# Patient Record
Sex: FEMALE | Race: WHITE | Employment: OTHER | URBAN - METROPOLITAN AREA
[De-identification: names, ages, dates, MRNs, and addresses within clinical notes are randomized per-mention and may not be internally consistent; named-entity substitution may affect disease eponyms.]

---

## 2017-02-09 RX ORDER — AMLODIPINE BESYLATE 5 MG/1
5 TABLET ORAL DAILY
Qty: 30 TAB | Refills: 2 | Status: SHIPPED | OUTPATIENT
Start: 2017-02-09 | End: 2017-03-15 | Stop reason: SDUPTHER

## 2017-02-15 ENCOUNTER — OFFICE VISIT (OUTPATIENT)
Dept: CARDIOLOGY CLINIC | Age: 67
End: 2017-02-15

## 2017-02-15 VITALS
HEART RATE: 58 BPM | BODY MASS INDEX: 35.18 KG/M2 | DIASTOLIC BLOOD PRESSURE: 70 MMHG | OXYGEN SATURATION: 97 % | WEIGHT: 179.2 LBS | HEIGHT: 60 IN | RESPIRATION RATE: 16 BRPM | SYSTOLIC BLOOD PRESSURE: 134 MMHG

## 2017-02-15 DIAGNOSIS — E78.5 HYPERLIPIDEMIA, UNSPECIFIED HYPERLIPIDEMIA TYPE: Chronic | ICD-10-CM

## 2017-02-15 DIAGNOSIS — I10 ESSENTIAL HYPERTENSION: ICD-10-CM

## 2017-02-15 DIAGNOSIS — E11.9 TYPE 2 DIABETES MELLITUS WITHOUT COMPLICATION, WITHOUT LONG-TERM CURRENT USE OF INSULIN (HCC): Chronic | ICD-10-CM

## 2017-02-15 DIAGNOSIS — I48.0 PAF (PAROXYSMAL ATRIAL FIBRILLATION) (HCC): Primary | ICD-10-CM

## 2017-02-15 NOTE — Clinical Note
Sandro Doll     1950       Jennifer Dubose MD, [facility] Date of Visit-2/15/2017 PCP is Amparo Faustin MD  
[facility] [place] [facility] HPI:  Sandro Doll is a 69 y.o. female Rosina Campbell returns for annual follow up with no specific complaints. Has gone to the gym, has done well. Her vision has improved with retinopathy. She continues on calcium channel blocker, ACE inhibitor and beta blocker. Impression/Plan: 1. Paroxysmal atrial fibrillation with known systolic murmur and occasional palpitations. 2. Now controlled hypertension. 3. Dyslipidemia, on statin trial triglycerides are improved. 4. Stress test 09/30/15 within normal limits, EF of 69%. 5. Follow up in one year. EKG 02/15/17 - sinus bradycardia at a rate of 58 with normal axis intervals. Assessment/Plan:    
 
 
 
  
Impression: 1. PAF (paroxysmal atrial fibrillation) ([facility] [address] [address] 2. Hyperlipidemia, unspecified hyperlipidemia type 3. Essential hypertension 4. Type 2 diabetes mellitus without complication, without long-term current use of insulin ([facility] [address] Cardiac History: In clinical trial= REDUCE-IT Study- \"A Multi-Center, Prospective, Randomized, Double-Blind, Placebo-Controlled, Parallel-Group Study to Evaluate the Effect of [id number] on Cardiovascular Health and Mortality in Hypertriglyceridemic Patients with Cardiovascular Disease or at [address] for Cardiovascular Disease: REDUCE-IT (Reduction of Cardiovascular Events with EPA- Intervention Trial) PAF Echo 2-5-13= normal 
Nuke 4-24-13= 6 minutes , normal , EF 80% NUKE-exercise nuclear stress test 9-30-15= 6'30\" EF 69% no EKG changes or ischemia, few PACs, normal study ROS-except as noted above. . A complete cardiac and respiratory are reviewed and negative except as above ; Resp-denies wheezing  or productive cough,.  Const- No unusual weight loss or fever; Neuro-no recent seizure or CVA ; GI- No BRBPR, abdom pain, bloating ; - no  hematuria  
see supplement sheet, initialed and to be scanned by staff Past Medical History:  
Diagnosis Date  Anemia  Cystocele  Diabetes ([facility] [address]  Diabetic neuropathy, painful ([facility] [address]  Hepatitis B   
 Hypercholesterolemia  Hypertension  Microalbuminuria  Mild nonproliferative diabetic retinopathy ([facility] [address]  PAF (paroxysmal atrial fibrillation) ([facility] [address] [facility] 3-24-13 ER-Rolf follows  Rectocele  Retinopathy  Rosacea  Ruptured disc, cervical   
 Stenosis, cervical spine Social Hx= reports that she has never smoked. She has never used smokeless tobacco. She reports that she does not drink alcohol or use illicit drugs. Exam and Labs:   
Visit Vitals  /70 (BP 1 Location: Left arm, BP Patient Position: Sitting)  Pulse (!) 58  Resp 16  
 Ht 5' (1.524 m)  Wt 179 lb 3.2 oz (81.3 kg)  SpO2 97%  BMI 35 kg/m2 Constitutional:  NAD, comfortable Head: NC,AT. Eyes: No scleral icterus. Neck:  Neck supple. No JVD present. Throat: moist mucous membranes. Chest: Effort normal & normal respiratory excursion . Neurological: alert, conversant and oriented . Skin: Skin is not cold. No obvious systemic rash noted. Not diaphoretic. No erythema. Psychiatric:  Grossly normal mood and affect. Behavior appears normal. Extremities:  no clubbing or cyanosis. Abdomen: non distended Lungs:breath sounds normal. No stridor. distress, wheezes or  Rales. Heart:normal rate, regular rhythm, normal S1, S2, no murmurs, rubs, clicks or gallops , PMI non displaced. Edema: Edema is none. Lab Results Component Value Date/Time Cholesterol, total 140 01/12/2016 12:28 PM  
 HDL Cholesterol 56 01/12/2016 12:28 PM  
 LDL, calculated 56 01/12/2016 12:28 PM  
 Triglyceride 141 01/12/2016 12:28 PM  
 CHOL/HDL Ratio 3.3 08/17/2010 03:47 AM  
 
No results found for this or any previous visit. Lab Results Component Value Date/Time Sodium 142 08/16/2016 10:45 AM  
 Potassium 4.8 08/16/2016 10:45 AM  
 Chloride 107 08/16/2016 10:45 AM  
 CO2 21 08/16/2016 10:45 AM  
 Anion gap 11 07/16/2016 12:26 PM  
 Glucose 97 08/16/2016 10:45 AM  
 BUN 25 08/16/2016 10:45 AM  
 Creatinine 0.89 08/16/2016 10:45 AM  
 BUN/Creatinine ratio 28 08/16/2016 10:45 AM  
 GFR est AA 77 08/16/2016 10:45 AM  
 GFR est non-AA 67 08/16/2016 10:45 AM  
 Calcium 9.1 08/16/2016 10:45 AM  
  
Wt Readings from Last 3 Encounters:  
02/16/17 177 lb 8 oz (80.5 kg) 02/15/17 179 lb 3.2 oz (81.3 kg)  
11/14/16 170 lb 4 oz (77.2 kg) BP Readings from Last 3 Encounters:  
02/16/17 119/62  
02/15/17 134/70  
11/14/16 121/80 Current Outpatient Prescriptions Medication Sig  
 amLODIPine (NORVASC) 5 mg tablet Take 1 Tab by mouth daily.  gabapentin (NEURONTIN) 100 mg capsule Take 100 mg by mouth nightly.  metFORMIN ER (GLUCOPHAGE XR) 500 mg tablet TAKE 4 TABLETS BY MOUTH DAILY WITH SUPPER  
 simvastatin (ZOCOR) 20 mg tablet TAKE 1 TABLET DAILY AT BEDTIME FOR CHOLESTEROL  lisinopril (PRINIVIL, ZESTRIL) 10 mg tablet TAKE ONE TABLET BY MOUTH EVERY DAY  famotidine (PEPCID) 20 mg tablet Take 20 mg by mouth daily as needed.  FREESTYLE LANCETS 28 gauge misc FOR USE IN LANCET DEVICE  metoprolol succinate (TOPROL-XL) 50 mg XL tablet TAKE ONE (1) TABLET(S) ONCE DAILY  prednisoLONE acetate (PRED FORTE) 1 % ophthalmic suspension Administer 1 Drop to both eyes daily.  glucose blood VI test strips (FREESTYLE TEST) strip Check fasting BS one time daily. Dx 250.00  loperamide (IMODIUM) 2 mg capsule Take  by mouth as needed.  methylcellulose, laxative, (CITRUCEL) powd Take  by mouth as needed.  oxybutynin chloride XL (DITROPAN XL) 10 mg CR tablet Take 10 mg by mouth daily.  cetirizine (ZYRTEC) 10 mg tablet Take  by mouth daily.  CALCIUM CARBONATE/VITAMIN D3 (CALCIUM + D PO) Take  by mouth.  MULTI-VITAMIN PO Take  by mouth.  aspirin delayed-release 81 mg tablet take 81 mg by mouth daily.  lisinopril (PRINIVIL, ZESTRIL) 10 mg tablet TAKE ONE TABLET BY MOUTH EVERY DAY  CALCIUM CARBONATE (TUMS PO) Take  by mouth daily as needed.  diclofenac EC (VOLTAREN) 50 mg EC tablet Take 1 Tab by mouth two (2) times a day. No current facility-administered medications for this visit. Impression see above.

## 2017-02-15 NOTE — MR AVS SNAPSHOT
Visit Information Date & Time Provider Department Dept. Phone Encounter #  
 2/15/2017 11:20 AM Celso Jorge MD [facility] [phone] [id number] Your Appointments 2/16/2017  1:15 PM  
ROUTINE CARE with Yousuf Landeros MD  
[facility] [address]) Appt Note: hip and shoulder pain-$0 cp dcosme 02/07/17  
 [address]  
  
   
 [address] [id number]  
  
    
 3/20/2017 10:00 AM  
ROUTINE CARE with Yousuf Landeros MD  
[facility] [address]) Appt Note: 5 mo BP and labs [address] [place] [address] [facility]  
[phone]  
  
    
 4/18/2017 10:00 AM  
RESEARCH with RESEARCH, STFRANCES  
[facility] ([address] [place] [address]) Appt Note: Reduce It Study/ Visit 7- Month 48/ No charge margee PE at 10:40  
 [address] [facility] [address]  
[phone]  
  
   
 [address] [facility] [address] [facility] [address]  
  
    
 4/18/2017 10:40 AM  
ESTABLISHED PATIENT with Sravanthi Atkinson NP  
[facility] ([address] [place] [address]) Appt Note: Reduce It study Physical Exam  
 [address] [facility]  
[phone]  
  
   
 [address] [facility] [address] [facility] [address]  
  
    
 2/21/2018 11:00 AM  
ESTABLISHED PATIENT with Celso Jorge MD  
[facility] ([address] [place] [address]) Appt Note: ANNAUL  
 [id number] [facility]  
[address] [address] [address] Upcoming Health Maintenance Date Due Hepatitis C Screening 1950 FOOT EXAM Q1 9/29/2014 COLONOSCOPY 8/26/2016 HEMOGLOBIN A1C Q6M 10/19/2016 LIPID PANEL Q1 1/12/2017 MEDICARE YEARLY EXAM 6/15/2017 MICROALBUMIN Q1 7/6/2017 EYE EXAM RETINAL OR DILATED Q1 10/19/2017 BREAST CANCER SCRN MAMMOGRAM 10/13/2018 GLAUCOMA SCREENING Q2Y 10/19/2018 DTaP/Tdap/Td series (2 - Td) 9/20/2023 Allergies as of 2/15/2017  Review Complete On: 2/15/2017 By: Mayelin Short LPN Severity Noted Reaction Type Reactions Sulfa (Sulfonamide Antibiotics)  06/10/2009    Unknown (comments) Current Immunizations  Reviewed on 11/1/2016 Name Date Influenza High Dose Vaccine PF 10/31/2016, 10/19/2015 Influenza Vaccine 10/6/2014, 9/20/2013 Pneumococcal Conjugate (PCV-13) 9/7/2016 Pneumococcal Polysaccharide (PPSV-23) 10/6/2014 TD Vaccine 1/29/2009 Tdap 9/20/2013 Zoster Vaccine, Live 6/14/2008 Not reviewed this visit You Were Diagnosed With   
  
 Codes Comments PAF (paroxysmal atrial fibrillation) ([facility]    -  Primary ICD-10-CM: I48.0 ICD-9-CM: 427.31 Hyperlipidemia, unspecified hyperlipidemia type     ICD-10-CM: E78.5 ICD-9-CM: 272.4 Vitals BP Pulse Resp Height(growth percentile) Weight(growth percentile) SpO2  
 134/70 (BP 1 Location: Left arm, BP Patient Position: Sitting) (!) 58 16 5' (1.524 m) 179 lb 3.2 oz (81.3 kg) 97% BMI OB Status Smoking Status 35 kg/m2 Postmenopausal Never Smoker BMI and BSA Data Body Mass Index Body Surface Area 35 kg/m 2 1.86 m 2 Preferred Pharmacy Pharmacy Name Phone [address] [phone] Your Updated Medication List  
  
   
This list is accurate as of: 2/15/17 12:02 PM.  Always use your most recent med list. amLODIPine 5 mg tablet Commonly known as:  Qureshi Mullet Take 1 Tab by mouth daily. aspirin delayed-release 81 mg tablet  
take 81 mg by mouth daily. CALCIUM + D PO Take  by mouth. CITRUCEL (SUCROSE) Powd Generic drug:  methylcellulose (laxative) Take  by mouth as needed. famotidine 20 mg tablet Commonly known as:  PEPCID Take 20 mg by mouth daily as needed. FREESTYLE LANCETS 28 gauge Misc Generic drug:  lancets FOR USE IN LANCET DEVICE  
  
 gabapentin 100 mg capsule Commonly known as:  NEURONTIN Take 100 mg by mouth nightly. glucose blood VI test strips strip Commonly known as:  FREESTYLE TEST Check fasting BS one time daily. Dx 250.00  
  
 lisinopril 10 mg tablet Commonly known as:  PRINIVIL, ZESTRIL  
TAKE ONE TABLET BY MOUTH EVERY DAY  
  
 loperamide 2 mg capsule Commonly known as:  IMODIUM Take  by mouth as needed. metFORMIN  mg tablet Commonly known as:  GLUCOPHAGE XR  
TAKE 4 TABLETS BY MOUTH DAILY WITH SUPPER  
  
 metoprolol succinate 50 mg XL tablet Commonly known as:  TOPROL-XL  
TAKE ONE (1) TABLET(S) ONCE DAILY MULTI-VITAMIN PO Take  by mouth. oxybutynin chloride XL 10 mg CR tablet Commonly known as:  DITROPAN XL Take 10 mg by mouth daily. prednisoLONE acetate 1 % ophthalmic suspension Commonly known as:  PRED FORTE Administer 1 Drop to both eyes daily. simvastatin 20 mg tablet Commonly known as:  ZOCOR  
TAKE 1 TABLET DAILY AT BEDTIME FOR CHOLESTEROL ZyrTEC 10 mg tablet Generic drug:  cetirizine Take  by mouth daily. We Performed the Following AMB POC EKG ROUTINE W/ 12 LEADS, INTER & REP [04453 CPT(R)] Patient Instructions Follow up with Dr. Ama Larios in 1 year Introducing [facility]! Dear Georgie Lopez: 
Thank you for requesting a [employer] account. Our records indicate that you already have an active [employer] account. You can access your account anytime at [email or web address] Did you know that you can access your hospital and ER discharge instructions at any time in [employer]? You can also review all of your test results from your hospital stay or ER visit. Additional Information If you have questions, please visit the Frequently Asked Questions section of the [email or web address] website at [email or web address] Remember, [employer] is NOT to be used for urgent needs. For medical emergencies, dial 911. Now available from your iPhone and Android! Please provide this summary of care documentation to your next provider. Your primary care clinician is listed as Ivon Gandhi. If you have any questions after today's visit, please call [phone].

## 2017-02-16 ENCOUNTER — OFFICE VISIT (OUTPATIENT)
Dept: INTERNAL MEDICINE CLINIC | Age: 67
End: 2017-02-16

## 2017-02-16 VITALS
OXYGEN SATURATION: 98 % | RESPIRATION RATE: 18 BRPM | DIASTOLIC BLOOD PRESSURE: 62 MMHG | TEMPERATURE: 98.1 F | HEART RATE: 50 BPM | BODY MASS INDEX: 34.85 KG/M2 | SYSTOLIC BLOOD PRESSURE: 119 MMHG | HEIGHT: 60 IN | WEIGHT: 177.5 LBS

## 2017-02-16 DIAGNOSIS — M76.32 IT BAND SYNDROME, LEFT: ICD-10-CM

## 2017-02-16 DIAGNOSIS — I10 ESSENTIAL HYPERTENSION: ICD-10-CM

## 2017-02-16 DIAGNOSIS — M70.72 HIP BURSITIS, LEFT: Primary | ICD-10-CM

## 2017-02-16 RX ORDER — DICLOFENAC SODIUM 50 MG/1
50 TABLET, DELAYED RELEASE ORAL 2 TIMES DAILY
Qty: 30 TAB | Refills: 0 | Status: SHIPPED | OUTPATIENT
Start: 2017-02-16 | End: 2017-03-20 | Stop reason: ALTCHOICE

## 2017-02-16 NOTE — PATIENT INSTRUCTIONS
Bursitis: Care Instructions  Your Care Instructions  A bursa is a small sac of fluid that helps the tissues around a joint slide over one another easily. Injury or overuse of a joint can cause pain, redness, and inflammation in the bursa (bursitis). Bursitis usually gets better if you avoid the activity that caused it. You can help prevent bursitis from coming back by doing stretching and strengthening exercises. You may also need to change the way you do some activities. Follow-up care is a key part of your treatment and safety. Be sure to make and go to all appointments, and call your doctor if you are having problems. Its also a good idea to know your test results and keep a list of the medicines you take. How can you care for yourself at home? · Put ice or a cold pack on the area for 10 to 20 minutes at a time. Try to do this every 1 to 2 hours for the next 3 days (when you are awake) or until the swelling goes down. Put a thin cloth between the ice and your skin. · After the 3 days of using ice, you may use heat on the area. You can use a hot water bottle; a warm, moist towel; or a heating pad set on low. You can also try alternating heat and ice. · Rest the area where you have pain. Stop any activities that cause pain. Switch to activities that do not stress the area. · Take pain medicines exactly as directed. ¨ If the doctor gave you a prescription medicine for pain, take it as prescribed. ¨ If you are not taking a prescription pain medicine, ask your doctor if you can take an over-the-counter medicine. ¨ Do not take two or more pain medicines at the same time unless the doctor told you to. Many pain medicines have acetaminophen, which is Tylenol. Too much acetaminophen (Tylenol) can be harmful. · To prevent stiffness, gently move the joint as much as you can without pain every day. As the pain gets better, keep doing range-of-motion exercises.  Ask your doctor for exercises that will make the muscles around the joint stronger. Do these as directed. · You can slowly return to the activity that caused the pain, but do it with less effort until you can do it without pain or swelling. Be sure to warm up before and stretch after you do the activity. When should you call for help? Call your doctor now or seek immediate medical care if:  · You get a fever and chills. · You have increased swelling or redness in a joint. · You cannot use a joint, or the pain in a joint gets worse. Watch closely for changes in your health, and be sure to contact your doctor if:  · You have pain for 2 weeks or longer despite home treatment. Where can you learn more? Go to http://lizabeth-jose raul.info/. Enter O620 in the search box to learn more about \"Bursitis: Care Instructions. \"  Current as of: May 23, 2016  Content Version: 11.1  © 2006-2016 [employer]. Care instructions adapted under license by [employer] (which disclaims liability or warranty for this information). If you have questions about a medical condition or this instruction, always ask your healthcare professional. [place] [address] any warranty or liability for your use of this information. Iliotibial Band Syndrome: Exercises  Your Care Instructions  Here are some examples of typical rehabilitation exercises for your condition. Start each exercise slowly. Ease off the exercise if you start to have pain. Your doctor or physical therapist will tell you when you can start these exercises and which ones will work best for you. How to do the exercises  Iliotibial band stretch    1. Lean sideways against a wall. If you are not steady on your feet, hold on to a chair or counter. 2. Stand on the leg with the affected hip, with that leg close to the wall. Then cross your other leg in front of it. 3. Let your affected hip drop out to the side of your body and against the wall.  Then lean away from your affected hip until you feel a stretch. 4. Hold the stretch for 15 to 30 seconds. 5. Repeat 2 to 4 times. Piriformis stretch    1. Lie on your back with your legs straight. 2. Lift your affected leg and bend your knee. With your opposite hand, reach across your body, and then gently pull your knee toward your opposite shoulder. 3. Hold the stretch for 15 to 30 seconds. 4. Repeat 2 to 4 times. Hamstring wall stretch    1. Lie on your back in a doorway, with your good leg through the open door. 2. Slide your affected leg up the wall to straighten your knee. You should feel a gentle stretch down the back of your leg. ¨ Do not arch your back. ¨ Do not bend either knee. ¨ Keep one heel touching the floor and the other heel touching the wall. Do not point your toes. 3. Hold the stretch for at least 1 minute to begin. Then try to lengthen the time you hold the stretch to as long as 6 minutes. 4. Repeat 2 to 4 times. If you do not have a place to do this exercise in a doorway, there is another way to do it:  1. Lie on your back, and bend the knee of your affected leg. 2. Loop a towel under the ball and toes of that foot, and hold the ends of the towel in your hands. 3. Straighten your knee, and slowly pull back on the towel. You should feel a gentle stretch down the back of your leg. 4. Hold the stretch for 15 to 30 seconds. Or even better, hold the stretch for 1 minute if you can. 5. Repeat 2 to 4 times. Follow-up care is a key part of your treatment and safety. Be sure to make and go to all appointments, and call your doctor if you are having problems. It's also a good idea to know your test results and keep a list of the medicines you take. Where can you learn more? Go to http://lizabeth-jose raul.info/. Enter P252 in the search box to learn more about \"Iliotibial Band Syndrome: Exercises. \"  Current as of: May 23, 2016  Content Version: 11.1  © 8006-1854 [employer], [facility].  Care instructions adapted under license by "[employer] (which disclaims liability or warranty for this information). If you have questions about a medical condition or this instruction, always ask your healthcare professional. [address] any warranty or liability for your use of this information.

## 2017-02-16 NOTE — MR AVS SNAPSHOT
Visit Information Date & Time Provider Department Dept. Phone Encounter #  
 2/16/2017  1:15 PM Bernardino Porter MD [facility] [id number] Follow-up Instructions Return if symptoms worsen or fail to improve. Your Appointments 3/20/2017 10:00 AM  
ROUTINE CARE with Bernardino Porter MD  
[facility] Appt Note: 5 mo BP and labs [address] [place] [address]  
  
   
 Jimi Merrill [address] [address]  
  
    
 4/18/2017 10:00 AM  
RESEARCH with RESEARCH, STFRANCES  
[facility] ([facility]) Appt Note: Reduce It Study/ Visit 7- Month 48/ No charge margmiguel PE at 10:40  
 [address] [place] [address] [facility] [address]  
[phone]  
  
   
 [address] [facility] [address] [place] [address] [address]  
  
    
 4/18/2017 10:40 AM  
ESTABLISHED PATIENT with Veena Aguilera NP  
[facility] ([facility]) Appt Note: Reduce It study Physical Exam  
 [address] [place] [address] [facility]  
[phone]  
  
   
 [address] [place] [address] [address]  
  
    
 2/21/2018 11:00 AM  
ESTABLISHED PATIENT with Inés Gan MD  
[facility] ([facility]) Appt Note: ANNAUL  
 [address] [place] [address] [address] [facility]  
[address] [place] [address] [address] Upcoming Health Maintenance Date Due Hepatitis C Screening 1950 FOOT EXAM Q1 9/29/2014 COLONOSCOPY 8/26/2016 HEMOGLOBIN A1C Q6M 10/19/2016 LIPID PANEL Q1 1/12/2017 MEDICARE YEARLY EXAM 6/15/2017 MICROALBUMIN Q1 7/6/2017 EYE EXAM RETINAL OR DILATED Q1 10/19/2017 BREAST CANCER SCRN MAMMOGRAM 10/13/2018 GLAUCOMA SCREENING Q2Y 10/19/2018 DTaP/Tdap/Td series (2 - Td) 9/20/2023 Allergies as of 2/16/2017  Review Complete On: 2/15/2017 By: Ulises Hammond LPN Severity Noted Reaction Type Reactions Sulfa (Sulfonamide Antibiotics)  06/10/2009    Unknown (comments) Current Immunizations  Reviewed on 11/1/2016 Name Date Influenza High Dose Vaccine PF 10/31/2016, 10/19/2015 Influenza Vaccine 10/6/2014, 9/20/2013 Pneumococcal Conjugate (PCV-13) 9/7/2016 Pneumococcal Polysaccharide (PPSV-23) 10/6/2014 TD Vaccine 1/29/2009 Tdap 9/20/2013 Zoster Vaccine, Live 6/14/2008 Not reviewed this visit You Were Diagnosed With   
  
 Codes Comments Hip bursitis, left    -  Primary ICD-10-CM: M70.72 ICD-9-CM: 726.5 IT band syndrome, left     ICD-10-CM: M76.32 
ICD-9-CM: 728.89 Essential hypertension     ICD-10-CM: I10 
ICD-9-CM: 401.9 Vitals BP Pulse Temp Resp Height(growth percentile) Weight(growth percentile)  
 119/62 (BP 1 Location: Left arm, BP Patient Position: Sitting) (!) 50 98.1 °F (36.7 °C) (Oral) 18 5' (1.524 m) 177 lb 8 oz (80.5 kg) SpO2 BMI OB Status Smoking Status 98% 34.67 kg/m2 Postmenopausal Never Smoker Vitals History BMI and BSA Data Body Mass Index Body Surface Area  
 34.67 kg/m 2 1.85 m 2 Preferred Pharmacy Pharmacy Name Phone [address] [phone] Your Updated Medication List  
  
   
This list is accurate as of: 2/16/17  1:53 PM.  Always use your most recent med list. amLODIPine 5 mg tablet Commonly known as:  Alnana Huston Take 1 Tab by mouth daily. aspirin delayed-release 81 mg tablet  
take 81 mg by mouth daily. CALCIUM + D PO Take  by mouth. CITRUCEL (SUCROSE) Powd Generic drug:  methylcellulose (laxative) Take  by mouth as needed. diclofenac EC 50 mg EC tablet Commonly known as:  VOLTAREN  
 Take 1 Tab by mouth two (2) times a day. famotidine 20 mg tablet Commonly known as:  PEPCID Take 20 mg by mouth daily as needed. FREESTYLE LANCETS 28 gauge Misc Generic drug:  lancets FOR USE IN LANCET DEVICE  
  
 gabapentin 100 mg capsule Commonly known as:  NEURONTIN Take 100 mg by mouth nightly. glucose blood VI test strips strip Commonly known as:  FREESTYLE TEST Check fasting BS one time daily. Dx 250.00  
  
 lisinopril 10 mg tablet Commonly known as:  PRINIVIL, ZESTRIL  
TAKE ONE TABLET BY MOUTH EVERY DAY  
  
 loperamide 2 mg capsule Commonly known as:  IMODIUM Take  by mouth as needed. metFORMIN  mg tablet Commonly known as:  GLUCOPHAGE XR  
TAKE 4 TABLETS BY MOUTH DAILY WITH SUPPER  
  
 metoprolol succinate 50 mg XL tablet Commonly known as:  TOPROL-XL  
TAKE ONE (1) TABLET(S) ONCE DAILY MULTI-VITAMIN PO Take  by mouth. oxybutynin chloride XL 10 mg CR tablet Commonly known as:  DITROPAN XL Take 10 mg by mouth daily. prednisoLONE acetate 1 % ophthalmic suspension Commonly known as:  PRED FORTE Administer 1 Drop to both eyes daily. simvastatin 20 mg tablet Commonly known as:  ZOCOR  
TAKE 1 TABLET DAILY AT BEDTIME FOR CHOLESTEROL  
  
 TUMS PO Take  by mouth daily as needed. ZyrTEC 10 mg tablet Generic drug:  cetirizine Take  by mouth daily. Prescriptions Sent to Pharmacy Refills  
 diclofenac EC (VOLTAREN) 50 mg EC tablet 0 Sig: Take 1 Tab by mouth two (2) times a day. Class: Normal  
 Pharmacy: [address] Ph #: [phone] Route: Oral  
  
We Performed the Following METABOLIC PANEL, BASIC [67097 CPT(R)] Follow-up Instructions Return if symptoms worsen or fail to improve. Patient Instructions Bursitis: Care Instructions Your Care Instructions A bursa is a small sac of fluid that helps the tissues around a joint slide over one another easily. Injury or overuse of a joint can cause pain, redness, and inflammation in the bursa (bursitis). Bursitis usually gets better if you avoid the activity that caused it. You can help prevent bursitis from coming back by doing stretching and strengthening exercises. You may also need to change the way you do some activities. Follow-up care is a key part of your treatment and safety. Be sure to make and go to all appointments, and call your doctor if you are having problems. Its also a good idea to know your test results and keep a list of the medicines you take. How can you care for yourself at home? · Put ice or a cold pack on the area for 10 to 20 minutes at a time. Try to do this every 1 to 2 hours for the next 3 days (when you are awake) or until the swelling goes down. Put a thin cloth between the ice and your skin. · After the 3 days of using ice, you may use heat on the area. You can use a hot water bottle; a warm, moist towel; or a heating pad set on low. You can also try alternating heat and ice. · Rest the area where you have pain. Stop any activities that cause pain. Switch to activities that do not stress the area. · Take pain medicines exactly as directed. ¨ If the doctor gave you a prescription medicine for pain, take it as prescribed. ¨ If you are not taking a prescription pain medicine, ask your doctor if you can take an over-the-counter medicine. ¨ Do not take two or more pain medicines at the same time unless the doctor told you to. Many pain medicines have acetaminophen, which is Tylenol. Too much acetaminophen (Tylenol) can be harmful. · To prevent stiffness, gently move the joint as much as you can without pain every day. As the pain gets better, keep doing range-of-motion exercises. Ask your doctor for exercises that will make the muscles around the joint stronger. Do these as directed. · You can slowly return to the activity that caused the pain, but do it with less effort until you can do it without pain or swelling. Be sure to warm up before and stretch after you do the activity. When should you call for help? Call your doctor now or seek immediate medical care if: 
· You get a fever and chills. · You have increased swelling or redness in a joint. · You cannot use a joint, or the pain in a joint gets worse. Watch closely for changes in your health, and be sure to contact your doctor if: 
· You have pain for 2 weeks or longer despite home treatment. Where can you learn more? Go to http://lizabeth-jose raul.info/. Enter H187 in the search box to learn more about \"Bursitis: Care Instructions. \" Current as of: May 23, 2016 Content Version: 11.1 © 3534-3256 [employer]. Care instructions adapted under license by [employer] (which disclaims liability or warranty for this information). If you have questions about a medical condition or this instruction, always ask your healthcare professional. [address] any warranty or liability for your use of this information. Iliotibial Band Syndrome: Exercises Your Care Instructions Here are some examples of typical rehabilitation exercises for your condition. Start each exercise slowly. Ease off the exercise if you start to have pain. Your doctor or physical therapist will tell you when you can start these exercises and which ones will work best for you. How to do the exercises Iliotibial band stretch 1. Lean sideways against a wall. If you are not steady on your feet, hold on to a chair or counter. 2. Stand on the leg with the affected hip, with that leg close to the wall. Then cross your other leg in front of it. 3. Let your affected hip drop out to the side of your body and against the wall. Then lean away from your affected hip until you feel a stretch. 4. Hold the stretch for 15 to 30 seconds. 5. Repeat 2 to 4 times. Piriformis stretch 1. Lie on your back with your legs straight. 2. Lift your affected leg and bend your knee. With your opposite hand, reach across your body, and then gently pull your knee toward your opposite shoulder. 3. Hold the stretch for 15 to 30 seconds. 4. Repeat 2 to 4 times. Hamstring wall stretch 1. Lie on your back in a doorway, with your good leg through the open door. 2. Slide your affected leg up the wall to straighten your knee. You should feel a gentle stretch down the back of your leg. ¨ Do not arch your back. ¨ Do not bend either knee. ¨ Keep one heel touching the floor and the other heel touching the wall. Do not point your toes. 3. Hold the stretch for at least 1 minute to begin. Then try to lengthen the time you hold the stretch to as long as 6 minutes. 4. Repeat 2 to 4 times. If you do not have a place to do this exercise in a doorway, there is another way to do it: 1. Lie on your back, and bend the knee of your affected leg. 2. Loop a towel under the ball and toes of that foot, and hold the ends of the towel in your hands. 3. Straighten your knee, and slowly pull back on the towel. You should feel a gentle stretch down the back of your leg. 4. Hold the stretch for 15 to 30 seconds. Or even better, hold the stretch for 1 minute if you can. 5. Repeat 2 to 4 times. Follow-up care is a key part of your treatment and safety. Be sure to make and go to all appointments, and call your doctor if you are having problems. It's also a good idea to know your test results and keep a list of the medicines you take. Where can you learn more? Go to http://lizabeth-jose raul.info/. Enter P252 in the search box to learn more about \"Iliotibial Band Syndrome: Exercises. \" Current as of: May 23, 2016 Content Version: 11.1 © 4918-0066 [employer].  Care instructions adapted under license by [address] (which disclaims liability or warranty for this information). If you have questions about a medical condition or this instruction, always ask your healthcare professional. [address] any warranty or liability for your use of this information. Introducing [facility]! Dear Trena Felty: 
Thank you for requesting a [employer] account. Our records indicate that you already have an active [employer] account. You can access your account anytime at [email or web address] Did you know that you can access your hospital and ER discharge instructions at any time in [employer]? You can also review all of your test results from your hospital stay or ER visit. Additional Information If you have questions, please visit the Frequently Asked Questions section of the [employer] website at [email or web address] Remember, [employer] is NOT to be used for urgent needs. For medical emergencies, dial 911. Now available from your iPhone and Android! Please provide this summary of care documentation to your next provider. Your primary care clinician is listed as Carlota Crawford. If you have any questions after today's visit, please call [phone].

## 2017-02-16 NOTE — PROGRESS NOTES
HISTORY OF PRESENT ILLNESS  Erica Brice is a 69 y.o. female. HPI  Problem visit:  Erica Brice is here for complaint of lateral left hip pain. Problem began 2 month(s) ago. Severity is moderate  Character of problem: moderate or more   Climbing stairs, lying on L side makes the problem worse. At rest makes the problem better. Associated symptoms include: no lower back pain. Occasional lateral knee pain  Treatments tried include: Ibuprofen, Naprosyn used but not effective      ROS    Physical Exam   Musculoskeletal:        Left hip: She exhibits tenderness. She exhibits normal range of motion, normal strength, no swelling, no crepitus and no deformity. Legs:  L GT tenderness fairly localized. Visit Vitals    /62 (BP 1 Location: Left arm, BP Patient Position: Sitting)    Pulse (!) 50    Temp 98.1 °F (36.7 °C) (Oral)    Resp 18    Ht 5' (1.524 m)    Wt 177 lb 8 oz (80.5 kg)    SpO2 98%    BMI 34.67 kg/m2         ASSESSMENT and PLAN  Gianfranco Carrington was seen today for hip pain. Diagnoses and all orders for this visit:    Hip bursitis, left - trial of nsaids x 2 weeks, stretching exercises given for IT band. nonweight bearing exercise for now. The patient was given written instructions detailing her diagnoses and recommendations made today at the visit. Consider steroid injection and PT if not improving  -     diclofenac EC (VOLTAREN) 50 mg EC tablet; Take 1 Tab by mouth two (2) times a day. IT band syndrome, left  -     diclofenac EC (VOLTAREN) 50 mg EC tablet; Take 1 Tab by mouth two (2) times a day. Essential hypertension -Well controlled and stable. her medications were reviewed and refilled where necessary as noted below. Labs ordered as noted. -     METABOLIC PANEL, BASIC      Follow-up Disposition:  Return if symptoms worsen or fail to improve.

## 2017-02-23 DIAGNOSIS — I10 ESSENTIAL HYPERTENSION WITH GOAL BLOOD PRESSURE LESS THAN 130/80: ICD-10-CM

## 2017-02-24 RX ORDER — LISINOPRIL 10 MG/1
TABLET ORAL
Qty: 30 TAB | Refills: 5 | Status: SHIPPED | OUTPATIENT
Start: 2017-02-24 | End: 2017-08-22 | Stop reason: SDUPTHER

## 2017-02-28 NOTE — PROGRESS NOTES
Cindi Bruce returns for annual follow up with no specific complaints. Has gone to the gym, has done well. Her vision has improved with retinopathy. She continues on calcium channel blocker, ACE inhibitor and beta blocker. Impression/Plan:  1. Paroxysmal atrial fibrillation with known systolic murmur and occasional palpitations. 2. Now controlled hypertension. 3. Dyslipidemia, on statin trial triglycerides are improved. 4. Stress test 09/30/15 within normal limits, EF of 69%. 5. Follow up in one year. EKG 02/15/17 - sinus bradycardia at a rate of 58 with normal axis intervals.

## 2017-02-28 NOTE — PROGRESS NOTES
Evelin Rojo     1950       Jennifer Demarco MD, [facility]  Date of Visit-2/15/2017   PCP is Filipe Carrillo MD   [facility]  [facility]  HPI:  Evelin Rojo is a 69 y.o. female    Dictation on: 02/27/2017  7:38 PM by: Alo Burnette [[id number]]         Assessment/Plan:              Impression:   1. PAF (paroxysmal atrial fibrillation) ([address]    2. Hyperlipidemia, unspecified hyperlipidemia type    3. Essential hypertension    4. Type 2 diabetes mellitus without complication, without long-term current use of insulin ([address]       Cardiac History: In clinical trial= REDUCE-IT Study- \"A Multi-Center, Prospective, Randomized, Double-Blind, Placebo-Controlled, Parallel-Group Study to Evaluate the Effect of MYS147 on Cardiovascular Health and Mortality in Hypertriglyceridemic Patients with Cardiovascular Disease or at High Risk for Cardiovascular Disease: REDUCE-IT (Reduction of Cardiovascular Events with EPA- Intervention Trial)     PAF  Echo 2-5-13= normal  Nuke 4-24-13= 6 minutes , normal , EF 80%  NUKE-exercise nuclear stress test 9-30-15= 6'30\" EF 69% no EKG changes or ischemia, few PACs, normal study     ROS-except as noted above. . A complete cardiac and respiratory are reviewed and negative except as above ; Resp-denies wheezing  or productive cough,.  Const- No unusual weight loss or fever; Neuro-no recent seizure or CVA ; GI- No BRBPR, abdom pain, bloating ; - no  hematuria   see supplement sheet, initialed and to be scanned by staff  Past Medical History:   Diagnosis Date    Anemia     Cystocele     Diabetes ([address]     Diabetic neuropathy, painful ([address]     Hepatitis B     Hypercholesterolemia     Hypertension     Microalbuminuria     Mild nonproliferative diabetic retinopathy ([address]     PAF (paroxysmal atrial fibrillation) ([address]     Chip 3-24-13 ER-Rolf follows    Rectocele     Retinopathy     Rosacea     Ruptured disc, cervical  Stenosis, cervical spine       Social Hx= reports that she has never smoked. She has never used smokeless tobacco. She reports that she does not drink alcohol or use illicit drugs. Exam and Labs:    Visit Vitals    /70 (BP 1 Location: Left arm, BP Patient Position: Sitting)    Pulse (!) 58    Resp 16    Ht 5' (1.524 m)    Wt 179 lb 3.2 oz (81.3 kg)    SpO2 97%    BMI 35 kg/m2      Constitutional:  NAD, comfortable  Head: NC,AT. Eyes: No scleral icterus. Neck:  Neck supple. No JVD present. Throat: moist mucous membranes. Chest: Effort normal & normal respiratory excursion . Neurological: alert, conversant and oriented . Skin: Skin is not cold. No obvious systemic rash noted. Not diaphoretic. No erythema. Psychiatric:  Grossly normal mood and affect. Behavior appears normal. Extremities:  no clubbing or cyanosis. Abdomen: non distended    Lungs:breath sounds normal. No stridor. distress, wheezes or  Rales. Heart:normal rate, regular rhythm, normal S1, S2, no murmurs, rubs, clicks or gallops , PMI non displaced. Edema: Edema is none. Lab Results   Component Value Date/Time    Cholesterol, total 140 01/12/2016 12:28 PM    HDL Cholesterol 56 01/12/2016 12:28 PM    LDL, calculated 56 01/12/2016 12:28 PM    Triglyceride 141 01/12/2016 12:28 PM    CHOL/HDL Ratio 3.3 08/17/2010 03:47 AM     No results found for this or any previous visit.    Lab Results   Component Value Date/Time    Sodium 142 08/16/2016 10:45 AM    Potassium 4.8 08/16/2016 10:45 AM    Chloride 107 08/16/2016 10:45 AM    CO2 21 08/16/2016 10:45 AM    Anion gap 11 07/16/2016 12:26 PM    Glucose 97 08/16/2016 10:45 AM    BUN 25 08/16/2016 10:45 AM    Creatinine 0.89 08/16/2016 10:45 AM    BUN/Creatinine ratio 28 08/16/2016 10:45 AM    GFR est AA 77 08/16/2016 10:45 AM    GFR est non-AA 67 08/16/2016 10:45 AM    Calcium 9.1 08/16/2016 10:45 AM      Wt Readings from Last 3 Encounters:   02/16/17 177 lb 8 oz (80.5 kg)   02/15/17 179 lb 3.2 oz (81.3 kg)   11/14/16 170 lb 4 oz (77.2 kg)      BP Readings from Last 3 Encounters:   02/16/17 119/62   02/15/17 134/70   11/14/16 121/80        Current Outpatient Prescriptions   Medication Sig    amLODIPine (NORVASC) 5 mg tablet Take 1 Tab by mouth daily.  gabapentin (NEURONTIN) 100 mg capsule Take 100 mg by mouth nightly.  metFORMIN ER (GLUCOPHAGE XR) 500 mg tablet TAKE 4 TABLETS BY MOUTH DAILY WITH SUPPER    simvastatin (ZOCOR) 20 mg tablet TAKE 1 TABLET DAILY AT BEDTIME FOR CHOLESTEROL    lisinopril (PRINIVIL, ZESTRIL) 10 mg tablet TAKE ONE TABLET BY MOUTH EVERY DAY    famotidine (PEPCID) 20 mg tablet Take 20 mg by mouth daily as needed.  FREESTYLE LANCETS 28 gauge misc FOR USE IN LANCET DEVICE    metoprolol succinate (TOPROL-XL) 50 mg XL tablet TAKE ONE (1) TABLET(S) ONCE DAILY    prednisoLONE acetate (PRED FORTE) 1 % ophthalmic suspension Administer 1 Drop to both eyes daily.  glucose blood VI test strips (FREESTYLE TEST) strip Check fasting BS one time daily. Dx 250.00    loperamide (IMODIUM) 2 mg capsule Take  by mouth as needed.  methylcellulose, laxative, (CITRUCEL) powd Take  by mouth as needed.  oxybutynin chloride XL (DITROPAN XL) 10 mg CR tablet Take 10 mg by mouth daily.  cetirizine (ZYRTEC) 10 mg tablet Take  by mouth daily.  CALCIUM CARBONATE/VITAMIN D3 (CALCIUM + D PO) Take  by mouth.  MULTI-VITAMIN PO Take  by mouth.  aspirin delayed-release 81 mg tablet take 81 mg by mouth daily.  lisinopril (PRINIVIL, ZESTRIL) 10 mg tablet TAKE ONE TABLET BY MOUTH EVERY DAY    CALCIUM CARBONATE (TUMS PO) Take  by mouth daily as needed.  diclofenac EC (VOLTAREN) 50 mg EC tablet Take 1 Tab by mouth two (2) times a day. No current facility-administered medications for this visit. Impression see above.

## 2017-03-14 RX ORDER — BLOOD SUGAR DIAGNOSTIC
STRIP MISCELLANEOUS
Qty: 100 STRIP | Refills: 11 | Status: SHIPPED | OUTPATIENT
Start: 2017-03-14 | End: 2017-03-20 | Stop reason: SDUPTHER

## 2017-03-15 RX ORDER — AMLODIPINE BESYLATE 5 MG/1
5 TABLET ORAL DAILY
Qty: 30 TAB | Refills: 11 | Status: SHIPPED | OUTPATIENT
Start: 2017-03-15 | End: 2017-08-22 | Stop reason: SDUPTHER

## 2017-03-15 RX ORDER — METOPROLOL SUCCINATE 50 MG/1
TABLET, EXTENDED RELEASE ORAL
Qty: 30 TAB | Refills: 11 | Status: SHIPPED | OUTPATIENT
Start: 2017-03-15 | End: 2017-08-22 | Stop reason: SDUPTHER

## 2017-03-15 NOTE — TELEPHONE ENCOUNTER
Patient leaving to go out of town and needs to pick this up this week.       Pharmacy verified     30 day supply with refills     Thank you, Wale Villalobos

## 2017-03-20 ENCOUNTER — TELEPHONE (OUTPATIENT)
Dept: INTERNAL MEDICINE CLINIC | Age: 67
End: 2017-03-20

## 2017-03-20 ENCOUNTER — OFFICE VISIT (OUTPATIENT)
Dept: INTERNAL MEDICINE CLINIC | Age: 67
End: 2017-03-20

## 2017-03-20 ENCOUNTER — HOSPITAL ENCOUNTER (OUTPATIENT)
Dept: LAB | Age: 67
Discharge: HOME | End: 2017-03-20
Payer: MEDICARE

## 2017-03-20 VITALS
HEART RATE: 53 BPM | BODY MASS INDEX: 35.76 KG/M2 | DIASTOLIC BLOOD PRESSURE: 66 MMHG | OXYGEN SATURATION: 98 % | TEMPERATURE: 98 F | WEIGHT: 182.13 LBS | RESPIRATION RATE: 18 BRPM | HEIGHT: 60 IN | SYSTOLIC BLOOD PRESSURE: 130 MMHG

## 2017-03-20 DIAGNOSIS — L30.9 ECZEMA, UNSPECIFIED TYPE: ICD-10-CM

## 2017-03-20 DIAGNOSIS — E11.9 TYPE 2 DIABETES MELLITUS WITH HEMOGLOBIN A1C GOAL OF LESS THAN 7.0% (HCC): ICD-10-CM

## 2017-03-20 DIAGNOSIS — N34.2 INFECTIVE URETHRITIS: ICD-10-CM

## 2017-03-20 DIAGNOSIS — E78.5 HYPERLIPIDEMIA, UNSPECIFIED HYPERLIPIDEMIA TYPE: Chronic | ICD-10-CM

## 2017-03-20 DIAGNOSIS — Z11.59 NEED FOR HEPATITIS C SCREENING TEST: ICD-10-CM

## 2017-03-20 DIAGNOSIS — N34.3 DYSURIA-FREQUENCY SYNDROME: ICD-10-CM

## 2017-03-20 DIAGNOSIS — E11.9 TYPE 2 DIABETES MELLITUS WITH HEMOGLOBIN A1C GOAL OF LESS THAN 7.0% (HCC): Primary | ICD-10-CM

## 2017-03-20 DIAGNOSIS — I10 ESSENTIAL HYPERTENSION: Primary | ICD-10-CM

## 2017-03-20 LAB
BILIRUB UR QL STRIP: NEGATIVE
GLUCOSE UR-MCNC: NEGATIVE MG/DL
KETONES P FAST UR STRIP-MCNC: NEGATIVE MG/DL
PH UR STRIP: 5.5 [PH] (ref 4.6–8)
PROT UR QL STRIP: NEGATIVE MG/DL
SP GR UR STRIP: 1.01 (ref 1–1.03)
UA UROBILINOGEN AMB POC: NORMAL (ref 0.2–1)
URINALYSIS CLARITY POC: NORMAL
URINALYSIS COLOR POC: NORMAL
URINE BLOOD POC: NORMAL
URINE LEUKOCYTES POC: NORMAL
URINE NITRITES POC: NEGATIVE

## 2017-03-20 PROCEDURE — 80048 BASIC METABOLIC PNL TOTAL CA: CPT

## 2017-03-20 PROCEDURE — 36415 COLL VENOUS BLD VENIPUNCTURE: CPT

## 2017-03-20 PROCEDURE — 86803 HEPATITIS C AB TEST: CPT

## 2017-03-20 PROCEDURE — 83036 HEMOGLOBIN GLYCOSYLATED A1C: CPT

## 2017-03-20 PROCEDURE — 80061 LIPID PANEL: CPT

## 2017-03-20 RX ORDER — NITROFURANTOIN 25; 75 MG/1; MG/1
100 CAPSULE ORAL 2 TIMES DAILY
Qty: 14 CAP | Refills: 0 | Status: SHIPPED | OUTPATIENT
Start: 2017-03-20 | End: 2017-05-08 | Stop reason: ALTCHOICE

## 2017-03-20 RX ORDER — FLUOCINONIDE CREAM (EMULSIFIED BASE) 0.5 MG/G
CREAM TOPICAL 2 TIMES DAILY
Qty: 30 G | Refills: 0 | Status: SHIPPED | OUTPATIENT
Start: 2017-03-20 | End: 2017-09-27 | Stop reason: ALTCHOICE

## 2017-03-20 NOTE — TELEPHONE ENCOUNTER
Call patient in regards to her UTI results.   She states she left before knowing and she didn't know if she needed an antibiotic.   [phone]

## 2017-03-20 NOTE — PROGRESS NOTES
HISTORY OF PRESENT ILLNESS  Kvng Gonzales is a 69 y.o. female. HPI  Diabetes:  She is here for follow up of diabetes. Proteinuria: no  Neuropathy: yes  Medication change since last visit:  No   Diabetic Review of Systems - medication compliance: compliant all of the time, diabetic diet compliance: compliant all of the time. Lab Results   Component Value Date/Time    Hemoglobin A1c 5.8 07/16/2015 10:28 AM    Hemoglobin A1c (POC) 5.7 01/12/2016 11:40 AM    Hemoglobin A1c, External 5.6 04/19/2016     Lab Results   Component Value Date/Time    Microalbumin/Creat ratio (mg/g creat) 14 12/16/2009 10:00 AM    Microalb/Creat ratio (ug/mg creat.) <2.7 07/06/2016 09:27 AM    Microalbumin,urine random 1.77 12/16/2009 10:00 AM    Microalbumin, urine <3.0 07/06/2016 09:27 AM         Last Point of Care HGB A1C  Hemoglobin A1c (POC)   Date Value Ref Range Status   01/12/2016 5.7 % Final            Urinary Problems:  Kvng Gonzales is a 69 y.o. female who complains of frequency/urgency x 14 days, without flank pain, fever, chills, nausea or vomiting. Urine has not been cloudy/foul smelling.  her symptoms are mild. she is not drinking plenty of fluids for hydration. her history is significant for: hyperactive bladder . reports that she does not currently engage in sexual activity. .    No LMP recorded. Patient is postmenopausal.    Hypertension:  Kvng Gonzales is a 69 y.o. female with hypertension. with Chronic kidney disease stage 2   Medication change since last visit: No  The patient reports:  taking medications as instructed, no medication side effects noted, no chest pain on exertion, no dyspnea on exertion, no swelling of ankles, no orthostatic dizziness or lightheadedness, no palpitations.        Lifestyle modification/social history: generally follows a low fat low cholesterol diet, generally follows a low sodium diet    Lab Results   Component Value Date/Time    Sodium 142 08/16/2016 10:45 AM Potassium 4.8 08/16/2016 10:45 AM    Chloride 107 08/16/2016 10:45 AM    CO2 21 08/16/2016 10:45 AM    Anion gap 11 07/16/2016 12:26 PM    Glucose 97 08/16/2016 10:45 AM    BUN 25 08/16/2016 10:45 AM    Creatinine 0.89 08/16/2016 10:45 AM    BUN/Creatinine ratio 28 08/16/2016 10:45 AM    GFR est AA 77 08/16/2016 10:45 AM    GFR est non-AA 67 08/16/2016 10:45 AM    Calcium 9.1 08/16/2016 10:45 AM     Rash:  she presents with rash/ skin problem located on L lateral breast .  Onset of skin problem was several weeks ago. Itching: yes. Flaking or scaling:no. Pain: no .  Weeping/ draining:  no.   Exposures: no  Medications tried include:  none   she does not have a history of eczema    Last mammogram 10/16:  STUDY: [facility] MAMMO BI SCREENING DIGTL     INDICATION: Screening.     COMPARISON: priors dating back to 2007     BREAST COMPOSITION: scattered fibroglandular densities     FINDINGS: Bilateral digital screening mammography was performed, and is  interpreted in conjunction with a computer assisted detection (CAD) system. No  suspicious masses or calcifications are identified. There is no skin thickening  or nipple retraction. There has been no significant change.     IMPRESSION  IMPRESSION:     BIRADS 1: Negative. No mammographic evidence of malignancy.      Next screening mammogram is recommended in one year. The patient will be  notified of these results.         Hyperlipidemia:  Og Braun is following up on her dyslipidemia. Cardiovascular risks for her are: LDL goal is under 80  diabetic  hypertension  obese  sedentary lifestyle.    Currently she takes Zocor (simvastatin) ,   Lab Results   Component Value Date/Time    Cholesterol, total 140 01/12/2016 12:28 PM    Cholesterol, total 144 07/16/2015 10:28 AM    Cholesterol, total 161 08/15/2014 02:23 PM    Cholesterol, total 158 03/21/2014 10:28 AM    Cholesterol, total 147 04/24/2013 12:46 PM    HDL Cholesterol 56 01/12/2016 12:28 PM    HDL Cholesterol 42 07/16/2015 10:28 AM    HDL Cholesterol 46 08/15/2014 02:23 PM    HDL Cholesterol 45 03/21/2014 10:28 AM    HDL Cholesterol 45 04/24/2013 12:46 PM    LDL, calculated 56 01/12/2016 12:28 PM    LDL, calculated 63 07/16/2015 10:28 AM    LDL, calculated 56 08/15/2014 02:23 PM    LDL, calculated 69 03/21/2014 10:28 AM    LDL, calculated 51 04/24/2013 12:46 PM    Triglyceride 141 01/12/2016 12:28 PM    Triglyceride 193 07/16/2015 10:28 AM    Triglyceride 294 08/15/2014 02:23 PM    Triglyceride 221 03/21/2014 10:28 AM    Triglyceride 254 04/24/2013 12:46 PM    CHOL/HDL Ratio 3.3 08/17/2010 03:47 AM    CHOL/HDL Ratio 3.3 12/16/2009 08:00 PM    CHOL/HDL Ratio 3.0 06/02/2009 08:47 PM     Lab Results   Component Value Date/Time    ALT (SGPT) 14 07/25/2016 01:04 PM    AST (SGOT) 14 07/25/2016 01:04 PM    Alk. phosphatase 62 07/25/2016 01:04 PM    Bilirubin, total <0.2 07/25/2016 01:04 PM       Myalgias: no  Fatigue: no                ROS    Physical Exam   Constitutional: She appears well-developed and well-nourished. No distress. /66 (BP 1 Location: Left arm, BP Patient Position: Sitting)  Pulse (!) 53  Temp 98 °F (36.7 °C) (Oral)   Resp 18  Ht 5' (1.524 m)  Wt 182 lb 2 oz (82.6 kg)  SpO2 98%  BMI 35.57 kg/m2Body mass index is 35.57 kg/(m^2). HENT:   Mouth/Throat: Oropharynx is clear and moist.   Neck: No JVD present. Carotid bruit is not present. Cardiovascular: Normal rate, regular rhythm and intact distal pulses. Murmur heard. Systolic murmur is present with a grade of 2/6   Pulses:       Posterior tibial pulses are 1+ on the right side, and 1+ on the left side. Pulmonary/Chest: Effort normal and breath sounds normal.       3 cm dry erythematous patch c/w eczema - L lateral breast   Musculoskeletal: She exhibits no edema. Neurological: She is alert. Skin: Skin is warm and dry. She is not diaphoretic. Nursing note and vitals reviewed.     Urine dipstick shows positive for small amt leukocytes, trace red blood cells. ASSESSMENT and PLAN  Mag Lane was seen today for hypertension. Diagnoses and all orders for this visit:    Essential hypertension - Well controlled and stable. her medications were reviewed and refilled where necessary as noted below. Labs ordered as noted. -     METABOLIC PANEL, BASIC    Dysuria-frequency syndrome    -     AMB POC URINALYSIS DIP STICK MANUAL W/O MICRO    Hyperlipidemia, unspecified hyperlipidemia type - recheck now  -     LIPID PANEL    Type 2 diabetes mellitus with hemoglobin A1c goal of less than 7.0% ([facility]) -Well controlled and stable. her medications were reviewed and refilled where necessary as noted below. Labs ordered as noted. -     HEMOGLOBIN A1C WITH EAG    Eczema, unspecified type - trial of steroid cream.  Call if not resolving.    -     fluocinonide-emollient (LIDEX-E) 0.05 % topical cream; Apply  to affected area two (2) times a day. Need for hepatitis C screening test  -     HEPATITIS C AB    Infective urethritis  -     nitrofurantoin, macrocrystal-monohydrate, (MACROBID) 100 mg capsule; Take 1 Cap by mouth two (2) times a day. Follow-up Disposition:  Return in about 6 months (around 9/20/2017).

## 2017-03-20 NOTE — MR AVS SNAPSHOT
Visit Information Date & Time Provider Department Dept. Phone Encounter #  
 3/20/2017 10:00 AM Silvia Arrieta MD [facility] [address]  [id number] Follow-up Instructions Return in about 6 months (around 9/20/2017). Your Appointments 4/18/2017 10:00 AM  
RESEARCH with RESEARCH, STFRANCES  
[facility] ([address] [place] [address]) Appt Note: Reduce It Study/ Visit 7- Month 48/ No charge margmiguel PE at 10:40  
 [address] [facility] [address]  
[phone]  
  
   
 [address] [facility] [address]  
  
    
 4/18/2017 10:40 AM  
ESTABLISHED PATIENT with Jyotsna Snow NP  
[facility]ULAR [facility] ([address] [place] [address]) Appt Note: Reduce It study Physical Exam  
 [address] [facility]  
[phone]  
  
   
 [address] [facility] [address] [facility] [address] [address]  
  
    
 2/21/2018 11:00 AM  
ESTABLISHED PATIENT with Isidro Londono MD  
[facility] ([address] [place] [address]) Appt Note: ANNAUL  
 [address] [facility]  
[address] [place] [address] [address] Upcoming Health Maintenance Date Due Hepatitis C Screening 1950 FOOT EXAM Q1 9/29/2014 COLONOSCOPY 8/26/2016 HEMOGLOBIN A1C Q6M 10/19/2016 LIPID PANEL Q1 1/12/2017 MEDICARE YEARLY EXAM 6/15/2017 MICROALBUMIN Q1 7/6/2017 EYE EXAM RETINAL OR DILATED Q1 10/19/2017 BREAST CANCER SCRN MAMMOGRAM 10/13/2018 GLAUCOMA SCREENING Q2Y 10/19/2018 DTaP/Tdap/Td series (2 - Td) 9/20/2023 Allergies as of 3/20/2017  Review Complete On: 2/27/2017 By: Isidro Londono MD  
  
 Severity Noted Reaction Type Reactions Sulfa (Sulfonamide Antibiotics)  06/10/2009    Unknown (comments) Current Immunizations  Reviewed on 11/1/2016 Name Date Influenza High Dose Vaccine PF 10/31/2016, 10/19/2015 Influenza Vaccine 10/6/2014, 9/20/2013 Pneumococcal Conjugate (PCV-13) 9/7/2016 Pneumococcal Polysaccharide (PPSV-23) 10/6/2014 TD Vaccine 1/29/2009 Tdap 9/20/2013 Zoster Vaccine, Live 6/14/2008 Not reviewed this visit You Were Diagnosed With   
  
 Codes Comments Essential hypertension    -  Primary ICD-10-CM: I10 
ICD-9-CM: 401.9 Dysuria-frequency syndrome     ICD-10-CM: N34.3 ICD-9-CM: 597.81 Hyperlipidemia, unspecified hyperlipidemia type     ICD-10-CM: E78.5 ICD-9-CM: 272.4 Type 2 diabetes mellitus with hemoglobin A1c goal of less than 7.0% (HCC)     ICD-10-CM: E11.9 ICD-9-CM: 250.00 Eczema, unspecified type     ICD-10-CM: L30.9 ICD-9-CM: 692.9 Need for hepatitis C screening test     ICD-10-CM: Z11.59 
ICD-9-CM: V73.89 Vitals BP Pulse Temp Resp Height(growth percentile) Weight(growth percentile) 130/66 (BP 1 Location: Left arm, BP Patient Position: Sitting) (!) 53 98 °F (36.7 °C) (Oral) 18 5' (1.524 m) 182 lb 2 oz (82.6 kg) SpO2 BMI OB Status Smoking Status 98% 35.57 kg/m2 Postmenopausal Never Smoker BMI and BSA Data Body Mass Index Body Surface Area 35.57 kg/m 2 1.87 m 2 Preferred Pharmacy Pharmacy Name Phone [address] Katheren Goodpasture [phone] Your Updated Medication List  
  
   
This list is accurate as of: 3/20/17 10:35 AM.  Always use your most recent med list. amLODIPine 5 mg tablet Commonly known as:  Audrey Richmond Take 1 Tab by mouth daily. aspirin delayed-release 81 mg tablet  
take 81 mg by mouth daily. CALCIUM + D PO Take  by mouth. CITRUCEL (SUCROSE) Powd Generic drug:  methylcellulose (laxative) Take  by mouth as needed. famotidine 20 mg tablet Commonly known as:  PEPCID Take 20 mg by mouth daily as needed. fluocinonide-emollient 0.05 % topical cream  
Commonly known as:  LIDEX-E Apply  to affected area two (2) times a day. FREESTYLE LANCETS 28 gauge Misc Generic drug:  lancets FOR USE IN LANCET DEVICE FREESTYLE TEST strip Generic drug:  glucose blood VI test strips USE TO CHECK FASTING BLOOD SUGAR ONE TIME DAILY * lisinopril 10 mg tablet Commonly known as:  PRINIVIL, ZESTRIL  
TAKE ONE TABLET BY MOUTH EVERY DAY  
  
 * lisinopril 10 mg tablet Commonly known as:  PRINIVIL, ZESTRIL  
TAKE ONE TABLET BY MOUTH EVERY DAY  
  
 loperamide 2 mg capsule Commonly known as:  IMODIUM Take  by mouth as needed. metFORMIN  mg tablet Commonly known as:  GLUCOPHAGE XR  
TAKE 4 TABLETS BY MOUTH DAILY WITH SUPPER  
  
 metoprolol succinate 50 mg XL tablet Commonly known as:  TOPROL-XL  
TAKE ONE (1) TABLET(S) ONCE DAILY MULTI-VITAMIN PO Take  by mouth. oxybutynin chloride XL 10 mg CR tablet Commonly known as:  DITROPAN XL Take 10 mg by mouth daily. prednisoLONE acetate 1 % ophthalmic suspension Commonly known as:  PRED FORTE Administer 1 Drop to both eyes daily. simvastatin 20 mg tablet Commonly known as:  ZOCOR  
TAKE 1 TABLET DAILY AT BEDTIME FOR CHOLESTEROL  
  
 TUMS PO Take  by mouth daily as needed. ZyrTEC 10 mg tablet Generic drug:  cetirizine Take  by mouth daily. * Notice: This list has 2 medication(s) that are the same as other medications prescribed for you. Read the directions carefully, and ask your doctor or other care provider to review them with you. Prescriptions Sent to Pharmacy Refills  
 fluocinonide-emollient (LIDEX-E) 0.05 % topical cream 0 Sig: Apply  to affected area two (2) times a day. Class: Normal  
 Pharmacy: [facility] [address] #: [phone] Route: Topical  
  
We Performed the Following HEMOGLOBIN A1C WITH EAG [17101 CPT(R)] HEPATITIS C AB [85187 CPT(R)] LIPID PANEL [54728 CPT(R)] METABOLIC PANEL, BASIC [27990 CPT(R)] Follow-up Instructions Return in about 6 months (around 9/20/2017). Introducing [facility] & HEALTH SERVICES! Dear Deanna Dejesus: 
Thank you for requesting a [email or web address] account. Our records indicate that you already have an active [email or web address] account. You can access your account anytime at [email or web address] Did you know that you can access your hospital and ER discharge instructions at any time in [email or web address]? You can also review all of your test results from your hospital stay or ER visit. Additional Information If you have questions, please visit the Frequently Asked Questions section of the [email or web address] website at [email or web address] Remember, [email or web address] is NOT to be used for urgent needs. For medical emergencies, dial 911. Now available from your iPhone and Android! Please provide this summary of care documentation to your next provider. Your primary care clinician is listed as Lauryn Roland. If you have any questions after today's visit, please call [phone].

## 2017-03-21 LAB
BUN SERPL-MCNC: 34 MG/DL (ref 8–27)
BUN/CREAT SERPL: 26 (ref 11–26)
CALCIUM SERPL-MCNC: 9.1 MG/DL (ref 8.7–10.3)
CHLORIDE SERPL-SCNC: 104 MMOL/L (ref 96–106)
CHOLEST SERPL-MCNC: 138 MG/DL (ref 100–199)
CO2 SERPL-SCNC: 18 MMOL/L (ref 18–29)
CREAT SERPL-MCNC: 1.31 MG/DL (ref 0.57–1)
EST. AVERAGE GLUCOSE BLD GHB EST-MCNC: 126 MG/DL
GLUCOSE SERPL-MCNC: 98 MG/DL (ref 65–99)
HBA1C MFR BLD: 6 % (ref 4.8–5.6)
HCV AB S/CO SERPL IA: 0.1 S/CO RATIO (ref 0–0.9)
HDLC SERPL-MCNC: 63 MG/DL
INTERPRETATION, 910389: NORMAL
INTERPRETATION: NORMAL
LDLC SERPL CALC-MCNC: 48 MG/DL (ref 0–99)
Lab: NORMAL
PDF IMAGE, 910387: NORMAL
POTASSIUM SERPL-SCNC: 5.4 MMOL/L (ref 3.5–5.2)
SODIUM SERPL-SCNC: 138 MMOL/L (ref 134–144)
TRIGL SERPL-MCNC: 133 MG/DL (ref 0–149)
VLDLC SERPL CALC-MCNC: 27 MG/DL (ref 5–40)

## 2017-04-18 ENCOUNTER — OFFICE VISIT (OUTPATIENT)
Dept: CARDIOLOGY CLINIC | Age: 67
End: 2017-04-18

## 2017-04-18 ENCOUNTER — RESEARCH ENCOUNTER (OUTPATIENT)
Dept: CARDIOLOGY CLINIC | Age: 67
End: 2017-04-18

## 2017-04-18 VITALS
DIASTOLIC BLOOD PRESSURE: 78 MMHG | OXYGEN SATURATION: 97 % | TEMPERATURE: 96.7 F | RESPIRATION RATE: 16 BRPM | HEART RATE: 52 BPM | SYSTOLIC BLOOD PRESSURE: 124 MMHG | WEIGHT: 182.4 LBS | BODY MASS INDEX: 35.62 KG/M2

## 2017-04-18 VITALS
HEART RATE: 52 BPM | BODY MASS INDEX: 35.81 KG/M2 | HEIGHT: 60 IN | SYSTOLIC BLOOD PRESSURE: 124 MMHG | RESPIRATION RATE: 16 BRPM | OXYGEN SATURATION: 97 % | DIASTOLIC BLOOD PRESSURE: 78 MMHG | WEIGHT: 182.4 LBS

## 2017-04-18 DIAGNOSIS — E78.5 HYPERLIPIDEMIA, UNSPECIFIED HYPERLIPIDEMIA TYPE: ICD-10-CM

## 2017-04-18 DIAGNOSIS — I10 ESSENTIAL HYPERTENSION: ICD-10-CM

## 2017-04-18 DIAGNOSIS — I48.0 PAF (PAROXYSMAL ATRIAL FIBRILLATION) (HCC): Primary | ICD-10-CM

## 2017-04-18 NOTE — PROGRESS NOTES
Patient seen for Visit 7(Month 50) for the REDUCE-IT Study-  \"A Multi-Center, Prospective, Randomized, Double-Blind, Placebo-Controlled, Parallel-Group Study to Evaluate the Effect of BGK524 on Cardiovascular Health and Mortality in Hypertriglyceridemic Patients with Cardiovascular Disease or at [address] [facility]:  REDUCE-IT (Reduction of Cardiovascular Events with EPA- Intervention Trial)    See research paper chart.

## 2017-04-24 ENCOUNTER — TELEPHONE (OUTPATIENT)
Dept: CARDIOLOGY CLINIC | Age: 67
End: 2017-04-24

## 2017-05-08 ENCOUNTER — OFFICE VISIT (OUTPATIENT)
Dept: INTERNAL MEDICINE CLINIC | Age: 67
End: 2017-05-08

## 2017-05-08 VITALS
TEMPERATURE: 98.3 F | HEART RATE: 60 BPM | DIASTOLIC BLOOD PRESSURE: 69 MMHG | WEIGHT: 180.25 LBS | OXYGEN SATURATION: 98 % | RESPIRATION RATE: 18 BRPM | SYSTOLIC BLOOD PRESSURE: 126 MMHG | BODY MASS INDEX: 35.39 KG/M2 | HEIGHT: 60 IN

## 2017-05-08 DIAGNOSIS — J06.9 VIRAL UPPER RESPIRATORY TRACT INFECTION: Primary | ICD-10-CM

## 2017-05-08 RX ORDER — METHYLPREDNISOLONE 4 MG/1
TABLET ORAL
Refills: 0 | COMMUNITY
Start: 2017-05-05 | End: 2017-05-11 | Stop reason: ALTCHOICE

## 2017-05-08 RX ORDER — SIMVASTATIN 20 MG/1
TABLET, FILM COATED ORAL
Qty: 30 TAB | Refills: 5 | Status: SHIPPED | OUTPATIENT
Start: 2017-05-08 | End: 2017-08-22 | Stop reason: SDUPTHER

## 2017-05-08 RX ORDER — GABAPENTIN 100 MG/1
CAPSULE ORAL
Refills: 5 | COMMUNITY
Start: 2017-02-09 | End: 2018-09-06 | Stop reason: SDUPTHER

## 2017-05-08 RX ORDER — DEXTROMETHORPHAN POLISTIREX 30 MG/5ML
60 SUSPENSION ORAL 2 TIMES DAILY
Refills: 0 | COMMUNITY
End: 2017-08-22 | Stop reason: ALTCHOICE

## 2017-05-08 RX ORDER — DICLOFENAC SODIUM 50 MG/1
TABLET, DELAYED RELEASE ORAL
Refills: 0 | COMMUNITY
Start: 2017-02-16 | End: 2017-05-08 | Stop reason: ALTCHOICE

## 2017-05-08 RX ORDER — AZITHROMYCIN 250 MG/1
TABLET, FILM COATED ORAL
Refills: 0 | COMMUNITY
Start: 2017-05-05 | End: 2017-05-11 | Stop reason: ALTCHOICE

## 2017-05-08 RX ORDER — METFORMIN HYDROCHLORIDE 500 MG/1
TABLET, EXTENDED RELEASE ORAL
Qty: 120 TAB | Refills: 5 | Status: SHIPPED | OUTPATIENT
Start: 2017-05-08 | End: 2017-08-22 | Stop reason: SDUPTHER

## 2017-05-08 NOTE — PATIENT INSTRUCTIONS

## 2017-05-08 NOTE — MR AVS SNAPSHOT
Visit Information Date & Time Provider Department Dept. Phone Encounter #  
 5/8/2017  4:00 PM Boom Capone MD [facility] [address] [id number] Follow-up Instructions Return if symptoms worsen or fail to improve. Your Appointments 9/20/2017 11:15 AM  
ROUTINE CARE with Boom Capone MD  
[facility] [address] Appt Note: 6 month [address] [place] [address]  
  
   
 [address] [phone]  
  
    
 2/21/2018 11:00 AM  
ESTABLISHED PATIENT with Halle Huston MD  
[facility] ([address] [place] [address]) Appt Note: EMANUEL  
 [address] [facility] [address]  
[phone]  
  
   
 [address] [address]  
  
    
 4/18/2018 10:00 AM  
RESEARCH with RESEARCH, [address] ([address] [place] [address]) Appt Note: research at 10 at [address] [facility] [address] [facility] [address]  
[phone]  
  
   
 [address] [facility] [address] [place] [address]  
  
    
 4/18/2018 10:30 AM  
ESTABLISHED PATIENT with Natalie Aaron NP  
[facility] ([address] [place] [address]) Appt Note: research at 10 at [address] [facility] [address] [place] [address] [id number] [place] [address] Upcoming Health Maintenance Date Due  
 FOOT EXAM Q1 9/29/2014 MEDICARE YEARLY EXAM 6/15/2017 MICROALBUMIN Q1 7/6/2017 INFLUENZA AGE 9 TO ADULT 8/1/2017 HEMOGLOBIN A1C Q6M 9/20/2017 EYE EXAM RETINAL OR DILATED Q1 10/19/2017 LIPID PANEL Q1 3/20/2018 BREAST CANCER SCRN MAMMOGRAM 10/13/2018 GLAUCOMA SCREENING Q2Y 10/19/2018 DTaP/Tdap/Td series (2 - Td) 9/20/2023 COLONOSCOPY 6/29/2025 Allergies as of 5/8/2017  Review Complete On: 5/8/2017 By: Gabriel Aguirre LPN Severity Noted Reaction Type Reactions Sulfa (Sulfonamide Antibiotics)  06/10/2009    Unknown (comments) Current Immunizations  Reviewed on 11/1/2016 Name Date Influenza High Dose Vaccine PF 10/31/2016, 10/19/2015 Influenza Vaccine 10/6/2014, 9/20/2013 Pneumococcal Conjugate (PCV-13) 9/7/2016 Pneumococcal Polysaccharide (PPSV-23) 10/6/2014 TD Vaccine 1/29/2009 Tdap 9/20/2013 Zoster Vaccine, Live 6/14/2008 Not reviewed this visit You Were Diagnosed With   
  
 Codes Comments Viral upper respiratory tract infection    -  Primary ICD-10-CM: J06.9, B97.89 ICD-9-CM: 465.9 Vitals BP Pulse Temp Resp Height(growth percentile) Weight(growth percentile) 126/69 (BP 1 Location: Left arm, BP Patient Position: Sitting) 60 98.3 °F (36.8 °C) (Oral) 18 5' (1.524 m) 180 lb 4 oz (81.8 kg) SpO2 BMI OB Status Smoking Status 98% 35.2 kg/m2 Postmenopausal Never Smoker Vitals History BMI and BSA Data Body Mass Index Body Surface Area  
 35.2 kg/m 2 1.86 m 2 Preferred Pharmacy Pharmacy Name Phone [address] [phone] Your Updated Medication List  
  
   
This list is accurate as of: 5/8/17  4:50 PM.  Always use your most recent med list. amLODIPine 5 mg tablet Commonly known as:  Janae Hobson Take 1 Tab by mouth daily. aspirin delayed-release 81 mg tablet  
take 81 mg by mouth daily. azithromycin 250 mg tablet Commonly known as:  Efren Jha TAKE TWO TABLETS BY MOUTH AS ONE DOSE ON THE FIRST DAY, THEN TAKE ONE TABLET DAILY THEREAFTER. CALCIUM + D PO Take  by mouth. dextromethorphan 30 mg/5 mL liquid Commonly known as:  DELSYM Take 10 mL by mouth two (2) times a day. famotidine 20 mg tablet Commonly known as:  PEPCID  
 Take 20 mg by mouth daily as needed. fluocinonide-emollient 0.05 % topical cream  
Commonly known as:  LIDEX-E Apply  to affected area two (2) times a day. FREESTYLE LANCETS 28 gauge Misc Generic drug:  lancets FOR USE IN LANCET DEVICE  
  
 gabapentin 100 mg capsule Commonly known as:  NEURONTIN  
TAKE ONE CAPSULE BY MOUTH THREE TIMES A DAY AS NEEDED  
  
 glucose blood VI test strips strip Commonly known as:  FREESTYLE TEST For fasting BS testing once daily. ICD-10: E11.9  
  
 lisinopril 10 mg tablet Commonly known as:  PRINIVIL, ZESTRIL  
TAKE ONE TABLET BY MOUTH EVERY DAY  
  
 loperamide 2 mg capsule Commonly known as:  IMODIUM Take  by mouth as needed. metFORMIN  mg tablet Commonly known as:  GLUCOPHAGE XR  
TAKE 4 TABLETS BY MOUTH DAILY WITH SUPPER  
  
 methylPREDNISolone 4 mg tablet Commonly known as:  MEDROL DOSEPACK  
TAKE AS DIRECTED ON PACKAGE UNTIL FINISHED  
  
 metoprolol succinate 50 mg XL tablet Commonly known as:  TOPROL-XL  
TAKE ONE (1) TABLET(S) ONCE DAILY MULTI-VITAMIN PO Take  by mouth. OTHER Fish oil vs. Placebo  
  
 oxybutynin chloride XL 10 mg CR tablet Commonly known as:  DITROPAN XL Take 10 mg by mouth daily. prednisoLONE acetate 1 % ophthalmic suspension Commonly known as:  PRED FORTE Administer 1 Drop to both eyes daily. simvastatin 20 mg tablet Commonly known as:  ZOCOR  
TAKE 1 TABLET DAILY AT BEDTIME FOR CHOLESTEROL  
  
 TUMS PO Take  by mouth daily as needed. ZyrTEC 10 mg tablet Generic drug:  cetirizine Take  by mouth daily. Follow-up Instructions Return if symptoms worsen or fail to improve. Patient Instructions Upper Respiratory Infection (Cold): Care Instructions Your Care Instructions An upper respiratory infection, or URI, is an infection of the nose, sinuses, or throat.  URIs are spread by coughs, sneezes, and direct contact. The common cold is the most frequent kind of URI. The flu and sinus infections are other kinds of URIs. Almost all URIs are caused by viruses. Antibiotics won't cure them. But you can treat most infections with home care. This may include drinking lots of fluids and taking over-the-counter pain medicine. You will probably feel better in 4 to 10 days. The doctor has checked you carefully, but problems can develop later. If you notice any problems or new symptoms, get medical treatment right away. Follow-up care is a key part of your treatment and safety. Be sure to make and go to all appointments, and call your doctor if you are having problems. It's also a good idea to know your test results and keep a list of the medicines you take. How can you care for yourself at home? · To prevent dehydration, drink plenty of fluids, enough so that your urine is light yellow or clear like water. Choose water and other caffeine-free clear liquids until you feel better. If you have kidney, heart, or liver disease and have to limit fluids, talk with your doctor before you increase the amount of fluids you drink. · Take an over-the-counter pain medicine, such as acetaminophen (Tylenol), ibuprofen (Advil, Motrin), or naproxen (Aleve). Read and follow all instructions on the label. · Before you use cough and cold medicines, check the label. These medicines may not be safe for young children or for people with certain health problems. · Be careful when taking over-the-counter cold or flu medicines and Tylenol at the same time. Many of these medicines have acetaminophen, which is Tylenol. Read the labels to make sure that you are not taking more than the recommended dose. Too much acetaminophen (Tylenol) can be harmful. · Get plenty of rest. 
· Do not smoke or allow others to smoke around you. If you need help quitting, talk to your doctor about stop-smoking programs and medicines. These can increase your chances of quitting for good. When should you call for help? Call 911 anytime you think you may need emergency care. For example, call if: 
· You have severe trouble breathing. Call your doctor now or seek immediate medical care if: 
· You seem to be getting much sicker. · You have new or worse trouble breathing. · You have a new or higher fever. · You have a new rash. Watch closely for changes in your health, and be sure to contact your doctor if: 
· You have a new symptom, such as a sore throat, an earache, or sinus pain. · You cough more deeply or more often, especially if you notice more mucus or a change in the color of your mucus. · You do not get better as expected. Where can you learn more? Go to http://lizabeth-jose raul.info/. Enter C591 in the search box to learn more about \"Upper Respiratory Infection (Cold): Care Instructions. \" Current as of: June 30, 2016 Content Version: 11.2 © 7342-5497 [employer]. Care instructions adapted under license by [employer] (which disclaims liability or warranty for this information). If you have questions about a medical condition or this instruction, always ask your healthcare professional. [address] any warranty or liability for your use of this information. Introducing [facility] & HEALTH SERVICES! Dear Maritza Dear: 
Thank you for requesting a [employer] account. Our records indicate that you already have an active [employer] account. You can access your account anytime at [email or web address] Did you know that you can access your hospital and ER discharge instructions at any time in [employer]? You can also review all of your test results from your hospital stay or ER visit. Additional Information If you have questions, please visit the Frequently Asked Questions section of the [employer] website at [email or web address] Remember, MyChart is NOT to be used for urgent needs. For medical emergencies, dial 911. Now available from your iPhone and Android! Please provide this summary of care documentation to your next provider. Your primary care clinician is listed as Jenni Schultz. If you have any questions after today's visit, please call [phone].

## 2017-05-08 NOTE — PROGRESS NOTES
HISTORY OF PRESENT ILLNESS  Roxana Olivas is a 69 y.o. female. HPI  Upper respiratory illness:  Roxana Olivas presents with complaints of congestion, post nasal drip, cough described as productive of green sputum, clear nasal discharge and hoarseness for 1 weeks. no nausea and no vomiting . she has not had  night sweats, fever and chills. Symptoms are moderate. Over-the-counter remedies including started z pack and steroid taper by dentist for tooth infection since Fri   has been used with poor relief of symptoms. Drinking plenty of fluids: yes  Asthma?:  no  non-smoker  Contacts with similar infections: no           ROS    Physical Exam   Constitutional: She is oriented to person, place, and time. She appears well-developed and well-nourished. No distress. /69 (BP 1 Location: Left arm, BP Patient Position: Sitting)  Pulse 60  Temp 98.3 °F (36.8 °C) (Oral)   Resp 18  Ht 5' (1.524 m)  Wt 180 lb 4 oz (81.8 kg)  SpO2 98%  BMI 35.2 kg/m2   HENT:   Right Ear: Tympanic membrane and ear canal normal.   Left Ear: Tympanic membrane and ear canal normal.   Nose: Mucosal edema and rhinorrhea present. Right sinus exhibits no maxillary sinus tenderness and no frontal sinus tenderness. Left sinus exhibits no maxillary sinus tenderness and no frontal sinus tenderness. Mouth/Throat: Uvula is midline and mucous membranes are normal. Posterior oropharyngeal erythema present. No oropharyngeal exudate, posterior oropharyngeal edema or tonsillar abscesses. Eyes: Conjunctivae are normal.   Neck: Neck supple. Cardiovascular: Normal rate, regular rhythm and normal heart sounds. Pulmonary/Chest: Effort normal and breath sounds normal. No respiratory distress. She has no wheezes. She has no rales. Lymphadenopathy:     She has no cervical adenopathy. Neurological: She is alert and oriented to person, place, and time. Skin: Skin is warm and dry. She is not diaphoretic.    Nursing note and vitals reviewed. ASSESSMENT and PLAN  Mo Mcclellan was seen today for cough. Diagnoses and all orders for this visit:    Viral upper respiratory tract infection  Harrington Boast was diagnosed with a viral upper respiratory infection. she is advised to drink plenty of water, use shower steam or humidifier to loosen secretions, saline nasal lavage 3 times daily and get plenty of rest.  she may use mucinex 1200mg twice daily along with tylenol or advil as needed for fever and pain. Written instructions were given to the patient emphasizing these recommendations. Add mucinex DM. Voice rest.  She can complete  zpack and steroids for tooth infection meantime    Follow-up Disposition:  Return if symptoms worsen or fail to improve.

## 2017-05-11 ENCOUNTER — OFFICE VISIT (OUTPATIENT)
Dept: INTERNAL MEDICINE CLINIC | Age: 67
End: 2017-05-11

## 2017-05-11 VITALS
WEIGHT: 179.5 LBS | BODY MASS INDEX: 35.24 KG/M2 | HEART RATE: 50 BPM | OXYGEN SATURATION: 98 % | HEIGHT: 60 IN | TEMPERATURE: 97.7 F | SYSTOLIC BLOOD PRESSURE: 122 MMHG | RESPIRATION RATE: 18 BRPM | DIASTOLIC BLOOD PRESSURE: 79 MMHG

## 2017-05-11 DIAGNOSIS — R82.90 FOUL SMELLING URINE: Primary | ICD-10-CM

## 2017-05-11 LAB
BILIRUB UR QL STRIP: NEGATIVE
GLUCOSE UR-MCNC: NEGATIVE MG/DL
KETONES P FAST UR STRIP-MCNC: NEGATIVE MG/DL
PH UR STRIP: 5.5 [PH] (ref 4.6–8)
PROT UR QL STRIP: NEGATIVE MG/DL
SP GR UR STRIP: 1.01 (ref 1–1.03)
UA UROBILINOGEN AMB POC: NORMAL (ref 0.2–1)
URINALYSIS CLARITY POC: CLEAR
URINALYSIS COLOR POC: NORMAL
URINE BLOOD POC: NEGATIVE
URINE LEUKOCYTES POC: NEGATIVE
URINE NITRITES POC: NEGATIVE

## 2017-05-11 RX ORDER — NITROFURANTOIN 25; 75 MG/1; MG/1
100 CAPSULE ORAL 2 TIMES DAILY
Qty: 14 CAP | Refills: 0 | Status: SHIPPED | OUTPATIENT
Start: 2017-05-11 | End: 2017-08-22 | Stop reason: ALTCHOICE

## 2017-07-19 DIAGNOSIS — E11.9 TYPE 2 DIABETES MELLITUS WITH HEMOGLOBIN A1C GOAL OF LESS THAN 7.0% (HCC): ICD-10-CM

## 2017-08-22 ENCOUNTER — OFFICE VISIT (OUTPATIENT)
Dept: INTERNAL MEDICINE CLINIC | Age: 67
End: 2017-08-22

## 2017-08-22 VITALS
HEART RATE: 60 BPM | WEIGHT: 181.13 LBS | RESPIRATION RATE: 18 BRPM | OXYGEN SATURATION: 98 % | BODY MASS INDEX: 35.56 KG/M2 | HEIGHT: 60 IN | TEMPERATURE: 97.9 F | SYSTOLIC BLOOD PRESSURE: 126 MMHG | DIASTOLIC BLOOD PRESSURE: 64 MMHG

## 2017-08-22 DIAGNOSIS — I10 ESSENTIAL HYPERTENSION WITH GOAL BLOOD PRESSURE LESS THAN 130/80: ICD-10-CM

## 2017-08-22 DIAGNOSIS — J06.9 VIRAL UPPER RESPIRATORY TRACT INFECTION: Primary | ICD-10-CM

## 2017-08-22 DIAGNOSIS — R49.0 HOARSENESS: ICD-10-CM

## 2017-08-22 RX ORDER — CODEINE PHOSPHATE AND GUAIFENESIN 10; 100 MG/5ML; MG/5ML
5 SOLUTION ORAL
Qty: 120 ML | Refills: 0 | Status: SHIPPED | OUTPATIENT
Start: 2017-08-22 | End: 2017-09-27 | Stop reason: ALTCHOICE

## 2017-08-22 RX ORDER — ESOMEPRAZOLE MAGNESIUM 40 MG/1
20 CAPSULE, DELAYED RELEASE ORAL DAILY
COMMUNITY
End: 2018-10-03

## 2017-08-22 RX ORDER — AMLODIPINE BESYLATE 5 MG/1
5 TABLET ORAL DAILY
Qty: 90 TAB | Refills: 2 | Status: SHIPPED | OUTPATIENT
Start: 2017-08-22 | End: 2017-08-22 | Stop reason: SDUPTHER

## 2017-08-22 RX ORDER — LISINOPRIL 10 MG/1
TABLET ORAL
Qty: 90 TAB | Refills: 2 | Status: SHIPPED | OUTPATIENT
Start: 2017-08-22 | End: 2018-03-01 | Stop reason: SDUPTHER

## 2017-08-22 RX ORDER — SIMVASTATIN 20 MG/1
TABLET, FILM COATED ORAL
Qty: 90 TAB | Refills: 2 | Status: SHIPPED | OUTPATIENT
Start: 2017-08-22 | End: 2018-06-14 | Stop reason: SDUPTHER

## 2017-08-22 RX ORDER — METFORMIN HYDROCHLORIDE 500 MG/1
TABLET, EXTENDED RELEASE ORAL
Qty: 360 TAB | Refills: 5 | Status: SHIPPED | OUTPATIENT
Start: 2017-08-22 | End: 2018-10-29 | Stop reason: SDUPTHER

## 2017-08-22 NOTE — MR AVS SNAPSHOT
Visit Information Date & Time Provider Department Dept. Phone Encounter #  
 8/22/2017 11:00 AM Kaitlin Morrell MD [facility] [address] [facility] [id number] Follow-up Instructions Return if symptoms worsen or fail to improve. Your Appointments 9/20/2017 11:15 AM  
ROUTINE CARE with Kaitlin Morrell MD  
[facility] [address] Appt Note: 6 month [address] [place] [address]  
  
   
 Jimi Merrill [address] [phone]  
  
    
 2/21/2018 11:00 AM  
ESTABLISHED PATIENT with Ana Chapin MD  
[facility] ([address] [place] [address]) Appt Note: EMANUEL  
 [address] [facility] [address]  
[phone]  
  
   
 [address] [address]  
  
    
 4/20/2018 10:00 AM  
[facility] with [facility], [address] ([address] [place] [address]) Appt Note: REDUCE-IT study/ Physical Exam @ 10:30 margee; REDUCE-IT study/ Physical Exam @ 10:30 [address] [facility] [address]  
[phone]  
  
   
 [address] [address]  
  
    
 4/20/2018 10:30 AM  
ESTABLISHED PATIENT with Eric Arcos, SURINDER  
[facility] ([address] [place] [address]) Appt Note: REDUCEIT STUDY CIARA 10 [address] [facility]  
[address] [facility] [address] [address] [address] Upcoming Health Maintenance Date Due  
 FOOT EXAM Q1 9/29/2014 MEDICARE YEARLY EXAM 6/15/2017 MICROALBUMIN Q1 7/6/2017 INFLUENZA AGE 9 TO ADULT 8/1/2017 HEMOGLOBIN A1C Q6M 9/20/2017 EYE EXAM RETINAL OR DILATED Q1 10/19/2017 LIPID PANEL Q1 3/20/2018 BREAST CANCER SCRN MAMMOGRAM 10/13/2018 GLAUCOMA SCREENING Q2Y 10/19/2018 DTaP/Tdap/Td series (2 - Td) 9/20/2023 COLONOSCOPY 6/29/2025 Allergies as of 8/22/2017  Review Complete On: 8/22/2017 By: Rocio Rowell MD  
  
 Severity Noted Reaction Type Reactions Sulfa (Sulfonamide Antibiotics)  06/10/2009    Unknown (comments) Current Immunizations  Reviewed on 11/1/2016 Name Date Influenza High Dose Vaccine PF 10/31/2016, 10/19/2015 Influenza Vaccine 10/6/2014, 9/20/2013 Pneumococcal Conjugate (PCV-13) 9/7/2016 Pneumococcal Polysaccharide (PPSV-23) 10/6/2014 TD Vaccine 1/29/2009 Tdap 9/20/2013 Zoster Vaccine, Live 6/14/2008 Not reviewed this visit You Were Diagnosed With   
  
 Codes Comments Viral upper respiratory tract infection    -  Primary ICD-10-CM: J06.9, B97.89 ICD-9-CM: 465.9 Hoarseness     ICD-10-CM: R49.0 ICD-9-CM: 784.42 Essential hypertension with goal blood pressure less than 130/80     ICD-10-CM: I10 
ICD-9-CM: 401.9 Vitals BP Pulse Temp Resp Height(growth percentile) Weight(growth percentile) 126/64 (BP 1 Location: Left arm, BP Patient Position: Sitting) 60 97.9 °F (36.6 °C) (Oral) 18 5' (1.524 m) 181 lb 2 oz (82.2 kg) SpO2 BMI OB Status Smoking Status 98% 35.37 kg/m2 Postmenopausal Never Smoker Vitals History BMI and BSA Data Body Mass Index Body Surface Area  
 35.37 kg/m 2 1.87 m 2 Preferred Pharmacy Pharmacy Name Phone [id number] - [address] [facility], [address] [facility] [phone] Your Updated Medication List  
  
   
This list is accurate as of: 8/22/17 11:54 AM.  Always use your most recent med list. amLODIPine 5 mg tablet Commonly known as:  Brian Pritchett Take 1 Tab by mouth daily. aspirin delayed-release 81 mg tablet  
take 81 mg by mouth daily. CALCIUM + D PO Take  by mouth.  
  
 esomeprazole 40 mg capsule Commonly known as:  Yvette Delarosa Take  by mouth daily. famotidine 20 mg tablet Commonly known as:  PEPCID Take 20 mg by mouth daily as needed. fluocinonide-emollient 0.05 % topical cream  
Commonly known as:  LIDEX-E Apply  to affected area two (2) times a day. FREESTYLE LANCETS 28 gauge Misc Generic drug:  lancets FOR USE IN LANCET DEVICE  
  
 gabapentin 100 mg capsule Commonly known as:  NEURONTIN  
TAKE ONE CAPSULE BY MOUTH THREE TIMES A DAY AS NEEDED  
  
 glucose blood VI test strips strip Commonly known as:  FREESTYLE TEST For fasting BS testing once daily. ICD-10: E11.9  
  
 guaiFENesin-codeine 100-10 mg/5 mL solution Commonly known as:  ROBITUSSIN AC Take 5 mL by mouth three (3) times daily as needed for Cough. Max Daily Amount: 15 mL. lisinopril 10 mg tablet Commonly known as:  PRINIVIL, ZESTRIL  
TAKE ONE TABLET BY MOUTH EVERY DAY  
  
 loperamide 2 mg capsule Commonly known as:  IMODIUM Take  by mouth as needed. metFORMIN  mg tablet Commonly known as:  GLUCOPHAGE XR  
TAKE 4 TABLETS BY MOUTH DAILY WITH SUPPER  
  
 metoprolol succinate 50 mg XL tablet Commonly known as:  TOPROL-XL  
TAKE ONE (1) TABLET(S) ONCE DAILY MULTI-VITAMIN PO Take  by mouth. OTHER Fish oil vs. Placebo  
  
 oxybutynin chloride XL 10 mg CR tablet Commonly known as:  DITROPAN XL Take 10 mg by mouth daily. prednisoLONE acetate 1 % ophthalmic suspension Commonly known as:  PRED FORTE Administer 1 Drop to both eyes daily. simvastatin 20 mg tablet Commonly known as:  ZOCOR  
TAKE 1 TABLET DAILY AT BEDTIME FOR CHOLESTEROL  
  
 TUMS PO Take  by mouth daily as needed. ZyrTEC 10 mg tablet Generic drug:  cetirizine Take  by mouth daily. Prescriptions Printed Refills  
 guaiFENesin-codeine (ROBITUSSIN AC) 100-10 mg/5 mL solution 0 Sig: Take 5 mL by mouth three (3) times daily as needed for Cough. Max Daily Amount: 15 mL. Class: Print  Route: Oral  
  
 Prescriptions Sent to Pharmacy Refills  
 lisinopril (PRINIVIL, ZESTRIL) 10 mg tablet 2 Sig: TAKE ONE TABLET BY MOUTH EVERY DAY Class: Normal  
 Pharmacy: [address] [place], [place] [id number]  Mario [address] [place] [address] Ph #: [phone]  
 amLODIPine (NORVASC) 5 mg tablet 2 Sig: Take 1 Tab by mouth daily. Class: Normal  
 Pharmacy: [address] [place] [address] [address]nie Ramos, 1 [facility] Ph #: [phone] Route: Oral  
 metFORMIN ER (GLUCOPHAGE XR) 500 mg tablet 5 Sig: TAKE 4 TABLETS BY MOUTH DAILY WITH SUPPER Class: Normal  
 Pharmacy: [address] [place] [address] [address] [place] [address] Ph #: [phone]  
 simvastatin (ZOCOR) 20 mg tablet 2 Sig: TAKE 1 TABLET DAILY AT BEDTIME FOR CHOLESTEROL Class: Normal  
 Pharmacy: [address] [place] [address] [address] Johnnie Banks, 1 [facility] Ph #: [phone] Follow-up Instructions Return if symptoms worsen or fail to improve. Introducing [facility]! Dear Jesika Guidry: 
Thank you for requesting a [facility] account. Our records indicate that you already have an active [employer] account. You can access your account anytime at [email or web address] Did you know that you can access your hospital and ER discharge instructions at any time in [employer]? You can also review all of your test results from your hospital stay or ER visit. Additional Information If you have questions, please visit the Frequently Asked Questions section of the [employer] website at [email or web address] Remember, [employer] is NOT to be used for urgent needs. For medical emergencies, dial 911. Now available from your iPhone and Android! Please provide this summary of care documentation to your next provider. Your primary care clinician is listed as Rich Tobin.  If you have any questions after today's visit, please call [phone].

## 2017-08-22 NOTE — PROGRESS NOTES
HISTORY OF PRESENT ILLNESS  Norberta Oppenheim is a 69 y.o. female. HPI  Problem visit:  Norberta Oppenheim is here for complaint of prolonged cough and hoarseness. Now with acute cough, URI symptoms  Problem began 5 month(s) ago. URI 1 week ago. Severity is moderate  Character of problem: unable to sing  URI makes the problem worse. nothing makes the problem better. Associated symptoms include: some chest congestion, minimal production. The patient denies fevers, chills or sweats. Some sneezing. Minimal sinus congestion. Treatments tried include: tessalon in past not that helpful. [facility] survey given to pt to complete. Results as follows:  Snoring: YES  Tired: YES  Observed: NO  Pressure: YES  BMI >35: YES  Age >50: YES  Neck (>17, >16): NO  Gender Male: NO    Score:  5 / 8   (High risk if > 3)    ROS    Physical Exam   Constitutional: She is oriented to person, place, and time. She appears well-developed and well-nourished. No distress. /64 (BP 1 Location: Left arm, BP Patient Position: Sitting)  Pulse 60  Temp 97.9 °F (36.6 °C) (Oral)   Resp 18  Ht 5' (1.524 m)  Wt 181 lb 2 oz (82.2 kg)  SpO2 98%  BMI 35.37 kg/m2   HENT:   Left Ear: Tympanic membrane and ear canal normal.   Nose: No mucosal edema or rhinorrhea. Right sinus exhibits no maxillary sinus tenderness and no frontal sinus tenderness. Left sinus exhibits no maxillary sinus tenderness and no frontal sinus tenderness. Mouth/Throat: Uvula is midline and mucous membranes are normal. No oropharyngeal exudate, posterior oropharyngeal edema, posterior oropharyngeal erythema or tonsillar abscesses. Cerumen R ear   Eyes: Conjunctivae are normal.   Neck: Neck supple. Cardiovascular: Normal rate, regular rhythm and normal heart sounds. Pulmonary/Chest: Effort normal and breath sounds normal. No accessory muscle usage. No respiratory distress. She has no decreased breath sounds. She has no wheezes. She has no rhonchi.  She has no rales. Lymphadenopathy:     She has no cervical adenopathy. Neurological: She is alert and oriented to person, place, and time. Skin: Skin is warm and dry. She is not diaphoretic. Nursing note and vitals reviewed. ASSESSMENT and PLAN  Diagnoses and all orders for this visit:    1. Viral upper respiratory tract infection -Jalen Johansen was diagnosed with a viral upper respiratory infection. she is advised to drink plenty of water, use shower steam or humidifier to loosen secretions, saline nasal lavage 3 times daily and get plenty of rest.  she may use mucinex 1200mg twice daily along with tylenol or advil as needed for fever and pain. Written instructions were given to the patient emphasizing these recommendations. treat symptoms, cough. Robitussin AC. Prn.    -     guaiFENesin-codeine (ROBITUSSIN AC) 100-10 mg/5 mL solution; Take 5 mL by mouth three (3) times daily as needed for Cough. Max Daily Amount: 15 mL. 2. Hoarseness -chronic. To follow up with new ENT. Continuing speech therapy. Avoid heavy coughing. See above  ? Related to obstructive sleep apnea. She will see ENT first and consider sleep study if in agreement. 3. Essential hypertension with goal blood pressure less than 130/80  -     lisinopril (PRINIVIL, ZESTRIL) 10 mg tablet; TAKE ONE TABLET BY MOUTH EVERY DAY    Other orders  -     amLODIPine (NORVASC) 5 mg tablet; Take 1 Tab by mouth daily. -     metFORMIN ER (GLUCOPHAGE XR) 500 mg tablet; TAKE 4 TABLETS BY MOUTH DAILY WITH SUPPER  -     simvastatin (ZOCOR) 20 mg tablet; TAKE 1 TABLET DAILY AT BEDTIME FOR CHOLESTEROL      Follow-up Disposition:  Return if symptoms worsen or fail to improve.

## 2017-08-23 RX ORDER — AMLODIPINE BESYLATE 5 MG/1
5 TABLET ORAL DAILY
Qty: 90 TAB | Refills: 2 | Status: SHIPPED | OUTPATIENT
Start: 2017-08-23 | End: 2018-02-21 | Stop reason: SDUPTHER

## 2017-08-23 RX ORDER — METOPROLOL SUCCINATE 50 MG/1
TABLET, EXTENDED RELEASE ORAL
Qty: 30 TAB | Refills: 11 | Status: SHIPPED | OUTPATIENT
Start: 2017-08-23 | End: 2017-08-30 | Stop reason: SDUPTHER

## 2017-08-30 RX ORDER — METOPROLOL SUCCINATE 50 MG/1
TABLET, EXTENDED RELEASE ORAL
Qty: 90 TAB | Refills: 2 | Status: SHIPPED | OUTPATIENT
Start: 2017-08-30 | End: 2018-02-21 | Stop reason: SDUPTHER

## 2017-09-27 ENCOUNTER — OFFICE VISIT (OUTPATIENT)
Dept: INTERNAL MEDICINE CLINIC | Age: 67
End: 2017-09-27

## 2017-09-27 ENCOUNTER — HOSPITAL ENCOUNTER (OUTPATIENT)
Dept: LAB | Age: 67
Discharge: HOME | End: 2017-09-27
Payer: MEDICARE

## 2017-09-27 VITALS
HEIGHT: 60 IN | WEIGHT: 182.25 LBS | DIASTOLIC BLOOD PRESSURE: 68 MMHG | SYSTOLIC BLOOD PRESSURE: 139 MMHG | OXYGEN SATURATION: 99 % | RESPIRATION RATE: 18 BRPM | TEMPERATURE: 98.3 F | HEART RATE: 56 BPM | BODY MASS INDEX: 35.78 KG/M2

## 2017-09-27 DIAGNOSIS — Z00.00 MEDICARE ANNUAL WELLNESS VISIT, SUBSEQUENT: Primary | ICD-10-CM

## 2017-09-27 DIAGNOSIS — I10 ESSENTIAL HYPERTENSION: ICD-10-CM

## 2017-09-27 DIAGNOSIS — N18.3 CKD (CHRONIC KIDNEY DISEASE), STAGE 3 (MODERATE): ICD-10-CM

## 2017-09-27 DIAGNOSIS — E11.9 TYPE 2 DIABETES MELLITUS WITH HEMOGLOBIN A1C GOAL OF LESS THAN 7.0% (HCC): ICD-10-CM

## 2017-09-27 DIAGNOSIS — E78.5 HYPERLIPIDEMIA, UNSPECIFIED HYPERLIPIDEMIA TYPE: Chronic | ICD-10-CM

## 2017-09-27 DIAGNOSIS — J30.9 ALLERGIC RHINITIS, UNSPECIFIED ALLERGIC RHINITIS TRIGGER, UNSPECIFIED RHINITIS SEASONALITY: ICD-10-CM

## 2017-09-27 PROBLEM — N18.9 CKD (CHRONIC KIDNEY DISEASE): Status: ACTIVE | Noted: 2017-09-27

## 2017-09-27 LAB — HBA1C MFR BLD HPLC: 5.7 %

## 2017-09-27 PROCEDURE — 80048 BASIC METABOLIC PNL TOTAL CA: CPT

## 2017-09-27 PROCEDURE — 36415 COLL VENOUS BLD VENIPUNCTURE: CPT

## 2017-09-27 PROCEDURE — 82043 UR ALBUMIN QUANTITATIVE: CPT

## 2017-09-27 RX ORDER — LORATADINE 10 MG/1
10 TABLET ORAL DAILY
COMMUNITY
End: 2019-10-07

## 2017-09-27 NOTE — PROGRESS NOTES
Dr. Rasheed Lopez referred Loretta Fry, 1950, a 69 y.o. female for a Medicare Annual Wellness Visit (AWV)    This is a Subsequent Medicare Annual Wellness Exam (AWV) (Performed 12 months after IPPE or effective date of Medicare Part B enrollment, Once in a lifetime)    I have reviewed the patient's medical history in detail and updated the computerized patient record. History     Past Medical History:   Diagnosis Date    Anemia     Cystocele     Diabetes ([facility]     Diabetic neuropathy, painful (HCC)     Hepatitis B     Hypercholesterolemia     Hypertension     Microalbuminuria     Mild nonproliferative diabetic retinopathy (HCC)     PAF (paroxysmal atrial fibrillation) ([facility]     [facility] 3-24-13 ER-Rolf follows    Rectocele     Retinopathy     Rosacea     Ruptured disc, cervical     Stenosis, cervical spine       Past Surgical History:   Procedure Laterality Date    HX BREAST BIOPSY Left 2009    HX HERNIA REPAIR      umbilical    HX MENISCUS REPAIR  7/11/14    HX OVARIAN CYST REMOVAL  1973     Current Outpatient Prescriptions   Medication Sig Dispense Refill    loratadine (CLARITIN) 10 mg tablet Take 10 mg by mouth daily.  metoprolol succinate (TOPROL-XL) 50 mg XL tablet TAKE ONE (1) TABLET(S) ONCE DAILY 90 Tab 2    amLODIPine (NORVASC) 5 mg tablet Take 1 Tab by mouth daily. 90 Tab 2    esomeprazole (NEXIUM) 40 mg capsule Take 40 mg by mouth daily.  lisinopril (PRINIVIL, ZESTRIL) 10 mg tablet TAKE ONE TABLET BY MOUTH EVERY DAY 90 Tab 2    metFORMIN ER (GLUCOPHAGE XR) 500 mg tablet TAKE 4 TABLETS BY MOUTH DAILY WITH SUPPER 360 Tab 5    simvastatin (ZOCOR) 20 mg tablet TAKE 1 TABLET DAILY AT BEDTIME FOR CHOLESTEROL 90 Tab 2    glucose blood VI test strips (FREESTYLE TEST) strip For fasting BS testing once daily.  ICD-10: E11.9 100 Strip 11    gabapentin (NEURONTIN) 100 mg capsule TAKE ONE CAPSULE BY MOUTH THREE TIMES A DAY AS NEEDED  5    OTHER Fish oil vs. Placebo  CALCIUM CARBONATE (TUMS PO) Take  by mouth daily as needed.  famotidine (PEPCID) 20 mg tablet Take 20 mg by mouth daily as needed.  FREESTYLE LANCETS 28 gauge misc FOR USE IN LANCET DEVICE 100 Each 11    prednisoLONE acetate (PRED FORTE) 1 % ophthalmic suspension Administer 1 Drop to both eyes daily. 5    loperamide (IMODIUM) 2 mg capsule Take  by mouth as needed.  oxybutynin chloride XL (DITROPAN XL) 10 mg CR tablet Take 10 mg by mouth daily.  CALCIUM CARBONATE/VITAMIN D3 (CALCIUM + D PO) Take  by mouth.  MULTI-VITAMIN PO Take 1 Tab by mouth daily.  aspirin delayed-release 81 mg tablet take 81 mg by mouth daily.        Allergies   Allergen Reactions    Sulfa (Sulfonamide Antibiotics) Unknown (comments)     Family History   Problem Relation Age of Onset    Hypertension Mother     Thyroid Disease Mother     Heart Failure Father     Heart Disease Father     Thyroid Disease Sister     Thyroid Disease Sister     Heart Attack Other     Cancer Maternal Aunt      esophageal    Cancer Paternal Aunt      breast     Social History   Substance Use Topics    Smoking status: Never Smoker    Smokeless tobacco: Never Used    Alcohol use No      Comment: very rarely      Patient Active Problem List   Diagnosis Code    DM (diabetes mellitus) ([facility] [address] [facility] E11.9    Hyperlipidemia E78.5    Hepatitis B B19.10    GERD (gastroesophageal reflux disease) K21.9    Rosacea L71.9    AR (allergic rhinitis) J30.9    Intervertebral disk disease M51.9    Uterine prolapse N81.4    Incontinence R32    HTN (hypertension) I10    Anemia D64.9    PAF (paroxysmal atrial fibrillation) (HCC) I48.0    Peripheral neuropathy (HCC) G62.9    Pre-ulcerative corn or callous L84    Advanced care planning/counseling discussion Z71.89       Depression Risk Factor Screening:     PHQ over the last two weeks 9/27/2017   Little interest or pleasure in doing things Not at all   Feeling down, depressed or hopeless Not at all   Total Score PHQ 2 0     Alcohol Risk Factor Screening: You do not drink alcohol or very rarely. Functional Ability and Level of Safety:   Hearing Loss  Hearing is good. Activities of Daily Living  The home contains: no safety equipment  Patient does total self care    ADL Assessment 9/27/2017   Feeding yourself No Help Needed   Getting from bed to chair No Help Needed   Getting dressed No Help Needed   Bathing or showering No Help Needed   Walk across the room (includes cane/walker) No Help Needed   Using the telphone No Help Needed   Taking your medications No Help Needed   Preparing meals No Help Needed   Managing money (expenses/bills) No Help Needed   Moderately strenuous housework (laundry) No Help Needed   Shopping for personal items (toiletries/medicines) No Help Needed   Shopping for groceries No Help Needed   Driving No Help Needed   Climbing a flight of stairs No Help Needed   Getting to places beyond walking distances No Help Needed       Fall RiskFall Risk Assessment, last 12 mths 9/27/2017   Able to walk? Yes   Fall in past 12 months? No       Abuse Screen  Patient is not abused    Abuse Screening Questionnaire 9/27/2017   Do you ever feel afraid of your partner? N   Are you in a relationship with someone who physically or mentally threatens you? N   Is it safe for you to go home?  Y       Cognitive Screening   Evaluation of Cognitive Function:  Has your family/caregiver stated any concerns about your memory: no  Normal    Patient Care Team   Patient Care Team:  Va Bush MD as PCP - General    Assessment/Plan   Education and counseling provided:  Are appropriate based on today's review and evaluation  End-of-Life planning (with patient's consent)  Pneumococcal Vaccine  Influenza Vaccine  Screening Mammography  Screening Pap and pelvic (covered once every 2 years)  Colorectal cancer screening tests  Cardiovascular screening blood test  Bone mass measurement (DEXA)  Screening for glaucoma  Diabetes outpatient self-management training services  Tdap and Zostavax    Diagnoses and all orders for this visit:    1. Type 2 diabetes mellitus with hemoglobin A1c goal of less than 7.0% ([facility])  -     AMB POC HEMOGLOBIN B1N  -     METABOLIC PANEL, BASIC  -     MICROALBUMIN, UR, RAND W/ MICROALBUMIN/CREA RATIO    2. Hyperlipidemia, unspecified hyperlipidemia type    3. Essential hypertension    4. Medicare annual wellness visit, subsequent        Health Maintenance Due   Topic Date Due    FOOT EXAM Q1  09/29/2014    MEDICARE YEARLY EXAM  06/15/2017    MICROALBUMIN Q1  07/06/2017    INFLUENZA AGE 9 TO ADULT  08/01/2017    HEMOGLOBIN A1C Q6M  10/19/2017    EYE EXAM RETINAL OR DILATED Q1  10/19/2017     . Medicare Annual Wellness Visit:  ----Immunizations: Patient confirmed the following records of vaccinations are correct and current. Immunization History   Administered Date(s) Administered    Influenza High Dose Vaccine PF 10/19/2015, 10/31/2016    Influenza Vaccine 09/20/2013, 10/06/2014    Pneumococcal Conjugate (PCV-13) 09/07/2016    Pneumococcal Polysaccharide (PPSV-23) 10/06/2014    TD Vaccine 01/29/2009    Tdap 09/20/2013    Zoster Vaccine, Live 06/14/2008   Patient is up to date on vaccines. Encouraged her to get a flu shot this fall. She plans to get it [facility] at [facility].    ----Screenings: See chart in patient instructions for specific information. ADL's, Fall Risk, Depression Screening and Abuse screening information is reported above. Patient is due for a mammogram 10/2017. She is up to date on other screenings.  A1c done today.     ----Medication Reconciliation was performed today and the following 4 changes were made: added claritin, added directions to MVI  Medications Discontinued During This Encounter   Medication Reason    fluocinonide-emollient (LIDEX-E) 0.05 % topical cream Therapy Completed    guaiFENesin-codeine (ROBITUSSIN AC) 100-10 mg/5 mL solution Therapy Completed    cetirizine (ZYRTEC) 10 mg tablet Not A Current Medication           Patient verbalized understanding of information presented. Answered all of the patient's questions. AVS handed and reviewed with patient which includes a Medicare Wellness Preventative Screening Table and patient specific information. Notification of recommendations will be sent to Dr. Funmi Raza for review.     Chapo Montez, MiriamD, BCPS, CDE

## 2017-09-27 NOTE — PATIENT INSTRUCTIONS
Medicare Part B Preventive Services Limitations Recommendation Scheduled   Bone Mass Measurement  (age 72 & older, biennial) Requires diagnosis related to osteoporosis or estrogen deficiency. Biennial benefit unless patient has history of long-term glucocorticoid tx or baseline is needed because initial test was by other method Last:2013    A DEXA scan is recommended after you turn 72years of age to determine your risk for osteoporosis Next: As recommended by your physician   Colorectal Cancer Screening  -Fecal occult blood test (annual)  -Flexible sigmoidoscopy (5y)  -Screening colonoscopy (10y)  -Barium Enema  Last: 2015    Every 5-10 years depending on your colonoscopy result starting at age 48 years Next: 2025 or as recommended   Glaucoma Screening (no USPSTF recommendation) Diabetes mellitus, family history, , age 48 or over,  American, age 72 or over Last: 10/2016    Every year Next: 11/2017   Screening Mammography (biennial age 54-69) Annually (age 36 or over) Last: 10/2016 Next: 11/2017   Screening Pap Tests and Pelvic Examination (up to age 72 and after 72 if unknown history or abnormal study last 10 years) Every 24 months except high risk Last: NA Next:    Discuss with your doctor if this screening is appropriate for you. Cardiovascular Screening Blood Tests (every 5 years)  Total cholesterol, HDL, Triglycerides Order as a panel if possible Last: 4/18/17    Every Year Next: 4/2018   Diabetes Screening Tests (at least every 3 years, Medicare covers annually or at 6-month intervals for prediabetic patients)    Fasting blood sugar (FBS) or glucose tolerance test (GTT) Patient must be diagnosed with one of the following:  -Hypertension, Dyslipidemia, obesity, previous impaired FBS or GTT  Or any two of the following: overweight, FH of diabetes, age ? 72, history of gestational diabetes, birth of baby weighing more than 9 pounds Last: today    Every 3-6 months depending on your result    Every 3 years Next: every 3-6 months as recommended   Diabetes Self-Management Training (DSMT) (no USPSTF recommendation) Requires referral by treating physician for patient with diabetes or renal disease. 10 hours of initial DSMT session of no less than 30 minutes each in a continuous 12-month period. 2 hours of follow-up DSMT in subsequent years. Last: NA Talk to your doctor if you are interested in a refresher course. Medical Nutrition Therapy (MNT) (for diabetes or renal disease not recommended schedule) Requires referral by treating physician for patient with diabetes or renal disease. Can be provided in same year as diabetes self-management training (DSMT), and CMS recommends medical nutrition therapy take place after DSMT. Up to 3 hours for initial year and 2 hours in subsequent years. Last: NA Talk to your doctor if you are interested in a refresher course. Seasonal Influenza Vaccination (annually)  Last: 2016    Every Fall Please get one this Fall. Pneumococcal Vaccination (once after 65)  Last:  Pneumovax:  2014  Prevnar-13:  2016 Complete   Shingles Vaccination  Last: 2008    A shingles vaccine is recommended once in a lifetime after age 61 Complete   Tetanus, Diphtheria and Pertussis (Tdap) Vaccination Booster One Booster as an adult and then tetanus every 10 years or as indicated Last: 2013 Complete   Hepatitis B Vaccinations (if medium/high risk) Medium/high risk factors:  End-stage renal disease,  Hemophiliacs who received Factor VIII or IX concentrates, Clients of institutions for the mentally retarded, Persons who live in the same house as a HepB virus carrier, Homosexual men, Illicit injectable drug abusers.  Last: NA Next: NA   Counseling to Prevent Tobacco Use (up to 8 sessions per year)  - Counseling greater than 3 and up to 10 minutes  - Counseling greater than 10 minutes Patients must be asymptomatic of tobacco-related conditions to receive as preventive service Last: NA Next: NA   Human Immunodeficiency Virus (HIV) Screening (annually for increased risk patients)  HIV-1 and HIV-2 by EIA, ROSE MARIE, rapid antibody test, or oral mucosa transudate Patient must be at increased risk for HIV infection per USPSTF guidelines or pregnant. Tests covered annually for patients at increased risk. Pregnant patients may receive up to 3 test during pregnancy. Last: NA Next: NA   Ultrasound Screening for Abdominal Aortic Aneurysm (AAA) once Patient must be referred through IPPE and not have had a screening for abdominal aortic aneurysm before under medicare.  Limited to patients who meet onf of the following criteria:  -Men who are 73-68 years old and have smoked more than 100 cigarettes in their lifetime  -Anyone with a FH of AAA  -Anyone recommended for screening by the USPSFTF As recommended by your PCP or Specialist   As recommended by your PCP or Specialist     NA = Not Applicable; NI= Not Indicated

## 2017-09-27 NOTE — PROGRESS NOTES
HISTORY OF PRESENT ILLNESS  Francie Viveros is a 69 y.o. female. HPI  Diabetes:  She is here for follow up of diabetes. Proteinuria: no  Neuropathy: occasional burning soles  Medication change since last visit:  No   Diabetic Review of Systems - home glucose monitoring: is performed regularly, nonfasting values range 's. Hypoglycemic symptoms: no  Dilated eye exam in the last year: yes      Lab Results   Component Value Date/Time    Hemoglobin A1c 6.0 03/20/2017 10:59 AM    Hemoglobin A1c (POC) 5.7 09/27/2017 11:30 AM    Hemoglobin A1c, External 5.6 04/19/2016     Lab Results   Component Value Date/Time    Microalbumin/Creat ratio (mg/g creat) 14 12/16/2009 10:00 AM    Microalb/Creat ratio (ug/mg creat.) <2.7 07/06/2016 09:27 AM    Microalbumin,urine random 1.77 12/16/2009 10:00 AM         Last Point of Care HGB A1C  Hemoglobin A1c (POC)   Date Value Ref Range Status   09/27/2017 5.7 % Final      Hypertension:  Francie Viveros is a 69 y.o. female with hypertension. with Chronic kidney disease stage 3   Medication change since last visit: No  The patient reports:  taking medications as instructed, no medication side effects noted, home BP monitoring in range of 253-337'U systolic over 67-94'S diastolic, no chest pain on exertion, no dyspnea on exertion, mild swelling of ankles, no orthostatic dizziness or lightheadedness, no palpitations.        Lifestyle modification/social history: generally follows a low fat low cholesterol diet, generally follows a low sodium diet, exercises sporadically, nonsmoker    Lab Results   Component Value Date/Time    Sodium 138 03/20/2017 10:59 AM    Potassium 5.4 03/20/2017 10:59 AM    Chloride 104 03/20/2017 10:59 AM    CO2 18 03/20/2017 10:59 AM    Anion gap 11 07/16/2016 12:26 PM    Glucose 98 03/20/2017 10:59 AM    BUN 34 03/20/2017 10:59 AM    Creatinine 1.31 03/20/2017 10:59 AM    BUN/Creatinine ratio 26 03/20/2017 10:59 AM    GFR est AA 48 03/20/2017 10:59 AM GFR est non-AA 42 03/20/2017 10:59 AM    Calcium 9.1 03/20/2017 10:59 AM     Hyperlipidemia:  Adelina Cardona is following up on her dyslipidemia. Cardiovascular risks for her are: LDL goal is under 80  diabetic  hypertension  obese. Currently she takes Zocor (simvastatin) ,   Lab Results   Component Value Date/Time    Cholesterol, total 138 03/20/2017 10:59 AM    Cholesterol, total 140 01/12/2016 12:28 PM    Cholesterol, total 144 07/16/2015 10:28 AM    Cholesterol, total 161 08/15/2014 02:23 PM    Cholesterol, total 158 03/21/2014 10:28 AM    HDL Cholesterol 63 03/20/2017 10:59 AM    HDL Cholesterol 56 01/12/2016 12:28 PM    HDL Cholesterol 42 07/16/2015 10:28 AM    HDL Cholesterol 46 08/15/2014 02:23 PM    HDL Cholesterol 45 03/21/2014 10:28 AM    LDL, calculated 48 03/20/2017 10:59 AM    LDL, calculated 56 01/12/2016 12:28 PM    LDL, calculated 63 07/16/2015 10:28 AM    LDL, calculated 56 08/15/2014 02:23 PM    LDL, calculated 69 03/21/2014 10:28 AM    Triglyceride 133 03/20/2017 10:59 AM    Triglyceride 141 01/12/2016 12:28 PM    Triglyceride 193 07/16/2015 10:28 AM    Triglyceride 294 08/15/2014 02:23 PM    Triglyceride 221 03/21/2014 10:28 AM    CHOL/HDL Ratio 3.3 08/17/2010 03:47 AM    CHOL/HDL Ratio 3.3 12/16/2009 08:00 PM    CHOL/HDL Ratio 3.0 06/02/2009 08:47 PM     Lab Results   Component Value Date/Time    ALT (SGPT) 14 07/25/2016 01:04 PM    AST (SGOT) 14 07/25/2016 01:04 PM    Alk. phosphatase 62 07/25/2016 01:04 PM    Bilirubin, total <0.2 07/25/2016 01:04 PM       Myalgias: no  Fatigue: no    She has seen ENT for VC varicosity and irritation related to reflux. On PPI for 3 months and to have recheck tomorrow with ENT. ROS    Physical Exam   Constitutional: She appears well-developed and well-nourished. No distress.    /68 (BP 1 Location: Left arm, BP Patient Position: Sitting)  Pulse (!) 56  Temp 98.3 °F (36.8 °C) (Oral)   Resp 18  Ht 5' (1.524 m)  Wt 182 lb 4 oz (82.7 kg) SpO2 99%  BMI 35.59 kg/m2Body mass index is 35.59 kg/(m^2). HENT:   Mouth/Throat: Oropharynx is clear and moist.   Neck: No JVD present. Carotid bruit is not present. Cardiovascular: Normal rate, regular rhythm and intact distal pulses. Murmur heard. Systolic murmur is present with a grade of 2/6   Pulses:       Posterior tibial pulses are 1+ on the right side, and 1+ on the left side. Pulmonary/Chest: Effort normal and breath sounds normal. No respiratory distress. She has no wheezes. She has no rales. Abdominal: Soft. Bowel sounds are normal. She exhibits no distension. There is no tenderness. Musculoskeletal: She exhibits edema (trace ankle edema bilaterally). She exhibits no tenderness. Neurological: She is alert. Skin: Skin is warm and dry. She is not diaphoretic. Psychiatric: She has a normal mood and affect. Her behavior is normal. Thought content normal.   Nursing note and vitals reviewed. ASSESSMENT and PLAN  Diagnoses and all orders for this visit:    1. Medicare annual wellness visit, subsequent  Houston Osler received a complete medicare wellness assessment today by Darrin Wisdom. I've reviewed her assessment note and all screening/preventative plans addressed. I also addressed all questions and concerns surrounding the assessment and plans with Houston Osler. 2. Type 2 diabetes mellitus with hemoglobin A1c goal of less than 7.0% ([facility] [address]- Well controlled and stable. her medications were reviewed and refilled where necessary as noted below. Labs ordered as noted. -     AMB POC HEMOGLOBIN S7R  -     METABOLIC PANEL, BASIC  -     MICROALBUMIN, UR, RAND W/ MICROALBUMIN/CREA RATIO    3. Hyperlipidemia, unspecified hyperlipidemia type -Well controlled and stable. her medications were reviewed and refilled where necessary as noted below. Labs ordered as noted. Recheck next visit      4. Essential hypertension -Well controlled and stable.   her medications were reviewed and refilled where necessary as noted below. Labs ordered as noted. 5. CKD 3 . Recheck bmp now. She reports better water intake. Follow-up Disposition:  Return in about 4 months (around 1/27/2018).

## 2017-09-27 NOTE — MR AVS SNAPSHOT
Visit Information Date & Time Provider Department Dept. Phone Encounter #  
 9/27/2017 11:15 AM Sherry Genao MD [facility] [address] [id number] Follow-up Instructions Return in about 4 months (around 1/27/2018). Your Appointments 2/21/2018 11:00 AM  
ESTABLISHED PATIENT with Marlon Win MD  
[facility] ([facility]) Appt Note: ANNAUL  
 [address] [facility] [address]  
[phone]  
  
   
 [address] [address]  
  
    
 4/20/2018 10:00 AM  
[facility] ([address]) Appt Note: REDUCE-IT study/ Physical Exam @ 10:30 margee; REDUCE-IT study/ Physical Exam @ 10:30 [address] [facility] [address]  
[phone]  
  
   
 [address] [address]  
  
    
 4/20/2018 10:30 AM  
ESTABLISHED PATIENT with Maximilian Ochoa NP  
[facility] ([address]) Appt Note: REDUCEIT STUDY CIARA 10 10 [id number] [place] [address] Upcoming Health Maintenance Date Due  
 FOOT EXAM Q1 9/29/2014 MEDICARE YEARLY EXAM 6/15/2017 MICROALBUMIN Q1 7/6/2017 INFLUENZA AGE 9 TO ADULT 8/1/2017 HEMOGLOBIN A1C Q6M 10/19/2017 EYE EXAM RETINAL OR DILATED Q1 10/19/2017 LIPID PANEL Q1 3/20/2018 BREAST CANCER SCRN MAMMOGRAM 10/13/2018 GLAUCOMA SCREENING Q2Y 10/19/2018 DTaP/Tdap/Td series (2 - Td) 9/20/2023 COLONOSCOPY 6/29/2025 Allergies as of 9/27/2017  Review Complete On: 8/22/2017 By: Sherry Genao MD  
  
 Severity Noted Reaction Type Reactions Sulfa (Sulfonamide Antibiotics)  06/10/2009    Unknown (comments) Current Immunizations  Reviewed on 11/1/2016 Name Date Influenza High Dose Vaccine PF 10/31/2016, 10/19/2015 Influenza Vaccine 10/6/2014, 9/20/2013 Pneumococcal Conjugate (PCV-13) 9/7/2016 Pneumococcal Polysaccharide (PPSV-23) 10/6/2014 TD Vaccine 1/29/2009 Tdap 9/20/2013 Zoster Vaccine, Live 6/14/2008 Not reviewed this visit You Were Diagnosed With   
  
 Codes Comments Type 2 diabetes mellitus with hemoglobin A1c goal of less than 7.0% ([facility])    -  Primary ICD-10-CM: E11.9 ICD-9-CM: 250.00 Hyperlipidemia, unspecified hyperlipidemia type     ICD-10-CM: E78.5 ICD-9-CM: 272.4 Essential hypertension     ICD-10-CM: I10 
ICD-9-CM: 401.9 Vitals BP Pulse Temp Resp Height(growth percentile) Weight(growth percentile) 139/68 (BP 1 Location: Left arm, BP Patient Position: Sitting) (!) 56 98.3 °F (36.8 °C) (Oral) 18 5' (1.524 m) 182 lb 4 oz (82.7 kg) SpO2 BMI OB Status Smoking Status 99% 35.59 kg/m2 Postmenopausal Never Smoker Vitals History BMI and BSA Data Body Mass Index Body Surface Area 35.59 kg/m 2 1.87 m 2 Preferred Pharmacy Pharmacy Name Phone [id number] - [address] [facility], [address] [place] [address] [phone] Your Updated Medication List  
  
   
This list is accurate as of: 9/27/17 12:07 PM.  Always use your most recent med list. amLODIPine 5 mg tablet Commonly known as:  Caralee Dupes Take 1 Tab by mouth daily. aspirin delayed-release 81 mg tablet  
take 81 mg by mouth daily. CALCIUM + D PO Take  by mouth. CLARITIN 10 mg tablet Generic drug:  loratadine Take 10 mg by mouth daily. esomeprazole 40 mg capsule Commonly known as:  Hedwig Daunt Take 40 mg by mouth daily. famotidine 20 mg tablet Commonly known as:  PEPCID Take 20 mg by mouth daily as needed. FREESTYLE LANCETS 28 gauge Misc Generic drug:  lancets FOR USE IN LANCET DEVICE  
  
 gabapentin 100 mg capsule Commonly known as:  NEURONTIN  
TAKE ONE CAPSULE BY MOUTH THREE TIMES A DAY AS NEEDED  
  
 glucose blood VI test strips strip Commonly known as:  FREESTYLE TEST For fasting BS testing once daily. ICD-10: E11.9  
  
 lisinopril 10 mg tablet Commonly known as:  PRINIVIL, ZESTRIL  
TAKE ONE TABLET BY MOUTH EVERY DAY  
  
 loperamide 2 mg capsule Commonly known as:  IMODIUM Take  by mouth as needed. metFORMIN  mg tablet Commonly known as:  GLUCOPHAGE XR  
TAKE 4 TABLETS BY MOUTH DAILY WITH SUPPER  
  
 metoprolol succinate 50 mg XL tablet Commonly known as:  TOPROL-XL  
TAKE ONE (1) TABLET(S) ONCE DAILY MULTI-VITAMIN PO Take 1 Tab by mouth daily. OTHER Fish oil vs. Placebo  
  
 oxybutynin chloride XL 10 mg CR tablet Commonly known as:  DITROPAN XL Take 10 mg by mouth daily. prednisoLONE acetate 1 % ophthalmic suspension Commonly known as:  PRED FORTE Administer 1 Drop to both eyes daily. simvastatin 20 mg tablet Commonly known as:  ZOCOR  
TAKE 1 TABLET DAILY AT BEDTIME FOR CHOLESTEROL  
  
 TUMS PO Take  by mouth daily as needed. We Performed the Following AMB POC HEMOGLOBIN A1C [29201 CPT(R)] METABOLIC PANEL, BASIC [30340 CPT(R)] MICROALBUMIN, UR, RAND W/ MICROALBUMIN/CREA RATIO [id number] CPT(R)] Follow-up Instructions Return in about 4 months (around 1/27/2018). Patient Instructions Medicare Part B Preventive Services Limitations Recommendation Scheduled Bone Mass Measurement 
(age 72 & older, biennial) Requires diagnosis related to osteoporosis or estrogen deficiency. Biennial benefit unless patient has history of long-term glucocorticoid tx or baseline is needed because initial test was by other method Last:2013 A DEXA scan is recommended after you turn 72years of age to determine your risk for osteoporosis Next: As recommended by your physician Colorectal Cancer Screening 
-Fecal occult blood test (annual) -Flexible sigmoidoscopy (5y) 
-Screening colonoscopy (10y) -Barium Enema  Last: 2015 Every 5-10 years depending on your colonoscopy result starting at age 48 years Next: 2025 or as recommended Glaucoma Screening (no USPSTF recommendation) Diabetes mellitus, family history, , age 48 or over,  American, age 72 or over Last: 10/2016 Every year Next: 11/2017 Screening Mammography (biennial age 54-69) Annually (age 36 or over) Last: 10/2016 Next: 11/2017 Screening Pap Tests and Pelvic Examination (up to age 72 and after 72 if unknown history or abnormal study last 10 years) Every 24 months except high risk Last: NA Next: 
 
Discuss with your doctor if this screening is appropriate for you. Cardiovascular Screening Blood Tests (every 5 years) Total cholesterol, HDL, Triglycerides Order as a panel if possible Last: 4/18/17 Every Year Next: 4/2018 Diabetes Screening Tests (at least every 3 years, Medicare covers annually or at 6-month intervals for prediabetic patients) Fasting blood sugar (FBS) or glucose tolerance test (GTT) Patient must be diagnosed with one of the following: 
-Hypertension, Dyslipidemia, obesity, previous impaired FBS or GTT 
Or any two of the following: overweight, FH of diabetes, age ? 72, history of gestational diabetes, birth of baby weighing more than 9 pounds Last: today Every 3-6 months depending on your result Every 3 years Next: every 3-6 months as recommended Diabetes Self-Management Training (DSMT) (no USPSTF recommendation) Requires referral by treating physician for patient with diabetes or renal disease. 10 hours of initial DSMT session of no less than 30 minutes each in a continuous 12-month period.   2 hours of follow-up DSMT in subsequent years. Last: NA Talk to your doctor if you are interested in a refresher course. Medical Nutrition Therapy (MNT) (for diabetes or renal disease not recommended schedule) Requires referral by treating physician for patient with diabetes or renal disease. Can be provided in same year as diabetes self-management training (DSMT), and CMS recommends medical nutrition therapy take place after DSMT. Up to 3 hours for initial year and 2 hours in subsequent years. Last: NA Talk to your doctor if you are interested in a refresher course. Seasonal Influenza Vaccination (annually)  Last: 2016 Every Fall Please get one this Fall. Pneumococcal Vaccination (once after 65)  Last: 
Pneumovax: 
2014 Prevnar-13: 
2016 Complete Shingles Vaccination  Last: 2008 A shingles vaccine is recommended once in a lifetime after age 61 Complete Tetanus, Diphtheria and Pertussis (Tdap) Vaccination Booster One Booster as an adult and then tetanus every 10 years or as indicated Last: 2013 Complete Hepatitis B Vaccinations (if medium/high risk) Medium/high risk factors:  End-stage renal disease, Hemophiliacs who received Factor VIII or IX concentrates, Clients of institutions for the mentally retarded, Persons who live in the same house as a HepB virus carrier, Homosexual men, Illicit injectable drug abusers. Last: NA Next: NA  
Counseling to Prevent Tobacco Use (up to 8 sessions per year) - Counseling greater than 3 and up to 10 minutes - Counseling greater than 10 minutes Patients must be asymptomatic of tobacco-related conditions to receive as preventive service Last: NA Next: NA Human Immunodeficiency Virus (HIV) Screening (annually for increased risk patients) HIV-1 and HIV-2 by EIA, ROSE MARIE, rapid antibody test, or oral mucosa transudate Patient must be at increased risk for HIV infection per USPSTF guidelines or pregnant.   Tests covered annually for patients at increased risk.  Pregnant patients may receive up to 3 test during pregnancy. Last: NA Next: NA Ultrasound Screening for Abdominal Aortic Aneurysm (AAA) once Patient must be referred through IPPE and not have had a screening for abdominal aortic aneurysm before under medicare. Limited to patients who meet onf of the following criteria: 
-Men who are 73-68 years old and have smoked more than 100 cigarettes in their lifetime 
-Anyone with a FH of AAA 
-Anyone recommended for screening by the USPSFTF As recommended by your PCP or Specialist 
 As recommended by your PCP or Specialist 
  
NA = Not Applicable; NI= Not Indicated Introducing [facility] [place] [facility]! Dear Mandeep Fofana: 
Thank you for requesting a [employer] account. Our records indicate that you already have an active [employer] account. You can access your account anytime at [email or web address] Did you know that you can access your hospital and ER discharge instructions at any time in [employer]? You can also review all of your test results from your hospital stay or ER visit. Additional Information If you have questions, please visit the Frequently Asked Questions section of the [employer] website at [email or web address] Remember, [employer] is NOT to be used for urgent needs. For medical emergencies, dial 911. Now available from your iPhone and Android! Please provide this summary of care documentation to your next provider. Your primary care clinician is listed as Brandon Holguin. If you have any questions after today's visit, please call [phone].

## 2017-09-28 LAB
ALBUMIN/CREAT UR: 62.8 MG/G CREAT (ref 0–30)
BUN SERPL-MCNC: 23 MG/DL (ref 8–27)
BUN/CREAT SERPL: 24 (ref 12–28)
CALCIUM SERPL-MCNC: 9.6 MG/DL (ref 8.7–10.3)
CHLORIDE SERPL-SCNC: 103 MMOL/L (ref 96–106)
CO2 SERPL-SCNC: 21 MMOL/L (ref 18–29)
CREAT SERPL-MCNC: 0.94 MG/DL (ref 0.57–1)
CREAT UR-MCNC: 41.9 MG/DL
GLUCOSE SERPL-MCNC: 98 MG/DL (ref 65–99)
MICROALBUMIN UR-MCNC: 26.3 UG/ML
POTASSIUM SERPL-SCNC: 5.1 MMOL/L (ref 3.5–5.2)
SODIUM SERPL-SCNC: 140 MMOL/L (ref 134–144)

## 2017-11-08 ENCOUNTER — OFFICE VISIT (OUTPATIENT)
Dept: OBGYN CLINIC | Age: 67
End: 2017-11-08

## 2017-11-08 VITALS
WEIGHT: 181 LBS | DIASTOLIC BLOOD PRESSURE: 62 MMHG | BODY MASS INDEX: 35.53 KG/M2 | HEIGHT: 60 IN | RESPIRATION RATE: 19 BRPM | SYSTOLIC BLOOD PRESSURE: 133 MMHG | HEART RATE: 59 BPM

## 2017-11-08 DIAGNOSIS — Z12.4 SCREENING FOR CERVICAL CANCER: Primary | ICD-10-CM

## 2017-11-08 RX ORDER — FLUTICASONE PROPIONATE 50 MCG
SPRAY, SUSPENSION (ML) NASAL
COMMUNITY
Start: 2017-10-29 | End: 2020-10-12 | Stop reason: SDUPTHER

## 2017-11-08 NOTE — PROGRESS NOTES
Iqra Lucero is a ,  69 y.o. female [facility] whose LMP was on  who presents for her annual checkup. She is menopausal and amenorrheic. She is having no significant problems. She sees Rosa Maria Wang for urge incontinence. Hormone Status:    She is not having vasomotor symptoms. The patient is not using HRT. She has not had any vaginal bleeding. She reports no gynecologic symptoms. Sexual history:    She  reports that she does not currently engage in sexual activity. Medical conditions:    Since her last annual GYN exam about one year ago, she has had the following changes in her health history: none. Surgical history confirmed with patient. has a past surgical history that includes meniscus repair (14); breast biopsy (Left, ); ovarian cyst removal (); and hernia repair. Pap and Mammogram History:    Her most recent Pap smear was normal obtained 1 year(s) ago. The patient had her mammogram today in our office    Breast Cancer History/Substance Abuse:     She does not have a family history of breast cancer. Osteoporosis History:    Family history does not include a first or second degree relative with osteopenia or osteoporosis. A bone density scan was not obtained.     Past Medical History:   Diagnosis Date    Anemia     Cystocele     Diabetes ([address]     Diabetic neuropathy, painful (HCC)     Hepatitis B     Hypercholesterolemia     Hypertension     Microalbuminuria     Mild nonproliferative diabetic retinopathy (HCC)     PAF (paroxysmal atrial fibrillation) ([address]     [facility] 3-24-13 ER-[facility]olf follows    Rectocele     Retinopathy     Rosacea     Ruptured disc, cervical     Stenosis, cervical spine      Past Surgical History:   Procedure Laterality Date    HX BREAST BIOPSY Left     HX HERNIA REPAIR      umbilical    HX MENISCUS REPAIR  14    HX OVARIAN CYST REMOVAL       Current Outpatient Prescriptions   Medication Sig Dispense Refill    fluticasone (FLONASE) 50 mcg/actuation nasal spray       loratadine (CLARITIN) 10 mg tablet Take 10 mg by mouth daily.  metoprolol succinate (TOPROL-XL) 50 mg XL tablet TAKE ONE (1) TABLET(S) ONCE DAILY 90 Tab 2    amLODIPine (NORVASC) 5 mg tablet Take 1 Tab by mouth daily. 90 Tab 2    esomeprazole (NEXIUM) 40 mg capsule Take 40 mg by mouth daily.  lisinopril (PRINIVIL, ZESTRIL) 10 mg tablet TAKE ONE TABLET BY MOUTH EVERY DAY 90 Tab 2    metFORMIN ER (GLUCOPHAGE XR) 500 mg tablet TAKE 4 TABLETS BY MOUTH DAILY WITH SUPPER 360 Tab 5    simvastatin (ZOCOR) 20 mg tablet TAKE 1 TABLET DAILY AT BEDTIME FOR CHOLESTEROL 90 Tab 2    glucose blood VI test strips (FREESTYLE TEST) strip For fasting BS testing once daily. ICD-10: E11.9 100 Strip 11    gabapentin (NEURONTIN) 100 mg capsule TAKE ONE CAPSULE BY MOUTH THREE TIMES A DAY AS NEEDED  5    OTHER Fish oil vs. Placebo      CALCIUM CARBONATE (TUMS PO) Take  by mouth daily as needed.  famotidine (PEPCID) 20 mg tablet Take 20 mg by mouth daily as needed.  FREESTYLE LANCETS 28 gauge misc FOR USE IN LANCET DEVICE 100 Each 11    prednisoLONE acetate (PRED FORTE) 1 % ophthalmic suspension Administer 1 Drop to both eyes daily. 5    loperamide (IMODIUM) 2 mg capsule Take  by mouth as needed.  oxybutynin chloride XL (DITROPAN XL) 10 mg CR tablet Take 10 mg by mouth daily.  CALCIUM CARBONATE/VITAMIN D3 (CALCIUM + D PO) Take  by mouth.  MULTI-VITAMIN PO Take 1 Tab by mouth daily.  aspirin delayed-release 81 mg tablet take 81 mg by mouth daily. Allergies: Sulfa (sulfonamide antibiotics)   Social History     Social History    Marital status:      Spouse name: N/A    Number of children: N/A    Years of education: N/A     Occupational History    Not on file.      Social History Main Topics    Smoking status: Never Smoker    Smokeless tobacco: Never Used    Alcohol use No      Comment: very rarely  Drug use: No    Sexual activity: Not Currently     Other Topics Concern    Not on file     Social History Narrative     Tobacco History:  reports that she has never smoked. She has never used smokeless tobacco.  Alcohol Abuse:  reports that she does not drink alcohol. Drug Abuse:  reports that she does not use illicit drugs.   Patient Active Problem List   Diagnosis Code    DM (diabetes mellitus) ([facility] E11.9    Hyperlipidemia E78.5    Hepatitis B B19.10    GERD (gastroesophageal reflux disease) K21.9    Rosacea L71.9    AR (allergic rhinitis) J30.9    Intervertebral disk disease M51.9    Uterine prolapse N81.4    Incontinence R32    HTN (hypertension) I10    Anemia D64.9    PAF (paroxysmal atrial fibrillation) ([facility]) I48.0    Peripheral neuropathy G62.9    Pre-ulcerative corn or callous L84    Advanced care planning/counseling discussion Z71.89    Type 2 diabetes mellitus with hemoglobin A1c goal of less than 7.0% ([facility]) E11.9    CKD (chronic kidney disease) N18.9       Review of Systems - History obtained from the patient  Constitutional: negative for weight loss, fever, night sweats  HEENT: negative for hearing loss, earache, congestion, snoring, sorethroat  CV: negative for chest pain, palpitations, edema  Resp: negative for cough, shortness of breath, wheezing  GI: negative for change in bowel habits, abdominal pain, black or bloody stools  : negative for frequency, dysuria, hematuria, vaginal discharge  MSK: negative for back pain, joint pain, muscle pain  Breast: negative for breast lumps, nipple discharge, galactorrhea  Skin :negative for itching, rash, hives  Neuro: negative for dizziness, headache, confusion, weakness  Psych: negative for anxiety, depression, change in mood  Heme/lymph: negative for bleeding, bruising, pallor    Physical Exam    Visit Vitals    /62 (BP 1 Location: Left arm, BP Patient Position: Sitting)    Pulse (!) 59    Resp 19    Ht 5' (1.524 m)    [place] 181 lb (82.1 kg)    BMI 35.35 kg/m2     Constitutional  · Appearance: well-nourished, well developed, alert, in no acute distress    HENT  · Head and Face: appears normal    Neck  · Inspection/Palpation: normal appearance, no masses or tenderness  · Lymph Nodes: no lymphadenopathy present    Chest  · Respiratory Effort: breathing normal    Breasts  · Inspection of Breasts: breasts symmetrical, no skin changes, no discharge present, nipple appearance normal, no skin retraction present  · Palpation of Breasts and Axillae: no masses present on palpation, no breast tenderness  · Axillary Lymph Nodes: no lymphadenopathy present    Gastrointestinal  · Abdominal Examination: abdomen non-tender to palpation, normal bowel sounds, no masses present  · Liver and spleen: no hepatomegaly present, spleen not palpable  · Hernias: no hernias identified    Genitourinary  · External Genitalia: normal appearance for age with atrophy, no discharge present, no tenderness present, no inflammatory lesions present, no masses present  · Vagina:atrophic vaginal vault with pale epithelium and flattening of rugae, cystocele, rectocele and uterine descent present, no discharge present, no inflammatory lesions present, no masses present  · Bladder: non-tender to palpation  · Urethra: appears normal  · Cervix: normal   · Uterus: normal size, shape and consistency  · Adnexa: no adnexal tenderness present, no adnexal masses present  · Perineum: perineum within normal limits, no evidence of trauma, no rashes or skin lesions present  · Anus: anus within normal limits, no hemorrhoids present  · Inguinal Lymph Nodes: no lymphadenopathy present    Skin  · General Inspection: no rash, no lesions identified    Neurologic/Psychiatric  · Mental Status:  · Orientation: grossly oriented to person, place and time  · Mood and Affect: mood normal, affect appropriate    .   Assessment:  Routine gynecologic examination  Her current medical status is satisfactory with long standing pelvic relaxation. Plan:  Counseled re: diet, exercise, healthy lifestyle  Return for yearly wellness visits  Rec annual mammogram  She will f/u with Shiva Silvestre for bladder issues.

## 2018-01-31 ENCOUNTER — OFFICE VISIT (OUTPATIENT)
Dept: INTERNAL MEDICINE CLINIC | Age: 68
End: 2018-01-31

## 2018-01-31 ENCOUNTER — HOSPITAL ENCOUNTER (OUTPATIENT)
Dept: LAB | Age: 68
Discharge: HOME | End: 2018-01-31
Payer: MEDICARE

## 2018-01-31 VITALS
HEIGHT: 60 IN | WEIGHT: 184.25 LBS | SYSTOLIC BLOOD PRESSURE: 128 MMHG | BODY MASS INDEX: 36.17 KG/M2 | RESPIRATION RATE: 18 BRPM | OXYGEN SATURATION: 100 % | TEMPERATURE: 98.1 F | HEART RATE: 58 BPM | DIASTOLIC BLOOD PRESSURE: 68 MMHG

## 2018-01-31 DIAGNOSIS — E11.9 TYPE 2 DIABETES MELLITUS WITH HEMOGLOBIN A1C GOAL OF LESS THAN 7.0% (HCC): Primary | ICD-10-CM

## 2018-01-31 DIAGNOSIS — N18.2 CKD (CHRONIC KIDNEY DISEASE), STAGE II: ICD-10-CM

## 2018-01-31 DIAGNOSIS — E78.5 HYPERLIPIDEMIA, UNSPECIFIED HYPERLIPIDEMIA TYPE: Chronic | ICD-10-CM

## 2018-01-31 DIAGNOSIS — I10 ESSENTIAL HYPERTENSION: ICD-10-CM

## 2018-01-31 PROBLEM — N18.30 STAGE 3 CHRONIC KIDNEY DISEASE (HCC): Status: ACTIVE | Noted: 2018-01-31

## 2018-01-31 PROBLEM — E11.21 TYPE 2 DIABETES MELLITUS WITH NEPHROPATHY (HCC): Status: ACTIVE | Noted: 2018-01-31

## 2018-01-31 PROBLEM — N18.9 CKD (CHRONIC KIDNEY DISEASE): Status: RESOLVED | Noted: 2017-09-27 | Resolved: 2018-01-31

## 2018-01-31 LAB — HBA1C MFR BLD HPLC: 5.6 %

## 2018-01-31 PROCEDURE — 80048 BASIC METABOLIC PNL TOTAL CA: CPT

## 2018-01-31 NOTE — PROGRESS NOTES
HISTORY OF PRESENT ILLNESS  Yarely Escalera is a 69 y.o. female. HPI  Diabetes:  She is here for follow up of diabetes. Proteinuria: no  Neuropathy: no  Medication change since last visit:  No   Diabetic Review of Systems - medication compliance: compliant most of the time, diabetic diet compliance: compliant most of the time, home glucose monitoring: is performed regularly, fasting values range 90's. Hypoglycemic symptoms: no  Dilated eye exam in the last year: yes      Lab Results   Component Value Date/Time    Hemoglobin A1c 6.0 03/20/2017 10:59 AM    Hemoglobin A1c (POC) 5.6 01/31/2018 11:30 AM    Hemoglobin A1c, External 5.6 04/19/2016     Lab Results   Component Value Date/Time    Microalbumin/Creat ratio (mg/g creat) 14 12/16/2009 10:00 AM    Microalb/Creat ratio (ug/mg creat.) 62.8 09/27/2017 12:40 PM    Microalbumin,urine random 1.77 12/16/2009 10:00 AM         Last Point of Care HGB A1C  Hemoglobin A1c (POC)   Date Value Ref Range Status   01/31/2018 5.6 % Final          Hypertension:  Yarely Escalera is a 69 y.o. female with hypertension. with Chronic kidney disease stage 2   Medication change since last visit: No  The patient reports:  taking medications as instructed, no medication side effects noted, home BP monitoring in range of 788'T systolic over 90'U diastolic, no TIA's, no chest pain on exertion, no dyspnea on exertion, no swelling of ankles, no orthostatic dizziness or lightheadedness, no palpitations.        Lifestyle modification/social history: generally follows a low fat low cholesterol diet, generally follows a low sodium diet    Lab Results   Component Value Date/Time    Sodium 140 09/27/2017 12:40 PM    Potassium 5.1 09/27/2017 12:40 PM    Chloride 103 09/27/2017 12:40 PM    CO2 21 09/27/2017 12:40 PM    Anion gap 11 07/16/2016 12:26 PM    Glucose 98 09/27/2017 12:40 PM    BUN 23 09/27/2017 12:40 PM    Creatinine 0.94 09/27/2017 12:40 PM    BUN/Creatinine ratio 24 09/27/2017 12:40 PM    GFR est AA 72 09/27/2017 12:40 PM    GFR est non-AA 62 09/27/2017 12:40 PM    Calcium 9.6 09/27/2017 12:40 PM         Hyperlipidemia:  Pennie Mcconnell is following up on her dyslipidemia. Cardiovascular risks for her are: LDL goal is under 100  diabetic  hypertension. Currently she takes Zocor (simvastatin) , ? Fish oil  Lab Results   Component Value Date/Time    Cholesterol, total 138 03/20/2017 10:59 AM    Cholesterol, total 140 01/12/2016 12:28 PM    Cholesterol, total 144 07/16/2015 10:28 AM    Cholesterol, total 161 08/15/2014 02:23 PM    Cholesterol, total 158 03/21/2014 10:28 AM    HDL Cholesterol 63 03/20/2017 10:59 AM    HDL Cholesterol 56 01/12/2016 12:28 PM    HDL Cholesterol 42 07/16/2015 10:28 AM    HDL Cholesterol 46 08/15/2014 02:23 PM    HDL Cholesterol 45 03/21/2014 10:28 AM    LDL, calculated 48 03/20/2017 10:59 AM    LDL, calculated 56 01/12/2016 12:28 PM    LDL, calculated 63 07/16/2015 10:28 AM    LDL, calculated 56 08/15/2014 02:23 PM    LDL, calculated 69 03/21/2014 10:28 AM    Triglyceride 133 03/20/2017 10:59 AM    Triglyceride 141 01/12/2016 12:28 PM    Triglyceride 193 07/16/2015 10:28 AM    Triglyceride 294 08/15/2014 02:23 PM    Triglyceride 221 03/21/2014 10:28 AM    CHOL/HDL Ratio 3.3 08/17/2010 03:47 AM    CHOL/HDL Ratio 3.3 12/16/2009 08:00 PM    CHOL/HDL Ratio 3.0 06/02/2009 08:47 PM     Lab Results   Component Value Date/Time    ALT (SGPT) 14 07/25/2016 01:04 PM    AST (SGOT) 14 07/25/2016 01:04 PM    Alk.  phosphatase 62 07/25/2016 01:04 PM    Bilirubin, total <0.2 07/25/2016 01:04 PM       Myalgias: no  Fatigue: no    Patient Active Problem List   Diagnosis Code    DM (diabetes mellitus) ([facility] E11.9    Hyperlipidemia E78.5    Hepatitis B B19.10    GERD (gastroesophageal reflux disease) K21.9    Rosacea L71.9    AR (allergic rhinitis) J30.9    Intervertebral disk disease M51.9    Uterine prolapse N81.4    Incontinence R32    HTN (hypertension) I10    Anemia D64.9    PAF (paroxysmal atrial fibrillation) ([facility]) I48.0    Peripheral neuropathy G62.9    Pre-ulcerative corn or callous L84    Advanced care planning/counseling discussion Z71.89    Type 2 diabetes mellitus with hemoglobin A1c goal of less than 7.0% ([facility]) E11.9    Type 2 diabetes mellitus with nephropathy ([facility]) E11.21    Stage 3 chronic kidney disease N18.3     Past Medical History:   Diagnosis Date    Anemia     Cystocele     Diabetes ([facility]     Diabetic neuropathy, painful (HCC)     Hepatitis B     Hypercholesterolemia     Hypertension     Microalbuminuria     Mild nonproliferative diabetic retinopathy (HCC)     PAF (paroxysmal atrial fibrillation) ([facility])     [facility] 3-24-13 ER-[facility] follows    Rectocele     Retinopathy     Rosacea     Ruptured disc, cervical     Stenosis, cervical spine      Allergies   Allergen Reactions    Sulfa (Sulfonamide Antibiotics) Unknown (comments)     Current Outpatient Prescriptions on File Prior to Visit   Medication Sig Dispense Refill    fluticasone (FLONASE) 50 mcg/actuation nasal spray       loratadine (CLARITIN) 10 mg tablet Take 10 mg by mouth daily.  metoprolol succinate (TOPROL-XL) 50 mg XL tablet TAKE ONE (1) TABLET(S) ONCE DAILY 90 Tab 2    amLODIPine (NORVASC) 5 mg tablet Take 1 Tab by mouth daily. 90 Tab 2    esomeprazole (NEXIUM) 40 mg capsule Take 40 mg by mouth daily.  lisinopril (PRINIVIL, ZESTRIL) 10 mg tablet TAKE ONE TABLET BY MOUTH EVERY DAY 90 Tab 2    metFORMIN ER (GLUCOPHAGE XR) 500 mg tablet TAKE 4 TABLETS BY MOUTH DAILY WITH SUPPER 360 Tab 5    simvastatin (ZOCOR) 20 mg tablet TAKE 1 TABLET DAILY AT BEDTIME FOR CHOLESTEROL 90 Tab 2    gabapentin (NEURONTIN) 100 mg capsule TAKE ONE CAPSULE BY MOUTH THREE TIMES A DAY AS NEEDED  5    OTHER Fish oil vs. Placebo      CALCIUM CARBONATE (TUMS PO) Take  by mouth daily as needed.  famotidine (PEPCID) 20 mg tablet Take 20 mg by mouth daily as needed.       prednisoLONE acetate (PRED FORTE) 1 % ophthalmic suspension Administer 1 Drop to both eyes daily. 5    loperamide (IMODIUM) 2 mg capsule Take  by mouth as needed.  oxybutynin chloride XL (DITROPAN XL) 10 mg CR tablet Take 10 mg by mouth daily.  CALCIUM CARBONATE/VITAMIN D3 (CALCIUM + D PO) Take  by mouth.  MULTI-VITAMIN PO Take 1 Tab by mouth daily.  aspirin delayed-release 81 mg tablet take 81 mg by mouth daily.  glucose blood VI test strips (FREESTYLE TEST) strip For fasting BS testing once daily. ICD-10: E11.9 100 Strip 11    FREESTYLE LANCETS 28 gauge misc FOR USE IN LANCET DEVICE 100 Each 11     No current facility-administered medications on file prior to visit. Social History   Substance Use Topics    Smoking status: Never Smoker    Smokeless tobacco: Never Used    Alcohol use No      Comment: very rarely              ROS    Physical Exam   Constitutional: She appears well-developed and well-nourished. No distress. /68 (BP 1 Location: Left arm, BP Patient Position: Sitting)  Pulse (!) 58  Temp 98.1 °F (36.7 °C) (Oral)   Resp 18  Ht 5' (1.524 m)  Wt 184 lb 4 oz (83.6 kg)  SpO2 100%  BMI 35.98 kg/m2Body mass index is 35.98 kg/(m^2). HENT:   Mouth/Throat: Oropharynx is clear and moist.   Neck: No JVD present. Carotid bruit is not present. Cardiovascular: Normal rate, regular rhythm, normal heart sounds and intact distal pulses. Pulmonary/Chest: Effort normal and breath sounds normal.   Musculoskeletal: She exhibits no edema. Neurological: She is alert. Skin: Skin is warm and dry. She is not diaphoretic. Nursing note and vitals reviewed. ASSESSMENT and PLAN  Diagnoses and all orders for this visit:    1. Type 2 diabetes mellitus with hemoglobin A1c goal of less than 7.0% ([facility]) - Well controlled and stable. her medications were reviewed and refilled where necessary as noted below. Labs ordered as noted. -     AMB POC HEMOGLOBIN A1C    2. Hyperlipidemia, unspecified hyperlipidemia type - Well controlled and stable. her medications were reviewed and refilled where necessary as noted below. Labs ordered as noted. Getting FLP's done with fish oil study    3. Essential hypertension - Well controlled and stable. her medications were reviewed and refilled where necessary as noted below. Labs ordered as noted. -     METABOLIC PANEL, BASIC    4. CKD (chronic kidney disease), stage II  -     METABOLIC PANEL, BASIC      Follow-up Disposition:  Return in about 6 months (around 7/31/2018).

## 2018-01-31 NOTE — MR AVS SNAPSHOT
[facility] 
 
 
 [address] Joni Dasilva [address] 
[phone] Patient: Romelia Mcfarland MRN: [id number] FOL:[id number] Visit Information Date & Time Provider Department Dept. Phone Encounter #  
 1/31/2018 11:30 AM Ev Palumbo MD [facility] [address] [id number] Follow-up Instructions Return in about 6 months (around 7/31/2018). Your Appointments 2/21/2018 11:00 AM  
ESTABLISHED PATIENT with Paulino Oro MD  
[facility] ([facility] SCHEDULING) Appt Note: ANNAUL  
 [address] [facility] [address]  
[phone]  
  
   
 [address] [facility] [address] [place] [address]  
  
    
 4/20/2018 10:00 AM  
RESEARCH with RESEARCH, [address] ([facility]) Appt Note: REDUCE-IT study/ Physical Exam @ 10:30 margee; REDUCE-IT study/ Physical Exam @ 10:30 [address] [facility] [address]  
[phone]  
  
   
 [address] [facility] [address] [place] [address]  
  
    
 4/20/2018 10:30 AM  
ESTABLISHED PATIENT with Margot Michelle NP  
[facility] ([facility]) Appt Note: REDUCEIT STUDY CI[place] [address] [place] [id number] [address] Upcoming Health Maintenance Date Due  
 FOOT EXAM Q1 9/29/2014 Influenza Age 5 to Adult 8/1/2017 EYE EXAM RETINAL OR DILATED Q1 10/19/2017 LIPID PANEL Q1 3/20/2018 HEMOGLOBIN A1C Q6M 3/27/2018 MICROALBUMIN Q1 9/27/2018 MEDICARE YEARLY EXAM 9/28/2018 GLAUCOMA SCREENING Q2Y 10/19/2018 BREAST CANCER SCRN MAMMOGRAM 11/8/2019 DTaP/Tdap/Td series (2 - Td) 9/20/2023 COLONOSCOPY 6/29/2025 Allergies as of 1/31/2018  Review Complete On: 11/8/2017 By: Zeina Mariee MD  
  
 Severity Noted Reaction Type Reactions Sulfa (Sulfonamide Antibiotics)  06/10/2009    Unknown (comments) Current Immunizations  Reviewed on 11/1/2016 Name Date Influenza High Dose Vaccine PF 10/31/2016, 10/19/2015 Influenza Vaccine 10/6/2014, 9/20/2013 Pneumococcal Conjugate (PCV-13) 9/7/2016 Pneumococcal Polysaccharide (PPSV-23) 10/6/2014 TD Vaccine 1/29/2009 Tdap 9/20/2013 Zoster Vaccine, Live 6/14/2008 Not reviewed this visit You Were Diagnosed With   
  
 Codes Comments Type 2 diabetes mellitus with hemoglobin A1c goal of less than 7.0% ([facility])    -  Primary ICD-10-CM: E11.9 ICD-9-CM: 250.00 Hyperlipidemia, unspecified hyperlipidemia type     ICD-10-CM: E78.5 ICD-9-CM: 272.4 Essential hypertension     ICD-10-CM: I10 
ICD-9-CM: 401.9 CKD (chronic kidney disease), stage II     ICD-10-CM: N18.2 ICD-9-CM: 771. 2 Vitals BP Pulse Temp Resp Height(growth percentile) Weight(growth percentile) 128/68 (BP 1 Location: Left arm, BP Patient Position: Sitting) (!) 58 98.1 °F (36.7 °C) (Oral) 18 5' (1.524 m) 184 lb 4 oz (83.6 kg) SpO2 BMI OB Status Smoking Status 100% 35.98 kg/m2 Postmenopausal Never Smoker Vitals History BMI and BSA Data Body Mass Index Body Surface Area 35.98 kg/m 2 1.88 m 2 Preferred Pharmacy Pharmacy Name Phone [id number] - [address], [address] [facility] [phone] Your Updated Medication List  
  
   
This list is accurate as of: 1/31/18 12:07 PM.  Always use your most recent med list. amLODIPine 5 mg tablet Commonly known as:  Fili Bradford Take 1 Tab by mouth daily. aspirin delayed-release 81 mg tablet  
take 81 mg by mouth daily. CALCIUM + D PO Take  by mouth. CLARITIN 10 mg tablet Generic drug:  loratadine Take 10 mg by mouth daily. esomeprazole 40 mg capsule Commonly known as:  Gar Asp Take 40 mg by mouth daily. famotidine 20 mg tablet Commonly known as:  PEPCID Take 20 mg by mouth daily as needed. fluticasone 50 mcg/actuation nasal spray Commonly known as:  eBto Wilson FREESTYLE LANCETS 28 gauge Misc Generic drug:  lancets FOR USE IN LANCET DEVICE  
  
 gabapentin 100 mg capsule Commonly known as:  NEURONTIN  
TAKE ONE CAPSULE BY MOUTH THREE TIMES A DAY AS NEEDED  
  
 glucose blood VI test strips strip Commonly known as:  FREESTYLE TEST For fasting BS testing once daily. ICD-10: E11.9  
  
 lisinopril 10 mg tablet Commonly known as:  PRINIVIL, ZESTRIL  
TAKE ONE TABLET BY MOUTH EVERY DAY  
  
 loperamide 2 mg capsule Commonly known as:  IMODIUM Take  by mouth as needed. metFORMIN  mg tablet Commonly known as:  GLUCOPHAGE XR  
TAKE 4 TABLETS BY MOUTH DAILY WITH SUPPER  
  
 metoprolol succinate 50 mg XL tablet Commonly known as:  TOPROL-XL  
TAKE ONE (1) TABLET(S) ONCE DAILY MULTI-VITAMIN PO Take 1 Tab by mouth daily. OTHER Fish oil vs. Placebo  
  
 oxybutynin chloride XL 10 mg CR tablet Commonly known as:  DITROPAN XL Take 10 mg by mouth daily. prednisoLONE acetate 1 % ophthalmic suspension Commonly known as:  PRED FORTE Administer 1 Drop to both eyes daily. simvastatin 20 mg tablet Commonly known as:  ZOCOR  
TAKE 1 TABLET DAILY AT BEDTIME FOR CHOLESTEROL  
  
 TUMS PO Take  by mouth daily as needed. We Performed the Following AMB POC HEMOGLOBIN A1C [58099 CPT(R)] METABOLIC PANEL, BASIC [26776 CPT(R)] Follow-up Instructions Return in about 6 months (around 2018). Introducing [facility]! Dear Shanda Sabillon: 
Thank you for requesting a [employer] account. Our records indicate that you already have an active [employer] account.   You can access your account anytime at [email or web address] Did you know that you can access your hospital and ER discharge instructions at any time in [employer]? You can also review all of your test results from your hospital stay or ER visit. Additional Information If you have questions, please visit the Frequently Asked Questions section of the [employer] website at [email or web address] Remember, [employer] is NOT to be used for urgent needs. For medical emergencies, dial 911. Now available from your iPhone and Android! Please provide this summary of care documentation to your next provider. Your primary care clinician is listed as Roro Alegre. If you have any questions after today's visit, please call [phone].

## 2018-02-01 LAB
BUN SERPL-MCNC: 27 MG/DL (ref 8–27)
BUN/CREAT SERPL: 28 (ref 12–28)
CALCIUM SERPL-MCNC: 9.5 MG/DL (ref 8.7–10.3)
CHLORIDE SERPL-SCNC: 104 MMOL/L (ref 96–106)
CO2 SERPL-SCNC: 18 MMOL/L (ref 18–29)
CREAT SERPL-MCNC: 0.98 MG/DL (ref 0.57–1)
GFR SERPLBLD CREATININE-BSD FMLA CKD-EPI: 59 ML/MIN/1.73
GFR SERPLBLD CREATININE-BSD FMLA CKD-EPI: 68 ML/MIN/1.73
GLUCOSE SERPL-MCNC: 99 MG/DL (ref 65–99)
INTERPRETATION: NORMAL
POTASSIUM SERPL-SCNC: 4.7 MMOL/L (ref 3.5–5.2)
SODIUM SERPL-SCNC: 140 MMOL/L (ref 134–144)

## 2018-02-21 ENCOUNTER — OFFICE VISIT (OUTPATIENT)
Dept: CARDIOLOGY CLINIC | Age: 68
End: 2018-02-21

## 2018-02-21 VITALS
DIASTOLIC BLOOD PRESSURE: 60 MMHG | SYSTOLIC BLOOD PRESSURE: 120 MMHG | WEIGHT: 185 LBS | BODY MASS INDEX: 36.32 KG/M2 | OXYGEN SATURATION: 98 % | HEART RATE: 58 BPM | RESPIRATION RATE: 16 BRPM | HEIGHT: 60 IN

## 2018-02-21 DIAGNOSIS — E78.5 HYPERLIPIDEMIA, UNSPECIFIED HYPERLIPIDEMIA TYPE: Chronic | ICD-10-CM

## 2018-02-21 DIAGNOSIS — I10 ESSENTIAL HYPERTENSION: ICD-10-CM

## 2018-02-21 DIAGNOSIS — I48.0 PAF (PAROXYSMAL ATRIAL FIBRILLATION) (HCC): Primary | ICD-10-CM

## 2018-02-21 RX ORDER — AMLODIPINE BESYLATE 5 MG/1
5 TABLET ORAL DAILY
Qty: 90 TAB | Refills: 2 | Status: SHIPPED | OUTPATIENT
Start: 2018-02-21 | End: 2018-10-03

## 2018-02-21 RX ORDER — METOPROLOL SUCCINATE 50 MG/1
TABLET, EXTENDED RELEASE ORAL
Qty: 90 TAB | Refills: 2 | Status: SHIPPED | OUTPATIENT
Start: 2018-02-21 | End: 2019-11-12 | Stop reason: SDUPTHER

## 2018-02-21 NOTE — MR AVS SNAPSHOT
[facility] 
[phone] Patient: Paul Cortés MRN: [id number] UTD:0/30/1116 Visit Information Date & Time Provider Department Dept. Phone Encounter #  
 2/21/2018  1:40 PM Dano James MD [facility] [phone] [id number] Your Appointments 4/20/2018 10:00 AM  
RESEARCH with RESEARCH, STFRANCES  
[facility] ([address] [place] [address]) Appt Note: REDUCE-IT study/ Physical Exam @ 10:30 margee; REDUCE-IT study/ Physical Exam @ 10:30 [address] [address]  
[phone]  
  
   
 [address] [address]  
  
    
 4/20/2018 10:30 AM  
ESTABLISHED PATIENT with Zack Morris NP  
[facility] ([address] [place] [address]) Appt Note: REDUCEIT STUDY CIARA 10 10 [address] 600 [facility] [address]  
[phone]  
  
   
 [address] [address]  
  
    
 7/31/2018 10:00 AM  
ROUTINE CARE with Marcia Chaney MD  
[facility] [address] Appt Note: 6 mo fasting [address] [place]  
  
   
 [address] [id number]  
  
    
 2/27/2019 10:00 AM  
ESTABLISHED PATIENT with Dano James MD  
[facility] ([address] [place] [address]) Appt Note: annual  
 [address] [facility] [address] 1007 [facility]  
[address] [facility] [place] [address] [address] Upcoming Health Maintenance Date Due  
 FOOT EXAM Q1 9/29/2014 Influenza Age 5 to Adult 8/1/2017 EYE EXAM RETINAL OR DILATED Q1 10/19/2017 LIPID PANEL Q1 3/20/2018 HEMOGLOBIN A1C Q6M 7/31/2018 MICROALBUMIN Q1 9/27/2018 MEDICARE YEARLY EXAM 9/28/2018 GLAUCOMA SCREENING Q2Y 10/19/2018 BREAST CANCER SCRN MAMMOGRAM 11/8/2019 DTaP/Tdap/Td series (2 - Td) 9/20/2023 COLONOSCOPY 6/29/2025 Allergies as of 2/21/2018  Review Complete On: 2/21/2018 By: Prince Flanagan Severity Noted Reaction Type Reactions Sulfa (Sulfonamide Antibiotics)  06/10/2009    Unknown (comments) Current Immunizations  Reviewed on 11/1/2016 Name Date Influenza High Dose Vaccine PF 10/31/2016, 10/19/2015 Influenza Vaccine 10/6/2014, 9/20/2013 Pneumococcal Conjugate (PCV-13) 9/7/2016 Pneumococcal Polysaccharide (PPSV-23) 10/6/2014 TD Vaccine 1/29/2009 Tdap 9/20/2013 Zoster Vaccine, Live 6/14/2008 Not reviewed this visit You Were Diagnosed With   
  
 Codes Comments PAF (paroxysmal atrial fibrillation) ([facility]    -  Primary ICD-10-CM: I48.0 ICD-9-CM: 427.31 Vitals BP Pulse Resp Height(growth percentile) Weight(growth percentile) SpO2  
 120/60 (BP 1 Location: Left arm, BP Patient Position: Sitting) (!) 58 16 5' (1.524 m) 185 lb (83.9 kg) 98% BMI OB Status Smoking Status 36.13 kg/m2 Postmenopausal Never Smoker Vitals History BMI and BSA Data Body Mass Index Body Surface Area  
 36.13 kg/m 2 1.88 m 2 Preferred Pharmacy Pharmacy Name Phone [id number] - [address] [facility], [address] [facility] [phone] Your Updated Medication List  
  
   
This list is accurate as of 2/21/18  2:15 PM.  Always use your most recent med list. amLODIPine 5 mg tablet Commonly known as:  Arva Brazen Take 1 Tab by mouth daily. aspirin delayed-release 81 mg tablet  
take 81 mg by mouth daily. CALCIUM + D PO Take  by mouth. CLARITIN 10 mg tablet Generic drug:  loratadine Take 10 mg by mouth daily. esomeprazole 40 mg capsule Commonly known as:  Elizabeth Bell Take 40 mg by mouth daily. famotidine 20 mg tablet Commonly known as:  PEPCID Take 20 mg by mouth daily as needed. fluticasone 50 mcg/actuation nasal spray Commonly known as:  Heidi Tavarez FREESTYLE LANCETS 28 gauge Misc Generic drug:  lancets FOR USE IN LANCET DEVICE  
  
 gabapentin 100 mg capsule Commonly known as:  NEURONTIN  
TAKE ONE CAPSULE BY MOUTH THREE TIMES A DAY AS NEEDED  
  
 glucose blood VI test strips strip Commonly known as:  FREESTYLE TEST For fasting BS testing once daily. ICD-10: E11.9  
  
 lisinopril 10 mg tablet Commonly known as:  PRINIVIL, ZESTRIL  
TAKE ONE TABLET BY MOUTH EVERY DAY  
  
 loperamide 2 mg capsule Commonly known as:  IMODIUM Take  by mouth as needed. metFORMIN  mg tablet Commonly known as:  GLUCOPHAGE XR  
TAKE 4 TABLETS BY MOUTH DAILY WITH SUPPER  
  
 metoprolol succinate 50 mg XL tablet Commonly known as:  TOPROL-XL  
TAKE ONE (1) TABLET(S) ONCE DAILY MULTI-VITAMIN PO Take 1 Tab by mouth daily. OTHER Fish oil vs. Placebo  
  
 oxybutynin chloride XL 10 mg CR tablet Commonly known as:  DITROPAN XL Take 10 mg by mouth daily. prednisoLONE acetate 1 % ophthalmic suspension Commonly known as:  PRED FORTE Administer 1 Drop to both eyes daily. simvastatin 20 mg tablet Commonly known as:  ZOCOR  
TAKE 1 TABLET DAILY AT BEDTIME FOR CHOLESTEROL  
  
 TUMS PO Take  by mouth daily as needed. Prescriptions Sent to Pharmacy Refills  
 metoprolol succinate (TOPROL-XL) 50 mg XL tablet 2 Sig: TAKE ONE (1) TABLET(S) ONCE DAILY Class: Normal  
 Pharmacy: [address] [place] [address] [address]129 - [address] [place] [address] Ph #: [phone]  
 amLODIPine (NORVASC) 5 mg tablet 2 Sig: Take 1 Tab by mouth daily. Class: Normal  
 Pharmacy: [address] [place] [address] [address], 1 [facility] Ph #: [phone] Route: Oral  
  
We Performed the Following AMB POC EKG ROUTINE W/ 12 LEADS, INTER & REP [89820 CPT(R)] Introducing [facility]! Dear Olimpia Plan: 
Thank you for requesting a [employer] account. Our records indicate that you already have an active [employer] account. You can access your account anytime at [email or web address] Did you know that you can access your hospital and ER discharge instructions at any time in [employer]? You can also review all of your test results from your hospital stay or ER visit. Additional Information If you have questions, please visit the Frequently Asked Questions section of the [employer] website at [email or web address] Remember, [employer] is NOT to be used for urgent needs. For medical emergencies, dial 911. Now available from your iPhone and Android! Please provide this summary of care documentation to your next provider. Your primary care clinician is listed as Nalani Lennox. If you have any questions after today's visit, please call [phone].

## 2018-02-21 NOTE — PROGRESS NOTES
Fred Appiah     1950       Jennifer Garcia MD, [facility]  Date of Visit-2/21/2018   PCP is Yung Villalobos MD   [facility] [employer]  [facility]  HPI:  Fred Appiah is a 77 y.o. female   Annual follow up of AF. Overall the pt states she is doing well. The pt reports that very infrequently she feels some irregularity in her heart rhythm but these are short episodes. She states that she had some dizziness the other day but this was short lived and she felt like this was because she had not drank enough water. The pt reports that she is getting back to exercising and doing well with this. She reports that she is on aspirin at this time. The pt states that she is monitoring her blood pressure at home and this is well controlled. She reports that she got diagnosed with sleep apnea. Denies chest pain, edema, syncope or shortness of breath at rest, has no tachycardia, palpitations. EKG: Sinus bradycardia WNL     Assessment/Plan:     1. PAF systolic murmur, rare palpitations doing well. Continue aspirin     2. HTN good control     3. Dyslipidemia on statin trial, TGs are improved    4. Last stress test 2015 WNL, EF 69%    5. Follow up in 1 year. Impression:   1. PAF (paroxysmal atrial fibrillation) ([address]    2. Essential hypertension    3. Hyperlipidemia, unspecified hyperlipidemia type       Cardiac History:    In clinical trial= REDUCE-IT Study- \"A Multi-Center, Prospective, Randomized, Double-Blind, Placebo-Controlled, Parallel-Group Study to Evaluate the Effect of KZH528 on Cardiovascular Health and Mortality in Hypertriglyceridemic Patients with Cardiovascular Disease or at High Risk for Cardiovascular Disease: REDUCE-IT (Reduction of Cardiovascular Events with EPA- Intervention Trial)     PAF  Echo 2-5-13= normal  Nuke 4-24-13= 6 minutes , normal , EF 80%  NUKE-exercise nuclear stress test 9-30-15= 6'30\" EF 69% no EKG changes or ischemia, few PACs, normal study     ROS-except as noted above. . A complete cardiac and respiratory are reviewed and negative except as above ; Resp-denies wheezing  or productive cough,. Const- No unusual weight loss or fever; Neuro-no recent seizure or CVA ; GI- No BRBPR, abdom pain, bloating ; - no  hematuria   see supplement sheet, initialed and to be scanned by staff  Past Medical History:   Diagnosis Date    Anemia     Cystocele     Diabetes ([facility]     Diabetic neuropathy, painful ([facility]     Hepatitis B     Hypercholesterolemia     Hypertension     Microalbuminuria     Mild nonproliferative diabetic retinopathy ([facility]     PAF (paroxysmal atrial fibrillation) ([facility]     [facility] 3-24-13 ER-[facility] follows    Rectocele     Retinopathy     Rosacea     Ruptured disc, cervical     Stenosis, cervical spine       Social Hx= reports that she has never smoked. She has never used smokeless tobacco. She reports that she does not drink alcohol or use illicit drugs. Exam and Labs:  /60 (BP 1 Location: Left arm, BP Patient Position: Sitting)  Pulse (!) 58  Resp 16  Ht 5' (1.524 m)  Wt 185 lb (83.9 kg)  SpO2 98%  BMI 36.13 kg/i3Ouflnlvguknzzc:  NAD, comfortable  Head: NC,AT. Eyes: No scleral icterus. Neck:  Neck supple. No JVD present. Throat: moist mucous membranes. Chest: Effort normal & normal respiratory excursion . Neurological: alert, conversant and oriented . Skin: Skin is not cold. No obvious systemic rash noted. Not diaphoretic. No erythema. Psychiatric:  Grossly normal mood and affect. Behavior appears normal. Extremities:  no clubbing or cyanosis. Abdomen: non distended    Lungs:breath sounds normal. No stridor. distress, wheezes or  Rales. UAKLH:1/4 short systolic murmur at the RUSB normal rate, regular rhythm, normal S1, S2, no rubs, clicks or gallops , PMI non displaced. Edema: Edema is none.   Lab Results   Component Value Date/Time    Cholesterol, total 138 03/20/2017 10:59 AM    HDL Cholesterol 63 03/20/2017 10:59 AM    LDL, calculated 48 03/20/2017 10:59 AM    Triglyceride 133 03/20/2017 10:59 AM    CHOL/HDL Ratio 3.3 08/17/2010 03:47 AM     Lab Results   Component Value Date/Time    Sodium 140 01/31/2018 12:51 PM    Potassium 4.7 01/31/2018 12:51 PM    Chloride 104 01/31/2018 12:51 PM    CO2 18 01/31/2018 12:51 PM    Anion gap 11 07/16/2016 12:26 PM    Glucose 99 01/31/2018 12:51 PM    BUN 27 01/31/2018 12:51 PM    Creatinine 0.98 01/31/2018 12:51 PM    BUN/Creatinine ratio 28 01/31/2018 12:51 PM    GFR est AA 68 01/31/2018 12:51 PM    GFR est non-AA 59 (L) 01/31/2018 12:51 PM    Calcium 9.5 01/31/2018 12:51 PM      Wt Readings from Last 3 Encounters:   02/21/18 185 lb (83.9 kg)   01/31/18 184 lb 4 oz (83.6 kg)   11/08/17 181 lb (82.1 kg)      BP Readings from Last 3 Encounters:   02/21/18 120/60   01/31/18 128/68   11/08/17 133/62      Current Outpatient Prescriptions   Medication Sig    fluticasone (FLONASE) 50 mcg/actuation nasal spray     loratadine (CLARITIN) 10 mg tablet Take 10 mg by mouth daily.  metoprolol succinate (TOPROL-XL) 50 mg XL tablet TAKE ONE (1) TABLET(S) ONCE DAILY    amLODIPine (NORVASC) 5 mg tablet Take 1 Tab by mouth daily.  esomeprazole (NEXIUM) 40 mg capsule Take 40 mg by mouth daily.  lisinopril (PRINIVIL, ZESTRIL) 10 mg tablet TAKE ONE TABLET BY MOUTH EVERY DAY    metFORMIN ER (GLUCOPHAGE XR) 500 mg tablet TAKE 4 TABLETS BY MOUTH DAILY WITH SUPPER    simvastatin (ZOCOR) 20 mg tablet TAKE 1 TABLET DAILY AT BEDTIME FOR CHOLESTEROL    OTHER Fish oil vs. Placebo    famotidine (PEPCID) 20 mg tablet Take 20 mg by mouth daily as needed.  FREESTYLE LANCETS 28 gauge [facility] [place] FOR USE IN LANCET DEVICE    prednisoLONE acetate (PRED FORTE) 1 % ophthalmic suspension Administer 1 Drop to both eyes daily.  loperamide (IMODIUM) 2 mg capsule Take  by mouth as needed.  oxybutynin chloride XL (DITROPAN XL) 10 mg CR tablet Take 10 mg by mouth daily.     CALCIUM CARBONATE/VITAMIN D3 (CALCIUM + D PO) Take  by mouth.  MULTI-VITAMIN PO Take 1 Tab by mouth daily.  aspirin delayed-release 81 mg tablet take 81 mg by mouth daily.  glucose blood VI test strips (FREESTYLE TEST) strip For fasting BS testing once daily. ICD-10: E11.9    gabapentin (NEURONTIN) 100 mg capsule TAKE ONE CAPSULE BY MOUTH THREE TIMES A DAY AS NEEDED    CALCIUM CARBONATE (TUMS PO) Take  by mouth daily as needed. No current facility-administered medications for this visit. Impression see above.       Written by Cecil Cao, as dictated by Lico Matthews MD.

## 2018-03-01 DIAGNOSIS — I10 ESSENTIAL HYPERTENSION WITH GOAL BLOOD PRESSURE LESS THAN 130/80: ICD-10-CM

## 2018-03-01 RX ORDER — LISINOPRIL 10 MG/1
TABLET ORAL
Qty: 90 TAB | Refills: 2 | Status: SHIPPED | OUTPATIENT
Start: 2018-03-01 | End: 2019-02-15 | Stop reason: SDUPTHER

## 2018-04-19 ENCOUNTER — TELEPHONE (OUTPATIENT)
Dept: INTERNAL MEDICINE CLINIC | Age: 68
End: 2018-04-19

## 2018-04-19 ENCOUNTER — HOSPITAL ENCOUNTER (OUTPATIENT)
Dept: LAB | Age: 68
Discharge: HOME | End: 2018-04-19
Payer: MEDICARE

## 2018-04-19 ENCOUNTER — OFFICE VISIT (OUTPATIENT)
Dept: INTERNAL MEDICINE CLINIC | Age: 68
End: 2018-04-19

## 2018-04-19 VITALS
SYSTOLIC BLOOD PRESSURE: 140 MMHG | HEIGHT: 60 IN | RESPIRATION RATE: 16 BRPM | TEMPERATURE: 98.1 F | OXYGEN SATURATION: 97 % | HEART RATE: 65 BPM | DIASTOLIC BLOOD PRESSURE: 64 MMHG | BODY MASS INDEX: 36.24 KG/M2 | WEIGHT: 184.6 LBS

## 2018-04-19 DIAGNOSIS — R19.7 DIARRHEA OF PRESUMED INFECTIOUS ORIGIN: ICD-10-CM

## 2018-04-19 DIAGNOSIS — J45.21 MILD INTERMITTENT REACTIVE AIRWAY DISEASE WITH ACUTE EXACERBATION: ICD-10-CM

## 2018-04-19 DIAGNOSIS — J40 BRONCHITIS: Primary | ICD-10-CM

## 2018-04-19 PROCEDURE — 87324 CLOSTRIDIUM AG IA: CPT

## 2018-04-19 PROCEDURE — 89055 LEUKOCYTE ASSESSMENT FECAL: CPT

## 2018-04-19 PROCEDURE — 87427 SHIGA-LIKE TOXIN AG IA: CPT

## 2018-04-19 PROCEDURE — 87177 OVA AND PARASITES SMEARS: CPT

## 2018-04-19 RX ORDER — AZITHROMYCIN 250 MG/1
250 TABLET, FILM COATED ORAL SEE ADMIN INSTRUCTIONS
Qty: 6 TAB | Refills: 0 | Status: SHIPPED | OUTPATIENT
Start: 2018-04-19 | End: 2018-04-24

## 2018-04-19 RX ORDER — ALBUTEROL SULFATE 90 UG/1
2 AEROSOL, METERED RESPIRATORY (INHALATION)
Qty: 1 INHALER | Refills: 0 | Status: SHIPPED | OUTPATIENT
Start: 2018-04-19 | End: 2019-08-02

## 2018-04-19 NOTE — PATIENT INSTRUCTIONS
Bronchitis: Care Instructions  Your Care Instructions    Bronchitis is inflammation of the bronchial tubes, which carry air to the lungs. The tubes swell and produce mucus, or phlegm. The mucus and inflamed bronchial tubes make you cough. You may have trouble breathing. Most cases of bronchitis are caused by viruses like those that cause colds. Antibiotics usually do not help and they may be harmful. Bronchitis usually develops rapidly and lasts about 2 to 3 weeks in otherwise healthy people. Follow-up care is a key part of your treatment and safety. Be sure to make and go to all appointments, and call your doctor if you are having problems. It's also a good idea to know your test results and keep a list of the medicines you take. How can you care for yourself at home? · Take all medicines exactly as prescribed. Call your doctor if you think you are having a problem with your medicine. · Get some extra rest.  · Take an over-the-counter pain medicine, such as acetaminophen (Tylenol), ibuprofen (Advil, Motrin), or naproxen (Aleve) to reduce fever and relieve body aches. Read and follow all instructions on the label. · Do not take two or more pain medicines at the same time unless the doctor told you to. Many pain medicines have acetaminophen, which is Tylenol. Too much acetaminophen (Tylenol) can be harmful. · Take an over-the-counter cough medicine that contains dextromethorphan to help quiet a dry, hacking cough so that you can sleep. Avoid cough medicines that have more than one active ingredient. Read and follow all instructions on the label. · Breathe moist air from a humidifier, hot shower, or sink filled with hot water. The heat and moisture will thin mucus so you can cough it out. · Do not smoke. Smoking can make bronchitis worse. If you need help quitting, talk to your doctor about stop-smoking programs and medicines. These can increase your chances of quitting for good.   When should you call for help? Call 911 anytime you think you may need emergency care. For example, call if:  ? · You have severe trouble breathing. ?Call your doctor now or seek immediate medical care if:  ? · You have new or worse trouble breathing. ? · You cough up dark brown or bloody mucus (sputum). ? · You have a new or higher fever. ? · You have a new rash. ? Watch closely for changes in your health, and be sure to contact your doctor if:  ? · You cough more deeply or more often, especially if you notice more mucus or a change in the color of your mucus. ? · You are not getting better as expected. Where can you learn more? Go to http://lizabeth-jose raul.info/. Enter H333 in the search box to learn more about \"Bronchitis: Care Instructions. \"  Current as of: May 12, 2017  Content Version: 11.4  © 7301-3274 [employer]. Care instructions adapted under license by [employer] (which disclaims liability or warranty for this information). If you have questions about a medical condition or this instruction, always ask your healthcare professional. [place] [address] any warranty or liability for your use of this information. Diarrhea: Care Instructions  Your Care Instructions    Diarrhea is loose, watery stools (bowel movements). The exact cause is often hard to find. Sometimes diarrhea is your body's way of getting rid of what caused an upset stomach. Viruses, food poisoning, and many medicines can cause diarrhea. Some people get diarrhea in response to emotional stress, anxiety, or certain foods. Almost everyone has diarrhea now and then. It usually isn't serious, and your stools will return to normal soon. The important thing to do is replace the fluids you have lost, so you can prevent dehydration. The doctor has checked you carefully, but problems can develop later. If you notice any problems or new symptoms, get medical treatment right away.   Follow-up care is a key part of your treatment and safety. Be sure to make and go to all appointments, and call your doctor if you are having problems. It's also a good idea to know your test results and keep a list of the medicines you take. How can you care for yourself at home? · Watch for signs of dehydration, which means your body has lost too much water. Dehydration is a serious condition and should be treated right away. Signs of dehydration are:  ¨ Increasing thirst and dry eyes and mouth. ¨ Feeling faint or lightheaded. ¨ Darker urine, and a smaller amount of urine than normal.  · To prevent dehydration, drink plenty of fluids, enough so that your urine is light yellow or clear like water. Choose water and other caffeine-free clear liquids until you feel better. If you have kidney, heart, or liver disease and have to limit fluids, talk with your doctor before you increase the amount of fluids you drink. · Begin eating small amounts of mild foods the next day, if you feel like it. ¨ Try yogurt that has live cultures of Lactobacillus. (Check the label.)  ¨ Avoid spicy foods, fruits, alcohol, and caffeine until 48 hours after all symptoms are gone. ¨ Avoid chewing gum that contains sorbitol. ¨ Avoid dairy products (except for yogurt with Lactobacillus) while you have diarrhea and for 3 days after symptoms are gone. · The doctor may recommend that you take over-the-counter medicine, such as loperamide (Imodium), if you still have diarrhea after 6 hours. Read and follow all instructions on the label. Do not use this medicine if you have bloody diarrhea, a high fever, or other signs of serious illness. Call your doctor if you think you are having a problem with your medicine. When should you call for help? Call 911 anytime you think you may need emergency care. For example, call if:  ? · You passed out (lost consciousness). ? · Your stools are maroon or very bloody.    ?Call your doctor now or seek immediate medical care if:  ? · You are dizzy or lightheaded, or you feel like you may faint. ? · Your stools are black and look like tar, or they have streaks of blood. ? · You have new or worse belly pain. ? · You have symptoms of dehydration, such as:  ¨ Dry eyes and a dry mouth. ¨ Passing only a little dark urine. ¨ Feeling thirstier than usual.   ? · You have a new or higher fever. ? Watch closely for changes in your health, and be sure to contact your doctor if:  ? · Your diarrhea is getting worse. ? · You see pus in the diarrhea. ? · You are not getting better after 2 days (48 hours). Where can you learn more? Go to http://lizabeth-jose raul.info/. Enter I281 in the search box to learn more about \"Diarrhea: Care Instructions. \"  Current as of: March 20, 2017  Content Version: 11.4  © 8949-2249 [employer]. Care instructions adapted under license by [employer] (which disclaims liability or warranty for this information). If you have questions about a medical condition or this instruction, always ask your healthcare professional. [place] [address] any warranty or liability for your use of this information. Reactive Airway Disease: Care Instructions  Your Care Instructions    Reactive airway disease is a breathing problem that appears as wheezing, a whistling noise in your airways. It may be caused by a viral or bacterial infection, allergies, tobacco smoke, or something else in the environment. When you are around these triggers, your body releases chemicals that make the airways get tight. Reactive airway disease is a lot like asthma. Both can cause wheezing. But asthma is ongoing, while reactive airway disease may occur only now and then. Tests can be done to tell whether you have asthma. You may take the same medicines used to treat asthma. Good home care and follow-up care with your doctor can help you recover.   Follow-up care is a key part of your treatment and safety. Be sure to make and go to all appointments, and call your doctor if you are having problems. It's also a good idea to know your test results and keep a list of the medicines you take. How can you care for yourself at home? · Take your medicines exactly as prescribed. Call your doctor if you think you are having a problem with your medicine. · Do not smoke or allow others to smoke around you. If you need help quitting, talk to your doctor about stop-smoking programs and medicines. These can increase your chances of quitting for good. · If you know what caused your wheezing (such as perfume or the odor of household chemicals), try to avoid it in the future. · Wash your hands several times a day, and consider using hand gels or wipes that contain alcohol. This can prevent colds and other infections. When should you call for help? Call 911 anytime you think you may need emergency care. For example, call if:  ? · You have severe trouble breathing. ? Watch closely for changes in your health, and be sure to contact your doctor if:  ? · You cough up yellow, dark brown, or bloody mucus. ? · You have a fever. ? · Your wheezing gets worse. Where can you learn more? Go to http://lizabeth-jose raul.info/. Enter D414 in the search box to learn more about \"Reactive Airway Disease: Care Instructions. \"  Current as of: May 12, 2017  Content Version: 11.4  © 4695-6407 [employer]. Care instructions adapted under license by [employer] (which disclaims liability or warranty for this information). If you have questions about a medical condition or this instruction, always ask your healthcare professional. [address] any warranty or liability for your use of this information.

## 2018-04-19 NOTE — PROGRESS NOTES
Mortimer Andes is a 77 y.o. female    Chief Complaint   Patient presents with    URI     continuation of diarrhea and congestion pt thinks is due to her Augmentin        1. Have you been to the ER, urgent care clinic since your last visit? Hospitalized since your last visit? No    2. Have you seen or consulted any other health care providers outside of the [facility] since your last visit? Include any pap smears or colon screening.   Yes, [facility] on 4/15/18 for Upper Respiratory Infection     Visit Vitals    /64 (BP 1 Location: Left arm, BP Patient Position: Sitting)    Pulse 65    Temp 98.1 °F (36.7 °C) (Oral)    Resp 16    Ht 5' (1.524 m)    Wt 184 lb 9.6 oz (83.7 kg)    SpO2 97%    BMI 36.05 kg/m2

## 2018-04-19 NOTE — MR AVS SNAPSHOT
[address] [facility] [address] 
 
 
 [address] [place] 1007 [facility] 
[phone] Patient: Francie Viveros MRN: [id number] Visit Information Date & Time Provider Department Dept. Phone Encounter #  
 4/19/2018  2:40 PM Howard Loredo NP [facility] [address] [id number] Your Appointments 4/20/2018  9:00 AM  
ESTABLISHED PATIENT with RESEARCH, STFRANCES  
[facility] ([address] [place] [address]) Appt Note: REDUCE-IT study/ Physical Exam @ 10:30 margee; REDUCE-IT study/ Physical Exam @ 10:30 BOY; REDUCE-IT study/ End of study/ James Model @ 9/Physical Exam @ 9:30 [address] [facility] [address] [id number]  
[phone]  
  
   
 [address] [address]  
  
    
 4/20/2018  9:30 AM  
ESTABLISHED PATIENT with Bruno Still NP  
[facility] ([address] [place] [address]) Appt Note: Reduce It Study/ End of study visit/  9 jANET AT 9:30 BOY  
 320 [facility] [address] 600 [facility] [address]  
[phone]  
  
   
 320 [facility] Mike [address] [place] [id number]  
  
    
 7/31/2018 10:00 AM  
ROUTINE CARE with Saul Spencer MD  
[facility] [address] Appt Note: 6 mo fasting [address] [place] [address]  
  
   
 [facility] [phone]  
  
    
 2/27/2019 10:00 AM  
ESTABLISHED PATIENT with Vy Pederson MD  
[facility] ([address] [place] [address]) Appt Note: annual  
 320 [facility] [address] 1007 [facility]  
[address] [address] [facility] 91 Stret Upcoming Health Maintenance Date Due  
 FOOT EXAM Q1 9/29/2014 Influenza Age 5 to Adult 8/1/2017 EYE EXAM RETINAL OR DILATED Q1 10/19/2017 LIPID PANEL Q1 3/20/2018 HEMOGLOBIN A1C Q6M 7/31/2018 MICROALBUMIN Q1 9/27/2018 MEDICARE YEARLY EXAM 9/28/2018 GLAUCOMA SCREENING Q2Y 10/19/2018 BREAST CANCER SCRN MAMMOGRAM 11/8/2019 DTaP/Tdap/Td series (2 - Td) 9/20/2023 COLONOSCOPY 6/29/2025 Allergies as of 4/19/2018  Review Complete On: 4/19/2018 By: Violet Magallon NP Severity Noted Reaction Type Reactions Sulfa (Sulfonamide Antibiotics)  06/10/2009    Unknown (comments) Current Immunizations  Reviewed on 11/1/2016 Name Date Influenza High Dose Vaccine PF 10/31/2016, 10/19/2015 Influenza Vaccine 10/6/2014, 9/20/2013 Pneumococcal Conjugate (PCV-13) 9/7/2016 Pneumococcal Polysaccharide (PPSV-23) 10/6/2014 TD Vaccine 1/29/2009 Tdap 9/20/2013 Zoster Vaccine, Live 6/14/2008 Not reviewed this visit You Were Diagnosed With   
  
 Codes Comments Bronchitis    -  Primary ICD-10-CM: Y58 ICD-9-CM: 441 Mild intermittent reactive airway disease with acute exacerbation     ICD-10-CM: J45.21 ICD-9-CM: 707.16 Diarrhea of presumed infectious origin     ICD-10-CM: R19.7 ICD-9-CM: 466. 3 Vitals BP Pulse Temp Resp Height(growth percentile) Weight(growth percentile) 140/64 (BP 1 Location: Left arm, BP Patient Position: Sitting) 65 98.1 °F (36.7 °C) (Oral) 16 5' (1.524 m) 184 lb 9.6 oz (83.7 kg) SpO2 BMI OB Status Smoking Status 97% 36.05 kg/m2 Postmenopausal Never Smoker Vitals History BMI and BSA Data Body Mass Index Body Surface Area 36.05 kg/m 2 1.88 m 2 Preferred Pharmacy Pharmacy Name Phone [id number] - [address], 1 [facility] [phone] Your Updated Medication List  
  
   
This list is accurate as of 4/19/18  3:35 PM.  Always use your most recent med list.  
  
  
  
  
 albuterol 90 mcg/actuation inhaler Commonly known as:  PROVENTIL HFA, VENTOLIN HFA, PROAIR HFA  
 Take 2 Puffs by inhalation every six (6) hours as needed for Wheezing. amLODIPine 5 mg tablet Commonly known as:  Dejah Teresa Take 1 Tab by mouth daily. aspirin delayed-release 81 mg tablet  
take 81 mg by mouth daily. azithromycin 250 mg tablet Commonly known as:  Karissa Chen Take 1 Tab by mouth See Admin Instructions for 5 days. CALCIUM + D PO Take  by mouth. CLARITIN 10 mg tablet Generic drug:  loratadine Take 10 mg by mouth daily. esomeprazole 40 mg capsule Commonly known as:  Rick Oscar Take 40 mg by mouth daily. famotidine 20 mg tablet Commonly known as:  PEPCID Take 20 mg by mouth daily as needed. fluticasone 50 mcg/actuation nasal spray Commonly known as:  Julianna Dahl FREESTYLE LANCETS 28 gauge Misc Generic drug:  lancets FOR USE IN LANCET DEVICE  
  
 gabapentin 100 mg capsule Commonly known as:  NEURONTIN  
TAKE ONE CAPSULE BY MOUTH THREE TIMES A DAY AS NEEDED  
  
 glucose blood VI test strips strip Commonly known as:  FREESTYLE TEST For fasting BS testing once daily. ICD-10: E11.9  
  
 lisinopril 10 mg tablet Commonly known as:  PRINIVIL, ZESTRIL  
TAKE ONE TABLET BY MOUTH EVERY DAY  
  
 loperamide 2 mg capsule Commonly known as:  IMODIUM Take  by mouth as needed. metFORMIN  mg tablet Commonly known as:  GLUCOPHAGE XR  
TAKE 4 TABLETS BY MOUTH DAILY WITH SUPPER  
  
 metoprolol succinate 50 mg XL tablet Commonly known as:  TOPROL-XL  
TAKE ONE (1) TABLET(S) ONCE DAILY MULTI-VITAMIN PO Take 1 Tab by mouth daily. OTHER Fish oil vs. Placebo  
  
 oxybutynin chloride XL 10 mg CR tablet Commonly known as:  DITROPAN XL Take 10 mg by mouth daily. prednisoLONE acetate 1 % ophthalmic suspension Commonly known as:  PRED FORTE Administer 1 Drop to both eyes daily. simvastatin 20 mg tablet Commonly known as:  ZOCOR  
TAKE 1 TABLET DAILY AT BEDTIME FOR CHOLESTEROL  
  
 TUMS PO  
 Take  by mouth daily as needed. Prescriptions Sent to Pharmacy Refills  
 albuterol (PROVENTIL HFA, VENTOLIN HFA, PROAIR HFA) 90 mcg/actuation inhaler 0 Sig: Take 2 Puffs by inhalation every six (6) hours as needed for Wheezing. Class: Normal  
 [facility] [address] [place] [phone] Route: Inhalation  
 azithromycin (ZITHROMAX) 250 mg tablet 0 Sig: Take 1 Tab by mouth See Admin Instructions for 5 days. Class: Normal  
 Pharmacy: [place] [address] [facility], 1 [facility] Ph #: [phone] Route: Oral  
  
We Performed the Following C DIFFICILE TOXIN A & B BY EIA [37015 CPT(R)] CULTURE, STOOL [id number] CPT(R)] OVA+PARASITE + GIARDIA [[id number] Custom] WBC, STOOL [77105 CPT(R)] Patient Instructions Bronchitis: Care Instructions Your Care Instructions Bronchitis is inflammation of the bronchial tubes, which carry air to the lungs. The tubes swell and produce mucus, or phlegm. The mucus and inflamed bronchial tubes make you cough. You may have trouble breathing. Most cases of bronchitis are caused by viruses like those that cause colds. Antibiotics usually do not help and they may be harmful. Bronchitis usually develops rapidly and lasts about 2 to 3 weeks in otherwise healthy people. Follow-up care is a key part of your treatment and safety. Be sure to make and go to all appointments, and call your doctor if you are having problems. It's also a good idea to know your test results and keep a list of the medicines you take. How can you care for yourself at home? · Take all medicines exactly as prescribed. Call your doctor if you think you are having a problem with your medicine.  
· Get some extra rest. 
· Take an over-the-counter pain medicine, such as acetaminophen (Tylenol), ibuprofen (Advil, Motrin), or naproxen (Aleve) to reduce fever and relieve body aches. Read and follow all instructions on the label. · Do not take two or more pain medicines at the same time unless the doctor told you to. Many pain medicines have acetaminophen, which is Tylenol. Too much acetaminophen (Tylenol) can be harmful. · Take an over-the-counter cough medicine that contains dextromethorphan to help quiet a dry, hacking cough so that you can sleep. Avoid cough medicines that have more than one active ingredient. Read and follow all instructions on the label. · Breathe moist air from a humidifier, hot shower, or sink filled with hot water. The heat and moisture will thin mucus so you can cough it out. · Do not smoke. Smoking can make bronchitis worse. If you need help quitting, talk to your doctor about stop-smoking programs and medicines. These can increase your chances of quitting for good. When should you call for help? Call 911 anytime you think you may need emergency care. For example, call if: 
? · You have severe trouble breathing. ?Call your doctor now or seek immediate medical care if: 
? · You have new or worse trouble breathing. ? · You cough up dark brown or bloody mucus (sputum). ? · You have a new or higher fever. ? · You have a new rash. ? Watch closely for changes in your health, and be sure to contact your doctor if: 
? · You cough more deeply or more often, especially if you notice more mucus or a change in the color of your mucus. ? · You are not getting better as expected. Where can you learn more? Go to http://lizabeth-jose raul.info/. Enter H333 in the search box to learn more about \"Bronchitis: Care Instructions. \" Current as of: May 12, 2017 Content Version: 11.4 © 5453-7930 Healthwise, [place] Everywhere.  Care instructions adapted under license by [employer] (which disclaims liability or warranty for this information). If you have questions about a medical condition or this instruction, always ask your healthcare professional. [address] any warranty or liability for your use of this information. Diarrhea: Care Instructions Your Care Instructions Diarrhea is loose, watery stools (bowel movements). The exact cause is often hard to find. Sometimes diarrhea is your body's way of getting rid of what caused an upset stomach. Viruses, food poisoning, and many medicines can cause diarrhea. Some people get diarrhea in response to emotional stress, anxiety, or certain foods. Almost everyone has diarrhea now and then. It usually isn't serious, and your stools will return to normal soon. The important thing to do is replace the fluids you have lost, so you can prevent dehydration. The doctor has checked you carefully, but problems can develop later. If you notice any problems or new symptoms, get medical treatment right away. Follow-up care is a key part of your treatment and safety. Be sure to make and go to all appointments, and call your doctor if you are having problems. It's also a good idea to know your test results and keep a list of the medicines you take. How can you care for yourself at home? · Watch for signs of dehydration, which means your body has lost too much water. Dehydration is a serious condition and should be treated right away. Signs of dehydration are: 
¨ Increasing thirst and dry eyes and mouth. ¨ Feeling faint or lightheaded. ¨ Darker urine, and a smaller amount of urine than normal. 
· To prevent dehydration, drink plenty of fluids, enough so that your urine is light yellow or clear like water. Choose water and other caffeine-free clear liquids until you feel better. If you have kidney, heart, or liver disease and have to limit fluids, talk with your doctor before you increase the amount of fluids you drink. · Begin eating small amounts of mild foods the next day, if you feel like it. ¨ Try yogurt that has live cultures of Lactobacillus. (Check the label.) ¨ Avoid spicy foods, fruits, alcohol, and caffeine until 48 hours after all symptoms are gone. ¨ Avoid chewing gum that contains sorbitol. ¨ Avoid dairy products (except for yogurt with Lactobacillus) while you have diarrhea and for 3 days after symptoms are gone. · The doctor may recommend that you take over-the-counter medicine, such as loperamide (Imodium), if you still have diarrhea after 6 hours. Read and follow all instructions on the label. Do not use this medicine if you have bloody diarrhea, a high fever, or other signs of serious illness. Call your doctor if you think you are having a problem with your medicine. When should you call for help? Call 911 anytime you think you may need emergency care. For example, call if: 
? · You passed out (lost consciousness). ? · Your stools are maroon or very bloody. ?Call your doctor now or seek immediate medical care if: 
? · You are dizzy or lightheaded, or you feel like you may faint. ? · Your stools are black and look like tar, or they have streaks of blood. ? · You have new or worse belly pain. ? · You have symptoms of dehydration, such as: ¨ Dry eyes and a dry mouth. ¨ Passing only a little dark urine. ¨ Feeling thirstier than usual.  
? · You have a new or higher fever. ? Watch closely for changes in your health, and be sure to contact your doctor if: 
? · Your diarrhea is getting worse. ? · You see pus in the diarrhea. ? · You are not getting better after 2 days (48 hours). Where can you learn more? Go to http://lizabeth-jose raul.info/. Enter Q775 in the search box to learn more about \"Diarrhea: Care Instructions. \" Current as of: March 20, 2017 Content Version: 11.4 © 7279-2697 Healthwise, [employer].  Care instructions adapted under license by [address] (which disclaims liability or warranty for this information). If you have questions about a medical condition or this instruction, always ask your healthcare professional. [address] any warranty or liability for your use of this information. Reactive Airway Disease: Care Instructions Your Care Instructions Reactive airway disease is a breathing problem that appears as wheezing, a whistling noise in your airways. It may be caused by a viral or bacterial infection, allergies, tobacco smoke, or something else in the environment. When you are around these triggers, your body releases chemicals that make the airways get tight. Reactive airway disease is a lot like asthma. Both can cause wheezing. But asthma is ongoing, while reactive airway disease may occur only now and then. Tests can be done to tell whether you have asthma. You may take the same medicines used to treat asthma. Good home care and follow-up care with your doctor can help you recover. Follow-up care is a key part of your treatment and safety. Be sure to make and go to all appointments, and call your doctor if you are having problems. It's also a good idea to know your test results and keep a list of the medicines you take. How can you care for yourself at home? · Take your medicines exactly as prescribed. Call your doctor if you think you are having a problem with your medicine. · Do not smoke or allow others to smoke around you. If you need help quitting, talk to your doctor about stop-smoking programs and medicines. These can increase your chances of quitting for good. · If you know what caused your wheezing (such as perfume or the odor of household chemicals), try to avoid it in the future. · Wash your hands several times a day, and consider using hand gels or wipes that contain alcohol. This can prevent colds and other infections. When should you call for help? Call 911 anytime you think you may need emergency care. For example, call if: 
? · You have severe trouble breathing. ? Watch closely for changes in your health, and be sure to contact your doctor if: 
? · You cough up yellow, dark brown, or bloody mucus. ? · You have a fever. ? · Your wheezing gets worse. Where can you learn more? Go to http://lizabeth-jose raul.info/. Enter N718 in the search box to learn more about \"Reactive Airway Disease: Care Instructions. \" Current as of: May 12, 2017 Content Version: 11.4 © 4325-8306 [employer]. Care instructions adapted under license by [employer] (which disclaims liability or warranty for this information). If you have questions about a medical condition or this instruction, always ask your healthcare professional. [address] any warranty or liability for your use of this information. Introducing [facility]! Dear Leticia Stokes: 
Thank you for requesting a [employer] account. Our records indicate that you already have an active [employer] account. You can access your account anytime at [email or web address] Did you know that you can access your hospital and ER discharge instructions at any time in [employer]? You can also review all of your test results from your hospital stay or ER visit. Additional Information If you have questions, please visit the Frequently Asked Questions section of the [employer] website at [email or web address] Remember, [employer] is NOT to be used for urgent needs. For medical emergencies, dial 911. Now available from your iPhone and Android! Please provide this summary of care documentation to your next provider. Your primary care clinician is listed as Balta Chu. If you have any questions after today's visit, please call [phone].

## 2018-04-20 ENCOUNTER — OFFICE VISIT (OUTPATIENT)
Dept: CARDIOLOGY CLINIC | Age: 68
End: 2018-04-20

## 2018-04-20 VITALS
TEMPERATURE: 97 F | BODY MASS INDEX: 35.86 KG/M2 | RESPIRATION RATE: 18 BRPM | HEART RATE: 64 BPM | DIASTOLIC BLOOD PRESSURE: 70 MMHG | WEIGHT: 183.6 LBS | SYSTOLIC BLOOD PRESSURE: 130 MMHG | OXYGEN SATURATION: 99 %

## 2018-04-20 VITALS
DIASTOLIC BLOOD PRESSURE: 70 MMHG | BODY MASS INDEX: 36.05 KG/M2 | RESPIRATION RATE: 18 BRPM | SYSTOLIC BLOOD PRESSURE: 130 MMHG | HEART RATE: 64 BPM | TEMPERATURE: 97 F | HEIGHT: 60 IN | OXYGEN SATURATION: 97 % | WEIGHT: 183.6 LBS

## 2018-04-20 DIAGNOSIS — E78.5 HYPERLIPIDEMIA, UNSPECIFIED HYPERLIPIDEMIA TYPE: Primary | ICD-10-CM

## 2018-04-20 DIAGNOSIS — E78.5 HYPERLIPIDEMIA, UNSPECIFIED HYPERLIPIDEMIA TYPE: Primary | Chronic | ICD-10-CM

## 2018-04-20 PROBLEM — E66.01 SEVERE OBESITY (BMI 35.0-39.9) WITH COMORBIDITY (HCC): Status: ACTIVE | Noted: 2018-04-20

## 2018-04-20 NOTE — PROGRESS NOTES
HISTORY OF PRESENT ILLNESS  Ranjith Client is a 77 y.o. female. HPI  Presents with complaints of chest congestion, productive cough of thick greenish-yellow mucous for the past 2 weeks. She was seen at [facility] on 4/8 and diagnosed with sinusitis and treated with Augmentin. Felt like symptoms improved initially but chest congestion has worsened and cough has remained productive of purulent mucous. Has noted some tightness in upper chest and wheezing with prolonged coughing spells. Denies fever, chills. Has been having 6+ diarrheal stools daily for the past 5 days; has had some fecal urgency and mild nausea but denies vomiting. Has not noted any blood in stools. Symptoms began while she was taking Augmentin and has continued to persist.    Review of Systems   Constitutional: Positive for malaise/fatigue. Negative for chills and fever. HENT: Positive for sore throat. Negative for congestion and sinus pain. Respiratory: Positive for cough, sputum production and wheezing. Cardiovascular: Negative for chest pain and palpitations. Gastrointestinal: Positive for diarrhea. Negative for abdominal pain, blood in stool, constipation, nausea and vomiting. Genitourinary: Negative for dysuria and frequency. Musculoskeletal: Negative for myalgias. Neurological: Positive for headaches. Negative for dizziness. /64 (BP 1 Location: Left arm, BP Patient Position: Sitting)  Pulse 65  Temp 98.1 °F (36.7 °C) (Oral)   Resp 16  Ht 5' (1.524 m)  Wt 184 lb 9.6 oz (83.7 kg)  SpO2 97%  BMI 36.05 kg/m2  Physical Exam   Constitutional: She is oriented to person, place, and time. She appears well-developed and well-nourished. HENT:   Head: Normocephalic and atraumatic. Right Ear: External ear normal.   Left Ear: External ear normal.   Nose: Nose normal.   Mouth/Throat: Posterior oropharyngeal erythema present. Neck: Normal range of motion. Neck supple. No thyromegaly present. Cardiovascular: Normal rate and regular rhythm. Pulmonary/Chest: Effort normal. She has wheezes. She has no rales. Loose cough   Abdominal: Soft. Bowel sounds are increased. There is no tenderness. There is no rebound. Musculoskeletal: Normal range of motion. Lymphadenopathy:     She has no cervical adenopathy. Neurological: She is alert and oriented to person, place, and time. Psychiatric: She has a normal mood and affect. Her behavior is normal.   Nursing note and vitals reviewed. ASSESSMENT and PLAN  Diagnoses and all orders for this visit:    1. Bronchitis - has not resolved with course of Augmentin. -     azithromycin (ZITHROMAX) 250 mg tablet; Take 1 Tab by mouth See Admin Instructions for 5 days. 2. Mild intermittent reactive airway disease with acute exacerbation  -     albuterol (PROVENTIL HFA, VENTOLIN HFA, PROAIR HFA) 90 mcg/actuation inhaler; Take 2 Puffs by inhalation every six (6) hours as needed for Wheezing.     3. Diarrhea of presumed infectious origin  -     C DIFFICILE TOXIN A & B BY EIA  -     CULTURE, STOOL  -     WBC, STOOL  -     OVA+PARASITE + GIARDIA      lab results and schedule of future lab studies reviewed with patient  reviewed diet, exercise and weight control  reviewed medications and side effects in detail

## 2018-04-20 NOTE — PROGRESS NOTES
Patient seen for the End-of-study visit for the REDUCE-IT Study-  \"A Multi-Center, Prospective, Randomized, Double-Blind, Placebo-Controlled, Parallel-Group Study to Evaluate the Effect of LDD046 on Cardiovascular Health and Mortality in Hypertriglyceridemic Patients with Cardiovascular Disease or at [facility]:  REDUCE-IT (Reduction of Cardiovascular Events with EPA- Intervention Trial)    See research paper chart.

## 2018-04-29 LAB
C DIFF TOX A+B STL QL IA: NEGATIVE
CAMPYLOBACTER STL CULT: NORMAL
E COLI SXT STL QL IA: NEGATIVE
G LAMBLIA AG STL QL IA: NEGATIVE
O+P STL CONC: NORMAL
O+P STL MICRO: NORMAL
SALM + SHIG STL CULT: NORMAL
TEST CODE CHANGE, 977287: NORMAL
WBC STL QL MICRO: NORMAL

## 2018-05-10 ENCOUNTER — OFFICE VISIT (OUTPATIENT)
Dept: INTERNAL MEDICINE CLINIC | Age: 68
End: 2018-05-10

## 2018-05-10 VITALS
SYSTOLIC BLOOD PRESSURE: 128 MMHG | WEIGHT: 184.2 LBS | RESPIRATION RATE: 12 BRPM | BODY MASS INDEX: 36.16 KG/M2 | HEART RATE: 69 BPM | TEMPERATURE: 98.2 F | OXYGEN SATURATION: 96 % | HEIGHT: 60 IN | DIASTOLIC BLOOD PRESSURE: 61 MMHG

## 2018-05-10 DIAGNOSIS — M62.838 TRAPEZIUS MUSCLE SPASM: Primary | ICD-10-CM

## 2018-05-10 DIAGNOSIS — J30.89 NON-SEASONAL ALLERGIC RHINITIS, UNSPECIFIED TRIGGER: ICD-10-CM

## 2018-05-10 RX ORDER — CYCLOBENZAPRINE HCL 5 MG
5 TABLET ORAL
Qty: 30 TAB | Refills: 0 | Status: SHIPPED | OUTPATIENT
Start: 2018-05-10 | End: 2018-05-29 | Stop reason: DRUGHIGH

## 2018-05-10 RX ORDER — MONTELUKAST SODIUM 10 MG/1
10 TABLET ORAL DAILY
Qty: 30 TAB | Refills: 2 | Status: SHIPPED | OUTPATIENT
Start: 2018-05-10 | End: 2018-10-03

## 2018-05-10 RX ORDER — NAPROXEN SODIUM 220 MG
440 TABLET ORAL 2 TIMES DAILY WITH MEALS
Qty: 10 TAB | Refills: 0
Start: 2018-05-10 | End: 2019-08-02

## 2018-05-10 NOTE — PATIENT INSTRUCTIONS
Rhomboid Muscle Strain: Rehab Exercises  Your Care Instructions  Here are some examples of typical rehabilitation exercises for your condition. Start each exercise slowly. Ease off the exercise if you start to have pain. Your doctor or physical therapist will tell you when you can start these exercises and which ones will work best for you. How to do the exercises  Lower neck and upper back (rhomboid) stretch    1. Stretch your arms out in front of your body. Clasp one hand on top of your other hand. 2. Gently reach out so that you feel your shoulder blades stretching away from each other. 3. Gently bend your head forward. 4. Hold for 15 to 30 seconds. 5. Repeat 2 to 4 times. Resisted rows    For this exercise, you will need elastic exercise material, such as surgical tubing or Thera-Band. 1. Put the band around a solid object, such as a bedpost, at about waist level. Stand facing where you have placed the band. Hold equal lengths of the band in each hand. 2. Start with your arms held out in front of you. 3. Pull the bands back, and move your shoulder blades together. As you finish, your elbows should be at your side and bent at 90 degrees (like the angle of the letter \"L\"). 4. Return to the starting position. 5. Repeat 8 to 12 times. Neck stretches    1. Look straight ahead, and tip your right ear to your right shoulder. Do not let your left shoulder rise as you tip your head to the right. 2. Hold for 15 to 30 seconds. 3. Tilt your head to the left. Do not let your right shoulder rise as you tip your head to the left. 4. Hold for 15 to 30 seconds. 5. Repeat 2 to 4 times to each side. Neck rotation    1. Sit in a firm chair, or stand up straight. 2. Keeping your chin level, turn your head to the right, and hold for 15 to 30 seconds. 3. Turn your head to the left, and hold for 15 to 30 seconds. 4. Repeat 2 to 4 times to each side. Follow-up care is a key part of your treatment and safety. Be sure to make and go to all appointments, and call your doctor if you are having problems. It's also a good idea to know your test results and keep a list of the medicines you take. Where can you learn more? Go to http://lizabeth-jose raul.info/. Enter 0841 31 [id number] in the search box to learn more about \"Rhomboid Muscle Strain: Rehab Exercises. \"  Current as of: March 21, 2017  Content Version: 11.4  © 0466-8726 Healthwise, FRS. Care instructions adapted under license by [employer] (which disclaims liability or warranty for this information). If you have questions about a medical condition or this instruction, always ask your healthcare professional. [address] any warranty or liability for your use of this information.

## 2018-05-10 NOTE — MR AVS SNAPSHOT
[address] [facility] 
 
 
 [address] [place] [address] 
[phone] Patient: Liu Pump MRN: [id number] YAY:[id number] Visit Information Date & Time Provider Department Dept. Phone Encounter #  
 5/10/2018  1:00 PM Jamison Maldonado NP [facility] [address] [id number] Your Appointments 7/31/2018 10:00 AM  
ROUTINE CARE with Sherry Genao MD  
[facility] Appt Note: 6 mo fasting [address] [place] [address]  
  
   
 Jimi Merrill [phone]  
  
    
 2/27/2019 10:00 AM  
ESTABLISHED PATIENT with Marlon Win MD  
[facility] ([facility]) Appt Note: annual  
 320 [facility] [address] 600 70 [facility]  
54 [facility] [address] [address] Upcoming Health Maintenance Date Due  
 FOOT EXAM Q1 9/29/2014 EYE EXAM RETINAL OR DILATED Q1 10/19/2017 LIPID PANEL Q1 3/20/2018 HEMOGLOBIN A1C Q6M 7/31/2018 Influenza Age 5 to Adult 8/1/2018 MICROALBUMIN Q1 9/27/2018 MEDICARE YEARLY EXAM 9/28/2018 GLAUCOMA SCREENING Q2Y 10/19/2018 BREAST CANCER SCRN MAMMOGRAM 11/8/2019 DTaP/Tdap/Td series (2 - Td) 9/20/2023 COLONOSCOPY 6/29/2025 Allergies as of 5/10/2018  Review Complete On: 5/10/2018 By: Jamison Maldonado NP Severity Noted Reaction Type Reactions Sulfa (Sulfonamide Antibiotics)  06/10/2009    Unknown (comments) Current Immunizations  Reviewed on 11/1/2016 Name Date Influenza High Dose Vaccine PF 10/31/2016, 10/19/2015 Influenza Vaccine 10/6/2014, 9/20/2013 Pneumococcal Conjugate (PCV-13) 9/7/2016 Pneumococcal Polysaccharide (PPSV-23) 10/6/2014 TD Vaccine 1/29/2009 Tdap 9/20/2013 Zoster Vaccine, Live 6/14/2008 Not reviewed this visit You Were Diagnosed With   
  
 Codes Comments Trapezius muscle spasm    -  Primary ICD-10-CM: C47.624 ICD-9-CM: 728.85 Non-seasonal allergic rhinitis, unspecified trigger     ICD-10-CM: J30.89 ICD-9-CM: 477.8 Vitals BP Pulse Temp Resp Height(growth percentile) Weight(growth percentile) 128/61 (BP 1 Location: Left arm, BP Patient Position: Sitting) 69 98.2 °F (36.8 °C) (Oral) 12 5' (1.524 m) 184 lb 3.2 oz (83.6 kg) SpO2 BMI OB Status Smoking Status 96% 35.97 kg/m2 Postmenopausal Never Smoker BMI and BSA Data Body Mass Index Body Surface Area  
 35.97 kg/m 2 1.88 m 2 Preferred Pharmacy Pharmacy Name Phone [id number] - [address] , [address] [employer] [facility] [phone] Your Updated Medication List  
  
   
This list is accurate as of 5/10/18  1:44 PM.  Always use your most recent med list.  
  
  
  
  
 albuterol 90 mcg/actuation inhaler Commonly known as:  PROVENTIL HFA, VENTOLIN HFA, PROAIR HFA Take 2 Puffs by inhalation every six (6) hours as needed for Wheezing. amLODIPine 5 mg tablet Commonly known as:  Dima Conti Take 1 Tab by mouth daily. aspirin delayed-release 81 mg tablet  
take 81 mg by mouth daily. CALCIUM + D PO Take  by mouth. CLARITIN 10 mg tablet Generic drug:  loratadine Take 10 mg by mouth daily. cyclobenzaprine 5 mg tablet Commonly known as:  FLEXERIL Take 1 Tab by mouth three (3) times daily as needed for Muscle Spasm(s). esomeprazole 40 mg capsule Commonly known as:  Erica Hu Take 40 mg by mouth daily. famotidine 20 mg tablet Commonly known as:  PEPCID Take 20 mg by mouth daily as needed. fluticasone 50 mcg/actuation nasal spray Commonly known as:  Star Serve FREESTYLE LANCETS 28 gauge Misc Generic drug:  lancets FOR USE IN LANCET DEVICE  
  
 gabapentin 100 mg capsule Commonly known as:  NEURONTIN  
 TAKE ONE CAPSULE BY MOUTH THREE TIMES A DAY AS NEEDED  
  
 glucose blood VI test strips strip Commonly known as:  FREESTYLE TEST For fasting BS testing once daily. ICD-10: E11.9  
  
 lisinopril 10 mg tablet Commonly known as:  PRINIVIL, ZESTRIL  
TAKE ONE TABLET BY MOUTH EVERY DAY  
  
 loperamide 2 mg capsule Commonly known as:  IMODIUM Take  by mouth as needed. metFORMIN  mg tablet Commonly known as:  GLUCOPHAGE XR  
TAKE 4 TABLETS BY MOUTH DAILY WITH SUPPER  
  
 metoprolol succinate 50 mg XL tablet Commonly known as:  TOPROL-XL  
TAKE ONE (1) TABLET(S) ONCE DAILY  
  
 montelukast 10 mg tablet Commonly known as:  SINGULAIR Take 1 Tab by mouth daily. MULTI-VITAMIN PO Take 1 Tab by mouth daily. naproxen sodium 220 mg tablet Commonly known as:  Danny Jessi Take 2 Tabs by mouth two (2) times daily (with meals). OTHER Fish oil vs. Placebo  
  
 oxybutynin chloride XL 10 mg CR tablet Commonly known as:  DITROPAN XL Take 10 mg by mouth daily. prednisoLONE acetate 1 % ophthalmic suspension Commonly known as:  PRED FORTE Administer 1 Drop to both eyes daily. simvastatin 20 mg tablet Commonly known as:  ZOCOR  
TAKE 1 TABLET DAILY AT BEDTIME FOR CHOLESTEROL  
  
 TUMS PO Take  by mouth daily as needed. Prescriptions Sent to Pharmacy Refills  
 montelukast (SINGULAIR) 10 mg tablet 2 Sig: Take 1 Tab by mouth daily. Class: Normal  
 Pharmacy: [address], [address] [place] [address] Ph #: [phone] Route: Oral  
 cyclobenzaprine (FLEXERIL) 5 mg tablet 0 Sig: Take 1 Tab by mouth three (3) times daily as needed for Muscle Spasm(s). Class: Normal  
 Pharmacy: [address], [address] [place] [address] Ph #: [phone] Route: Oral  
  
Patient Instructions Rhomboid Muscle Strain: Rehab Exercises Your Care Instructions Here are some examples of typical rehabilitation exercises for your condition. Start each exercise slowly. Ease off the exercise if you start to have pain. Your doctor or physical therapist will tell you when you can start these exercises and which ones will work best for you. How to do the exercises Lower neck and upper back (rhomboid) stretch 1. Stretch your arms out in front of your body. Clasp one hand on top of your other hand. 2. Gently reach out so that you feel your shoulder blades stretching away from each other. 3. Gently bend your head forward. 4. Hold for 15 to 30 seconds. 5. Repeat 2 to 4 times. Resisted rows For this exercise, you will need elastic exercise material, such as surgical tubing or Thera-Band. 1. Put the band around a solid object, such as a bedpost, at about waist level. Stand facing where you have placed the band. Hold equal lengths of the band in each hand. 2. Start with your arms held out in front of you. 3. Pull the bands back, and move your shoulder blades together. As you finish, your elbows should be at your side and bent at 90 degrees (like the angle of the letter \"L\"). 4. Return to the starting position. 5. Repeat 8 to 12 times. Neck stretches 1. Look straight ahead, and tip your right ear to your right shoulder. Do not let your left shoulder rise as you tip your head to the right. 2. Hold for 15 to 30 seconds. 3. Tilt your head to the left. Do not let your right shoulder rise as you tip your head to the left. 4. Hold for 15 to 30 seconds. 5. Repeat 2 to 4 times to each side. Neck rotation 1. Sit in a firm chair, or stand up straight. 2. Keeping your chin level, turn your head to the right, and hold for 15 to 30 seconds. 3. Turn your head to the left, and hold for 15 to 30 seconds. 4. Repeat 2 to 4 times to each side. Follow-up care is a key part of your treatment and safety.  Be sure to make and go to all appointments, and call your doctor if you are having problems. It's also a good idea to know your test results and keep a list of the medicines you take. Where can you learn more? Go to http://lizabeth-jose raul.info/. Enter [phone] [id number] in the search box to learn more about \"Rhomboid Muscle Strain: Rehab Exercises. \" Current as of: March 21, 2017 Content Version: 11.4 © 5553-9129 Mis [employer]. Care instructions adapted under license by [employer] (which disclaims liability or warranty for this information). If you have questions about a medical condition or this instruction, always ask your healthcare professional. [place] [address] any warranty or liability for your use of this information. Introducing [facility]! Dear Ike Alvarez: 
Thank you for requesting a VISup account. Our records indicate that you already have an active VISup account. You can access your account anytime at [email or web address] Did you know that you can access your hospital and ER discharge instructions at any time in VISup? You can also review all of your test results from your hospital stay or ER visit. Additional Information If you have questions, please visit the Frequently Asked Questions section of the VISup website at [email or web address] Remember, VISup is NOT to be used for urgent needs. For medical emergencies, dial 911. Now available from your iPhone and Android! Please provide this summary of care documentation to your next provider. Your primary care clinician is listed as David Hansen. If you have any questions after today's visit, please call [phone].

## 2018-05-11 NOTE — PROGRESS NOTES
HISTORY OF PRESENT ILLNESS  Terrence Marion is a 77 y.o. female. HPI  Presents with complaints of tenderness to right upper and mid back for the past 3 weeks; symptoms began after extended coughing while she was ill with bronchitis. Denies shortness of breath, wheezing or worsening of pain with deep breathing. Notes increased discomfort with twisting movements. Has taken Tylenol without much improvement. Has been having some muscle spasms with sudden tightness in mid back. Has been having more allergy symptoms that have not been responding to Claritin and Flonase nasal spray. Continues to have post nasal drainage and nasal congestion especially during spring season. Does not recall trying Singulair in the past.    Review of Systems   Constitutional: Negative for chills, fever and malaise/fatigue. HENT: Positive for congestion. Negative for sinus pain and sore throat. Respiratory: Negative for cough, sputum production, shortness of breath and wheezing. Cardiovascular: Negative for chest pain and palpitations. Gastrointestinal: Negative for nausea and vomiting. Genitourinary: Negative for dysuria and frequency. Musculoskeletal: Positive for back pain. Negative for myalgias. Neurological: Negative for dizziness and headaches. /61 (BP 1 Location: Left arm, BP Patient Position: Sitting)  Pulse 69  Temp 98.2 °F (36.8 °C) (Oral)   Resp 12  Ht 5' (1.524 m)  Wt 184 lb 3.2 oz (83.6 kg)  SpO2 96%  BMI 35.97 kg/m2  Physical Exam   Constitutional: She is oriented to person, place, and time. She appears well-developed and well-nourished. HENT:   Head: Normocephalic and atraumatic. Right Ear: Tympanic membrane and external ear normal.   Left Ear: Tympanic membrane and external ear normal.   Nose: Mucosal edema present. Right sinus exhibits no maxillary sinus tenderness. Left sinus exhibits no maxillary sinus tenderness. Mouth/Throat: Posterior oropharyngeal erythema present.  No posterior oropharyngeal edema. Neck: Normal range of motion. Neck supple. No thyromegaly present. Cardiovascular: Normal rate and regular rhythm. Pulmonary/Chest: Effort normal and breath sounds normal. She has no wheezes. Musculoskeletal:        Thoracic back: She exhibits tenderness and spasm. Back:    Lymphadenopathy:     She has no cervical adenopathy. Neurological: She is alert and oriented to person, place, and time. Psychiatric: She has a normal mood and affect. Her behavior is normal.   Nursing note and vitals reviewed. ASSESSMENT and PLAN  Diagnoses and all orders for this visit:    1. Trapezius muscle spasm  -     cyclobenzaprine (FLEXERIL) 5 mg tablet; Take 1 Tab by mouth three (3) times daily as needed for Muscle Spasm(s). -     naproxen sodium (ALEVE) 220 mg tablet; Take 2 Tabs by mouth two (2) times daily (with meals). 2. Non-seasonal allergic rhinitis, unspecified trigger  -     montelukast (SINGULAIR) 10 mg tablet; Take 1 Tab by mouth daily.         reviewed diet, exercise and weight control  reviewed medications and side effects in detail

## 2018-05-17 RX ORDER — METOPROLOL SUCCINATE 50 MG/1
TABLET, EXTENDED RELEASE ORAL
Qty: 90 TAB | Refills: 1 | Status: SHIPPED | OUTPATIENT
Start: 2018-05-17 | End: 2018-05-29 | Stop reason: SDUPTHER

## 2018-05-17 NOTE — TELEPHONE ENCOUNTER
Request for metoprolol succinate 50 mg XL, daily. Last office visit 2/21/18,   Next office visit 2/27/19.      Refills per verbal order from Dr. Mary Veliz.

## 2018-05-29 ENCOUNTER — HOSPITAL ENCOUNTER (OUTPATIENT)
Dept: GENERAL RADIOLOGY | Age: 68
Discharge: HOME | End: 2018-05-29
Attending: INTERNAL MEDICINE
Payer: MEDICARE

## 2018-05-29 ENCOUNTER — OFFICE VISIT (OUTPATIENT)
Dept: INTERNAL MEDICINE CLINIC | Age: 68
End: 2018-05-29

## 2018-05-29 VITALS
TEMPERATURE: 98.7 F | RESPIRATION RATE: 18 BRPM | HEIGHT: 60 IN | HEART RATE: 74 BPM | DIASTOLIC BLOOD PRESSURE: 57 MMHG | OXYGEN SATURATION: 99 % | WEIGHT: 182.25 LBS | SYSTOLIC BLOOD PRESSURE: 94 MMHG | BODY MASS INDEX: 35.78 KG/M2

## 2018-05-29 DIAGNOSIS — G89.29 CHRONIC RIGHT-SIDED THORACIC BACK PAIN: Primary | ICD-10-CM

## 2018-05-29 DIAGNOSIS — G89.29 CHRONIC RIGHT-SIDED THORACIC BACK PAIN: ICD-10-CM

## 2018-05-29 DIAGNOSIS — M54.6 CHRONIC RIGHT-SIDED THORACIC BACK PAIN: ICD-10-CM

## 2018-05-29 DIAGNOSIS — M54.6 CHRONIC RIGHT-SIDED THORACIC BACK PAIN: Primary | ICD-10-CM

## 2018-05-29 PROCEDURE — 71046 X-RAY EXAM CHEST 2 VIEWS: CPT

## 2018-05-29 RX ORDER — ACETAMINOPHEN 500 MG
1000 TABLET ORAL 3 TIMES DAILY
Refills: 0 | COMMUNITY
End: 2019-03-21

## 2018-05-29 RX ORDER — CYCLOBENZAPRINE HCL 10 MG
10 TABLET ORAL
Qty: 20 TAB | Refills: 0 | Status: SHIPPED | OUTPATIENT
Start: 2018-05-29 | End: 2018-10-03

## 2018-05-29 NOTE — MR AVS SNAPSHOT
[address] [facility] 
 
 
 [address] [place] [address] [facility] 
[phone] Patient: Michelle aMrt MRN: [id number] [facility]:[birth year] Visit Information Date & Time Provider Department Dept. Phone Encounter #  
 5/29/2018  3:15 PM Min Thomason MD [facility] [id number] Follow-up Instructions Return if symptoms worsen or fail to improve. Your Appointments 7/31/2018 10:00 AM  
ROUTINE CARE with Min Thomason MD  
[facility] Appt Note: 6 mo fasting [address] [place] [address] [address]  
  
   
 Jimi Merrill [phone]  
  
    
 2/27/2019 10:00 AM  
ESTABLISHED PATIENT with Shahla Pretty MD  
[facility] ([facility] Appt Note: annual  
 [address] [facility] [address] [place] [address]  
54 [facility] [address] [address] Upcoming Health Maintenance Date Due  
 FOOT EXAM Q1 9/29/2014 EYE EXAM RETINAL OR DILATED Q1 10/19/2017 LIPID PANEL Q1 3/20/2018 HEMOGLOBIN A1C Q6M 7/31/2018 Influenza Age 5 to Adult 8/1/2018 MICROALBUMIN Q1 9/27/2018 MEDICARE YEARLY EXAM 9/28/2018 GLAUCOMA SCREENING Q2Y 10/19/2018 BREAST CANCER SCRN MAMMOGRAM 11/8/2019 DTaP/Tdap/Td series (2 - Td) 9/20/2023 COLONOSCOPY 6/29/2025 Allergies as of 5/29/2018  Review Complete On: 5/10/2018 By: Franki Pichardo NP Severity Noted Reaction Type Reactions Sulfa (Sulfonamide Antibiotics)  06/10/2009    Unknown (comments) Current Immunizations  Reviewed on 11/1/2016 Name Date Influenza High Dose Vaccine PF 10/31/2016, 10/19/2015 Influenza Vaccine 10/6/2014, 9/20/2013 Pneumococcal Conjugate (PCV-13) 9/7/2016 Pneumococcal Polysaccharide (PPSV-23) 10/6/2014 TD Vaccine 1/29/2009 Tdap 9/20/2013 Zoster Vaccine, Live 6/14/2008 Not reviewed this visit You Were Diagnosed With   
  
 Codes Comments Chronic right-sided thoracic back pain    -  Primary ICD-10-CM: M54.6, G89.29 ICD-9-CM: 724.1, 338.29 Vitals BP Pulse Temp Resp Height(growth percentile) Weight(growth percentile) 94/57 (BP 1 Location: Left arm, BP Patient Position: Sitting) 74 98.7 °F (37.1 °C) (Oral) 18 5' (1.524 m) 182 lb 4 oz (82.7 kg) SpO2 BMI OB Status Smoking Status 99% 35.59 kg/m2 Postmenopausal Never Smoker Vitals History BMI and BSA Data Body Mass Index Body Surface Area 35.59 kg/m 2 1.87 m 2 Preferred Pharmacy Pharmacy Name Phone [id number] [address] [facility] [phone] Your Updated Medication List  
  
   
This list is accurate as of 5/29/18  5:05 PM.  Always use your most recent med list.  
  
  
  
  
 acetaminophen 500 mg tablet Commonly known as:  TYLENOL Take 2 Tabs by mouth three (3) times daily. albuterol 90 mcg/actuation inhaler Commonly known as:  PROVENTIL HFA, VENTOLIN HFA, PROAIR HFA Take 2 Puffs by inhalation every six (6) hours as needed for Wheezing. amLODIPine 5 mg tablet Commonly known as:  [place] Bars Take 1 Tab by mouth daily. aspirin delayed-release 81 mg tablet  
take 81 mg by mouth daily. CALCIUM + D PO Take  by mouth. CLARITIN 10 mg tablet Generic drug:  loratadine Take 10 mg by mouth daily. cyclobenzaprine 10 mg tablet Commonly known as:  FLEXERIL Take 1 Tab by mouth nightly. esomeprazole 40 mg capsule Commonly known as:  Madisyn Rosie Take 40 mg by mouth daily. famotidine 20 mg tablet Commonly known as:  PEPCID Take 20 mg by mouth daily as needed. fluticasone 50 mcg/actuation nasal spray Commonly known as:  Zoe Rosine FREESTYLE LANCETS 28 gauge Misc Generic drug:  lancets FOR USE IN LANCET DEVICE  
  
 gabapentin 100 mg capsule Commonly known as:  NEURONTIN  
TAKE ONE CAPSULE BY MOUTH THREE TIMES A DAY AS NEEDED  
  
 glucose blood VI test strips strip Commonly known as:  FREESTYLE TEST For fasting BS testing once daily. ICD-10: E11.9  
  
 lisinopril 10 mg tablet Commonly known as:  PRINIVIL, ZESTRIL  
TAKE ONE TABLET BY MOUTH EVERY DAY  
  
 loperamide 2 mg capsule Commonly known as:  IMODIUM Take  by mouth as needed. metFORMIN  mg tablet Commonly known as:  GLUCOPHAGE XR  
TAKE 4 TABLETS BY MOUTH DAILY WITH SUPPER  
  
 metoprolol succinate 50 mg XL tablet Commonly known as:  TOPROL-XL  
TAKE ONE (1) TABLET(S) ONCE DAILY  
  
 montelukast 10 mg tablet Commonly known as:  SINGULAIR Take 1 Tab by mouth daily. MULTI-VITAMIN PO Take 1 Tab by mouth daily. naproxen sodium 220 mg tablet Commonly known as:  Henery Area Take 2 Tabs by mouth two (2) times daily (with meals). OTHER Fish oil vs. Placebo  
  
 oxybutynin chloride XL 10 mg CR tablet Commonly known as:  DITROPAN XL Take 10 mg by mouth daily. prednisoLONE acetate 1 % ophthalmic suspension Commonly known as:  PRED FORTE Administer 1 Drop to both eyes daily. simvastatin 20 mg tablet Commonly known as:  ZOCOR  
TAKE 1 TABLET DAILY AT BEDTIME FOR CHOLESTEROL  
  
 TUMS PO Take  by mouth daily as needed. Prescriptions Sent to Pharmacy Refills  
 cyclobenzaprine (FLEXERIL) 10 mg tablet 0 Sig: Take 1 Tab by mouth nightly. Class: Normal  
 [facility] [place] [address] [facility] [address] [place] [address] Ph #: [phone] Route: Oral  
  
Follow-up Instructions Return if symptoms worsen or fail to improve. To-Do List   
 05/29/2018 Imaging:  XR CHEST PA LAT Introducing [facility]! Dear Mandeep Fofana: 
Thank you for requesting a MorphoSys account.   Our records indicate that you already have an active Pufetto account. You can access your account anytime at [email or web address] Did you know that you can access your hospital and ER discharge instructions at any time in Pufetto? You can also review all of your test results from your hospital stay or ER visit. Additional Information If you have questions, please visit the Frequently Asked Questions section of the Pufetto website at [email or web address] Remember, Pufetto is NOT to be used for urgent needs. For medical emergencies, dial 911. Now available from your iPhone and Android! Please provide this summary of care documentation to your next provider. Your primary care clinician is listed as Kelsi Wolf. If you have any questions after today's visit, please call [phone].

## 2018-05-29 NOTE — PROGRESS NOTES
HISTORY OF PRESENT ILLNESS  Houston Osler is a 77 y.o. female. HPI  Problem follow up:  Houston Osler returns for follow up visit regarding R thoracic, R neck back pain. she was seen 19 days ago in office by NP diagnosed with muscular back/ trapezius pain and treated with aleve, flexeril. Workup was significant for same. Notes, labs, studies, imaging related to this problem during prior visit were available . Since that visit, she has worsened. she has been compliant with prescribed treatment. Residual symptoms include: worsened over last several days  New issues associated with this problem include: none. The patient denies fevers, chills or sweats. Patient Active Problem List   Diagnosis Code    DM (diabetes mellitus) ([facility] [address] E11.9    Hyperlipidemia E78.5    Hepatitis B B19.10    GERD (gastroesophageal reflux disease) K21.9    Rosacea L71.9    AR (allergic rhinitis) J30.9    Intervertebral disk disease M51.9    Uterine prolapse N81.4    Incontinence R32    HTN (hypertension) I10    Anemia D64.9    PAF (paroxysmal atrial fibrillation) (HCC) I48.0    Peripheral neuropathy G62.9    Pre-ulcerative corn or callous L84    Advanced care planning/counseling discussion Z71.89    Type 2 diabetes mellitus with hemoglobin A1c goal of less than 7.0% (HCC) E11.9    Type 2 diabetes mellitus with nephropathy (HCC) E11.21    Stage 3 chronic kidney disease N18.3    Severe obesity (BMI 35.0-39. 9) with comorbidity ([address] E66.01     Past Medical History:   Diagnosis Date    Anemia     Cystocele     Diabetes ([address]     Diabetic neuropathy, painful ([address]     Hepatitis B     Hypercholesterolemia     Hypertension     Microalbuminuria     Mild nonproliferative diabetic retinopathy (HCC)     PAF (paroxysmal atrial fibrillation) ([address]     [facility] 3-24-13 [facility] follows    Rectocele     Retinopathy     Rosacea     Ruptured disc, cervical     Sleep apnea     Stenosis, cervical spine Allergies   Allergen Reactions    Sulfa (Sulfonamide Antibiotics) Unknown (comments)     Current Outpatient Prescriptions on File Prior to Visit   Medication Sig Dispense Refill    montelukast (SINGULAIR) 10 mg tablet Take 1 Tab by mouth daily. 30 Tab 2    cyclobenzaprine (FLEXERIL) 5 mg tablet Take 1 Tab by mouth three (3) times daily as needed for Muscle Spasm(s). 30 Tab 0    naproxen sodium (ALEVE) 220 mg tablet Take 2 Tabs by mouth two (2) times daily (with meals). 10 Tab 0    albuterol (PROVENTIL HFA, VENTOLIN HFA, PROAIR HFA) 90 mcg/actuation inhaler Take 2 Puffs by inhalation every six (6) hours as needed for Wheezing. 1 Inhaler 0    lisinopril (PRINIVIL, ZESTRIL) 10 mg tablet TAKE ONE TABLET BY MOUTH EVERY DAY 90 Tab 2    metoprolol succinate (TOPROL-XL) 50 mg XL tablet TAKE ONE (1) TABLET(S) ONCE DAILY 90 Tab 2    amLODIPine (NORVASC) 5 mg tablet Take 1 Tab by mouth daily. 90 Tab 2    fluticasone (FLONASE) 50 mcg/actuation nasal spray       loratadine (CLARITIN) 10 mg tablet Take 10 mg by mouth daily.  metFORMIN ER (GLUCOPHAGE XR) 500 mg tablet TAKE 4 TABLETS BY MOUTH DAILY WITH SUPPER 360 Tab 5    simvastatin (ZOCOR) 20 mg tablet TAKE 1 TABLET DAILY AT BEDTIME FOR CHOLESTEROL 90 Tab 2    glucose blood VI test strips (FREESTYLE TEST) strip For fasting BS testing once daily. ICD-10: E11.9 100 Strip 11    gabapentin (NEURONTIN) 100 mg capsule TAKE ONE CAPSULE BY MOUTH THREE TIMES A DAY AS NEEDED  5    OTHER Fish oil vs. Placebo      CALCIUM CARBONATE (TUMS PO) Take  by mouth daily as needed.  famotidine (PEPCID) 20 mg tablet Take 20 mg by mouth daily as needed.  FREESTYLE LANCETS 28 gauge misc FOR USE IN LANCET DEVICE 100 Each 11    prednisoLONE acetate (PRED FORTE) 1 % ophthalmic suspension Administer 1 Drop to both eyes daily. 5    loperamide (IMODIUM) 2 mg capsule Take  by mouth as needed.       oxybutynin chloride XL (DITROPAN XL) 10 mg CR tablet Take 10 mg by mouth daily.      CALCIUM CARBONATE/VITAMIN D3 (CALCIUM + D PO) Take  by mouth.  MULTI-VITAMIN PO Take 1 Tab by mouth daily.  aspirin delayed-release 81 mg tablet take 81 mg by mouth daily.  esomeprazole (NEXIUM) 40 mg capsule Take 40 mg by mouth daily. No current facility-administered medications on file prior to visit. Social History   Substance Use Topics    Smoking status: Never Smoker    Smokeless tobacco: Never Used    Alcohol use No      Comment: very rarely        . ROS    Physical Exam   Constitutional: She appears well-developed and well-nourished. No distress. BP 94/57 (BP 1 Location: Left arm, BP Patient Position: Sitting)  Pulse 74  Temp 98.7 °F (37.1 °C) (Oral)   Resp 18  Ht 5' (1.524 m)  Wt 182 lb 4 oz (82.7 kg)  SpO2 99%  BMI 35.59 kg/m2Body mass index is 35.59 kg/(m^2). HENT:   Mouth/Throat: Oropharynx is clear and moist.   Neck: No JVD present. Carotid bruit is not present. Cardiovascular: Normal rate, regular rhythm, normal heart sounds and intact distal pulses. Pulmonary/Chest: Effort normal. No accessory muscle usage. No respiratory distress. She has no decreased breath sounds. She has no wheezes. She has rhonchi in the right lower field. She has no rales. Musculoskeletal: She exhibits no edema. Neurological: She is alert. Skin: Skin is warm and dry. She is not diaphoretic. Nursing note and vitals reviewed. EXAM:  XR CHEST PA LAT     INDICATION:   Right posterior chest wall pain, mild RLL rhonchi     COMPARISON: None.     FINDINGS: PA and lateral radiographs of the chest demonstrate linear atelectasis  in the lingula with otherwise clear lungs. The cardiac and mediastinal contours  and pulmonary vascularity are normal.  The chest wall structures and visualized  upper abdomen show no acute findings with incidental note of degenerative spine  changes. IMPRESSION: No acute findings. ASSESSMENT and PLAN    ICD-10-CM ICD-9-CM    1. Chronic right-sided thoracic back pain M54.6 724.1 XR CHEST PA LAT    G89.29 338.29 cyclobenzaprine (FLEXERIL) 10 mg tablet      acetaminophen (TYLENOL) 500 mg tablet     Follow-up Disposition:  Return if symptoms worsen or fail to improve.

## 2018-06-14 RX ORDER — SIMVASTATIN 20 MG/1
TABLET, FILM COATED ORAL
Qty: 90 TAB | Refills: 2 | Status: SHIPPED | OUTPATIENT
Start: 2018-06-14 | End: 2019-02-19 | Stop reason: SDUPTHER

## 2018-06-14 NOTE — TELEPHONE ENCOUNTER
Pt called in advised that [facility] sending request for this refill for over a week. She is completely out of the medication.

## 2018-06-14 NOTE — TELEPHONE ENCOUNTER
As of today at 1:31 pm our office has not received a refill request from pharmacy. New prescription sent as prescribed.

## 2018-08-01 ENCOUNTER — TELEPHONE (OUTPATIENT)
Dept: INTERNAL MEDICINE CLINIC | Age: 68
End: 2018-08-01

## 2018-08-01 ENCOUNTER — HOSPITAL ENCOUNTER (OUTPATIENT)
Dept: LAB | Age: 68
Discharge: HOME | End: 2018-08-01
Payer: MEDICARE

## 2018-08-01 ENCOUNTER — OFFICE VISIT (OUTPATIENT)
Dept: INTERNAL MEDICINE CLINIC | Age: 68
End: 2018-08-01

## 2018-08-01 VITALS
OXYGEN SATURATION: 98 % | HEIGHT: 60 IN | HEART RATE: 56 BPM | DIASTOLIC BLOOD PRESSURE: 68 MMHG | WEIGHT: 182 LBS | TEMPERATURE: 98.1 F | BODY MASS INDEX: 35.73 KG/M2 | RESPIRATION RATE: 14 BRPM | SYSTOLIC BLOOD PRESSURE: 133 MMHG

## 2018-08-01 DIAGNOSIS — I49.9 IRREGULAR HEART BEAT: Primary | ICD-10-CM

## 2018-08-01 DIAGNOSIS — E78.5 HYPERLIPIDEMIA, UNSPECIFIED HYPERLIPIDEMIA TYPE: Chronic | ICD-10-CM

## 2018-08-01 DIAGNOSIS — E11.9 TYPE 2 DIABETES MELLITUS WITHOUT COMPLICATION, WITHOUT LONG-TERM CURRENT USE OF INSULIN (HCC): Chronic | ICD-10-CM

## 2018-08-01 DIAGNOSIS — I48.0 PAF (PAROXYSMAL ATRIAL FIBRILLATION) (HCC): ICD-10-CM

## 2018-08-01 PROCEDURE — 83036 HEMOGLOBIN GLYCOSYLATED A1C: CPT

## 2018-08-01 PROCEDURE — 36415 COLL VENOUS BLD VENIPUNCTURE: CPT

## 2018-08-01 PROCEDURE — 80053 COMPREHEN METABOLIC PANEL: CPT

## 2018-08-01 PROCEDURE — 84443 ASSAY THYROID STIM HORMONE: CPT

## 2018-08-01 PROCEDURE — 85025 COMPLETE CBC W/AUTO DIFF WBC: CPT

## 2018-08-01 PROCEDURE — 80061 LIPID PANEL: CPT

## 2018-08-01 NOTE — PROGRESS NOTES
Og Braun is a 77 y.o. female who presents today for Irregular Heart Beat  . She has a history of   Patient Active Problem List   Diagnosis Code    DM (diabetes mellitus) ([facility] [address] E11.9    Hyperlipidemia E78.5    Hepatitis B B19.10    GERD (gastroesophageal reflux disease) K21.9    Rosacea L71.9    AR (allergic rhinitis) J30.9    Intervertebral disk disease M51.9    Uterine prolapse N81.4    Incontinence R32    HTN (hypertension) I10    Anemia D64.9    PAF (paroxysmal atrial fibrillation) ([facility] I48.0    Peripheral neuropathy G62.9    Pre-ulcerative corn or callous L84    Advanced care planning/counseling discussion Z71.89    Type 2 diabetes mellitus with hemoglobin A1c goal of less than 7.0% (HCC) E11.9    Type 2 diabetes mellitus with nephropathy ([facility]) E11.21    Stage 3 chronic kidney disease N18.3    Severe obesity (BMI 35.0-39. 9) with comorbidity ([facility] E66.01   . Today patient is here for an acute visit. Problem visit:  Og Braun is here for complaint of irregular heart rate. Paged overnight as she noted that her heart was racing and that she was feeling very mildly winded. Denied any presyncope or CP. Since went to sleep and has not had palpitations since waking up. BP last night normal and Pulse in 70's. CHADS-VASC of 4. Suggest seeing Cards again to discuss monitor. Supposed to be seeing PCP tomorrow, but out of the office. Will order labs and she will schedule a f/u. ROS  Review of Systems   Constitutional: Negative for chills, fever and weight loss. Respiratory: Negative for cough and shortness of breath. Cardiovascular: Positive for palpitations (Has resolved. ). Negative for chest pain and leg swelling. Gastrointestinal: Negative for abdominal pain, nausea and vomiting. Neurological: Negative. Endo/Heme/Allergies: Does not bruise/bleed easily. Psychiatric/Behavioral: Negative for depression. The patient is not nervous/anxious. Visit Vitals    /68 (BP 1 Location: Left arm, BP Patient Position: Sitting)    Pulse (!) 56    Temp 98.1 °F (36.7 °C) (Oral)    Ht 5' (1.524 m)    Wt 182 lb (82.6 kg)    SpO2 98%    BMI 35.54 kg/m2       Physical Exam   Constitutional: She is oriented to person, place, and time. She appears well-developed and well-nourished. HENT:   Head: Normocephalic and atraumatic. Cardiovascular: Normal rate and regular rhythm. No murmur heard. Pulmonary/Chest: Effort normal. No respiratory distress. Neurological: She is alert and oriented to person, place, and time. Skin: Skin is warm and dry. Psychiatric: She has a normal mood and affect. Her behavior is normal.         Current Outpatient Prescriptions   Medication Sig    simvastatin (ZOCOR) 20 mg tablet TAKE 1 TABLET DAILY AT BEDTIME FOR CHOLESTEROL    acetaminophen (TYLENOL) 500 mg tablet Take 2 Tabs by mouth three (3) times daily.  montelukast (SINGULAIR) 10 mg tablet Take 1 Tab by mouth daily.  naproxen sodium (ALEVE) 220 mg tablet Take 2 Tabs by mouth two (2) times daily (with meals).  lisinopril (PRINIVIL, ZESTRIL) 10 mg tablet TAKE ONE TABLET BY MOUTH EVERY DAY    metoprolol succinate (TOPROL-XL) 50 mg XL tablet TAKE ONE (1) TABLET(S) ONCE DAILY    amLODIPine (NORVASC) 5 mg tablet Take 1 Tab by mouth daily.  fluticasone (FLONASE) 50 mcg/actuation nasal spray     loratadine (CLARITIN) 10 mg tablet Take 10 mg by mouth daily.  esomeprazole (NEXIUM) 40 mg capsule Take 20 mg by mouth daily.  metFORMIN ER (GLUCOPHAGE XR) 500 mg tablet TAKE 4 TABLETS BY MOUTH DAILY WITH SUPPER    glucose blood VI test strips (FREESTYLE TEST) strip For fasting BS testing once daily. ICD-10: E11.9    gabapentin (NEURONTIN) 100 mg capsule TAKE ONE CAPSULE BY MOUTH THREE TIMES A DAY AS NEEDED    OTHER Fish oil vs. Placebo    CALCIUM CARBONATE (TUMS PO) Take  by mouth daily as needed.     famotidine (PEPCID) 20 mg tablet Take 20 mg by mouth daily as needed.  FREESTYLE LANCETS 28 gauge [facility] FOR USE IN LANCET DEVICE    prednisoLONE acetate (PRED FORTE) 1 % ophthalmic suspension Administer 1 Drop to both eyes daily.  oxybutynin chloride XL (DITROPAN XL) 10 mg CR tablet Take 10 mg by mouth daily.  CALCIUM CARBONATE/VITAMIN D3 (CALCIUM + D PO) Take  by mouth.  MULTI-VITAMIN PO Take 1 Tab by mouth daily.  aspirin delayed-release 81 mg tablet take 81 mg by mouth daily.  cyclobenzaprine (FLEXERIL) 10 mg tablet Take 1 Tab by mouth nightly.  albuterol (PROVENTIL HFA, VENTOLIN HFA, PROAIR HFA) 90 mcg/actuation inhaler Take 2 Puffs by inhalation every six (6) hours as needed for Wheezing.  loperamide (IMODIUM) 2 mg capsule Take  by mouth as needed. No current facility-administered medications for this visit.          Past Medical History:   Diagnosis Date    Anemia     Cystocele     Diabetes ([facility] [address]     Diabetic neuropathy, painful (HCC)     Hepatitis B     Hypercholesterolemia     Hypertension     Microalbuminuria     Mild nonproliferative diabetic retinopathy (HCC)     PAF (paroxysmal atrial fibrillation) ([facility] [address]     [facility] follows    Rectocele     Retinopathy     Rosacea     Ruptured disc, cervical     Sleep apnea     Stenosis, cervical spine       Past Surgical History:   Procedure Laterality Date    HX BREAST BIOPSY Left 2009    HX HERNIA REPAIR      umbilical    HX MENISCUS REPAIR  7/11/14    HX OTHER SURGICAL  05/09/2018    Bladder Botox     HX OVARIAN CYST REMOVAL  1973      Social History   Substance Use Topics    Smoking status: Never Smoker    Smokeless tobacco: Never Used    Alcohol use No      Comment: very rarely       Family History   Problem Relation Age of Onset    Hypertension Mother     Thyroid Disease Mother     Heart Failure Father     Heart Disease Father     Thyroid Disease Sister     Thyroid Disease Sister     Heart Attack Other     Cancer Maternal Aunt esophageal    Cancer Paternal Aunt      breast        Allergies   Allergen Reactions    Sulfa (Sulfonamide Antibiotics) Unknown (comments)        Assessment/Plan  Diagnoses and all orders for this visit:    1. Irregular heart beat - ? Short episode of Afib. Today NSR. Will get back with Cards for further evaluation. CHADSVASC of 4. On ASA at this time. Continue BB. Over 50% of a visit of 25 minutes spent reviewing diagnosis and treatment options and side effects of medicines. -     AMB POC EKG ROUTINE W/ 12 LEADS, INTER & REP    2. PAF (paroxysmal atrial fibrillation) (HCC)  -     TSH 3RD GENERATION    3. Type 2 diabetes mellitus without complication, without long-term current use of insulin ([facility] [address] - Due for repeat. Get today. Will reschedule with PCP .   -     METABOLIC PANEL, COMPREHENSIVE  -     CBC WITH AUTOMATED DIFF  -     HEMOGLOBIN A1C WITH EAG    4. Hyperlipidemia, unspecified hyperlipidemia type - repeat.    -     LIPID PANEL        Follow-up Disposition: Not on File    Tremaine Talavera MD  8/1/2018

## 2018-08-01 NOTE — Clinical Note
Seen her in office. She had what could have been afib (rate controlled) which lasted for a couple hours. She will be scheduling to see you sooner.

## 2018-08-01 NOTE — TELEPHONE ENCOUNTER
Paged by pt. Notes that she feels as though her heart rate has been irregular this evening. No CP. Notes no SOB. Feeling fluttering. No ortho stastic symptoms or feelings of presyncope. Has not been sick recently. Notes history of PAF and palpitations, but no known problems in years. On no anticoagulation at this time. Will see first thing in morning. If begins to feel unstable or for any other concerns will be seen in ER.

## 2018-08-01 NOTE — MR AVS SNAPSHOT
[facility] 
 
 
 [address] [place] [address] [address] [facility] 
[phone] Patient: Corrina Dhillon MRN: [id number] BBC:[id number] Visit Information Date & Time Provider Department Dept. Phone Encounter #  
 8/1/2018  7:30 AM Tammy Atkins MD [facility] [address] [facility] [id number] Your Appointments 8/2/2018  3:30 PM  
ROUTINE CARE with Javed Obando MD  
[facility] Appt Note: 6 month f/u  
 [address]  
  
   
 [facility] [phone]  
  
    
 2/27/2019 10:00 AM  
ESTABLISHED PATIENT with Emmanuel Graff MD  
[facility] ([facility]) Appt Note: annual  
 [address] [facility] [place] [address] [facility]  
[address] [facility] [place] [address] [address] Upcoming Health Maintenance Date Due  
 FOOT EXAM Q1 9/29/2014 EYE EXAM RETINAL OR DILATED Q1 10/19/2017 LIPID PANEL Q1 3/20/2018 Influenza Age 5 to Adult 8/1/2018 MICROALBUMIN Q1 9/27/2018 MEDICARE YEARLY EXAM 9/28/2018 GLAUCOMA SCREENING Q2Y 10/19/2018 HEMOGLOBIN A1C Q6M 10/21/2018 BREAST CANCER SCRN MAMMOGRAM 11/8/2019 DTaP/Tdap/Td series (2 - Td) 9/20/2023 COLONOSCOPY 6/29/2025 Allergies as of 8/1/2018  Review Complete On: 8/1/2018 By: Rajiv Parra LPN Severity Noted Reaction Type Reactions Sulfa (Sulfonamide Antibiotics)  06/10/2009    Unknown (comments) Current Immunizations  Reviewed on 11/1/2016 Name Date Influenza High Dose Vaccine PF 10/31/2016, 10/19/2015 Influenza Vaccine 10/6/2014, 9/20/2013 Pneumococcal Conjugate (PCV-13) 9/7/2016 Pneumococcal Polysaccharide (PPSV-23) 10/6/2014 TD Vaccine 1/29/2009 Tdap 9/20/2013 Zoster Vaccine, Live 6/14/2008 Not reviewed this visit You Were Diagnosed With   
  
 Codes Comments Irregular heart beat    -  Primary ICD-10-CM: I49.9 ICD-9-CM: 427.9 PAF (paroxysmal atrial fibrillation) (HCC)     ICD-10-CM: I48.0 ICD-9-CM: 427.31 Type 2 diabetes mellitus without complication, without long-term current use of insulin (HCC)     ICD-10-CM: E11.9 ICD-9-CM: 250.00 Hyperlipidemia, unspecified hyperlipidemia type     ICD-10-CM: E78.5 ICD-9-CM: 272.4 Vitals BP Pulse Temp Resp Height(growth percentile) Weight(growth percentile) 133/68 (BP 1 Location: Left arm, BP Patient Position: Sitting) (!) 56 98.1 °F (36.7 °C) (Oral) 14 5' (1.524 m) 182 lb (82.6 kg) SpO2 BMI OB Status Smoking Status 98% 35.54 kg/m2 Postmenopausal Never Smoker Vitals History BMI and BSA Data Body Mass Index Body Surface Area 35.54 kg/m 2 1.87 m 2 Preferred Pharmacy Pharmacy Name Phone [id number] - [address] [facility], [address] [facility] [phone] Your Updated Medication List  
  
   
This list is accurate as of 8/1/18  8:24 AM.  Always use your most recent med list.  
  
  
  
  
 acetaminophen 500 mg tablet Commonly known as:  TYLENOL Take 2 Tabs by mouth three (3) times daily. albuterol 90 mcg/actuation inhaler Commonly known as:  PROVENTIL HFA, VENTOLIN HFA, PROAIR HFA Take 2 Puffs by inhalation every six (6) hours as needed for Wheezing. amLODIPine 5 mg tablet Commonly known as:  Elyn Coffer Take 1 Tab by mouth daily. aspirin delayed-release 81 mg tablet  
take 81 mg by mouth daily. CALCIUM + D PO Take  by mouth. CLARITIN 10 mg tablet Generic drug:  loratadine Take 10 mg by mouth daily. cyclobenzaprine 10 mg tablet Commonly known as:  FLEXERIL Take 1 Tab by mouth nightly. esomeprazole 40 mg capsule Commonly known as:  Sullivan Gaunt Take 20 mg by mouth daily. famotidine 20 mg tablet Commonly known as:  PEPCID  
 Take 20 mg by mouth daily as needed. fluticasone 50 mcg/actuation nasal spray Commonly known as:  Pretty Dejesus FREESTYLE LANCETS 28 gauge Misc Generic drug:  lancets FOR USE IN LANCET DEVICE  
  
 gabapentin 100 mg capsule Commonly known as:  NEURONTIN  
TAKE ONE CAPSULE BY MOUTH THREE TIMES A DAY AS NEEDED  
  
 glucose blood VI test strips strip Commonly known as:  FREESTYLE TEST For fasting BS testing once daily. ICD-10: E11.9  
  
 lisinopril 10 mg tablet Commonly known as:  PRINIVIL, ZESTRIL  
TAKE ONE TABLET BY MOUTH EVERY DAY  
  
 loperamide 2 mg capsule Commonly known as:  IMODIUM Take  by mouth as needed. metFORMIN  mg tablet Commonly known as:  GLUCOPHAGE XR  
TAKE 4 TABLETS BY MOUTH DAILY WITH SUPPER  
  
 metoprolol succinate 50 mg XL tablet Commonly known as:  TOPROL-XL  
TAKE ONE (1) TABLET(S) ONCE DAILY  
  
 montelukast 10 mg tablet Commonly known as:  SINGULAIR Take 1 Tab by mouth daily. MULTI-VITAMIN PO Take 1 Tab by mouth daily. naproxen sodium 220 mg tablet Commonly known as:  Ankit Board Take 2 Tabs by mouth two (2) times daily (with meals). OTHER Fish oil vs. Placebo  
  
 oxybutynin chloride XL 10 mg CR tablet Commonly known as:  DITROPAN XL Take 10 mg by mouth daily. prednisoLONE acetate 1 % ophthalmic suspension Commonly known as:  PRED FORTE Administer 1 Drop to both eyes daily. simvastatin 20 mg tablet Commonly known as:  ZOCOR  
TAKE 1 TABLET DAILY AT BEDTIME FOR CHOLESTEROL  
  
 TUMS PO Take  by mouth daily as needed. We Performed the Following AMB POC EKG ROUTINE W/ 12 LEADS, INTER & REP [42189 CPT(R)] CBC WITH AUTOMATED DIFF [50479 CPT(R)] HEMOGLOBIN A1C WITH EAG [48956 CPT(R)] LIPID PANEL [20249 CPT(R)] METABOLIC PANEL, COMPREHENSIVE [96133 CPT(R)] TSH 3RD GENERATION [03626 CPT(R)] Introducing [facility]! Dear Abdi Friend: Thank you for requesting a [employer] account. Our records indicate that you already have an active [employer] account. You can access your account anytime at [email or web address] Did you know that you can access your hospital and ER discharge instructions at any time in [employer]? You can also review all of your test results from your hospital stay or ER visit. Additional Information If you have questions, please visit the Frequently Asked Questions section of the [employer] website at [email or web address] Remember, [employer] is NOT to be used for urgent needs. For medical emergencies, dial 911. Now available from your iPhone and Android! Please provide this summary of care documentation to your next provider. Your primary care clinician is listed as Verona Francisco. If you have any questions after today's visit, please call [phone].

## 2018-08-02 LAB
ALBUMIN SERPL-MCNC: 4.3 G/DL (ref 3.5–4.8)
ALBUMIN/GLOB SERPL: 1.7 {RATIO} (ref 1.2–2.2)
ALP SERPL-CCNC: 70 IU/L (ref 39–117)
ALT SERPL-CCNC: 21 IU/L (ref 0–32)
AST SERPL-CCNC: 19 IU/L (ref 0–40)
BASOPHILS # BLD AUTO: 0.1 X10E3/UL (ref 0–0.2)
BASOPHILS NFR BLD AUTO: 1 %
BILIRUB SERPL-MCNC: 0.2 MG/DL (ref 0–1.2)
BUN SERPL-MCNC: 21 MG/DL (ref 8–27)
BUN/CREAT SERPL: 22 (ref 12–28)
CALCIUM SERPL-MCNC: 9.6 MG/DL (ref 8.7–10.3)
CHLORIDE SERPL-SCNC: 110 MMOL/L (ref 96–106)
CHOLEST SERPL-MCNC: 151 MG/DL (ref 100–199)
CO2 SERPL-SCNC: 19 MMOL/L (ref 20–29)
CREAT SERPL-MCNC: 0.97 MG/DL (ref 0.57–1)
EOSINOPHIL # BLD AUTO: 0.4 X10E3/UL (ref 0–0.4)
EOSINOPHIL NFR BLD AUTO: 5 %
ERYTHROCYTE [DISTWIDTH] IN BLOOD BY AUTOMATED COUNT: 15 % (ref 12.3–15.4)
EST. AVERAGE GLUCOSE BLD GHB EST-MCNC: 114 MG/DL
GLOBULIN SER CALC-MCNC: 2.6 G/DL (ref 1.5–4.5)
GLUCOSE SERPL-MCNC: 94 MG/DL (ref 65–99)
HBA1C MFR BLD: 5.6 % (ref 4.8–5.6)
HCT VFR BLD AUTO: 38.2 % (ref 34–46.6)
HDLC SERPL-MCNC: 53 MG/DL
HGB BLD-MCNC: 12.3 G/DL (ref 11.1–15.9)
IMM GRANULOCYTES # BLD: 0 X10E3/UL (ref 0–0.1)
IMM GRANULOCYTES NFR BLD: 0 %
INTERPRETATION, 910389: NORMAL
INTERPRETATION: NORMAL
LDLC SERPL CALC-MCNC: 55 MG/DL (ref 0–99)
LYMPHOCYTES # BLD AUTO: 1.5 X10E3/UL (ref 0.7–3.1)
LYMPHOCYTES NFR BLD AUTO: 22 %
Lab: NORMAL
MCH RBC QN AUTO: 28.7 PG (ref 26.6–33)
MCHC RBC AUTO-ENTMCNC: 32.2 G/DL (ref 31.5–35.7)
MCV RBC AUTO: 89 FL (ref 79–97)
MONOCYTES # BLD AUTO: 0.4 X10E3/UL (ref 0.1–0.9)
MONOCYTES NFR BLD AUTO: 6 %
NEUTROPHILS # BLD AUTO: 4.6 X10E3/UL (ref 1.4–7)
NEUTROPHILS NFR BLD AUTO: 66 %
PDF IMAGE, 910387: NORMAL
PLATELET # BLD AUTO: 237 X10E3/UL (ref 150–379)
POTASSIUM SERPL-SCNC: 4.7 MMOL/L (ref 3.5–5.2)
PROT SERPL-MCNC: 6.9 G/DL (ref 6–8.5)
RBC # BLD AUTO: 4.28 X10E6/UL (ref 3.77–5.28)
SODIUM SERPL-SCNC: 144 MMOL/L (ref 134–144)
TRIGL SERPL-MCNC: 216 MG/DL (ref 0–149)
TSH SERPL DL<=0.005 MIU/L-ACNC: 2.88 UIU/ML (ref 0.45–4.5)
VLDLC SERPL CALC-MCNC: 43 MG/DL (ref 5–40)
WBC # BLD AUTO: 7 X10E3/UL (ref 3.4–10.8)

## 2018-08-14 ENCOUNTER — OFFICE VISIT (OUTPATIENT)
Dept: CARDIOLOGY CLINIC | Age: 68
End: 2018-08-14

## 2018-08-14 ENCOUNTER — CLINICAL SUPPORT (OUTPATIENT)
Dept: CARDIOLOGY CLINIC | Age: 68
End: 2018-08-14

## 2018-08-14 VITALS
HEART RATE: 57 BPM | DIASTOLIC BLOOD PRESSURE: 78 MMHG | WEIGHT: 180 LBS | BODY MASS INDEX: 35.34 KG/M2 | HEIGHT: 60 IN | OXYGEN SATURATION: 97 % | SYSTOLIC BLOOD PRESSURE: 122 MMHG

## 2018-08-14 DIAGNOSIS — E78.5 HYPERLIPIDEMIA, UNSPECIFIED HYPERLIPIDEMIA TYPE: Chronic | ICD-10-CM

## 2018-08-14 DIAGNOSIS — I48.0 PAF (PAROXYSMAL ATRIAL FIBRILLATION) (HCC): Primary | ICD-10-CM

## 2018-08-14 DIAGNOSIS — I10 ESSENTIAL HYPERTENSION: ICD-10-CM

## 2018-08-14 DIAGNOSIS — R00.2 PALPITATIONS: ICD-10-CM

## 2018-08-14 DIAGNOSIS — E11.21 TYPE 2 DIABETES MELLITUS WITH NEPHROPATHY (HCC): ICD-10-CM

## 2018-08-14 DIAGNOSIS — E66.01 SEVERE OBESITY (BMI 35.0-39.9) WITH COMORBIDITY (HCC): ICD-10-CM

## 2018-08-14 NOTE — PATIENT INSTRUCTIONS
You will be scheduled for 30 day loop monitor, echocardiogram to evaluate your palpitations. Follow up with Dr. Aleja Banks in 6 weeks.

## 2018-08-14 NOTE — MR AVS SNAPSHOT
[address] [facility] [address] [facility] 
[phone] Patient: Jeremias Manley MRN: [id number]:[id number] Visit Information Date & Time Provider Department Dept. Phone Encounter #  
 8/14/2018 11:00 AM Aniyah Rees MD [facility] [phone] [id number] Your Appointments 9/6/2018  3:30 PM  
ROUTINE CARE with Bonifacio Hughes MD  
[facility] [address]) Appt Note: 6 month f/u; 6 mo fu  
 [address]  
  
   
 Jimi Merrill [phone]  
  
    
 2/27/2019 10:00 AM  
ESTABLISHED PATIENT with Aniyah Rees MD  
[facility] ([address]) Appt Note: annual  
 320 [facility] [address] [place] [address]  
54 [facility] [address] [address] Upcoming Health Maintenance Date Due  
 FOOT EXAM Q1 9/29/2014 EYE EXAM RETINAL OR DILATED Q1 10/19/2017 Influenza Age 5 to Adult 8/1/2018 MICROALBUMIN Q1 9/27/2018 MEDICARE YEARLY EXAM 9/28/2018 GLAUCOMA SCREENING Q2Y 10/19/2018 HEMOGLOBIN A1C Q6M 2/1/2019 LIPID PANEL Q1 8/1/2019 BREAST CANCER SCRN MAMMOGRAM 11/8/2019 DTaP/Tdap/Td series (2 - Td) 9/20/2023 COLONOSCOPY 6/29/2025 Allergies as of 8/14/2018  Review Complete On: 8/14/2018 By: Herson Christianson RN Severity Noted Reaction Type Reactions Sulfa (Sulfonamide Antibiotics)  06/10/2009    Unknown (comments) Current Immunizations  Reviewed on 11/1/2016 Name Date Influenza High Dose Vaccine PF 10/31/2016, 10/19/2015 Influenza Vaccine 10/6/2014, 9/20/2013 Pneumococcal Conjugate (PCV-13) 9/7/2016 Pneumococcal Polysaccharide (PPSV-23) 10/6/2014 TD Vaccine 1/29/2009 Tdap 9/20/2013 Zoster Vaccine, Live 6/14/2008 Not reviewed this visit You Were Diagnosed With   
  
 Codes Comments PAF (paroxysmal atrial fibrillation) ([facility]    -  Primary ICD-10-CM: I48.0 ICD-9-CM: 427.31 Palpitations     ICD-10-CM: R00.2 ICD-9-CM: 785.1 Vitals BP Pulse Height(growth percentile) Weight(growth percentile) SpO2 BMI  
 122/78 (!) 57 5' (1.524 m) 180 lb (81.6 kg) 97% 35.15 kg/m2 OB Status Smoking Status Postmenopausal Never Smoker Vitals History BMI and BSA Data Body Mass Index Body Surface Area  
 35.15 kg/m 2 1.86 m 2 Preferred Pharmacy Pharmacy Name Phone [id number] - [address], 1 [facility] [place] [phone] Your Updated Medication List  
  
   
This list is accurate as of 8/14/18 11:28 AM.  Always use your most recent med list.  
  
  
  
  
 acetaminophen 500 mg tablet Commonly known as:  TYLENOL Take 2 Tabs by mouth three (3) times daily. albuterol 90 mcg/actuation inhaler Commonly known as:  PROVENTIL HFA, VENTOLIN HFA, PROAIR HFA Take 2 Puffs by inhalation every six (6) hours as needed for Wheezing. amLODIPine 5 mg tablet Commonly known as:  Tatiana Vidal Take 1 Tab by mouth daily. aspirin delayed-release 81 mg tablet  
take 81 mg by mouth daily. CALCIUM + D PO Take  by mouth. CLARITIN 10 mg tablet Generic drug:  loratadine Take 10 mg by mouth daily. cyclobenzaprine 10 mg tablet Commonly known as:  FLEXERIL Take 1 Tab by mouth nightly. esomeprazole 40 mg capsule Commonly known as:  Delayne Crate Take 20 mg by mouth daily. famotidine 20 mg tablet Commonly known as:  PEPCID Take 20 mg by mouth daily as needed. fluticasone 50 mcg/actuation nasal spray Commonly known as:  Humberto COATS LANCETS 28 gauge Misc Generic drug:  lancets FOR USE IN LANCET DEVICE  
  
 gabapentin 100 mg capsule Commonly known as:  NEURONTIN  
 TAKE ONE CAPSULE BY MOUTH THREE TIMES A DAY AS NEEDED  
  
 glucose blood VI test strips strip Commonly known as:  FREESTYLE TEST For fasting BS testing once daily. ICD-10: E11.9  
  
 lisinopril 10 mg tablet Commonly known as:  PRINIVIL, ZESTRIL  
TAKE ONE TABLET BY MOUTH EVERY DAY  
  
 loperamide 2 mg capsule Commonly known as:  IMODIUM Take  by mouth as needed. metFORMIN  mg tablet Commonly known as:  GLUCOPHAGE XR  
TAKE 4 TABLETS BY MOUTH DAILY WITH SUPPER  
  
 metoprolol succinate 50 mg XL tablet Commonly known as:  TOPROL-XL  
TAKE ONE (1) TABLET(S) ONCE DAILY  
  
 montelukast 10 mg tablet Commonly known as:  SINGULAIR Take 1 Tab by mouth daily. MULTI-VITAMIN PO Take 1 Tab by mouth daily. naproxen sodium 220 mg tablet Commonly known as:  Lenetta Grieves Take 2 Tabs by mouth two (2) times daily (with meals). OTHER Fish oil vs. Placebo  
  
 oxybutynin chloride XL 10 mg CR tablet Commonly known as:  DITROPAN XL Take 10 mg by mouth daily. prednisoLONE acetate 1 % ophthalmic suspension Commonly known as:  PRED FORTE Administer 1 Drop to both eyes daily. simvastatin 20 mg tablet Commonly known as:  ZOCOR  
TAKE 1 TABLET DAILY AT BEDTIME FOR CHOLESTEROL  
  
 TUMS PO Take  by mouth daily as needed. We Performed the Following 2D ECHO COMPLETE ADULT (TTE) W OR WO CONTR [99038 CPT(R)] To-Do List   
 Around 08/14/2018 Cardiac Services:  LOOP MONITOR Patient Instructions You will be scheduled for 30 day loop monitor, echocardiogram to evaluate your palpitations. Follow up with Dr. Charisma Silvestre in 6 weeks. Introducing [facility]! Dear Nury Donald: 
Thank you for requesting a [employer] account. Our records indicate that you already have an active [employer] account. You can access your account anytime at [email or web address] Did you know that you can access your hospital and ER discharge instructions at any time in [employer]? You can also review all of your test results from your hospital stay or ER visit. Additional Information If you have questions, please visit the Frequently Asked Questions section of the [employer] website at [email or web address] Remember, [employer] is NOT to be used for urgent needs. For medical emergencies, dial 911. Now available from your iPhone and Android! Please provide this summary of care documentation to your next provider. Your primary care clinician is listed as Marquis Up. If you have any questions after today's visit, please call [phone].

## 2018-08-17 NOTE — PROGRESS NOTES
Beecher Severs     1950       Jennifer Laird MD, [facility]    PCP is Jamison Phelan MD   [facility] [place]  [facility]  HPI:  Date of Visit- 8-14-18  Beecher Severs is a 77 y.o. female   Subjective:  Doing fairly well. Has occasional palpitations and mild swelling of her feet at night. Saw Dr. Abhilash Becerra as PCP for irregular heartbeat, but that spontaneously resolved. She had one episode at night where she felt like her heart was racing and it went away. She had no ER visits or hospitalizations. EKG 08/01/18 showed sinus bradycardia, WNL. She has a 1/6 systolic murmur. She had an episode where she didn't feel bad, but she felt some mild shortness of breath with palpitations and she had to lay on her side. It's happened twice in the last six months where she was in the choir, had a sensation of shortness of breath. BP is stable at 122. Her heart rate is about 75. Assessment/Plan:     1. Paroxysmal atrial fibrillation with systolic murmur, increasing palpitations, but not particularly limiting her and no evidence of tachycardia. 2. Will get a 30 day loop monitor to be sure there is not prolonged. 3. Hypertension, fair control. 4. Dyslipidemia,on statin. Triglycerides trial drug. 5. Last stress test 2015 WNL, EF of 69%. 6. Follow up with echo and loop monitor and see back in six weeks. I want to exclude the possibility of change in LV function or more further arrhythmia. 1. PAF (paroxysmal atrial fibrillation) ([facility])    2. Palpitations    3. Essential hypertension    4. Hyperlipidemia, unspecified hyperlipidemia type    5. Type 2 diabetes mellitus with nephropathy ([facility]    6. Severe obesity (BMI 35.0-39. 9) with comorbidity [facility])       cardiovascular disease risk factors include DM  Future Appointments  Date Time Provider Khalif Lundy   8/21/2018 10:00 AM SHAYY Rincon   9/6/2018 3:30 PM Jamison Phelan MD [facility] 256   10/3/2018 11:00 AM Romana Drew, MD [facility] JAGDEEP SCHED   2/27/2019 10:00 AM Romana Drew, MD [facility]      Patient Instructions   You will be scheduled for 30 day loop monitor, echocardiogram to evaluate your palpitations. Follow up with Dr. Cindy Whitley in 6 weeks. Key CAD CHF Meds             simvastatin (ZOCOR) 20 mg tablet  (Taking) TAKE 1 TABLET DAILY AT BEDTIME FOR CHOLESTEROL    lisinopril (PRINIVIL, ZESTRIL) 10 mg tablet  (Taking) TAKE ONE TABLET BY MOUTH EVERY DAY    metoprolol succinate (TOPROL-XL) 50 mg XL tablet  (Taking) TAKE ONE (1) TABLET(S) ONCE DAILY    amLODIPine (NORVASC) 5 mg tablet  (Taking) Take 1 Tab by mouth daily. aspirin delayed-release 81 mg tablet  (Taking) take 81 mg by mouth daily. Cardiac History: In clinical trial= REDUCE-IT Study- \"A Multi-Center, Prospective, Randomized, Double-Blind, Placebo-Controlled, Parallel-Group Study to Evaluate the Effect of XCR686 on Cardiovascular Health and Mortality in Hypertriglyceridemic Patients with Cardiovascular Disease or at High Risk for Cardiovascular Disease: REDUCE-IT (Reduction of Cardiovascular Events with EPA- Intervention Trial)     PAF  Echo 2-5-13= normal  Nuke 4-24-13= 6 minutes , normal , EF 80%  NUKE-exercise nuclear stress test 9-30-15= 6'30\" EF 69% no EKG changes or ischemia, few PACs, normal study     ROS-except as noted above. . A complete cardiac and respiratory are reviewed and negative except as above ; Resp-denies wheezing  or productive cough,.  Const- No unusual weight loss or fever; Neuro-no recent seizure or CVA ; GI- No BRBPR, abdom pain, bloating ; - no  hematuria   see supplement sheet, initialed and to be scanned by staff  Past Medical History:   Diagnosis Date    Anemia     Cystocele     Diabetes ([address]     Diabetic neuropathy, painful ([facility]     Hepatitis B     Hypercholesterolemia     Hypertension     Microalbuminuria     Mild nonproliferative diabetic retinopathy ([facility]     PAF (paroxysmal atrial fibrillation) ([facility]     [facility] 3-24-13 ER-Rolf follows    Rectocele     Retinopathy     Rosacea     Ruptured disc, cervical     Sleep apnea     Stenosis, cervical spine       Social Hx= reports that she has never smoked. She has never used smokeless tobacco. She reports that she does not drink alcohol or use illicit drugs. Exam and Labs:    Visit Vitals    /78    Pulse (!) 57    Ht 5' (1.524 m)    Wt 180 lb (81.6 kg)    SpO2 97%    BMI 35.15 kg/m2      Constitutional:  NAD, comfortable  Head: NC,AT. Eyes: No scleral icterus. Neck:  Neck supple. No JVD present. Throat: moist mucous membranes. Chest: Effort normal & normal respiratory excursion . Neurological: alert, conversant and oriented . Skin: Skin is not cold. No obvious systemic rash noted. Not diaphoretic. No erythema. Psychiatric:  Grossly normal mood and affect. Behavior appears normal. Extremities:  no clubbing or cyanosis. Abdomen: non distended    Lungs:breath sounds normal. No stridor. distress, wheezes or  Rales. Heart:    normal rate, regular rhythm, normal S1, S2, no murmurs, rubs, clicks or gallops , PMI non displaced. Edema: Edema is none. Lab Results   Component Value Date/Time    Cholesterol, total 151 08/01/2018 08:53 AM    HDL Cholesterol 53 08/01/2018 08:53 AM    LDL, calculated 55 08/01/2018 08:53 AM    Triglyceride 216 (H) 08/01/2018 08:53 AM    CHOL/HDL Ratio 3.3 08/17/2010 03:47 AM     No results found for this or any previous visit.  Lab Results   Component Value Date/Time    Sodium 144 08/01/2018 08:53 AM    Potassium 4.7 08/01/2018 08:53 AM    Chloride 110 (H) 08/01/2018 08:53 AM    CO2 19 (L) 08/01/2018 08:53 AM    Anion gap 11 07/16/2016 12:26 PM    Glucose 94 08/01/2018 08:53 AM    BUN 21 08/01/2018 08:53 AM    Creatinine 0.97 08/01/2018 08:53 AM    BUN/Creatinine ratio 22 08/01/2018 08:53 AM    GFR est AA 68 08/01/2018 08:53 AM    GFR est non-AA 59 (L) 08/01/2018 08:53 AM    Calcium 9.6 08/01/2018 08:53 AM      Wt Readings from Last 3 Encounters:   08/14/18 180 lb (81.6 kg)   08/01/18 182 lb (82.6 kg)   05/29/18 182 lb 4 oz (82.7 kg)      BP Readings from Last 3 Encounters:   08/14/18 122/78   08/01/18 133/68   05/29/18 94/57        Current Outpatient Prescriptions   Medication Sig    simvastatin (ZOCOR) 20 mg tablet TAKE 1 TABLET DAILY AT BEDTIME FOR CHOLESTEROL    acetaminophen (TYLENOL) 500 mg tablet Take 2 Tabs by mouth three (3) times daily.  naproxen sodium (ALEVE) 220 mg tablet Take 2 Tabs by mouth two (2) times daily (with meals).  albuterol (PROVENTIL HFA, VENTOLIN HFA, PROAIR HFA) 90 mcg/actuation inhaler Take 2 Puffs by inhalation every six (6) hours as needed for Wheezing.  lisinopril (PRINIVIL, ZESTRIL) 10 mg tablet TAKE ONE TABLET BY MOUTH EVERY DAY    metoprolol succinate (TOPROL-XL) 50 mg XL tablet TAKE ONE (1) TABLET(S) ONCE DAILY    amLODIPine (NORVASC) 5 mg tablet Take 1 Tab by mouth daily.  fluticasone (FLONASE) 50 mcg/actuation nasal spray     loratadine (CLARITIN) 10 mg tablet Take 10 mg by mouth daily.  esomeprazole (NEXIUM) 40 mg capsule Take 20 mg by mouth daily.  metFORMIN ER (GLUCOPHAGE XR) 500 mg tablet TAKE 4 TABLETS BY MOUTH DAILY WITH SUPPER    glucose blood VI test strips (FREESTYLE TEST) strip For fasting BS testing once daily. ICD-10: E11.9    gabapentin (NEURONTIN) 100 mg capsule TAKE ONE CAPSULE BY MOUTH THREE TIMES A DAY AS NEEDED    OTHER Fish oil vs. Placebo    CALCIUM CARBONATE (TUMS PO) Take  by mouth daily as needed.  famotidine (PEPCID) 20 mg tablet Take 20 mg by mouth daily as needed.  FREESTYLE LANCETS 28 gauge misc FOR USE IN LANCET DEVICE    prednisoLONE acetate (PRED FORTE) 1 % ophthalmic suspension Administer 1 Drop to both eyes daily.  loperamide (IMODIUM) 2 mg capsule Take  by mouth as needed.  oxybutynin chloride XL (DITROPAN XL) 10 mg CR tablet Take 10 mg by mouth daily.  CALCIUM CARBONATE/VITAMIN D3 (CALCIUM + D PO) Take  by mouth.  MULTI-VITAMIN PO Take 1 Tab by mouth daily.  aspirin delayed-release 81 mg tablet take 81 mg by mouth daily.  cyclobenzaprine (FLEXERIL) 10 mg tablet Take 1 Tab by mouth nightly.  montelukast (SINGULAIR) 10 mg tablet Take 1 Tab by mouth daily. No current facility-administered medications for this visit. Impression see above.

## 2018-08-21 ENCOUNTER — CLINICAL SUPPORT (OUTPATIENT)
Dept: CARDIOLOGY CLINIC | Age: 68
End: 2018-08-21

## 2018-08-21 DIAGNOSIS — R00.2 PALPITATIONS: ICD-10-CM

## 2018-08-21 DIAGNOSIS — I10 HYPERTENSION, UNSPECIFIED TYPE: ICD-10-CM

## 2018-08-21 DIAGNOSIS — I48.0 PAF (PAROXYSMAL ATRIAL FIBRILLATION) (HCC): Primary | ICD-10-CM

## 2018-08-21 DIAGNOSIS — I48.0 PAF (PAROXYSMAL ATRIAL FIBRILLATION) (HCC): ICD-10-CM

## 2018-08-23 NOTE — PROGRESS NOTES
Echo looks unchanged with good EF and no valve issues  Nurse to call pt for test result   Will fu loop at next OV  10/3/2018  11:00 AM   Toshia Crawford MD        [address] [place] [address],[address]

## 2018-09-06 ENCOUNTER — HOSPITAL ENCOUNTER (OUTPATIENT)
Dept: LAB | Age: 68
Discharge: HOME | End: 2018-09-06
Payer: MEDICARE

## 2018-09-06 ENCOUNTER — OFFICE VISIT (OUTPATIENT)
Dept: INTERNAL MEDICINE CLINIC | Age: 68
End: 2018-09-06

## 2018-09-06 VITALS
HEART RATE: 57 BPM | TEMPERATURE: 98.6 F | DIASTOLIC BLOOD PRESSURE: 78 MMHG | OXYGEN SATURATION: 99 % | HEIGHT: 60 IN | SYSTOLIC BLOOD PRESSURE: 115 MMHG | BODY MASS INDEX: 35.56 KG/M2 | WEIGHT: 181.13 LBS | RESPIRATION RATE: 18 BRPM

## 2018-09-06 DIAGNOSIS — N18.30 STAGE 3 CHRONIC KIDNEY DISEASE (HCC): ICD-10-CM

## 2018-09-06 DIAGNOSIS — E78.00 PURE HYPERCHOLESTEROLEMIA: Chronic | ICD-10-CM

## 2018-09-06 DIAGNOSIS — I10 HYPERTENSION, UNSPECIFIED TYPE: ICD-10-CM

## 2018-09-06 DIAGNOSIS — E11.9 TYPE 2 DIABETES MELLITUS WITH HEMOGLOBIN A1C GOAL OF LESS THAN 7.0% (HCC): Primary | ICD-10-CM

## 2018-09-06 DIAGNOSIS — I48.0 PAF (PAROXYSMAL ATRIAL FIBRILLATION) (HCC): ICD-10-CM

## 2018-09-06 PROCEDURE — 82043 UR ALBUMIN QUANTITATIVE: CPT

## 2018-09-06 RX ORDER — GLUCOSAM/CHONDRO/HERB 149/HYAL 750-100 MG
4 TABLET ORAL DAILY
COMMUNITY
Start: 2018-09-06

## 2018-09-06 RX ORDER — GABAPENTIN 100 MG/1
CAPSULE ORAL
Qty: 30 CAP | Refills: 5 | Status: SHIPPED | OUTPATIENT
Start: 2018-09-06 | End: 2020-06-12

## 2018-09-06 NOTE — MR AVS SNAPSHOT
[address] [facility] 
 
 
 [address] [facility] [address] [place] [address] [facility] 
[phone] Patient: Trina Lacy MRN: [id number] PSB:[id number] Visit Information Date & Time Provider Department Dept. Phone Encounter #  
 9/6/2018  3:30 PM Henny Ahumada MD [facility] [address] [facility] [id number] Follow-up Instructions Return in about 6 months (around 3/6/2019). Your Appointments 10/3/2018 11:00 AM  
ESTABLISHED PATIENT with Kat Sauer MD  
[facility] (JAGDEEP SCHEDULING) Appt Note: 6 weeks follow up discuss testing [address] [facility] [address] [facility]  
[phone]  
  
   
 [address] [facility] [address] [facility] [address]  
  
    
 2/27/2019 10:00 AM  
ESTABLISHED PATIENT with Kat Sauer MD  
[facility] ([facility]) Appt Note: annual  
 320 [facility] 600 [address] [facility]  
[phone] Upcoming Health Maintenance Date Due  
 FOOT EXAM Q1 9/29/2014 EYE EXAM RETINAL OR DILATED Q1 10/19/2017 MICROALBUMIN Q1 9/27/2018 GLAUCOMA SCREENING Q2Y 10/19/2018 MEDICARE YEARLY EXAM 9/28/2018 HEMOGLOBIN A1C Q6M 2/1/2019 LIPID PANEL Q1 8/1/2019 BREAST CANCER SCRN MAMMOGRAM 11/8/2019 DTaP/Tdap/Td series (2 - Td) 9/20/2023 COLONOSCOPY 6/29/2025 Allergies as of 9/6/2018  Review Complete On: 8/17/2018 By: Kat Sauer MD  
  
 Severity Noted Reaction Type Reactions Sulfa (Sulfonamide Antibiotics)  06/10/2009    Unknown (comments) Current Immunizations  Reviewed on 11/1/2016 Name Date Influenza High Dose Vaccine PF 9/2/2018 12:00 AM, 10/31/2016, 10/19/2015 Influenza Vaccine 10/6/2014, 9/20/2013 Pneumococcal Conjugate (PCV-13) 9/7/2016 Pneumococcal Polysaccharide (PPSV-23) 10/6/2014 TD Vaccine 1/29/2009 Tdap 9/20/2013 Zoster Vaccine, Live 6/14/2008 Not reviewed this visit You Were Diagnosed With   
  
 Codes Comments PAF (paroxysmal atrial fibrillation) ([facility]    -  Primary ICD-10-CM: I48.0 ICD-9-CM: 427.31 Type 2 diabetes mellitus with hemoglobin A1c goal of less than 7.0% (HCC)     ICD-10-CM: E11.9 ICD-9-CM: 250.00 Pure hypercholesterolemia     ICD-10-CM: E78.00 ICD-9-CM: 272.0 Hypertension, unspecified type     ICD-10-CM: I10 
ICD-9-CM: 401.9 Stage 3 chronic kidney disease     ICD-10-CM: N18.3 ICD-9-CM: 969. 3 Vitals BP Pulse Temp Resp Height(growth percentile) Weight(growth percentile) 115/78 (BP 1 Location: Left arm, BP Patient Position: Sitting) (!) 57 98.6 °F (37 °C) (Oral) 18 5' (1.524 m) 181 lb 2 oz (82.2 kg) SpO2 BMI OB Status Smoking Status 99% 35.37 kg/m2 Postmenopausal Never Smoker Vitals History BMI and BSA Data Body Mass Index Body Surface Area  
 35.37 kg/m 2 1.87 m 2 Preferred Pharmacy Pharmacy Name Phone [id number] - [address] [facility] [address], 1 [facility] [phone] Your Updated Medication List  
  
   
This list is accurate as of 9/6/18  4:05 PM.  Always use your most recent med list.  
  
  
  
  
 acetaminophen 500 mg tablet Commonly known as:  TYLENOL Take 2 Tabs by mouth three (3) times daily. albuterol 90 mcg/actuation inhaler Commonly known as:  PROVENTIL HFA, VENTOLIN HFA, PROAIR HFA Take 2 Puffs by inhalation every six (6) hours as needed for Wheezing. amLODIPine 5 mg tablet Commonly known as:  Vonadiliae Maxwelton Take 1 Tab by mouth daily. aspirin delayed-release 81 mg tablet  
take 81 mg by mouth daily. CALCIUM + D PO Take  by mouth. CLARITIN 10 mg tablet Generic drug:  loratadine Take 10 mg by mouth daily. cyclobenzaprine 10 mg tablet Commonly known as:  FLEXERIL Take 1 Tab by mouth nightly. esomeprazole 40 mg capsule Commonly known as:  Ceola Limber Take 20 mg by mouth daily. famotidine 20 mg tablet Commonly known as:  PEPCID Take 20 mg by mouth daily as needed. FISH OIL 1,000 mg (120 mg-180 mg) capsule Generic drug:  omega 3-DHA-EPA-fish oil Take 4 Caps by mouth daily. fluticasone 50 mcg/actuation nasal spray Commonly known as:  Cathlyn Gema FREESTYLE LANCETS 28 gauge Misc Generic drug:  lancets FOR USE IN LANCET DEVICE  
  
 gabapentin 100 mg capsule Commonly known as:  NEURONTIN  
TAKE ONE CAPSULE BY MOUTH THREE TIMES A DAY AS NEEDED  
  
 glucose blood VI test strips strip Commonly known as:  FREESTYLE TEST For fasting BS testing once daily. ICD-10: E11.9  
  
 lisinopril 10 mg tablet Commonly known as:  PRINIVIL, ZESTRIL  
TAKE ONE TABLET BY MOUTH EVERY DAY  
  
 metFORMIN  mg tablet Commonly known as:  GLUCOPHAGE XR  
TAKE 4 TABLETS BY MOUTH DAILY WITH SUPPER  
  
 metoprolol succinate 50 mg XL tablet Commonly known as:  TOPROL-XL  
TAKE ONE (1) TABLET(S) ONCE DAILY  
  
 montelukast 10 mg tablet Commonly known as:  SINGULAIR Take 1 Tab by mouth daily. MULTI-VITAMIN PO Take 1 Tab by mouth daily. naproxen sodium 220 mg tablet Commonly known as:  Vianca Said Take 2 Tabs by mouth two (2) times daily (with meals). OTHER Fish oil vs. Placebo  
  
 oxybutynin chloride XL 10 mg CR tablet Commonly known as:  DITROPAN XL Take 10 mg by mouth daily. prednisoLONE acetate 1 % ophthalmic suspension Commonly known as:  PRED FORTE Administer 1 Drop to both eyes daily. simvastatin 20 mg tablet Commonly known as:  ZOCOR  
TAKE 1 TABLET DAILY AT BEDTIME FOR CHOLESTEROL  
  
 TUMS PO Take  by mouth daily as needed. Prescriptions Sent to Pharmacy Refills  
 gabapentin (NEURONTIN) 100 mg capsule 5 Sig: TAKE ONE CAPSULE BY MOUTH THREE TIMES A DAY AS NEEDED  Class: Normal  
 Pharmacy: [id number] [facility] [address], 1 [facility] #: [phone] We Performed the Following MICROALBUMIN, UR, RAND W/ MICROALB/CREAT RATIO [id number] CPT(R)] Follow-up Instructions Return in about 6 months (around 3/6/2019). Introducing [facility]! Dear Chevy Polk: 
Thank you for requesting a [employer] account. Our records indicate that you already have an active [employer] account. You can access your account anytime at [email or web address] Did you know that you can access your hospital and ER discharge instructions at any time in [employer]? You can also review all of your test results from your hospital stay or ER visit. Additional Information If you have questions, please visit the Frequently Asked Questions section of the [employer] website at [email or web address] Remember, [employer] is NOT to be used for urgent needs. For medical emergencies, dial 911. Now available from your iPhone and Android! Please provide this summary of care documentation to your next provider. Your primary care clinician is listed as Evan Jean. If you have any questions after today's visit, please call [phone].

## 2018-09-06 NOTE — PROGRESS NOTES
HISTORY OF PRESENT ILLNESS  Alhaji Cox is a 77 y.o. female. HPI  Diabetes:  She is here for follow up of diabetes. Proteinuria: yes  Neuropathy: yes  Medication change since last visit:  No   Diabetic Review of Systems - home glucose monitoring: is performed regularly, fasting values range 80-90'S. Hypoglycemic symptoms: no  Dilated eye exam in the last year: yes      Lab Results   Component Value Date/Time    Hemoglobin A1c 5.6 08/01/2018 08:53 AM    Hemoglobin A1c (POC) 5.6 01/31/2018 11:30 AM    Hemoglobin A1c, External 5.6 04/19/2016     Lab Results   Component Value Date/Time    Microalbumin/Creat ratio (mg/g creat) 14 12/16/2009 10:00 AM    Microalb/Creat ratio (ug/mg creat.) 62.8 (H) 09/27/2017 12:40 PM    Microalbumin,urine random 1.77 12/16/2009 10:00 AM         Last Point of Care HGB A1C  Hemoglobin A1c (POC)   Date Value Ref Range Status   01/31/2018 5.6 % Final      Hypertension:  Alhaji Cox is a 77 y.o. female with hypertension. with Chronic kidney disease stage 3   Medication change since last visit: No  The patient reports:  taking medications as instructed, no medication side effects noted, no chest pain on exertion, no dyspnea on exertion, no swelling of ankles, no orthostatic dizziness or lightheadedness, palpitations described as occasional rapid HR in past.  None recently.        Lifestyle modification/social history: generally follows a low fat low cholesterol diet, generally follows a low sodium diet, nonsmoker    Lab Results   Component Value Date/Time    Sodium 144 08/01/2018 08:53 AM    Potassium 4.7 08/01/2018 08:53 AM    Chloride 110 (H) 08/01/2018 08:53 AM    CO2 19 (L) 08/01/2018 08:53 AM    Anion gap 11 07/16/2016 12:26 PM    Glucose 94 08/01/2018 08:53 AM    BUN 21 08/01/2018 08:53 AM    Creatinine 0.97 08/01/2018 08:53 AM    BUN/Creatinine ratio 22 08/01/2018 08:53 AM    GFR est AA 68 08/01/2018 08:53 AM    GFR est non-AA 59 (L) 08/01/2018 08:53 AM    Calcium 9.6 08/01/2018 08:53 AM         Hyperlipidemia:  Evelin Rojo is following up on her dyslipidemia. Cardiovascular risks for her are: LDL goal is under 100  diabetic  hypertension. Currently she takes Zocor (simvastatin) ,   Lab Results   Component Value Date/Time    Cholesterol, total 151 08/01/2018 08:53 AM    Cholesterol, total 138 03/20/2017 10:59 AM    Cholesterol, total 140 01/12/2016 12:28 PM    Cholesterol, total 144 07/16/2015 10:28 AM    Cholesterol, total 161 08/15/2014 02:23 PM    HDL Cholesterol 53 08/01/2018 08:53 AM    HDL Cholesterol 63 03/20/2017 10:59 AM    HDL Cholesterol 56 01/12/2016 12:28 PM    HDL Cholesterol 42 07/16/2015 10:28 AM    HDL Cholesterol 46 08/15/2014 02:23 PM    LDL, calculated 55 08/01/2018 08:53 AM    LDL, calculated 48 03/20/2017 10:59 AM    LDL, calculated 56 01/12/2016 12:28 PM    LDL, calculated 63 07/16/2015 10:28 AM    LDL, calculated 56 08/15/2014 02:23 PM    Triglyceride 216 (H) 08/01/2018 08:53 AM    Triglyceride 133 03/20/2017 10:59 AM    Triglyceride 141 01/12/2016 12:28 PM    Triglyceride 193 (H) 07/16/2015 10:28 AM    Triglyceride 294 (H) 08/15/2014 02:23 PM    CHOL/HDL Ratio 3.3 08/17/2010 03:47 AM    CHOL/HDL Ratio 3.3 12/16/2009 08:00 PM    CHOL/HDL Ratio 3.0 06/02/2009 08:47 PM     Lab Results   Component Value Date/Time    ALT (SGPT) 21 08/01/2018 08:53 AM    AST (SGOT) 19 08/01/2018 08:53 AM    Alk. phosphatase 70 08/01/2018 08:53 AM    Bilirubin, total 0.2 08/01/2018 08:53 AM       Myalgias: no  Fatigue: no        ?  atrial fibrillation - she has had intermittant irregular heart beats. Saw Dr. Chanelle Hammer. Referred back to cardiology. Seeing dr. Artemio Childs and wearing event monitor. She got home sleep study to evaluate obstructive sleep apnea and oral appliance. Seeing Dr. Zenon Adams. Having fatigue during day. Home study was normal.  Considering formal sleep study again.       ROS    Physical Exam   Constitutional: She appears well-developed and well-nourished. No distress. /78 (BP 1 Location: Left arm, BP Patient Position: Sitting)  Pulse (!) 57  Temp 98.6 °F (37 °C) (Oral)   Resp 18  Ht 5' (1.524 m)  Wt 181 lb 2 oz (82.2 kg)  SpO2 99%  BMI 35.37 kg/m2Body mass index is 35.37 kg/(m^2). HENT:   Mouth/Throat: Oropharynx is clear and moist.   Neck: No JVD present. Carotid bruit is not present. Cardiovascular: Normal rate, regular rhythm, normal heart sounds and intact distal pulses. Pulmonary/Chest: Effort normal and breath sounds normal.   Musculoskeletal: She exhibits edema (1 plus edema to mid shins) and tenderness. Neurological: She is alert. Skin: Skin is warm and dry. She is not diaphoretic. Psychiatric: She has a normal mood and affect. Her behavior is normal. Thought content normal.   Nursing note and vitals reviewed. ASSESSMENT and PLAN  Diagnoses and all orders for this visit:    1. Type 2 diabetes mellitus with hemoglobin A1c goal of less than 7.0% ([facility]) - Well controlled and stable. her medications were reviewed and refilled where necessary as noted below. Labs ordered as noted. Resume gabapentin for rare episodes of neuropathic pain  -     MICROALBUMIN, UR, RAND W/ MICROALB/CREAT RATIO  -     gabapentin (NEURONTIN) 100 mg capsule; TAKE ONE CAPSULE BY MOUTH THREE TIMES A DAY AS NEEDED    2. PAF (paroxysmal atrial fibrillation) ([facility] [address] - reported but not seen on EKG yet. Awaiting event monitor eval.    3. Pure hypercholesterolemia - Well controlled and stable except for elevated Trigs. Increase fish oil to 4000mg/d.  her medications were reviewed and refilled where necessary as noted below. Labs ordered as noted. 4. Hypertension, unspecified type -Well controlled and stable. her medications were reviewed and refilled where necessary as noted below. Labs ordered as noted. 5. Stage 3 chronic kidney disease -recheck next visit. Follow-up Disposition:  Return in about 6 months (around 3/6/2019).

## 2018-09-07 LAB
ALBUMIN/CREAT UR: 4.7 MG/G CREAT (ref 0–30)
CREAT UR-MCNC: 109.6 MG/DL
MICROALBUMIN UR-MCNC: 5.2 UG/ML

## 2018-09-26 ENCOUNTER — TELEPHONE (OUTPATIENT)
Dept: CARDIOLOGY CLINIC | Age: 68
End: 2018-09-26

## 2018-09-28 DIAGNOSIS — E11.9 TYPE 2 DIABETES MELLITUS WITH HEMOGLOBIN A1C GOAL OF LESS THAN 7.0% (HCC): ICD-10-CM

## 2018-09-28 RX ORDER — LANCETS 28 GAUGE
EACH MISCELLANEOUS
Status: CANCELLED | OUTPATIENT
Start: 2018-09-28

## 2018-09-28 NOTE — TELEPHONE ENCOUNTER
Per the Pharmacist they have faxed over request several time and the patient  Is out of her   glucose blood VI test strips (FREESTYLE TEST) strip  Please call it into the [facility] at [phone] today

## 2018-10-03 ENCOUNTER — OFFICE VISIT (OUTPATIENT)
Dept: CARDIOLOGY CLINIC | Age: 68
End: 2018-10-03

## 2018-10-03 VITALS
DIASTOLIC BLOOD PRESSURE: 50 MMHG | BODY MASS INDEX: 35.34 KG/M2 | WEIGHT: 180 LBS | RESPIRATION RATE: 16 BRPM | HEIGHT: 60 IN | HEART RATE: 56 BPM | OXYGEN SATURATION: 98 % | SYSTOLIC BLOOD PRESSURE: 100 MMHG

## 2018-10-03 DIAGNOSIS — I35.8 SYSTOLIC MURMUR OF AORTA: ICD-10-CM

## 2018-10-03 DIAGNOSIS — R53.83 FATIGUE, UNSPECIFIED TYPE: ICD-10-CM

## 2018-10-03 DIAGNOSIS — E78.00 PURE HYPERCHOLESTEROLEMIA: Chronic | ICD-10-CM

## 2018-10-03 DIAGNOSIS — E11.9 TYPE 2 DIABETES MELLITUS WITHOUT COMPLICATION, WITHOUT LONG-TERM CURRENT USE OF INSULIN (HCC): Chronic | ICD-10-CM

## 2018-10-03 DIAGNOSIS — I48.0 PAF (PAROXYSMAL ATRIAL FIBRILLATION) (HCC): Primary | ICD-10-CM

## 2018-10-03 DIAGNOSIS — E66.01 SEVERE OBESITY (BMI 35.0-39.9) WITH COMORBIDITY (HCC): ICD-10-CM

## 2018-10-03 DIAGNOSIS — I10 HYPERTENSION, UNSPECIFIED TYPE: ICD-10-CM

## 2018-10-03 RX ORDER — HYDROGEN PEROXIDE 3 %
SOLUTION, NON-ORAL MISCELLANEOUS
Refills: 6 | COMMUNITY
Start: 2018-09-20 | End: 2022-09-30 | Stop reason: SDUPTHER

## 2018-10-03 NOTE — PROGRESS NOTES
Estela Hanna     1950       Jennifer Rizzo MD, [facility]  Date of Visit-10/3/2018   PCP is Vonda Guzman MD   [address] [place] [address] [facility] [place]  [facility]  HPI:  Estela Hanna is a 77 y.o. female   Improving palps, pos occ edema  History of PAF. Had noted some increasing palpitations and edema. A loop monitor was ordered as well as echo cardiogram.  Echocardiogram on 8/21 showed a normal EF and fairly unremarkable. The loop monitor showed no atrial fibrillation. Last nuclear was 2015. Overall the pt states she is doing well. When pt is wearing her monitor, she does not feel anything significantly abnormal or persistent other than feeling of fatigue. She still has LE edema but is not bothersome. Worried about CAD, friend with recent CABG, the patient gets chest discomfort occasionally   Denies  syncope or shortness of breath at rest, has no tachycardia, palpitations or sense of arrhythmia. Assessment/Plan:     1. Prior palpitations. Have improved and her loop monitor was normal.      2. Paroxymal atrial fibrillation    Stable rate, occurred only 4% of time , is on ASA, declines OAC    3. Systolic murmur. ---Unchanged. Echo with no aortic stenosis. Suspect sclerosis. 4. Fatigue with afib. Chest discomfort  --- Will plan nuclear stress test.  cardiovascular disease risk factors include dyslipidemia HTN and DM2    5. HTN. BP Readings from Last 3 Encounters:   10/03/18 100/50   09/06/18 115/78   08/14/18 122/78      ----Low BP today.   ----Will stop Norvasc and continue ace and beta-blocker. 6. Dyslipidemia. On statin. Lab Results   Component Value Date/Time    LDL, calculated 55 08/01/2018 08:53 AM      7.  Keep follow up in February,  Call results of the stress test.    Future Appointments  Date Time Provider Khalif Lundy   11/6/2018 10:00 AM SHAYY LOPEZ   2/27/2019 10:00 AM MD TATIANA Ferrari JAGDEEP SCHED   3/6/2019 10:30 AM Adan Hugo MD [address] [address]      Patient Instructions   1. Dr. James Phalen would like to schedule the following cardiac testing:    Nuclear Stress Testing- Wear comfortable shoes/clothing. No caffeine 12 hours before the scheduled test. Nothing to eat/drink except water 2 hours prior to testing. You make take your medications the morning of the test. Hold Metoprolol XL prior to testing. 2. STOP Norvasc. 3. Keep February appointment. Key CAD CHF Meds             omega 3-DHA-EPA-fish oil (FISH OIL) 1,000 mg (120 mg-180 mg) capsule  (Taking) Take 4 Caps by mouth daily. simvastatin (ZOCOR) 20 mg tablet  (Taking) TAKE 1 TABLET DAILY AT BEDTIME FOR CHOLESTEROL    lisinopril (PRINIVIL, ZESTRIL) 10 mg tablet  (Taking) TAKE ONE TABLET BY MOUTH EVERY DAY    metoprolol succinate (TOPROL-XL) 50 mg XL tablet  (Taking) TAKE ONE (1) TABLET(S) ONCE DAILY    aspirin delayed-release 81 mg tablet  (Taking) take 81 mg by mouth daily. Impression: No diagnosis found. Cardiac History: In clinical trial= REDUCE-IT Study- \"A Multi-Center, Prospective, Randomized, Double-Blind, Placebo-Controlled, Parallel-Group Study to Evaluate the Effect of GLK890 on Cardiovascular Health and Mortality in Hypertriglyceridemic Patients with Cardiovascular Disease or at High Risk for Cardiovascular Disease: REDUCE-IT (Reduction of Cardiovascular Events with EPA- Intervention Trial)     PAF  Echo 2-5-13= normal  Nuke 4-24-13= 6 minutes , normal , EF 80%  NUKE-exercise nuclear stress test 9-30-15= 6'30\" EF 69% no EKG changes or ischemia, few PACs, normal study     ROS-except as noted above. . A complete cardiac and respiratory are reviewed and negative except as above ; Resp-denies wheezing  or productive cough,.  Const- No unusual weight loss or fever; Neuro-no recent seizure or CVA ; GI- No BRBPR, abdom pain, bloating ; - no  hematuria   see supplement sheet, initialed and to be scanned by staff  Past Medical History:   Diagnosis Date    Anemia     Cystocele     Diabetes ([facility] [address]     Diabetic neuropathy, painful ([facility] [address]     Hepatitis B     Hypercholesterolemia     Hypertension     Microalbuminuria     Mild nonproliferative diabetic retinopathy (HCC)     PAF (paroxysmal atrial fibrillation) ([facility] [address]     [facility] 3-24-13 ER-Rolf follows    Rectocele     Retinopathy     Rosacea     Ruptured disc, cervical     Sleep apnea     Stenosis, cervical spine       Social Hx= reports that she has never smoked. She has never used smokeless tobacco. She reports that she does not drink alcohol or use illicit drugs. Exam and Labs:  /50 (BP 1 Location: Left arm, BP Patient Position: Sitting)  Pulse (!) 56  Resp 16  Ht 5' (1.524 m)  Wt 180 lb (81.6 kg)  SpO2 98%  BMI 35.15 kg/d7Ycymmpjscqlxmz:  NAD, comfortable  Head: NC,AT. Eyes: No scleral icterus. Neck:  Neck supple. No JVD present. Throat: moist mucous membranes. Chest: Effort normal & normal respiratory excursion . Neurological: alert, conversant and oriented . Skin: Skin is not cold. No obvious systemic rash noted. Not diaphoretic. No erythema. Psychiatric:  Grossly normal mood and affect. Behavior appears normal. Extremities:  no clubbing or cyanosis. Abdomen: non distended    Lungs:breath sounds normal. No stridor. distress, wheezes or  Rales. Heart: 1-2/6 MANSI on RUSB. normal rate, regular rhythm, normal S1, S2, PMI non displaced. Edema: Edema is nonpitting edema 0 - 1.   Lab Results   Component Value Date/Time    Cholesterol, total 151 08/01/2018 08:53 AM    HDL Cholesterol 53 08/01/2018 08:53 AM    LDL, calculated 55 08/01/2018 08:53 AM    Triglyceride 216 (H) 08/01/2018 08:53 AM    CHOL/HDL Ratio 3.3 08/17/2010 03:47 AM     Lab Results   Component Value Date/Time    Sodium 144 08/01/2018 08:53 AM    Potassium 4.7 08/01/2018 08:53 AM    Chloride 110 (H) 08/01/2018 08:53 AM    CO2 19 (L) 08/01/2018 08:53 AM    Anion gap 11 07/16/2016 12:26 PM    Glucose 94 08/01/2018 08:53 AM    BUN 21 08/01/2018 08:53 AM    Creatinine 0.97 08/01/2018 08:53 AM    BUN/Creatinine ratio 22 08/01/2018 08:53 AM    GFR est AA 68 08/01/2018 08:53 AM    GFR est non-AA 59 (L) 08/01/2018 08:53 AM    Calcium 9.6 08/01/2018 08:53 AM      Wt Readings from Last 3 Encounters:   10/03/18 180 lb (81.6 kg)   09/06/18 181 lb 2 oz (82.2 kg)   08/14/18 180 lb (81.6 kg)      BP Readings from Last 3 Encounters:   10/03/18 100/50   09/06/18 115/78   08/14/18 122/78      Current Outpatient Prescriptions   Medication Sig    esomeprazole (NEXIUM) 20 mg capsule TAKE 1 CAPSULE BY MOUTH EVERY DAY    glucose blood VI test strips (FREESTYLE TEST) strip For fasting BS testing once daily. ICD-10: E11.9    gabapentin (NEURONTIN) 100 mg capsule TAKE ONE CAPSULE BY MOUTH THREE TIMES A DAY AS NEEDED    omega 3-DHA-EPA-fish oil (FISH OIL) 1,000 mg (120 mg-180 mg) capsule Take 4 Caps by mouth daily.  simvastatin (ZOCOR) 20 mg tablet TAKE 1 TABLET DAILY AT BEDTIME FOR CHOLESTEROL    acetaminophen (TYLENOL) 500 mg tablet Take 2 Tabs by mouth three (3) times daily.  naproxen sodium (ALEVE) 220 mg tablet Take 2 Tabs by mouth two (2) times daily (with meals).  albuterol (PROVENTIL HFA, VENTOLIN HFA, PROAIR HFA) 90 mcg/actuation inhaler Take 2 Puffs by inhalation every six (6) hours as needed for Wheezing.  lisinopril (PRINIVIL, ZESTRIL) 10 mg tablet TAKE ONE TABLET BY MOUTH EVERY DAY    metoprolol succinate (TOPROL-XL) 50 mg XL tablet TAKE ONE (1) TABLET(S) ONCE DAILY    fluticasone (FLONASE) 50 mcg/actuation nasal spray     loratadine (CLARITIN) 10 mg tablet Take 10 mg by mouth daily.  metFORMIN ER (GLUCOPHAGE XR) 500 mg tablet TAKE 4 TABLETS BY MOUTH DAILY WITH SUPPER    CALCIUM CARBONATE (TUMS PO) Take  by mouth daily as needed.  famotidine (PEPCID) 20 mg tablet Take 20 mg by mouth daily as needed.     FREESTYLE LANCETS 28 gauge misc FOR USE IN LANCET DEVICE    prednisoLONE acetate (PRED FORTE) 1 % ophthalmic suspension Administer 1 Drop to both eyes daily.  oxybutynin chloride XL (DITROPAN XL) 10 mg CR tablet Take 10 mg by mouth daily.  CALCIUM CARBONATE/VITAMIN D3 (CALCIUM + D PO) Take  by mouth.  MULTI-VITAMIN PO Take 1 Tab by mouth daily.  aspirin delayed-release 81 mg tablet take 81 mg by mouth daily. No current facility-administered medications for this visit. Impression see above.       Written by Yobany Disla, as dictated by Chesley Osler, MD.

## 2018-10-03 NOTE — PATIENT INSTRUCTIONS
1. Dr. Essence Gandhi would like to schedule the following cardiac testing:    Nuclear Stress Testing- Wear comfortable shoes/clothing. No caffeine 12 hours before the scheduled test. Nothing to eat/drink except water 2 hours prior to testing. You make take your medications the morning of the test. Hold Metoprolol XL prior to testing. 2. STOP Norvasc. 3. Keep February appointment.

## 2018-10-03 NOTE — MR AVS SNAPSHOT
[address] [facility] 
[phone] Patient: Estela Hanna MRN: [id number] TC Visit Information Date & Time Provider Department Dept. Phone Encounter #  
 10/3/2018 11:00 AM Izzy Marin MD [facility] [phone] [id number] Your Appointments 2018 10:00 AM  
NUCLEAR MEDICINE with NUCLEAR, SHAYY  
[facility] (JADGEEP SCHEDULING) Appt Note: 1 day s-card per dr Jaswinder Alcazar dx ht 5'1 wt [address] [facility] [address] [facility]  
[phone]  
  
   
 [address] [address]  
  
    
 2019 10:00 AM  
ESTABLISHED PATIENT with Izzy Marin MD  
[facility] ([facility]) Appt Note: annual  
 354 [facility] [address] [facility] [address]  
[phone]  
  
   
 [address] [address]  
  
    
 3/6/2019 10:30 AM  
ROUTINE CARE with Vonda Guzman MD  
[facility]) Appt Note: 6 mo fasting [address] [facility] [place] [address]  
[phone]  
  
   
 [address] [facility] [address] Upcoming Health Maintenance Date Due Shingrix Vaccine Age 50> (1 of 2) 1998 FOOT EXAM Q1 2014 MEDICARE YEARLY EXAM 2018 HEMOGLOBIN A1C Q6M 2019 LIPID PANEL Q1 2019 MICROALBUMIN Q1 2019 EYE EXAM RETINAL OR DILATED Q1 2019 BREAST CANCER SCRN MAMMOGRAM 2019 GLAUCOMA SCREENING Q2Y 2020 DTaP/Tdap/Td series (2 - Td) 2023 COLONOSCOPY 2025 Allergies as of 10/3/2018  Review Complete On: 10/3/2018 By: Bert Schultz Severity Noted Reaction Type Reactions Sulfa (Sulfonamide Antibiotics)  06/10/2009    Unknown (comments) Current Immunizations  Reviewed on 11/1/2016 Name Date Influenza High Dose Vaccine PF 9/2/2018 12:00 AM, 10/31/2016, 10/19/2015 Influenza Vaccine 10/6/2014, 9/20/2013 Pneumococcal Conjugate (PCV-13) 9/7/2016 Pneumococcal Polysaccharide (PPSV-23) 10/6/2014 TD Vaccine 1/29/2009 Tdap 9/20/2013 Zoster Vaccine, Live 6/14/2008 Not reviewed this visit You Were Diagnosed With   
  
 Codes Comments PAF (paroxysmal atrial fibrillation) ([facility]    -  Primary ICD-10-CM: I48.0 ICD-9-CM: 427.31 Fatigue, unspecified type     ICD-10-CM: R53.83 ICD-9-CM: 780.79 Vitals BP Pulse Resp Height(growth percentile) Weight(growth percentile) SpO2  
 100/50 (BP 1 Location: Left arm, BP Patient Position: Sitting) (!) 56 16 5' (1.524 m) 180 lb (81.6 kg) 98% BMI OB Status Smoking Status 35.15 kg/m2 Postmenopausal Never Smoker BMI and BSA Data Body Mass Index Body Surface Area  
 35.15 kg/m 2 1.86 m 2 Preferred Pharmacy Pharmacy Name Phone [id number] - [address] [facility], [address] [facility] [phone] Your Updated Medication List  
  
   
This list is accurate as of 10/3/18 11:47 AM.  Always use your most recent med list.  
  
  
  
  
 acetaminophen 500 mg tablet Commonly known as:  TYLENOL Take 2 Tabs by mouth three (3) times daily. albuterol 90 mcg/actuation inhaler Commonly known as:  PROVENTIL HFA, VENTOLIN HFA, PROAIR HFA Take 2 Puffs by inhalation every six (6) hours as needed for Wheezing. aspirin delayed-release 81 mg tablet  
take 81 mg by mouth daily. CALCIUM + D PO Take  by mouth. CLARITIN 10 mg tablet Generic drug:  loratadine Take 10 mg by mouth daily. esomeprazole 20 mg capsule Commonly known as:  NEXIUM  
TAKE 1 CAPSULE BY MOUTH EVERY DAY  
  
 famotidine 20 mg tablet Commonly known as:  PEPCID Take 20 mg by mouth daily as needed. FISH OIL 1,000 mg (120 mg-180 mg) capsule Generic drug:  omega 3-DHA-EPA-fish oil Take 4 Caps by mouth daily. fluticasone 50 mcg/actuation nasal spray Commonly known as:  Meenakshi Dionne FREESTYLE LANCETS 28 gauge Misc Generic drug:  lancets FOR USE IN LANCET DEVICE  
  
 gabapentin 100 mg capsule Commonly known as:  NEURONTIN  
TAKE ONE CAPSULE BY MOUTH THREE TIMES A DAY AS NEEDED  
  
 glucose blood VI test strips strip Commonly known as:  FREESTYLE TEST For fasting BS testing once daily. ICD-10: E11.9  
  
 lisinopril 10 mg tablet Commonly known as:  PRINIVIL, ZESTRIL  
TAKE ONE TABLET BY MOUTH EVERY DAY  
  
 metFORMIN  mg tablet Commonly known as:  GLUCOPHAGE XR  
TAKE 4 TABLETS BY MOUTH DAILY WITH SUPPER  
  
 metoprolol succinate 50 mg XL tablet Commonly known as:  TOPROL-XL  
TAKE ONE (1) TABLET(S) ONCE DAILY MULTI-VITAMIN PO Take 1 Tab by mouth daily. naproxen sodium 220 mg tablet Commonly known as:  Killian [place] Take 2 Tabs by mouth two (2) times daily (with meals). oxybutynin chloride XL 10 mg CR tablet Commonly known as:  DITROPAN XL Take 10 mg by mouth daily. prednisoLONE acetate 1 % ophthalmic suspension Commonly known as:  PRED FORTE Administer 1 Drop to both eyes daily. simvastatin 20 mg tablet Commonly known as:  ZOCOR  
TAKE 1 TABLET DAILY AT BEDTIME FOR CHOLESTEROL  
  
 TUMS PO Take  by mouth daily as needed. To-Do List   
 10/03/2018 ECG:  STRESS TEST CARDIOLITE Patient Instructions 1. Dr. Johny Knott would like to schedule the following cardiac testing:  
 Nuclear Stress Testing- Wear comfortable shoes/clothing. No caffeine 12 hours before the scheduled test. Nothing to eat/drink except water 2 hours prior to testing. You make take your medications the morning of the test. Hold Metoprolol XL prior to testing. 2. STOP Norvasc. 3. Keep February appointment. Introducing [facility]! Dear Rickie Shelton: 
Thank you for requesting a [facility] account. Our records indicate that you already have an active [employer] account. You can access your account anytime at [email or web address] Did you know that you can access your hospital and ER discharge instructions at any time in [employer]? You can also review all of your test results from your hospital stay or ER visit. Additional Information If you have questions, please visit the Frequently Asked Questions section of the [employer] website at [email or web address] Remember, [employer] is NOT to be used for urgent needs. For medical emergencies, dial 911. Now available from your iPhone and Android! Please provide this summary of care documentation to your next provider. Your primary care clinician is listed as Checo Steinberg. If you have any questions after today's visit, please call [phone].

## 2018-10-03 NOTE — PROGRESS NOTES
Medication changes made per VO of Dr. Bonnie Puckett.    STOP Norvasc 5mg daily.        Stress Cardiolite ordered per VO of Dr. Bonnie Puckett.   Dx: Mariana Ruvalcaba AF

## 2018-10-29 RX ORDER — METFORMIN HYDROCHLORIDE 500 MG/1
2000 TABLET, EXTENDED RELEASE ORAL
Qty: 360 TAB | Refills: 2 | Status: SHIPPED | OUTPATIENT
Start: 2018-10-29 | End: 2019-07-11 | Stop reason: SDUPTHER

## 2018-10-29 NOTE — TELEPHONE ENCOUNTER
Refill request for 90 day supply faxed from [facility]. New prescription sent as prescribed.

## 2018-11-06 ENCOUNTER — CLINICAL SUPPORT (OUTPATIENT)
Dept: CARDIOLOGY CLINIC | Age: 68
End: 2018-11-06

## 2018-11-06 DIAGNOSIS — I48.0 PAF (PAROXYSMAL ATRIAL FIBRILLATION) (HCC): ICD-10-CM

## 2018-11-06 DIAGNOSIS — I10 HYPERTENSION, UNSPECIFIED TYPE: ICD-10-CM

## 2018-11-06 DIAGNOSIS — R07.9 CHEST PAIN, UNSPECIFIED TYPE: Primary | ICD-10-CM

## 2018-11-06 DIAGNOSIS — R53.83 FATIGUE, UNSPECIFIED TYPE: ICD-10-CM

## 2018-11-06 NOTE — PROGRESS NOTES
See scanned report. Dr. Precious Mondragon ordered study and Dr. Precious Mondragon read study. ID verified per protocol. Explained procedure and risks to patient. All concerns and questions ansewered prior to obtaining consent test. Patient developed dyspnea during test. At 2 minutes in recovery, patient without symptoms or complaints voiced.

## 2018-11-07 ENCOUNTER — TELEPHONE (OUTPATIENT)
Dept: CARDIOLOGY CLINIC | Age: 68
End: 2018-11-07

## 2018-11-07 RX ORDER — AMLODIPINE BESYLATE 5 MG/1
5 TABLET ORAL DAILY
Qty: 90 TAB | Refills: 1 | Status: SHIPPED | OUTPATIENT
Start: 2018-11-07 | End: 2019-06-05 | Stop reason: SDUPTHER

## 2018-11-07 NOTE — TELEPHONE ENCOUNTER
Verified patient with two types of identifiers. Patient called to state she restarted norvasc 5mg daily related to elevated BPs at home. MD notified and states this is fine.  Will order new prescription to preferred pharmacy per MD.

## 2018-11-07 NOTE — PROGRESS NOTES
Normal nuke  Good news  Strong pump  See us as planned  Future Appointments  12/14/2018 11:00 AM   MAMMOGRAM, SAJI             BSROBG         JAGDEEP SCHED  12/14/2018 11:20 AM   Belkis Dougherty MD        [phone]  2/27/2019  10:00 AM   Kristin Hodges MD       CAV          JAGDEEP SCHED  3/6/2019   10:30 AM   Amparo Santiago MD       [address] [facility]

## 2018-11-19 RX ORDER — METOPROLOL SUCCINATE 50 MG/1
TABLET, EXTENDED RELEASE ORAL
Qty: 90 TAB | Refills: 3 | Status: SHIPPED | OUTPATIENT
Start: 2018-11-19 | End: 2018-11-27 | Stop reason: SDUPTHER

## 2018-11-19 NOTE — TELEPHONE ENCOUNTER
Request for metoprolol 50mg daily. Last office visit 10-3-18, next office visit 2-27-19.  Refills per verbal order from Dr. Bonnie Puckett.

## 2018-11-27 ENCOUNTER — OFFICE VISIT (OUTPATIENT)
Dept: INTERNAL MEDICINE CLINIC | Age: 68
End: 2018-11-27

## 2018-11-27 VITALS
TEMPERATURE: 98.4 F | SYSTOLIC BLOOD PRESSURE: 129 MMHG | WEIGHT: 183.13 LBS | DIASTOLIC BLOOD PRESSURE: 83 MMHG | HEART RATE: 69 BPM | HEIGHT: 60 IN | RESPIRATION RATE: 18 BRPM | BODY MASS INDEX: 35.95 KG/M2 | OXYGEN SATURATION: 96 %

## 2018-11-27 DIAGNOSIS — J02.9 SORE THROAT: ICD-10-CM

## 2018-11-27 DIAGNOSIS — J02.0 STREP PHARYNGITIS: Primary | ICD-10-CM

## 2018-11-27 LAB
S PYO AG THROAT QL: POSITIVE
VALID INTERNAL CONTROL?: YES

## 2018-11-27 NOTE — PROGRESS NOTES
HISTORY OF PRESENT ILLNESS  Aniket Muñoz is a 77 y.o. female. HPI  Upper respiratory illness:  Aniket Muñoz presents with complaints of congestion, sore throat, dry cough, low grade fevers and fatigue for 7 days. no nausea and no vomiting . she has not had  productive cough. Symptoms are moderate. Over-the-counter remedies including zycam, aleve   has been used with poor relief of symptoms. Drinking plenty of fluids: yes  Asthma?:  no  non-smoker  Contacts with similar infections: yes     Patient Active Problem List   Diagnosis Code    [facility] E11.9    Hyperlipidemia E78.5    Hepatitis B B19.10    GERD (gastroesophageal reflux disease) K21.9    Rosacea L71.9    AR (allergic rhinitis) J30.9    Intervertebral disk disease M51.9    Uterine prolapse N81.4    Incontinence R32    HTN (hypertension) I10    Anemia D64.9    PAF (paroxysmal atrial fibrillation) ([facility] I48.0    Peripheral neuropathy G62.9    Pre-ulcerative corn or callous L84    Advanced care planning/counseling discussion Z71.89    Type 2 diabetes mellitus with hemoglobin A1c goal of less than 7.0% (HCC) E11.9    Type 2 diabetes mellitus with nephropathy (HCC) E11.21    Stage 3 chronic kidney disease (HCC) N18.3    Severe obesity (BMI 35.0-39. 9) with comorbidity (HCC) Q64.17    Systolic murmur of aorta M17.9     Past Medical History:   Diagnosis Date    Anemia     Cystocele     [facility]     Diabetic neuropathy, painful (HCC)     Hepatitis B     Hypercholesterolemia     Hypertension     Microalbuminuria     Mild nonproliferative diabetic retinopathy (HCC)     [facility]     [facility] 3-[facility] follows    Rectocele     Retinopathy     Rosacea     Ruptured disc, cervical     Sleep apnea     Stenosis, cervical spine      Allergies   Allergen Reactions    Sulfa (Sulfonamide Antibiotics) Unknown (comments)     Current Outpatient Medications on File Prior to Visit   Medication Sig Dispense Refill    amLODIPine (NORVASC) 5 mg tablet Take 1 Tab by mouth daily. 90 Tab 1    metFORMIN ER (GLUCOPHAGE XR) 500 mg tablet Take 4 Tabs by mouth daily (with dinner). 360 Tab 2    esomeprazole (NEXIUM) 20 mg capsule TAKE 1 CAPSULE BY MOUTH EVERY DAY  6    glucose blood VI test strips (FREESTYLE TEST) strip For fasting BS testing once daily. ICD-10: E11.9 100 Strip 11    gabapentin (NEURONTIN) 100 mg capsule TAKE ONE CAPSULE BY MOUTH THREE TIMES A DAY AS NEEDED 30 Cap 5    omega 3-DHA-EPA-fish oil (FISH OIL) 1,000 mg (120 mg-180 mg) capsule Take 4 Caps by mouth daily.  simvastatin (ZOCOR) 20 mg tablet TAKE 1 TABLET DAILY AT BEDTIME FOR CHOLESTEROL 90 Tab 2    acetaminophen (TYLENOL) 500 mg tablet Take 2 Tabs by mouth three (3) times daily. 0    naproxen sodium (ALEVE) 220 mg tablet Take 2 Tabs by mouth two (2) times daily (with meals). 10 Tab 0    albuterol (PROVENTIL HFA, VENTOLIN HFA, PROAIR HFA) 90 mcg/actuation inhaler Take 2 Puffs by inhalation every six (6) hours as needed for Wheezing. 1 Inhaler 0    lisinopril (PRINIVIL, ZESTRIL) 10 mg tablet TAKE ONE TABLET BY MOUTH EVERY DAY 90 Tab 2    metoprolol succinate (TOPROL-XL) 50 mg XL tablet TAKE ONE (1) TABLET(S) ONCE DAILY 90 Tab 2    fluticasone (FLONASE) 50 mcg/actuation nasal spray       loratadine (CLARITIN) 10 mg tablet Take 10 mg by mouth daily.  CALCIUM CARBONATE (TUMS PO) Take  by mouth daily as needed.  famotidine (PEPCID) 20 mg tablet Take 20 mg by mouth daily as needed.  FREESTYLE LANCETS 28 gauge misc FOR USE IN LANCET DEVICE 100 Each 11    prednisoLONE acetate (PRED FORTE) 1 % ophthalmic suspension Administer 1 Drop to both eyes daily. 5    oxybutynin chloride XL (DITROPAN XL) 10 mg CR tablet Take 10 mg by mouth daily.  CALCIUM CARBONATE/VITAMIN D3 (CALCIUM + D PO) Take  by mouth.  MULTI-VITAMIN PO Take 1 Tab by mouth daily.       aspirin delayed-release 81 mg tablet take 81 mg by mouth daily. No current facility-administered medications on file prior to visit. Social History     Tobacco Use    Smoking status: Never Smoker    Smokeless tobacco: Never Used   Substance Use Topics    Alcohol use: No     Alcohol/week: 0.0 oz     Comment: very rarely     Drug use: No             ROS    Physical Exam   Constitutional: She is oriented to person, place, and time. She appears well-developed and well-nourished. No distress. /83 (BP 1 Location: Left arm, BP Patient Position: Sitting)   Pulse 69   Temp 98.4 °F (36.9 °C) (Oral)   Resp 18   Ht 5' (1.524 m)   Wt 183 lb 2 oz (83.1 kg)   SpO2 96%   BMI 35.76 kg/m²    HENT:   Right Ear: Tympanic membrane and ear canal normal.   Left Ear: Tympanic membrane and ear canal normal.   Nose: No mucosal edema or rhinorrhea. Right sinus exhibits no maxillary sinus tenderness and no frontal sinus tenderness. Left sinus exhibits no maxillary sinus tenderness and no frontal sinus tenderness. Mouth/Throat: Uvula is midline and mucous membranes are normal. Oropharyngeal exudate (mild yellowish exudate on R tonsil) and posterior oropharyngeal erythema present. No posterior oropharyngeal edema or tonsillar abscesses. Eyes: Conjunctivae are normal.   Neck: Neck supple. Cardiovascular: Normal rate, regular rhythm and normal heart sounds. Pulmonary/Chest: Effort normal and breath sounds normal. No respiratory distress. She has no wheezes. She has no rales. Lymphadenopathy:     She has cervical adenopathy. Right cervical: Superficial cervical adenopathy present. Left cervical: Superficial cervical adenopathy present. Neurological: She is alert and oriented to person, place, and time. Skin: Skin is warm and dry. She is not diaphoretic. Nursing note and vitals reviewed. ASSESSMENT and PLAN  Diagnoses and all orders for this visit:    1.  Strep pharyngitis  -     penicillin g benzathine (BICILLIN L-A) 1,200,000 unit/2 mL syrg; 2 mL by IntraMUSCular route once for 1 dose. -     ID PENICILLIN G BENZATHINE INJ  -     ID THER/PROPH/DIAG INJECTION, SUBCUT/IM  The patient was given written instructions detailing her diagnoses and recommendations made today at the visit. 2. Sore throat  -     AMB POC RAPID STREP A      Follow-up Disposition:  Return if symptoms worsen or fail to improve.

## 2018-11-27 NOTE — PATIENT INSTRUCTIONS

## 2018-12-13 ENCOUNTER — HOSPITAL ENCOUNTER (OUTPATIENT)
Dept: LAB | Age: 68
Discharge: HOME | End: 2018-12-13
Payer: MEDICARE

## 2018-12-13 ENCOUNTER — OFFICE VISIT (OUTPATIENT)
Dept: INTERNAL MEDICINE CLINIC | Age: 68
End: 2018-12-13

## 2018-12-13 VITALS
BODY MASS INDEX: 35.69 KG/M2 | HEART RATE: 106 BPM | OXYGEN SATURATION: 98 % | DIASTOLIC BLOOD PRESSURE: 64 MMHG | TEMPERATURE: 98 F | SYSTOLIC BLOOD PRESSURE: 91 MMHG | WEIGHT: 181.8 LBS | HEIGHT: 60 IN | RESPIRATION RATE: 12 BRPM

## 2018-12-13 DIAGNOSIS — N30.00 ACUTE CYSTITIS WITHOUT HEMATURIA: ICD-10-CM

## 2018-12-13 DIAGNOSIS — R53.83 FATIGUE, UNSPECIFIED TYPE: Primary | ICD-10-CM

## 2018-12-13 DIAGNOSIS — K52.9 GASTROENTERITIS: ICD-10-CM

## 2018-12-13 DIAGNOSIS — E86.1 HYPOTENSION DUE TO HYPOVOLEMIA: ICD-10-CM

## 2018-12-13 DIAGNOSIS — I95.89 HYPOTENSION DUE TO HYPOVOLEMIA: ICD-10-CM

## 2018-12-13 LAB
BILIRUB UR QL STRIP: NEGATIVE
GLUCOSE UR-MCNC: NEGATIVE MG/DL
KETONES P FAST UR STRIP-MCNC: NEGATIVE MG/DL
PH UR STRIP: 5.5 [PH] (ref 4.6–8)
PROT UR QL STRIP: ABNORMAL
SP GR UR STRIP: 1.03 (ref 1–1.03)
UA UROBILINOGEN AMB POC: ABNORMAL (ref 0.2–1)
URINALYSIS CLARITY POC: CLEAR
URINALYSIS COLOR POC: YELLOW
URINE BLOOD POC: NEGATIVE
URINE LEUKOCYTES POC: ABNORMAL
URINE NITRITES POC: NEGATIVE

## 2018-12-13 PROCEDURE — 36415 COLL VENOUS BLD VENIPUNCTURE: CPT

## 2018-12-13 PROCEDURE — 80053 COMPREHEN METABOLIC PANEL: CPT

## 2018-12-13 PROCEDURE — 87086 URINE CULTURE/COLONY COUNT: CPT

## 2018-12-13 PROCEDURE — 85025 COMPLETE CBC W/AUTO DIFF WBC: CPT

## 2018-12-13 RX ORDER — NITROFURANTOIN 25; 75 MG/1; MG/1
100 CAPSULE ORAL 2 TIMES DAILY
Qty: 14 CAP | Refills: 0 | Status: SHIPPED | OUTPATIENT
Start: 2018-12-13 | End: 2019-02-26 | Stop reason: ALTCHOICE

## 2018-12-13 NOTE — PATIENT INSTRUCTIONS
Urinary Tract Infection in Women: Care Instructions  Your Care Instructions    A urinary tract infection, or UTI, is a general term for an infection anywhere between the kidneys and the urethra (where urine comes out). Most UTIs are bladder infections. They often cause pain or burning when you urinate. UTIs are caused by bacteria and can be cured with antibiotics. Be sure to complete your treatment so that the infection goes away. Follow-up care is a key part of your treatment and safety. Be sure to make and go to all appointments, and call your doctor if you are having problems. It's also a good idea to know your test results and keep a list of the medicines you take. How can you care for yourself at home? · Take your antibiotics as directed. Do not stop taking them just because you feel better. You need to take the full course of antibiotics. · Drink extra water and other fluids for the next day or two. This may help wash out the bacteria that are causing the infection. (If you have kidney, heart, or liver disease and have to limit fluids, talk with your doctor before you increase your fluid intake.)  · Avoid drinks that are carbonated or have caffeine. They can irritate the bladder. · Urinate often. Try to empty your bladder each time. · To relieve pain, take a hot bath or lay a heating pad set on low over your lower belly or genital area. Never go to sleep with a heating pad in place. To prevent UTIs  · Drink plenty of water each day. This helps you urinate often, which clears bacteria from your system. (If you have kidney, heart, or liver disease and have to limit fluids, talk with your doctor before you increase your fluid intake.)  · Urinate when you need to. · Urinate right after you have sex. · Change sanitary pads often. · Avoid douches, bubble baths, feminine hygiene sprays, and other feminine hygiene products that have deodorants.   · After going to the bathroom, wipe from front to back.  When should you call for help? Call your doctor now or seek immediate medical care if:    · Symptoms such as fever, chills, nausea, or vomiting get worse or appear for the first time.     · You have new pain in your back just below your rib cage. This is called flank pain.     · There is new blood or pus in your urine.     · You have any problems with your antibiotic medicine.    Watch closely for changes in your health, and be sure to contact your doctor if:    · You are not getting better after taking an antibiotic for 2 days.     · Your symptoms go away but then come back. Where can you learn more? Go to http://lizabeth-jose raul.info/. Enter M555 in the search box to learn more about \"Urinary Tract Infection in Women: Care Instructions. \"  Current as of: March 21, 2018  Content Version: 11.8  © 3097-0173 [employer]. Care instructions adapted under license by [employer] (which disclaims liability or warranty for this information). If you have questions about a medical condition or this instruction, always ask your healthcare professional. [address] any warranty or liability for your use of this information. Low Blood Pressure: Care Instructions  Your Care Instructions    Blood pressure is a measurement of the force of the blood against the walls of the blood vessels during and after each beat of the heart. Low blood pressure is also called hypotension. It means that your blood pressure is much lower than normal. Some people, especially young, slim women, may have slightly low blood pressure without symptoms. But in many people, low blood pressure can cause symptoms such as feeling dizzy or lightheaded. When your blood pressure is too low, your heart, brain, and other organs do not get enough blood. Low blood pressure can be caused by many things, including heart problems and some medicines.  Diabetes that is not under control can cause your blood pressure to drop. And so can a severe allergic reaction or infection. Another cause is dehydration, which is when your body loses too much fluid. Treatment for low blood pressure depends on the cause. Follow-up care is a key part of your treatment and safety. Be sure to make and go to all appointments, and call your doctor if you are having problems. It's also a good idea to know your test results and keep a list of the medicines you take. How can you care for yourself at home? · Drink plenty of fluids, enough so that your urine is light yellow or clear like water. If you have kidney, heart, or liver disease and have to limit fluids, talk with your doctor before you increase the amount of fluids you drink. · Be safe with medicines. Call your doctor if you think you are having a problem with your medicine. You will get more details on the specific medicines your doctor prescribes. · Stand up or get out of bed very slowly to allow your body to adjust.  · Get plenty of rest.  · Do not smoke. Smoking increases your risk of heart attack. If you need help quitting, talk to your doctor about stop-smoking programs and medicines. These can increase your chances of quitting for good. · Limit alcohol to 2 drinks a day for men and 1 drink a day for women. Alcohol may interfere with your medicine. In addition, alcohol can make your low blood pressure worse by causing your body to lose water. When should you call for help? Call 911 anytime you think you may need emergency care. For example, call if:    · You passed out (lost consciousness).    Call your doctor now or seek immediate medical care if:    · You are dizzy or lightheaded, or you feel like you may faint.    Watch closely for changes in your health, and be sure to contact your doctor if you have any problems. Where can you learn more? Go to http://lizabeth-jose raul.info/.   Enter C304 in the search box to learn more about \"Low Blood Pressure: Care Instructions. \"  Current as of: December 6, 2017  Content Version: 11.8  © 0294-1328 [employer]. Care instructions adapted under license by [employer] (which disclaims liability or warranty for this information). If you have questions about a medical condition or this instruction, always ask your healthcare professional. [place] [address] any warranty or liability for your use of this information.

## 2018-12-14 ENCOUNTER — OFFICE VISIT (OUTPATIENT)
Dept: OBGYN CLINIC | Age: 68
End: 2018-12-14

## 2018-12-14 ENCOUNTER — HOSPITAL ENCOUNTER (OUTPATIENT)
Dept: LAB | Age: 68
Discharge: HOME | End: 2018-12-14

## 2018-12-14 ENCOUNTER — APPOINTMENT (OUTPATIENT)
Dept: GENERAL RADIOLOGY | Age: 68
End: 2018-12-14
Attending: EMERGENCY MEDICINE
Payer: MEDICARE

## 2018-12-14 ENCOUNTER — HOSPITAL ENCOUNTER (EMERGENCY)
Age: 68
Discharge: HOME | End: 2018-12-14
Attending: EMERGENCY MEDICINE | Admitting: EMERGENCY MEDICINE
Payer: MEDICARE

## 2018-12-14 VITALS
BODY MASS INDEX: 35.63 KG/M2 | HEIGHT: 60 IN | DIASTOLIC BLOOD PRESSURE: 55 MMHG | SYSTOLIC BLOOD PRESSURE: 108 MMHG | WEIGHT: 181.5 LBS

## 2018-12-14 VITALS
HEART RATE: 71 BPM | BODY MASS INDEX: 35.71 KG/M2 | TEMPERATURE: 97.4 F | WEIGHT: 181.88 LBS | SYSTOLIC BLOOD PRESSURE: 117 MMHG | OXYGEN SATURATION: 98 % | DIASTOLIC BLOOD PRESSURE: 60 MMHG | RESPIRATION RATE: 18 BRPM | HEIGHT: 60 IN

## 2018-12-14 DIAGNOSIS — Z01.419 WELL WOMAN EXAM WITH ROUTINE GYNECOLOGICAL EXAM: Primary | ICD-10-CM

## 2018-12-14 DIAGNOSIS — E86.0 MODERATE DEHYDRATION: ICD-10-CM

## 2018-12-14 DIAGNOSIS — R07.89 ATYPICAL CHEST PAIN: Primary | ICD-10-CM

## 2018-12-14 LAB
ANION GAP SERPL CALC-SCNC: 14 MMOL/L (ref 5–15)
APPEARANCE UR: CLEAR
ATRIAL RATE: 62 BPM
ATRIAL RATE: 67 BPM
BACTERIA UR CULT: NO GROWTH
BACTERIA URNS QL MICRO: NEGATIVE /HPF
BASOPHILS # BLD: 0 K/UL (ref 0–0.1)
BASOPHILS NFR BLD: 0 % (ref 0–1)
BILIRUB UR QL: NEGATIVE
BUN SERPL-MCNC: 34 MG/DL (ref 6–20)
BUN/CREAT SERPL: 24 (ref 12–20)
CALCIUM SERPL-MCNC: 8.5 MG/DL (ref 8.5–10.1)
CALCULATED P AXIS, ECG09: 44 DEGREES
CALCULATED P AXIS, ECG09: 51 DEGREES
CALCULATED R AXIS, ECG10: -15 DEGREES
CALCULATED R AXIS, ECG10: -7 DEGREES
CALCULATED T AXIS, ECG11: 23 DEGREES
CALCULATED T AXIS, ECG11: 34 DEGREES
CHLORIDE SERPL-SCNC: 105 MMOL/L (ref 97–108)
CO2 SERPL-SCNC: 16 MMOL/L (ref 21–32)
COLOR UR: ABNORMAL
CREAT SERPL-MCNC: 1.43 MG/DL (ref 0.55–1.02)
DIAGNOSIS, 93000: NORMAL
DIAGNOSIS, 93000: NORMAL
DIFFERENTIAL METHOD BLD: ABNORMAL
EOSINOPHIL # BLD: 3.2 K/UL (ref 0–0.4)
EOSINOPHIL NFR BLD: 21 % (ref 0–7)
EPITH CASTS URNS QL MICRO: ABNORMAL /LPF
ERYTHROCYTE [DISTWIDTH] IN BLOOD BY AUTOMATED COUNT: 14.1 % (ref 11.5–14.5)
GLUCOSE SERPL-MCNC: 119 MG/DL (ref 65–100)
GLUCOSE UR STRIP.AUTO-MCNC: NEGATIVE MG/DL
HCT VFR BLD AUTO: 37.3 % (ref 35–47)
HGB BLD-MCNC: 12.1 G/DL (ref 11.5–16)
HGB UR QL STRIP: NEGATIVE
HYALINE CASTS URNS QL MICRO: ABNORMAL /LPF (ref 0–5)
IMM GRANULOCYTES # BLD: 0 K/UL (ref 0–0.04)
IMM GRANULOCYTES NFR BLD AUTO: 0 % (ref 0–0.5)
KETONES UR QL STRIP.AUTO: NEGATIVE MG/DL
LEUKOCYTE ESTERASE UR QL STRIP.AUTO: ABNORMAL
LYMPHOCYTES # BLD: 2.3 K/UL (ref 0.8–3.5)
LYMPHOCYTES NFR BLD: 15 % (ref 12–49)
MCH RBC QN AUTO: 28.8 PG (ref 26–34)
MCHC RBC AUTO-ENTMCNC: 32.4 G/DL (ref 30–36.5)
MCV RBC AUTO: 88.8 FL (ref 80–99)
MONOCYTES # BLD: 1.2 K/UL (ref 0–1)
MONOCYTES NFR BLD: 8 % (ref 5–13)
NEUTS SEG # BLD: 8.3 K/UL (ref 1.8–8)
NEUTS SEG NFR BLD: 56 % (ref 32–75)
NITRITE UR QL STRIP.AUTO: NEGATIVE
NRBC # BLD: 0 K/UL (ref 0–0.01)
NRBC BLD-RTO: 0 PER 100 WBC
P-R INTERVAL, ECG05: 160 MS
P-R INTERVAL, ECG05: 170 MS
PH UR STRIP: 5.5 [PH] (ref 5–8)
PLATELET # BLD AUTO: 269 K/UL (ref 150–400)
PMV BLD AUTO: 8.9 FL (ref 8.9–12.9)
POTASSIUM SERPL-SCNC: 4 MMOL/L (ref 3.5–5.1)
PROT UR STRIP-MCNC: NEGATIVE MG/DL
Q-T INTERVAL, ECG07: 404 MS
Q-T INTERVAL, ECG07: 424 MS
QRS DURATION, ECG06: 82 MS
QRS DURATION, ECG06: 82 MS
QTC CALCULATION (BEZET), ECG08: 426 MS
QTC CALCULATION (BEZET), ECG08: 430 MS
RBC # BLD AUTO: 4.2 M/UL (ref 3.8–5.2)
RBC #/AREA URNS HPF: ABNORMAL /HPF (ref 0–5)
RBC MORPH BLD: ABNORMAL
SODIUM SERPL-SCNC: 135 MMOL/L (ref 136–145)
SP GR UR REFRACTOMETRY: 1.01 (ref 1–1.03)
TROPONIN I BLD-MCNC: <0.04 NG/ML (ref 0–0.08)
TROPONIN I BLD-MCNC: <0.04 NG/ML (ref 0–0.08)
UR CULT HOLD, URHOLD: NORMAL
UROBILINOGEN UR QL STRIP.AUTO: 0.2 EU/DL (ref 0.2–1)
VENTRICULAR RATE, ECG03: 62 BPM
VENTRICULAR RATE, ECG03: 67 BPM
WBC # BLD AUTO: 15 K/UL (ref 3.6–11)
WBC URNS QL MICRO: ABNORMAL /HPF (ref 0–4)

## 2018-12-14 PROCEDURE — 81001 URINALYSIS AUTO W/SCOPE: CPT

## 2018-12-14 PROCEDURE — 93005 ELECTROCARDIOGRAM TRACING: CPT

## 2018-12-14 PROCEDURE — 80048 BASIC METABOLIC PNL TOTAL CA: CPT

## 2018-12-14 PROCEDURE — 74011250636 HC RX REV CODE- 250/636: Performed by: EMERGENCY MEDICINE

## 2018-12-14 PROCEDURE — 85025 COMPLETE CBC W/AUTO DIFF WBC: CPT

## 2018-12-14 PROCEDURE — 71046 X-RAY EXAM CHEST 2 VIEWS: CPT

## 2018-12-14 PROCEDURE — 96360 HYDRATION IV INFUSION INIT: CPT

## 2018-12-14 PROCEDURE — 84484 ASSAY OF TROPONIN QUANT: CPT

## 2018-12-14 PROCEDURE — 99285 EMERGENCY DEPT VISIT HI MDM: CPT

## 2018-12-14 PROCEDURE — 74011250637 HC RX REV CODE- 250/637: Performed by: EMERGENCY MEDICINE

## 2018-12-14 PROCEDURE — 36415 COLL VENOUS BLD VENIPUNCTURE: CPT

## 2018-12-14 RX ORDER — ASPIRIN 325 MG
325 TABLET ORAL
Status: COMPLETED | OUTPATIENT
Start: 2018-12-14 | End: 2018-12-14

## 2018-12-14 RX ADMIN — ASPIRIN 325 MG: 325 TABLET, COATED ORAL at 02:07

## 2018-12-14 RX ADMIN — SODIUM CHLORIDE 1000 ML: 900 INJECTION, SOLUTION INTRAVENOUS at 03:33

## 2018-12-14 NOTE — ED PROVIDER NOTES
77 y.o. female with past medical history significant for hypercholesterolemia, DM, HTN, PAF, anemia, cervical stenosis, hep B, microalbuminuria, and sleep apnea presents ambulatory to ED with chief complaint of intermittent chest pain, 3/10 in severity, over the last 2 hours that started while at rest. Pt describes the pain as a pressure, and denies any radiation of pain. She denies any modifying factors. She states that she was seen by her PCP today for dehydration, where her BP was noted to be 77 systolic; pt notes that she was instructed to hold her amlodipine. Pt adds that she took her BP later, finding it to be 973 systolic just PTA. She notes that she has been drinking plenty of fluids. Of note, the pt had a recent stress test that was normal. Pt denies h/o MI, PAD, or catheterizations. Pt denies any nausea, vomiting, LE swelling, diaphoresis, or lightheadedness currently. There are no other acute medical concerns at this time. Social hx: Patient denies Tobacco use. Reports rare EtOH use. Denies illicit drug abuse. PCP: Kasia Nicholas MD    Note written by Radha Kerr, as dictated by Elba Ruiz MD 1:48 AM        The history is provided by the patient. No  was used.         Past Medical History:   Diagnosis Date    Anemia     Cystocele     Diabetes ([address]     Diabetic neuropathy, painful ([address]     Hepatitis B     Hypercholesterolemia     Hypertension     Microalbuminuria     Mild nonproliferative diabetic retinopathy (HCC)     PAF (paroxysmal atrial fibrillation) ([address]     [facility] follows    Rectocele     Retinopathy     Rosacea     Ruptured disc, cervical     Sleep apnea     Stenosis, cervical spine        Past Surgical History:   Procedure Laterality Date    HX BREAST BIOPSY Left 2009    HX HERNIA REPAIR      umbilical    HX MENISCUS REPAIR  7/11/14    HX OTHER SURGICAL  05/09/2018    Bladder Botox     HX OVARIAN CYST REMOVAL 80         Family History:   Problem Relation Age of Onset    Hypertension Mother     Thyroid Disease Mother     Heart Failure Father     Heart Disease Father     Thyroid Disease Sister     Thyroid Disease Sister     Heart Attack Other     Cancer Maternal Aunt         esophageal    Cancer Paternal Aunt         breast       Social History     Socioeconomic History    Marital status:      Spouse name: Not on file    Number of children: Not on file    Years of education: Not on file    Highest education level: Not on file   Social Needs    Financial resource strain: Not on file    Food insecurity - worry: Not on file    Food insecurity - inability: Not on file   [employer] needs - medical: Not on file   [employer] needs - non-medical: Not on file   Occupational History    Not on file   Tobacco Use    Smoking status: Never Smoker    Smokeless tobacco: Never Used   Substance and Sexual Activity    Alcohol use: No     Alcohol/week: 0.0 oz     Comment: very rarely     Drug use: No    Sexual activity: Not Currently   Other Topics Concern    Not on file   Social History Narrative    Not on file         ALLERGIES: Sulfa (sulfonamide antibiotics)    Review of Systems   Constitutional: Negative for diaphoresis and fever. Respiratory: Negative for shortness of breath. Cardiovascular: Positive for chest pain. Negative for leg swelling. Gastrointestinal: Negative for nausea and vomiting. Musculoskeletal: Negative for back pain. Neurological: Negative for headaches. All other systems reviewed and are negative. Vitals:    12/14/18 0141   BP: 104/50   Pulse: 71   Resp: 18   Temp: 97.4 °F (36.3 °C)   SpO2: 99%   Weight: 82.5 kg (181 lb 14.1 oz)   Height: 5' (1.524 m)            Physical Exam   Constitutional: She appears well-developed and well-nourished. No distress. HENT:   Head: Normocephalic and atraumatic. Eyes: Conjunctivae are normal.   Neck: Neck supple. Cardiovascular: Normal rate, regular rhythm and normal heart sounds. Pulmonary/Chest: Effort normal and breath sounds normal. No respiratory distress. Abdominal: She exhibits no distension. Musculoskeletal: Normal range of motion. She exhibits no deformity. Neurological: She is alert. No cranial nerve deficit. Skin: Skin is warm and dry. Psychiatric: Her behavior is normal.   Nursing note and vitals reviewed. Note written by Chiquita Dakin, as dictated by Davina Fernandez MD 1:48 AM     MDM     77 y.o. female presents with chest pain over last 2 hours. Serial troponins are negative, pain is fairly atypical with nonischemic EKG, doubt ACS. Notably, the patient's BP is running slightly low and she is endorsing poor oral intake. She has evidence of dehydration on her lab work with modest GFR reduction. She was given IVF with improvement of BP and we discussed holding her antihypertensives until f/u. Plan to follow up with PCP as needed and return precautions discussed for worsening or new concerning symptoms. Procedures  ED EKG interpretation:  Rhythm: normal sinus rhythm; and regular . Rate (approx.): 67; ST/T wave: normal; baseline wander  Note written by Chiquita Dakin, as dictated by Davina Fernandez MD 1:50 AM    EKG 0239: Rate 62, normal EKG, normal sinus rhythm, unchanged from previous tracings.

## 2018-12-14 NOTE — ED TRIAGE NOTES
Patient arrives to triage with sharp intermittent chest pain over the past few hours. Patient denies radiation of pain. Patient was seen at doctors office today for bladder infection.

## 2018-12-14 NOTE — ED NOTES
Discharge Instructions Reviewed with patient. Discharge instructions given to patient per provider. patient able to return verbalize discharge instructions. Paper copy of discharge instructions given. Patient condition stable, Respiratory status WNL, Neurostatus intact. Patient ambulatory out of er, to home with friend.

## 2018-12-14 NOTE — PROGRESS NOTES
Eladio Monge is a ,  77 y.o. female [facility] whose LMP was on  who presents for her annual checkup. She is menopausal and amenorrheic. She is having no significant problems. Hormone Status:    She is not having vasomotor symptoms. The patient is not using HRT. She has not had any vaginal bleeding. She reports no gynecologic symptoms. Sexual history:    She  reports that she does not currently engage in sexual activity. Medical conditions:    Since her last annual GYN exam about one year ago, she has had the following changes in her health history: diagnosed with sleep apnea   Surgical history confirmed with patient. has a past surgical history that includes hx meniscus repair (14); hx breast biopsy (Left, ); hx ovarian cyst removal (); hx hernia repair; and hx other surgical (2018). Pap and Mammogram History:    Her most recent Pap smear was normal obtained 2 year(s) ago. The patient had her mammogram today in our office    Breast Cancer History/Substance Abuse:     She does not have a family history of breast cancer. Osteoporosis History:    Family history does not include a first or second degree relative with osteopenia or osteoporosis. A bone density scan was obtained  and revealed normal bone density. She is currently taking calcium and vit D.     Past Medical History:   Diagnosis Date    Anemia     Cystocele     Diabetes ([address]     Diabetic neuropathy, painful ([address]     Hepatitis B     Hypercholesterolemia     Hypertension     Microalbuminuria     Mild nonproliferative diabetic retinopathy (HCC)     PAF (paroxysmal atrial fibrillation) ([address]     [facility] 3-24-13 ER-[facility] follows    Rectocele     Retinopathy     Rosacea     Ruptured disc, cervical     Sleep apnea     Stenosis, cervical spine      Past Surgical History:   Procedure Laterality Date    HX BREAST BIOPSY Left 2009    HX HERNIA REPAIR      umbilical    HX MENISCUS REPAIR  7/11/14    HX OTHER SURGICAL  05/09/2018    Bladder Botox     HX OVARIAN CYST REMOVAL  1973     Current Outpatient Medications   Medication Sig Dispense Refill    nitrofurantoin, macrocrystal-monohydrate, (MACROBID) 100 mg capsule Take 1 Cap by mouth two (2) times a day. 14 Cap 0    amLODIPine (NORVASC) 5 mg tablet Take 1 Tab by mouth daily. 90 Tab 1    metFORMIN ER (GLUCOPHAGE XR) 500 mg tablet Take 4 Tabs by mouth daily (with dinner). 360 Tab 2    esomeprazole (NEXIUM) 20 mg capsule TAKE 1 CAPSULE BY MOUTH EVERY DAY  6    glucose blood VI test strips (FREESTYLE TEST) strip For fasting BS testing once daily. ICD-10: E11.9 100 Strip 11    gabapentin (NEURONTIN) 100 mg capsule TAKE ONE CAPSULE BY MOUTH THREE TIMES A DAY AS NEEDED 30 Cap 5    omega 3-DHA-EPA-fish oil (FISH OIL) 1,000 mg (120 mg-180 mg) capsule Take 4 Caps by mouth daily.  simvastatin (ZOCOR) 20 mg tablet TAKE 1 TABLET DAILY AT BEDTIME FOR CHOLESTEROL 90 Tab 2    acetaminophen (TYLENOL) 500 mg tablet Take 2 Tabs by mouth three (3) times daily. 0    naproxen sodium (ALEVE) 220 mg tablet Take 2 Tabs by mouth two (2) times daily (with meals). 10 Tab 0    lisinopril (PRINIVIL, ZESTRIL) 10 mg tablet TAKE ONE TABLET BY MOUTH EVERY DAY 90 Tab 2    metoprolol succinate (TOPROL-XL) 50 mg XL tablet TAKE ONE (1) TABLET(S) ONCE DAILY 90 Tab 2    fluticasone (FLONASE) 50 mcg/actuation nasal spray       loratadine (CLARITIN) 10 mg tablet Take 10 mg by mouth daily.  CALCIUM CARBONATE (TUMS PO) Take  by mouth daily as needed.  famotidine (PEPCID) 20 mg tablet Take 20 mg by mouth daily as needed.  FREESTYLE LANCETS 28 gauge misc FOR USE IN LANCET DEVICE 100 Each 11    prednisoLONE acetate (PRED FORTE) 1 % ophthalmic suspension Administer 1 Drop to both eyes daily. 5    oxybutynin chloride XL (DITROPAN XL) 10 mg CR tablet Take 10 mg by mouth daily.  CALCIUM CARBONATE/VITAMIN D3 (CALCIUM + D PO) Take  by mouth.  MULTI-VITAMIN PO Take 1 Tab by mouth daily.  aspirin delayed-release 81 mg tablet take 81 mg by mouth daily.  albuterol (PROVENTIL HFA, VENTOLIN HFA, PROAIR HFA) 90 mcg/actuation inhaler Take 2 Puffs by inhalation every six (6) hours as needed for Wheezing. 1 Inhaler 0     Allergies: Sulfa (sulfonamide antibiotics)   Social History     Socioeconomic History    Marital status:      Spouse name: Not on file    Number of children: Not on file    Years of education: Not on file    Highest education level: Not on file   Social Needs    Financial resource strain: Not on file    Food insecurity - worry: Not on file    Food insecurity - inability: Not on file    Transportation needs - medical: Not on file   [employer] needs - non-medical: Not on file   Occupational History    Not on file   Tobacco Use    Smoking status: Never Smoker    Smokeless tobacco: Never Used   Substance and Sexual Activity    Alcohol use: No     Alcohol/week: 0.0 oz     Comment: very rarely     Drug use: No    Sexual activity: Not Currently   Other Topics Concern    Not on file   Social History Narrative    Not on file     Tobacco History:  reports that  has never smoked. she has never used smokeless tobacco.  Alcohol Abuse:  reports that she does not drink alcohol. Drug Abuse:  reports that she does not use drugs.   Patient Active Problem List   Diagnosis Code    DM (diabetes mellitus) ([facility] E11.9    Hyperlipidemia E78.5    Hepatitis B B19.10    GERD (gastroesophageal reflux disease) K21.9    Rosacea L71.9    AR (allergic rhinitis) J30.9    Intervertebral disk disease M51.9    Uterine prolapse N81.4    Incontinence R32    HTN (hypertension) I10    Anemia D64.9    PAF (paroxysmal atrial fibrillation) ([facility]) I48.0    Peripheral neuropathy G62.9    Pre-ulcerative corn or callous L84    Advanced care planning/counseling discussion Z71.89    Type 2 diabetes mellitus with hemoglobin A1c goal of less than 7.0% (HCC) E11.9    Type 2 diabetes mellitus with nephropathy (HCC) E11.21    Stage 3 chronic kidney disease (HCC) N18.3    Severe obesity (BMI 35.0-39. 9) with comorbidity (HCC) T10.54    Systolic murmur of aorta I39.1       Review of Systems - History obtained from the patient  Constitutional: negative for weight loss, fever, night sweats  HEENT: negative for hearing loss, earache, congestion, snoring, sorethroat  CV: negative for chest pain, palpitations, edema  Resp: negative for cough, shortness of breath, wheezing  GI: negative for change in bowel habits, abdominal pain, black or bloody stools  : negative for frequency, dysuria, hematuria, vaginal discharge  MSK: negative for back pain, joint pain, muscle pain  Breast: negative for breast lumps, nipple discharge, galactorrhea  Skin :negative for itching, rash, hives  Neuro: negative for dizziness, headache, confusion, weakness  Psych: negative for anxiety, depression, change in mood  Heme/lymph: negative for bleeding, bruising, pallor    Physical Exam    Visit Vitals  /55   Ht 5' (1.524 m)   Wt 181 lb 8 oz (82.3 kg)   BMI 35.45 kg/m²     Constitutional  · Appearance: well-nourished, well developed, alert, in no acute distress    HENT  · Head and Face: appears normal    Neck  · Inspection/Palpation: normal appearance, no masses or tenderness  · Lymph Nodes: no lymphadenopathy present    Chest  · Respiratory Effort: breathing normal    Breasts  · Inspection of Breasts: breasts symmetrical, no skin changes, no discharge present, nipple appearance normal, no skin retraction present  · Palpation of Breasts and Axillae: no masses present on palpation, no breast tenderness  · Axillary Lymph Nodes: no lymphadenopathy present    Gastrointestinal  · Abdominal Examination: abdomen non-tender to palpation, normal bowel sounds, no masses present  · Liver and spleen: no hepatomegaly present, spleen not palpable  · Hernias: no hernias identified    Genitourinary  · External Genitalia: normal appearance for age with atrophy, no discharge present, no tenderness present, no inflammatory lesions present, no masses present  · Vagina:atrophic vaginal vault with pale epithelium and flattening of rugae, cystocele and rectocele present, no discharge present, no inflammatory lesions present, no masses present  · Bladder: non-tender to palpation  · Urethra: appears normal  · Cervix: normal with descent    · Uterus: normal size, shape and consistency  · Adnexa: no adnexal tenderness present, no adnexal masses present  · Perineum: perineum within normal limits, no evidence of trauma, no rashes or skin lesions present  · Anus: anus within normal limits, no hemorrhoids present  · Inguinal Lymph Nodes: no lymphadenopathy present    Skin  · General Inspection: no rash, no lesions identified    Neurologic/Psychiatric  · Mental Status:  · Orientation: grossly oriented to person, place and time  · Mood and Affect: mood normal, affect appropriate    . Assessment:  Routine gynecologic examination  Her current medical status is satisfactory with pelvic relaxation.     Plan:  Counseled re: diet, exercise, healthy lifestyle  Return for yearly wellness visits  Rec annual mammogram

## 2018-12-14 NOTE — DISCHARGE INSTRUCTIONS
Chest Pain: Care Instructions  Your Care Instructions    There are many things that can cause chest pain. Some are not serious and will get better on their own in a few days. But some kinds of chest pain need more testing and treatment. Your doctor may have recommended a follow-up visit in the next 8 to 12 hours. If you are not getting better, you may need more tests or treatment. Even though your doctor has released you, you still need to watch for any problems. The doctor carefully checked you, but sometimes problems can develop later. If you have new symptoms or if your symptoms do not get better, get medical care right away. If you have worse or different chest pain or pressure that lasts more than 5 minutes or you passed out (lost consciousness), call 911 or seek other emergency help right away. A medical visit is only one step in your treatment. Even if you feel better, you still need to do what your doctor recommends, such as going to all suggested follow-up appointments and taking medicines exactly as directed. This will help you recover and help prevent future problems. How can you care for yourself at home? · Rest until you feel better. · Take your medicine exactly as prescribed. Call your doctor if you think you are having a problem with your medicine. · Do not drive after taking a prescription pain medicine. When should you call for help? Call 911 if:    · You passed out (lost consciousness).     · You have severe difficulty breathing.     · You have symptoms of a heart attack. These may include:  ? Chest pain or pressure, or a strange feeling in your chest.  ? Sweating. ? Shortness of breath. ? Nausea or vomiting. ? Pain, pressure, or a strange feeling in your back, neck, jaw, or upper belly or in one or both shoulders or arms. ? Lightheadedness or sudden weakness. ? A fast or irregular heartbeat.   After you call 911, the  may tell you to chew 1 adult-strength or 2 to 4 low-dose aspirin. Wait for an ambulance. Do not try to drive yourself.    Call your doctor today if:    · You have any trouble breathing.     · Your chest pain gets worse.     · You are dizzy or lightheaded, or you feel like you may faint.     · You are not getting better as expected.     · You are having new or different chest pain. Where can you learn more? Go to http://lizabeth-jose raul.info/. Enter A120 in the search box to learn more about \"Chest Pain: Care Instructions. \"  Current as of: November 20, 2017  Content Version: 11.8  © 4914-9018 [employer]. Care instructions adapted under license by [employer] (which disclaims liability or warranty for this information). If you have questions about a medical condition or this instruction, always ask your healthcare professional. [address] any warranty or liability for your use of this information. Dehydration: Care Instructions  Your Care Instructions  Dehydration happens when your body loses too much fluid. This might happen when you do not drink enough water or you lose large amounts of fluids from your body because of diarrhea, vomiting, or sweating. Severe dehydration can be life-threatening. Water and minerals called electrolytes help put your body fluids back in balance. Learn the early signs of fluid loss, and drink more fluids to prevent dehydration. Follow-up care is a key part of your treatment and safety. Be sure to make and go to all appointments, and call your doctor if you are having problems. It's also a good idea to know your test results and keep a list of the medicines you take. How can you care for yourself at home? · To prevent dehydration, drink plenty of fluids, enough so that your urine is light yellow or clear like water. Choose water and other caffeine-free clear liquids until you feel better.  If you have kidney, heart, or liver disease and have to limit fluids, talk with your doctor before you increase the amount of fluids you drink. · If you do not feel like eating or drinking, try taking small sips of water, sports drinks, or other rehydration drinks. · Get plenty of rest.  To prevent dehydration  · Add more fluids to your diet and daily routine, unless your doctor has told you not to. · During hot weather, drink more fluids. Drink even more fluids if you exercise a lot. Stay away from drinks with alcohol or caffeine. · Watch for the symptoms of dehydration. These include:  ? A dry, sticky mouth. ? Dark yellow urine, and not much of it. ? Dry and sunken eyes. ? Feeling very tired. · Learn what problems can lead to dehydration. These include:  ? Diarrhea, fever, and vomiting. ? Any illness with a fever, such as pneumonia or the flu. ? Activities that cause heavy sweating, such as endurance races and heavy outdoor work in hot or humid weather. ? Alcohol or drug abuse or withdrawal.  ? Certain medicines, such as cold and allergy pills (antihistamines), diet pills (diuretics), and laxatives. ? Certain diseases, such as diabetes, cancer, and heart or kidney disease. When should you call for help? Call 911 anytime you think you may need emergency care. For example, call if:    · You passed out (lost consciousness).    Call your doctor now or seek immediate medical care if:    · You are confused and cannot think clearly.     · You are dizzy or lightheaded, or you feel like you may faint.     · You have signs of needing more fluids. You have sunken eyes and a dry mouth, and you pass only a little dark urine.     · You cannot keep fluids down.    Watch closely for changes in your health, and be sure to contact your doctor if:    · You are not making tears.     · Your skin is very dry and sags slowly back into place after you pinch it.     · Your mouth and eyes are very dry. Where can you learn more?   Go to http://lizabeth-jose raul.info/. Enter S179 in the search box to learn more about \"Dehydration: Care Instructions. \"  Current as of: November 20, 2017  Content Version: 11.8  © 1902-4271 [employer]. Care instructions adapted under license by [employer] (which disclaims liability or warranty for this information). If you have questions about a medical condition or this instruction, always ask your healthcare professional. [place] [address] any warranty or liability for your use of this information.

## 2018-12-15 LAB
ALBUMIN SERPL-MCNC: 4.8 G/DL (ref 3.5–4.8)
ALBUMIN/GLOB SERPL: 1.7 {RATIO} (ref 1.2–2.2)
ALP SERPL-CCNC: 98 IU/L (ref 39–117)
ALT SERPL-CCNC: 14 IU/L (ref 0–32)
AST SERPL-CCNC: 15 IU/L (ref 0–40)
BASOPHILS # BLD AUTO: 0 X10E3/UL (ref 0–0.2)
BASOPHILS NFR BLD AUTO: 0 %
BILIRUB SERPL-MCNC: 0.3 MG/DL (ref 0–1.2)
BUN SERPL-MCNC: 15 MG/DL (ref 8–27)
BUN/CREAT SERPL: 14 (ref 12–28)
CALCIUM SERPL-MCNC: 9.3 MG/DL (ref 8.7–10.3)
CHLORIDE SERPL-SCNC: 106 MMOL/L (ref 96–106)
CO2 SERPL-SCNC: 17 MMOL/L (ref 20–29)
CREAT SERPL-MCNC: 1.07 MG/DL (ref 0.57–1)
EOSINOPHIL # BLD AUTO: 2.9 X10E3/UL (ref 0–0.4)
EOSINOPHIL NFR BLD AUTO: 15 %
ERYTHROCYTE [DISTWIDTH] IN BLOOD BY AUTOMATED COUNT: 14.7 % (ref 12.3–15.4)
GLOBULIN SER CALC-MCNC: 2.8 G/DL (ref 1.5–4.5)
GLUCOSE SERPL-MCNC: 49 MG/DL (ref 65–99)
HCT VFR BLD AUTO: 41.5 % (ref 34–46.6)
HGB BLD-MCNC: 13.7 G/DL (ref 11.1–15.9)
IMM GRANULOCYTES # BLD: 0 X10E3/UL (ref 0–0.1)
IMM GRANULOCYTES NFR BLD: 0 %
INTERPRETATION: NORMAL
LYMPHOCYTES # BLD AUTO: 2.6 X10E3/UL (ref 0.7–3.1)
LYMPHOCYTES NFR BLD AUTO: 11 %
MCH RBC QN AUTO: 29.1 PG (ref 26.6–33)
MCHC RBC AUTO-ENTMCNC: 33 G/DL (ref 31.5–35.7)
MCV RBC AUTO: 88 FL (ref 79–97)
MONOCYTES # BLD AUTO: 0.9 X10E3/UL (ref 0.1–0.9)
MONOCYTES NFR BLD AUTO: 7 %
MORPHOLOGY BLD-IMP: ABNORMAL
NEUTROPHILS # BLD AUTO: 12.8 X10E3/UL (ref 1.4–7)
NEUTROPHILS NFR BLD AUTO: 67 %
PLATELET # BLD AUTO: 332 X10E3/UL (ref 150–379)
POTASSIUM SERPL-SCNC: 4 MMOL/L (ref 3.5–5.2)
PROT SERPL-MCNC: 7.6 G/DL (ref 6–8.5)
RBC # BLD AUTO: 4.7 X10E6/UL (ref 3.77–5.28)
SODIUM SERPL-SCNC: 142 MMOL/L (ref 134–144)
WBC # BLD AUTO: 19.4 X10E3/UL (ref 3.4–10.8)

## 2018-12-17 ENCOUNTER — HOSPITAL ENCOUNTER (OUTPATIENT)
Dept: LAB | Age: 68
Discharge: HOME | End: 2018-12-17
Payer: MEDICARE

## 2018-12-17 ENCOUNTER — OFFICE VISIT (OUTPATIENT)
Dept: INTERNAL MEDICINE CLINIC | Age: 68
End: 2018-12-17

## 2018-12-17 VITALS
TEMPERATURE: 97.4 F | HEIGHT: 60 IN | DIASTOLIC BLOOD PRESSURE: 43 MMHG | HEART RATE: 56 BPM | RESPIRATION RATE: 12 BRPM | OXYGEN SATURATION: 100 % | BODY MASS INDEX: 35.69 KG/M2 | WEIGHT: 181.8 LBS | SYSTOLIC BLOOD PRESSURE: 105 MMHG

## 2018-12-17 DIAGNOSIS — E86.0 DEHYDRATION: Primary | ICD-10-CM

## 2018-12-17 DIAGNOSIS — E86.1 HYPOTENSION DUE TO HYPOVOLEMIA: ICD-10-CM

## 2018-12-17 DIAGNOSIS — I95.89 HYPOTENSION DUE TO HYPOVOLEMIA: ICD-10-CM

## 2018-12-17 DIAGNOSIS — N30.00 ACUTE CYSTITIS WITHOUT HEMATURIA: ICD-10-CM

## 2018-12-17 DIAGNOSIS — D72.829 LEUKOCYTOSIS, UNSPECIFIED TYPE: ICD-10-CM

## 2018-12-17 LAB — BACTERIA UR CULT: ABNORMAL

## 2018-12-17 PROCEDURE — 80053 COMPREHEN METABOLIC PANEL: CPT

## 2018-12-17 PROCEDURE — 85025 COMPLETE CBC W/AUTO DIFF WBC: CPT

## 2018-12-17 PROCEDURE — 36415 COLL VENOUS BLD VENIPUNCTURE: CPT

## 2018-12-17 NOTE — PATIENT INSTRUCTIONS
Dehydration: Care Instructions  Your Care Instructions  Dehydration happens when your body loses too much fluid. This might happen when you do not drink enough water or you lose large amounts of fluids from your body because of diarrhea, vomiting, or sweating. Severe dehydration can be life-threatening. Water and minerals called electrolytes help put your body fluids back in balance. Learn the early signs of fluid loss, and drink more fluids to prevent dehydration. Follow-up care is a key part of your treatment and safety. Be sure to make and go to all appointments, and call your doctor if you are having problems. It's also a good idea to know your test results and keep a list of the medicines you take. How can you care for yourself at home? · To prevent dehydration, drink plenty of fluids, enough so that your urine is light yellow or clear like water. Choose water and other caffeine-free clear liquids until you feel better. If you have kidney, heart, or liver disease and have to limit fluids, talk with your doctor before you increase the amount of fluids you drink. · If you do not feel like eating or drinking, try taking small sips of water, sports drinks, or other rehydration drinks. · Get plenty of rest.  To prevent dehydration  · Add more fluids to your diet and daily routine, unless your doctor has told you not to. · During hot weather, drink more fluids. Drink even more fluids if you exercise a lot. Stay away from drinks with alcohol or caffeine. · Watch for the symptoms of dehydration. These include:  ? A dry, sticky mouth. ? Dark yellow urine, and not much of it. ? Dry and sunken eyes. ? Feeling very tired. · Learn what problems can lead to dehydration. These include:  ? Diarrhea, fever, and vomiting. ? Any illness with a fever, such as pneumonia or the flu. ? Activities that cause heavy sweating, such as endurance races and heavy outdoor work in hot or humid weather. ?  Alcohol or drug abuse or withdrawal.  ? Certain medicines, such as cold and allergy pills (antihistamines), diet pills (diuretics), and laxatives. ? Certain diseases, such as diabetes, cancer, and heart or kidney disease. When should you call for help? Call 911 anytime you think you may need emergency care. For example, call if:    · You passed out (lost consciousness).    Call your doctor now or seek immediate medical care if:    · You are confused and cannot think clearly.     · You are dizzy or lightheaded, or you feel like you may faint.     · You have signs of needing more fluids. You have sunken eyes and a dry mouth, and you pass only a little dark urine.     · You cannot keep fluids down.    Watch closely for changes in your health, and be sure to contact your doctor if:    · You are not making tears.     · Your skin is very dry and sags slowly back into place after you pinch it.     · Your mouth and eyes are very dry. Where can you learn more? Go to http://lizabeth-jose raul.info/. Enter T281 in the search box to learn more about \"Dehydration: Care Instructions. \"  Current as of: November 20, 2017  Content Version: 11.8  © 6110-1668 [employer]. Care instructions adapted under license by [employer] (which disclaims liability or warranty for this information). If you have questions about a medical condition or this instruction, always ask your healthcare professional. [place] [address] any warranty or liability for your use of this information. Urinary Tract Infection in Women: Care Instructions  Your Care Instructions    A urinary tract infection, or UTI, is a general term for an infection anywhere between the kidneys and the urethra (where urine comes out). Most UTIs are bladder infections. They often cause pain or burning when you urinate. UTIs are caused by bacteria and can be cured with antibiotics.  Be sure to complete your treatment so that the infection goes away. Follow-up care is a key part of your treatment and safety. Be sure to make and go to all appointments, and call your doctor if you are having problems. It's also a good idea to know your test results and keep a list of the medicines you take. How can you care for yourself at home? · Take your antibiotics as directed. Do not stop taking them just because you feel better. You need to take the full course of antibiotics. · Drink extra water and other fluids for the next day or two. This may help wash out the bacteria that are causing the infection. (If you have kidney, heart, or liver disease and have to limit fluids, talk with your doctor before you increase your fluid intake.)  · Avoid drinks that are carbonated or have caffeine. They can irritate the bladder. · Urinate often. Try to empty your bladder each time. · To relieve pain, take a hot bath or lay a heating pad set on low over your lower belly or genital area. Never go to sleep with a heating pad in place. To prevent UTIs  · Drink plenty of water each day. This helps you urinate often, which clears bacteria from your system. (If you have kidney, heart, or liver disease and have to limit fluids, talk with your doctor before you increase your fluid intake.)  · Urinate when you need to. · Urinate right after you have sex. · Change sanitary pads often. · Avoid douches, bubble baths, feminine hygiene sprays, and other feminine hygiene products that have deodorants. · After going to the bathroom, wipe from front to back. When should you call for help? Call your doctor now or seek immediate medical care if:    · Symptoms such as fever, chills, nausea, or vomiting get worse or appear for the first time.     · You have new pain in your back just below your rib cage.  This is called flank pain.     · There is new blood or pus in your urine.     · You have any problems with your antibiotic medicine.    Watch closely for changes in your health, and be sure to contact your doctor if:    · You are not getting better after taking an antibiotic for 2 days.     · Your symptoms go away but then come back. Where can you learn more? Go to http://lizabeth-jose raul.info/. Enter A732 in the search box to learn more about \"Urinary Tract Infection in Women: Care Instructions. \"  Current as of: March 21, 2018  Content Version: 11.8  © 6140-7829 [employer]. Care instructions adapted under license by [employer] (which disclaims liability or warranty for this information). If you have questions about a medical condition or this instruction, always ask your healthcare professional. [address] any warranty or liability for your use of this information.

## 2018-12-17 NOTE — PROGRESS NOTES
HISTORY OF PRESENT ILLNESS  Jay Brock is a 77 y.o. female. HPI  Presents for follow-up from visit to [place] [address] ED on 12/14 for dehydration and hypotension. She had been seen earlier that day in this office and advised to be seen in the ED if she began to feel more lightheaded or developed any other symptoms. Began to develop some chest discomfort and subsequently went to be evaluated. EKG, cardiac enzymes normal but she showed signs of dehydration and was given 2 L of IV fluid. Chest x-ray was normal; BUN and creatinine elevated. She has been holding her amlodipine for the past several nights due to blood pressure readings less than 500 systolic. Denies any further chest pain; denies shortness of breath, nausea, vomiting, diarrhea. Denies fever, chills. Urine culture sent out on 12/13 resulted positive for infection with E. coli; she has been taking Macrobid for possible UTI since 12/13 office visit. Overall reports she is feeling much better. Has been eating and drinking without issue. Patient Active Problem List   Diagnosis Code    DM (diabetes mellitus) ([facility] E11.9    Hyperlipidemia E78.5    Hepatitis B B19.10    GERD (gastroesophageal reflux disease) K21.9    Rosacea L71.9    AR (allergic rhinitis) J30.9    Intervertebral disk disease M51.9    Uterine prolapse N81.4    Incontinence R32    HTN (hypertension) I10    Anemia D64.9    PAF (paroxysmal atrial fibrillation) (HCC) I48.0    Peripheral neuropathy G62.9    Pre-ulcerative corn or callous L84    Advanced care planning/counseling discussion Z71.89    Type 2 diabetes mellitus with hemoglobin A1c goal of less than 7.0% (HCC) E11.9    Type 2 diabetes mellitus with nephropathy (HCC) E11.21    Stage 3 chronic kidney disease (HCC) N18.3    Severe obesity (BMI 35.0-39. 9) with comorbidity ([facility] [address] [address] C73.35    Systolic murmur of aorta F33.1     Past Surgical History:   Procedure Laterality Date    HX BREAST BIOPSY Left 2009    HX HERNIA REPAIR      umbilical    HX MENISCUS REPAIR  7/11/14    HX OTHER SURGICAL  05/09/2018    Bladder Botox     HX OVARIAN CYST REMOVAL  1973     Social History     Socioeconomic History    Marital status:      Spouse name: Not on file    Number of children: Not on file    Years of education: Not on file    Highest education level: Not on file   Social Needs    Financial resource strain: Not on file    Food insecurity - worry: Not on file    Food insecurity - inability: Not on file   [employer] needs - medical: Not on file   [employer] needs - non-medical: Not on file   Occupational History    Not on file   Tobacco Use    Smoking status: Never Smoker    Smokeless tobacco: Never Used   Substance and Sexual Activity    Alcohol use: No     Alcohol/week: 0.0 oz     Comment: very rarely     Drug use: No    Sexual activity: Not Currently   Other Topics Concern    Not on file   Social History Narrative    Not on file     Family History   Problem Relation Age of Onset    Hypertension Mother     Thyroid Disease Mother     Heart Failure Father     Heart Disease Father     Thyroid Disease Sister     Thyroid Disease Sister     Heart Attack Other     Cancer Maternal Aunt         esophageal    Cancer Paternal Aunt         breast     Current Outpatient Medications   Medication Sig    nitrofurantoin, macrocrystal-monohydrate, (MACROBID) 100 mg capsule Take 1 Cap by mouth two (2) times a day.  amLODIPine (NORVASC) 5 mg tablet Take 1 Tab by mouth daily.  metFORMIN ER (GLUCOPHAGE XR) 500 mg tablet Take 4 Tabs by mouth daily (with dinner).  esomeprazole (NEXIUM) 20 mg capsule TAKE 1 CAPSULE BY MOUTH EVERY DAY    glucose blood VI test strips (FREESTYLE TEST) strip For fasting BS testing once daily. ICD-10: E11.9    omega 3-DHA-EPA-fish oil (FISH OIL) 1,000 mg (120 mg-180 mg) capsule Take 4 Caps by mouth daily.     simvastatin (ZOCOR) 20 mg tablet TAKE 1 TABLET DAILY AT BEDTIME FOR CHOLESTEROL    lisinopril (PRINIVIL, ZESTRIL) 10 mg tablet TAKE ONE TABLET BY MOUTH EVERY DAY    metoprolol succinate (TOPROL-XL) 50 mg XL tablet TAKE ONE (1) TABLET(S) ONCE DAILY    fluticasone (FLONASE) 50 mcg/actuation nasal spray     loratadine (CLARITIN) 10 mg tablet Take 10 mg by mouth daily.  famotidine (PEPCID) 20 mg tablet Take 20 mg by mouth daily as needed.  FREESTYLE LANCETS 28 gauge misc FOR USE IN LANCET DEVICE    prednisoLONE acetate (PRED FORTE) 1 % ophthalmic suspension Administer 1 Drop to both eyes daily.  oxybutynin chloride XL (DITROPAN XL) 10 mg CR tablet Take 10 mg by mouth daily.  CALCIUM CARBONATE/VITAMIN D3 (CALCIUM + D PO) Take  by mouth.  MULTI-VITAMIN PO Take 1 Tab by mouth daily.  aspirin delayed-release 81 mg tablet take 81 mg by mouth daily.  gabapentin (NEURONTIN) 100 mg capsule TAKE ONE CAPSULE BY MOUTH THREE TIMES A DAY AS NEEDED    acetaminophen (TYLENOL) 500 mg tablet Take 2 Tabs by mouth three (3) times daily.  naproxen sodium (ALEVE) 220 mg tablet Take 2 Tabs by mouth two (2) times daily (with meals).  albuterol (PROVENTIL HFA, VENTOLIN HFA, PROAIR HFA) 90 mcg/actuation inhaler Take 2 Puffs by inhalation every six (6) hours as needed for Wheezing.  CALCIUM CARBONATE (TUMS PO) Take  by mouth daily as needed. No current facility-administered medications for this visit. Allergies   Allergen Reactions    Sulfa (Sulfonamide Antibiotics) Unknown (comments)     Immunization History   Administered Date(s) Administered    Influenza High Dose Vaccine PF 10/19/2015, 10/31/2016, 10/04/2017, 09/02/2018    Influenza Vaccine 09/20/2013, 10/06/2014    Pneumococcal Conjugate (PCV-13) 09/07/2016    Pneumococcal Polysaccharide (PPSV-23) 10/06/2014    TD Vaccine 01/29/2009    Tdap 09/20/2013    Zoster Vaccine, Live 06/14/2008       Review of Systems   Constitutional: Positive for malaise/fatigue.  Negative for chills, diaphoresis and fever. HENT: Negative for congestion and sore throat. Respiratory: Negative for cough. Cardiovascular: Negative for chest pain and palpitations. Gastrointestinal: Negative for abdominal pain, constipation, diarrhea, nausea and vomiting. Genitourinary: Negative for dysuria, frequency and hematuria. Musculoskeletal: Negative for joint pain and myalgias. Neurological: Negative for dizziness and headaches. /43 (BP 1 Location: Left arm, BP Patient Position: Sitting)   Pulse (!) 56   Temp 97.4 °F (36.3 °C) (Oral)   Resp 12   Ht 5' (1.524 m)   Wt 181 lb 12.8 oz (82.5 kg)   SpO2 100%   BMI 35.51 kg/m²   Physical Exam   Constitutional: She is oriented to person, place, and time. She appears well-developed and well-nourished. No distress. HENT:   Head: Normocephalic and atraumatic. Neck: Normal range of motion. Neck supple. No thyromegaly present. Cardiovascular: Normal rate and regular rhythm. Pulmonary/Chest: Effort normal and breath sounds normal. She has no wheezes. Abdominal: Soft. Bowel sounds are normal. There is no tenderness. There is no rebound and no guarding. Musculoskeletal: Normal range of motion. She exhibits no edema. Lymphadenopathy:     She has no cervical adenopathy. Neurological: She is alert and oriented to person, place, and time. Skin: Skin is warm and dry. No rash noted. Psychiatric: She has a normal mood and affect. Her behavior is normal.   Nursing note and vitals reviewed. ASSESSMENT and PLAN  Diagnoses and all orders for this visit:    1. Dehydration -- resolving  -     METABOLIC PANEL, COMPREHENSIVE    2. Leukocytosis, unspecified type  -     CBC WITH AUTOMATED DIFF    3. Acute cystitis without hematuria -- improving with Macrobid    4.  Hypotension due to hypovolemia -- resolving; continue to hold Amlodipine in evenings if SBP < 615.  -     METABOLIC PANEL, COMPREHENSIVE      lab results and schedule of future lab studies reviewed with patient  reviewed diet, exercise and weight control  reviewed medications and side effects in detail

## 2018-12-18 ENCOUNTER — TELEPHONE (OUTPATIENT)
Dept: INTERNAL MEDICINE CLINIC | Age: 68
End: 2018-12-18

## 2018-12-18 LAB
ALBUMIN SERPL-MCNC: 3.7 G/DL (ref 3.5–4.8)
ALBUMIN/GLOB SERPL: 1.3 {RATIO} (ref 1.2–2.2)
ALP SERPL-CCNC: 145 IU/L (ref 39–117)
ALT SERPL-CCNC: 17 IU/L (ref 0–32)
AST SERPL-CCNC: 19 IU/L (ref 0–40)
BASOPHILS # BLD AUTO: 0.1 X10E3/UL (ref 0–0.2)
BASOPHILS NFR BLD AUTO: 1 %
BILIRUB SERPL-MCNC: <0.2 MG/DL (ref 0–1.2)
BUN SERPL-MCNC: 17 MG/DL (ref 8–27)
BUN/CREAT SERPL: 16 (ref 12–28)
CALCIUM SERPL-MCNC: 9.2 MG/DL (ref 8.7–10.3)
CHLORIDE SERPL-SCNC: 111 MMOL/L (ref 96–106)
CO2 SERPL-SCNC: 19 MMOL/L (ref 20–29)
CREAT SERPL-MCNC: 1.04 MG/DL (ref 0.57–1)
EOSINOPHIL # BLD AUTO: 4.7 X10E3/UL (ref 0–0.4)
EOSINOPHIL NFR BLD AUTO: 36 %
ERYTHROCYTE [DISTWIDTH] IN BLOOD BY AUTOMATED COUNT: 15.3 % (ref 12.3–15.4)
GLOBULIN SER CALC-MCNC: 2.9 G/DL (ref 1.5–4.5)
GLUCOSE SERPL-MCNC: 88 MG/DL (ref 65–99)
HCT VFR BLD AUTO: 35.8 % (ref 34–46.6)
HGB BLD-MCNC: 12 G/DL (ref 11.1–15.9)
IMM GRANULOCYTES # BLD: 0 X10E3/UL (ref 0–0.1)
IMM GRANULOCYTES NFR BLD: 0 %
INTERPRETATION: NORMAL
LYMPHOCYTES # BLD AUTO: 2.2 X10E3/UL (ref 0.7–3.1)
LYMPHOCYTES NFR BLD AUTO: 17 %
MCH RBC QN AUTO: 28.7 PG (ref 26.6–33)
MCHC RBC AUTO-ENTMCNC: 33.5 G/DL (ref 31.5–35.7)
MCV RBC AUTO: 86 FL (ref 79–97)
MONOCYTES # BLD AUTO: 0.6 X10E3/UL (ref 0.1–0.9)
MONOCYTES NFR BLD AUTO: 5 %
MORPHOLOGY BLD-IMP: ABNORMAL
NEUTROPHILS # BLD AUTO: 5.4 X10E3/UL (ref 1.4–7)
NEUTROPHILS NFR BLD AUTO: 41 %
PLATELET # BLD AUTO: 290 X10E3/UL (ref 150–379)
POTASSIUM SERPL-SCNC: 4.6 MMOL/L (ref 3.5–5.2)
PROT SERPL-MCNC: 6.6 G/DL (ref 6–8.5)
RBC # BLD AUTO: 4.18 X10E6/UL (ref 3.77–5.28)
SODIUM SERPL-SCNC: 140 MMOL/L (ref 134–144)
WBC # BLD AUTO: 13 X10E3/UL (ref 3.4–10.8)

## 2018-12-18 RX ORDER — CIPROFLOXACIN 500 MG/1
500 TABLET ORAL 2 TIMES DAILY
Qty: 6 TAB | Refills: 0 | Status: SHIPPED | OUTPATIENT
Start: 2018-12-18 | End: 2019-02-26 | Stop reason: ALTCHOICE

## 2018-12-18 NOTE — TELEPHONE ENCOUNTER
Patient is requesting to speak with the nurse for Amada Leonardo or Amada Leonardo , states she has explosive diarrhea and has lost 1.5 pounds since yesterday .  She can be reached at [phone]

## 2019-02-15 DIAGNOSIS — I10 ESSENTIAL HYPERTENSION WITH GOAL BLOOD PRESSURE LESS THAN 130/80: ICD-10-CM

## 2019-02-15 RX ORDER — LISINOPRIL 10 MG/1
TABLET ORAL
Qty: 90 TAB | Refills: 1 | Status: SHIPPED | OUTPATIENT
Start: 2019-02-15 | End: 2019-08-02 | Stop reason: SDUPTHER

## 2019-02-15 NOTE — TELEPHONE ENCOUNTER
Received fax from [facility] ([address] [place] [address]) requesting refill on patient's Prinivil 10mg tablets.  Pended med order forwarded to pharmacist.

## 2019-02-18 ENCOUNTER — TELEPHONE (OUTPATIENT)
Dept: INTERNAL MEDICINE CLINIC | Age: 69
End: 2019-02-18

## 2019-02-18 NOTE — TELEPHONE ENCOUNTER
Pt calling in to be proactive. She is going out of town on 3/5/19 and will need her refill on simvastatin around the same time. Can you refill? Pt # [phone] pharmacy on file is correct.  Please call pt

## 2019-02-19 RX ORDER — SIMVASTATIN 20 MG/1
TABLET, FILM COATED ORAL
Qty: 90 TAB | Refills: 0 | Status: SHIPPED | OUTPATIENT
Start: 2019-02-19 | End: 2019-05-16 | Stop reason: SDUPTHER

## 2019-02-19 NOTE — TELEPHONE ENCOUNTER
Last visit noted, labs checked and refill deemed appropriate at this time. Verbal refill order authorized by covering physicians at [facility] for Dr. Huyen Andrew during his absence.     Ishan Diaz, MiriamD, BCPS, CDE

## 2019-02-25 ENCOUNTER — TELEPHONE (OUTPATIENT)
Dept: INTERNAL MEDICINE CLINIC | Age: 69
End: 2019-02-25

## 2019-02-26 ENCOUNTER — OFFICE VISIT (OUTPATIENT)
Dept: INTERNAL MEDICINE CLINIC | Age: 69
End: 2019-02-26

## 2019-02-26 ENCOUNTER — HOSPITAL ENCOUNTER (OUTPATIENT)
Dept: LAB | Age: 69
Discharge: HOME | End: 2019-02-26
Payer: MEDICARE

## 2019-02-26 VITALS
DIASTOLIC BLOOD PRESSURE: 78 MMHG | RESPIRATION RATE: 16 BRPM | HEART RATE: 54 BPM | SYSTOLIC BLOOD PRESSURE: 137 MMHG | OXYGEN SATURATION: 99 % | TEMPERATURE: 98.3 F | HEIGHT: 60 IN | WEIGHT: 185 LBS | BODY MASS INDEX: 36.32 KG/M2

## 2019-02-26 DIAGNOSIS — N39.0 URINARY TRACT INFECTION WITHOUT HEMATURIA, SITE UNSPECIFIED: ICD-10-CM

## 2019-02-26 DIAGNOSIS — R30.9 URINARY PAIN: Primary | ICD-10-CM

## 2019-02-26 DIAGNOSIS — E11.9 TYPE 2 DIABETES MELLITUS WITH HEMOGLOBIN A1C GOAL OF LESS THAN 7.0% (HCC): ICD-10-CM

## 2019-02-26 LAB
BILIRUB UR QL STRIP: NEGATIVE
GLUCOSE UR-MCNC: NEGATIVE MG/DL
KETONES P FAST UR STRIP-MCNC: NEGATIVE MG/DL
PH UR STRIP: 5 [PH] (ref 4.6–8)
PROT UR QL STRIP: NEGATIVE
SP GR UR STRIP: 1.01 (ref 1–1.03)
UA UROBILINOGEN AMB POC: NORMAL (ref 0.2–1)
URINALYSIS CLARITY POC: CLEAR
URINALYSIS COLOR POC: YELLOW
URINE BLOOD POC: NORMAL
URINE LEUKOCYTES POC: NORMAL
URINE NITRITES POC: NEGATIVE

## 2019-02-26 PROCEDURE — 87086 URINE CULTURE/COLONY COUNT: CPT

## 2019-02-26 RX ORDER — NITROFURANTOIN 25; 75 MG/1; MG/1
100 CAPSULE ORAL 2 TIMES DAILY
Qty: 10 CAP | Refills: 0 | Status: SHIPPED | OUTPATIENT
Start: 2019-02-26 | End: 2019-03-21

## 2019-02-26 NOTE — PROGRESS NOTES
HISTORY OF PRESENT ILLNESS  Liu Yost is a 68 y.o. female. HPI  Patient of Dr. Eileen Calvillo with history of diabetes and cervical prolapse. Has seen Dr. Jessica Vargas for this. Comes in with fatigue, some low back discomfort and perhaps some urgency. No dysuria, nausea, vomiting, fevers or chills. Home test kit was abnormal.  Urine dip here 1+ leukocyte positive. Will cover with Macrodantin, which she has tolerated, and encouraged to follow up with Dr. Klever Blas or Dr. Yamila Post concerning the prolapse issues. Review of Systems   Constitutional: Positive for malaise/fatigue. Negative for chills and fever. Gastrointestinal: Positive for diarrhea. Negative for abdominal pain, nausea and vomiting. Genitourinary: Positive for frequency and urgency. Negative for dysuria, flank pain and hematuria. Physical Exam   Constitutional: She is oriented to person, place, and time. She appears well-developed and well-nourished. HENT:   Head: Normocephalic and atraumatic. Neck: Normal range of motion. Neck supple. Carotid bruit is not present. Cardiovascular: Normal rate, regular rhythm, S1 normal, S2 normal, normal heart sounds and intact distal pulses. No murmur heard. Pulmonary/Chest: Effort normal and breath sounds normal. No respiratory distress. Abdominal: Soft. Bowel sounds are normal. She exhibits no mass. There is no tenderness. No cvat   Musculoskeletal: She exhibits no edema. Neurological: She is alert and oriented to person, place, and time. Psychiatric: She has a normal mood and affect. Her behavior is normal.   Nursing note and vitals reviewed. ASSESSMENT and PLAN  Diagnoses and all orders for this visit:    1. Urinary pain  -     AMB POC URINALYSIS DIP STICK MANUAL W/O MICRO  -     CULTURE, URINE    2. Urinary tract infection without hematuria, site unspecified  -     nitrofurantoin, macrocrystal-monohydrate, (MACROBID) 100 mg capsule; Take 1 Cap by mouth two (2) times a day.     3. Type 2 diabetes mellitus with hemoglobin A1c goal of less than 7.0% (HCC)

## 2019-02-27 ENCOUNTER — OFFICE VISIT (OUTPATIENT)
Dept: CARDIOLOGY CLINIC | Age: 69
End: 2019-02-27

## 2019-02-27 VITALS
HEART RATE: 60 BPM | HEIGHT: 60 IN | SYSTOLIC BLOOD PRESSURE: 120 MMHG | OXYGEN SATURATION: 98 % | WEIGHT: 184 LBS | RESPIRATION RATE: 16 BRPM | BODY MASS INDEX: 36.12 KG/M2 | DIASTOLIC BLOOD PRESSURE: 80 MMHG

## 2019-02-27 DIAGNOSIS — R00.2 PALPITATIONS: ICD-10-CM

## 2019-02-27 DIAGNOSIS — I48.0 PAF (PAROXYSMAL ATRIAL FIBRILLATION) (HCC): Primary | ICD-10-CM

## 2019-02-27 DIAGNOSIS — I35.8 SYSTOLIC MURMUR OF AORTA: ICD-10-CM

## 2019-02-27 DIAGNOSIS — E78.00 PURE HYPERCHOLESTEROLEMIA: ICD-10-CM

## 2019-02-27 DIAGNOSIS — I10 HYPERTENSION, UNSPECIFIED TYPE: ICD-10-CM

## 2019-02-27 NOTE — PROGRESS NOTES
Milton Gonzalez     1950       Vipal K. Lauralee Aschoff MD, [facility]  Date of Visit-2/27/2019   PCP is Trevon Owen MD   [address] [place] [address] [facility] [place]  [facility]  HPI:  Milton Gonzalez is a 68 y.o. female   4 months f/u of PAF. She had increasing palpitations and edema and echo in august was normal.  Loop showed no afib. Overall the pt states she is doing well. Pt has rare episodes of palpitations but she is otherwise feeling fine. Pt does have mild LE edema from time to time. Denies chest pain, syncope or shortness of breath at rest, has no tachycardia,   EKG: NSR normal axis and intervals   Assessment/Plan:     1. PAF. Stable. Continue ASA has declined [address]. 2. Systolic murmur unchanged. Suspect aortic sclerosis. 3. Heart palpitations. 4. Fatigue with cardiovascular factors including HTN and diabetes. Pt had stress cardiolite 11/2018 walked 16 minutes 30 seconds, normal perfusion and EF 73%. 5. HTN. BP Readings from Last 3 Encounters:   02/27/19 120/80   02/26/19 137/78   12/17/18 105/43     Last visit we had stopped Norvasc. Restarted back Norvasc because her BP went up. She has mild edema from amlodipine. 6. Dyslipidemia on statin. Lab Results   Component Value Date/Time    LDL, calculated 55 08/01/2018 08:53 AM     7. F/u in 6 months. Future Appointments   Date Time Provider [facility]   3/21/2019  2:45 PM Trevon Owen MD [address] [facility] [address]   8/28/2019 10:20 AM [facility]      There are no Patient Instructions on file for this visit. Key CAD CHF Meds             simvastatin (ZOCOR) 20 mg tablet  (Taking) TAKE 1 TABLET DAILY AT BEDTIME FOR CHOLESTEROL    lisinopril (PRINIVIL, ZESTRIL) 10 mg tablet  (Taking) TAKE ONE TABLET BY MOUTH EVERY DAY    amLODIPine (NORVASC) 5 mg tablet  (Taking) Take 1 Tab by mouth daily.     omega 3-DHA-EPA-fish oil (FISH OIL) 1,000 mg (120 mg-180 mg) capsule  (Taking) Take 4 Caps by mouth daily. metoprolol succinate (TOPROL-XL) 50 mg XL tablet  (Taking) TAKE ONE (1) TABLET(S) ONCE DAILY    aspirin delayed-release 81 mg tablet  (Taking) take 81 mg by mouth daily. Impression:   1. PAF (paroxysmal atrial fibrillation) ([address]    2. Systolic murmur of aorta    3. Palpitations    4. Hypertension, unspecified type    5. Pure hypercholesterolemia       Cardiac History: In clinical trial= REDUCE-IT Study- \"A Multi-Center, Prospective, Randomized, Double-Blind, Placebo-Controlled, Parallel-Group Study to Evaluate the Effect of [id number] on Cardiovascular Health and Mortality in Hypertriglyceridemic Patients with Cardiovascular Disease or at High Risk for Cardiovascular Disease: REDUCE-IT (Reduction of Cardiovascular Events with EPA- Intervention Trial)     PAF  Echo 2-5-13= normal  [facility] in 13,15,18 WNL   [facility] 11-6-18 6'50\" normal perfusion, EF 73%     ROS-except as noted above. . A complete cardiac and respiratory are reviewed and negative except as above ; Resp-denies wheezing  or productive cough,. Const- No unusual weight loss or fever; Neuro-no recent seizure or CVA ; GI- No BRBPR, abdom pain, bloating ; - no  hematuria   see supplement sheet, initialed and to be scanned by staff  Past Medical History:   Diagnosis Date    Anemia     Cystocele     Diabetes ([address]     Diabetic neuropathy, painful ([address]     Hepatitis B     Hypercholesterolemia     Hypertension     Microalbuminuria     Mild nonproliferative diabetic retinopathy ([address]     PAF (paroxysmal atrial fibrillation) ([address]     [facility] 3-24-13 ER-Rolf follows    Rectocele     Retinopathy     Rosacea     Ruptured disc, cervical     Sleep apnea     Stenosis, cervical spine       Social Hx= reports that  has never smoked. she has never used smokeless tobacco. She reports that she does not drink alcohol or use drugs.      Exam and Labs:  /80 (BP 1 Location: Left arm, BP Patient Position: Sitting)   Pulse 60   Resp 16   Ht 5' (1.524 m)   Wt 184 lb (83.5 kg)   SpO2 98%   BMI 35.94 kg/m² Constitutional:  NAD, comfortable  Head: NC,AT. Eyes: No scleral icterus. Neck:  Neck supple. No JVD present. Throat: moist mucous membranes. Chest: Effort normal & normal respiratory excursion . Neurological: alert, conversant and oriented . Skin: Skin is not cold. No obvious systemic rash noted. Not diaphoretic. No erythema. Psychiatric:  Grossly normal mood and affect. Behavior appears normal. Extremities:  no clubbing or cyanosis. Abdomen: non distended    Lungs:breath sounds normal. No stridor. distress, wheezes or  Rales. Heart: normal rate, regular rhythm, normal S1, S2, no murmurs, rubs, clicks or gallops , PMI non displaced. Edema: Edema is none. Lab Results   Component Value Date/Time    Cholesterol, total 151 08/01/2018 08:53 AM    HDL Cholesterol 53 08/01/2018 08:53 AM    LDL, calculated 55 08/01/2018 08:53 AM    Triglyceride 216 (H) 08/01/2018 08:53 AM    CHOL/HDL Ratio 3.3 08/17/2010 03:47 AM     Lab Results   Component Value Date/Time    Sodium 140 12/17/2018 02:37 PM    Potassium 4.6 12/17/2018 02:37 PM    Chloride 111 (H) 12/17/2018 02:37 PM    CO2 19 (L) 12/17/2018 02:37 PM    Anion gap 14 12/14/2018 02:02 AM    Glucose 88 12/17/2018 02:37 PM    BUN 17 12/17/2018 02:37 PM    Creatinine 1.04 (H) 12/17/2018 02:37 PM    BUN/Creatinine ratio 16 12/17/2018 02:37 PM    GFR est AA 63 12/17/2018 02:37 PM    GFR est non-AA 55 (L) 12/17/2018 02:37 PM    Calcium 9.2 12/17/2018 02:37 PM      Wt Readings from Last 3 Encounters:   02/26/19 185 lb (83.9 kg)   12/17/18 181 lb 12.8 oz (82.5 kg)   12/14/18 181 lb 8 oz (82.3 kg)      BP Readings from Last 3 Encounters:   02/26/19 137/78   12/17/18 105/43   12/14/18 108/55      Current Outpatient Medications   Medication Sig    nitrofurantoin, macrocrystal-monohydrate, (MACROBID) 100 mg capsule Take 1 Cap by mouth two (2) times a day.     simvastatin (ZOCOR) 20 mg tablet TAKE 1 TABLET DAILY AT BEDTIME FOR CHOLESTEROL    lisinopril (PRINIVIL, ZESTRIL) 10 mg tablet TAKE ONE TABLET BY MOUTH EVERY DAY    amLODIPine (NORVASC) 5 mg tablet Take 1 Tab by mouth daily.  metFORMIN ER (GLUCOPHAGE XR) 500 mg tablet Take 4 Tabs by mouth daily (with dinner).  esomeprazole (NEXIUM) 20 mg capsule TAKE 1 CAPSULE BY MOUTH EVERY DAY    glucose blood VI test strips (FREESTYLE TEST) strip For fasting BS testing once daily. ICD-10: E11.9    gabapentin (NEURONTIN) 100 mg capsule TAKE ONE CAPSULE BY MOUTH THREE TIMES A DAY AS NEEDED    omega 3-DHA-EPA-fish oil (FISH OIL) 1,000 mg (120 mg-180 mg) capsule Take 4 Caps by mouth daily.  acetaminophen (TYLENOL) 500 mg tablet Take 2 Tabs by mouth three (3) times daily.  naproxen sodium (ALEVE) 220 mg tablet Take 2 Tabs by mouth two (2) times daily (with meals).  albuterol (PROVENTIL HFA, VENTOLIN HFA, PROAIR HFA) 90 mcg/actuation inhaler Take 2 Puffs by inhalation every six (6) hours as needed for Wheezing.  metoprolol succinate (TOPROL-XL) 50 mg XL tablet TAKE ONE (1) TABLET(S) ONCE DAILY    fluticasone (FLONASE) 50 mcg/actuation nasal spray     loratadine (CLARITIN) 10 mg tablet Take 10 mg by mouth daily.  CALCIUM CARBONATE (TUMS PO) Take  by mouth daily as needed.  famotidine (PEPCID) 20 mg tablet Take 20 mg by mouth daily as needed.  FREESTYLE LANCETS 28 gauge misc FOR USE IN LANCET DEVICE    prednisoLONE acetate (PRED FORTE) 1 % ophthalmic suspension Administer 1 Drop to both eyes daily.  oxybutynin chloride XL (DITROPAN XL) 10 mg CR tablet Take 10 mg by mouth daily.  CALCIUM CARBONATE/VITAMIN D3 (CALCIUM + D PO) Take  by mouth.  MULTI-VITAMIN PO Take 1 Tab by mouth daily.  aspirin delayed-release 81 mg tablet take 81 mg by mouth daily. No current facility-administered medications for this visit. Impression see above.       Written by Latia Bacon, as dictated by Sergio Villanueva MD.

## 2019-02-27 NOTE — PROGRESS NOTES
Visit Vitals  /80 (BP 1 Location: Left arm, BP Patient Position: Sitting)   Pulse 60   Resp 16   Ht 5' (1.524 m)   Wt 184 lb (83.5 kg)   SpO2 98%   BMI 35.94 kg/m²

## 2019-02-28 LAB — BACTERIA UR CULT: NORMAL

## 2019-03-21 ENCOUNTER — HOSPITAL ENCOUNTER (OUTPATIENT)
Dept: LAB | Age: 69
Discharge: HOME | End: 2019-03-21
Payer: MEDICARE

## 2019-03-21 ENCOUNTER — OFFICE VISIT (OUTPATIENT)
Dept: INTERNAL MEDICINE CLINIC | Age: 69
End: 2019-03-21

## 2019-03-21 VITALS
OXYGEN SATURATION: 98 % | HEIGHT: 60 IN | HEART RATE: 69 BPM | TEMPERATURE: 98.7 F | DIASTOLIC BLOOD PRESSURE: 80 MMHG | SYSTOLIC BLOOD PRESSURE: 124 MMHG | BODY MASS INDEX: 36.32 KG/M2 | WEIGHT: 185 LBS | RESPIRATION RATE: 16 BRPM

## 2019-03-21 DIAGNOSIS — N18.30 STAGE 3 CHRONIC KIDNEY DISEASE (HCC): ICD-10-CM

## 2019-03-21 DIAGNOSIS — E66.01 SEVERE OBESITY (BMI 35.0-39.9) WITH COMORBIDITY (HCC): ICD-10-CM

## 2019-03-21 DIAGNOSIS — I48.0 PAF (PAROXYSMAL ATRIAL FIBRILLATION) (HCC): ICD-10-CM

## 2019-03-21 DIAGNOSIS — E78.00 PURE HYPERCHOLESTEROLEMIA: Chronic | ICD-10-CM

## 2019-03-21 DIAGNOSIS — E11.21 TYPE 2 DIABETES MELLITUS WITH NEPHROPATHY (HCC): Primary | ICD-10-CM

## 2019-03-21 DIAGNOSIS — I10 HYPERTENSION, UNSPECIFIED TYPE: ICD-10-CM

## 2019-03-21 PROBLEM — F51.01 PRIMARY INSOMNIA: Status: ACTIVE | Noted: 2019-03-21

## 2019-03-21 PROBLEM — R06.83 SNORING: Status: ACTIVE | Noted: 2019-03-21

## 2019-03-21 PROBLEM — Z87.39 HISTORY OF ARTHRITIS: Status: ACTIVE | Noted: 2019-03-21

## 2019-03-21 PROBLEM — G47.00 INSOMNIA WITH SLEEP APNEA: Status: ACTIVE | Noted: 2019-03-21

## 2019-03-21 PROBLEM — M21.379 FOOT DROP: Status: ACTIVE | Noted: 2019-03-21

## 2019-03-21 PROBLEM — E11.40 TYPE 2 DIABETES MELLITUS WITH DIABETIC NEUROPATHY (HCC): Status: ACTIVE | Noted: 2019-03-21

## 2019-03-21 PROBLEM — G47.33 MILD OBSTRUCTIVE SLEEP APNEA: Status: ACTIVE | Noted: 2019-03-21

## 2019-03-21 PROBLEM — G47.30 INSOMNIA WITH SLEEP APNEA: Status: ACTIVE | Noted: 2019-03-21

## 2019-03-21 PROCEDURE — 80048 BASIC METABOLIC PNL TOTAL CA: CPT

## 2019-03-21 PROCEDURE — 85025 COMPLETE CBC W/AUTO DIFF WBC: CPT

## 2019-03-21 PROCEDURE — 83036 HEMOGLOBIN GLYCOSYLATED A1C: CPT

## 2019-03-21 PROCEDURE — 36415 COLL VENOUS BLD VENIPUNCTURE: CPT

## 2019-03-21 NOTE — PATIENT INSTRUCTIONS
Body Mass Index: Care Instructions  Your Care Instructions    Body mass index (BMI) can help you see if your weight is raising your risk for health problems. It uses a formula to compare how much you weigh with how tall you are. · A BMI lower than 18.5 is considered underweight. · A BMI between 18.5 and 24.9 is considered healthy. · A BMI between 25 and 29.9 is considered overweight. A BMI of 30 or higher is considered obese. If your BMI is in the normal range, it means that you have a lower risk for weight-related health problems. If your BMI is in the overweight or obese range, you may be at increased risk for weight-related health problems, such as high blood pressure, heart disease, stroke, arthritis or joint pain, and diabetes. If your BMI is in the underweight range, you may be at increased risk for health problems such as fatigue, lower protection (immunity) against illness, muscle loss, bone loss, hair loss, and hormone problems. BMI is just one measure of your risk for weight-related health problems. You may be at higher risk for health problems if you are not active, you eat an unhealthy diet, or you drink too much alcohol or use tobacco products. Follow-up care is a key part of your treatment and safety. Be sure to make and go to all appointments, and call your doctor if you are having problems. It's also a good idea to know your test results and keep a list of the medicines you take. How can you care for yourself at home? · Practice healthy eating habits. This includes eating plenty of fruits, vegetables, whole grains, lean protein, and low-fat dairy. · If your doctor recommends it, get more exercise. Walking is a good choice. Bit by bit, increase the amount you walk every day. Try for at least 30 minutes on most days of the week. · Do not smoke. Smoking can increase your risk for health problems. If you need help quitting, talk to your doctor about stop-smoking programs and medicines. These can increase your chances of quitting for good. · Limit alcohol to 2 drinks a day for men and 1 drink a day for women. Too much alcohol can cause health problems. If you have a BMI higher than 25  · Your doctor may do other tests to check your risk for weight-related health problems. This may include measuring the distance around your waist. A waist measurement of more than 40 inches in men or 35 inches in women can increase the risk of weight-related health problems. · Talk with your doctor about steps you can take to stay healthy or improve your health. You may need to make lifestyle changes to lose weight and stay healthy, such as changing your diet and getting regular exercise. If you have a BMI lower than 18.5  · Your doctor may do other tests to check your risk for health problems. · Talk with your doctor about steps you can take to stay healthy or improve your health. You may need to make lifestyle changes to gain or maintain weight and stay healthy, such as getting more healthy foods in your diet and doing exercises to build muscle. Where can you learn more? Go to http://lizabeth-jose raul.info/. Enter S176 in the search box to learn more about \"Body Mass Index: Care Instructions. \"  Current as of: October 13, 2016  Content Version: 11.4  © 5928-4816 [employer]. Care instructions adapted under license by [employer] (which disclaims liability or warranty for this information). If you have questions about a medical condition or this instruction, always ask your healthcare professional. [address] any warranty or liability for your use of this information.

## 2019-03-21 NOTE — PROGRESS NOTES
Cassandra Rothman is a 68 y.o. female who presents today for Diabetes; Hypertension; and Cholesterol Problem  . She has a history of   Patient Active Problem List   Diagnosis Code    [facility]di[facility] mellitus) [facility] E11.9    Hyperlipidemia E78.5    Hepatitis B B19.10    GERD (gastroesophageal reflux disease) K21.9    Rosacea L71.9    AR (allergic rhinitis) J30.9    Intervertebral disk disease M51.9    Uterine prolapse N81.4    Incontinence R32    HTN (hypertension) I10    Anemia D64.9    PAF (paroxysmal atrial fibrillation) ([facility]) I48.0    Peripheral neuropathy G62.9    Pre-ulcerative corn or callous L84    Advanced care planning/counseling discussion Z71.89    Type 2 diabetes mellitus with hemoglobin A1c goal of less than 7.0% ([facility]) E11.9    Type 2 diabetes mellitus with nephropathy ([facility]) E11.21    Stage 3 chronic kidney disease ([facility]) N18.3    Severe obesity (BMI 35.0-39. 9) with comorbidity ([facility]) M14.71    Systolic murmur of aorta D07.4    History of arthritis Z87.39    Foot drop M21.379    Snoring R06.83    Mild obstructive sleep apnea G47.33    Primary insomnia F51.01    Insomnia with sleep apnea G47.00, G47.30   . Today patient is here for f/u.   she does not have other concerns. A. fib. Patient seeing cardiology. She is currently on just aspirin and beta-blocker. Recent normal stress test and with cardiology. 30-day monitor was negative for any runs of A. fib    Hypertension -blood pressure is very well controlled. She has been continued on her lisinopril, amlodipine and beta-blocker. Blood pressure was a bit high off the amlodipine. Hypertension ROS: taking medications as instructed, no medication side effects noted, no TIA's, no chest pain on exertion, no dyspnea on exertion     reports that she has never smoked. She has never used smokeless tobacco.    reports that she does not drink alcohol.    BP Readings from Last 2 Encounters:   03/21/19 124/80   02/27/19 120/80 Diabetes Mellitus follow-up - on Metformin, last A1C. Loose BM has resolved since cutting back her fish oils. Her A1c has been under very good control. We discussed we may cut down to 1000 mg of metformin if this is still improved. Last hemoglobin a1c   Lab Results   Component Value Date/Time    Hemoglobin A1c 5.6 08/01/2018 08:53 AM    Hemoglobin A1c (POC) 5.6 01/31/2018 11:30 AM    Hemoglobin A1c, External 5.6 04/19/2016     No components found for: POCA1C, HBA1C POC  medication compliance: compliant all of the time. Diabetic diet compliance: compliant most of the time. Further diabetic ROS: no polyuria or polydipsia, no chest pain, dyspnea or TIA's, no numbness, tingling or pain in extremities. Microalbuminuria:   Lab Results   Component Value Date/Time    Microalbumin/Creat ratio (mg/g creat) 14 12/16/2009 10:00 AM    Microalb/Creat ratio (ug/mg creat.) 4.7 09/06/2018 12:00 AM    Microalbumin,urine random 1.77 12/16/2009 10:00 AM     Hyperlipidemia-stable with low-dose statin. Lab Results   Component Value Date/Time    Cholesterol, total 151 08/01/2018 08:53 AM    HDL Cholesterol 53 08/01/2018 08:53 AM    LDL, calculated 55 08/01/2018 08:53 AM    VLDL, calculated 43 (H) 08/01/2018 08:53 AM    Triglyceride 216 (H) 08/01/2018 08:53 AM    CHOL/HDL Ratio 3.3 08/17/2010 03:47 AM       ROS  Review of Systems   Constitutional: Negative for chills, fever and weight loss. HENT: Negative for congestion, ear pain, hearing loss, nosebleeds and tinnitus. Eyes: Negative for blurred vision, double vision, photophobia and pain. Respiratory: Negative for cough and shortness of breath. Cardiovascular: Negative for chest pain, palpitations and leg swelling. Gastrointestinal: Negative for abdominal pain, constipation, diarrhea, heartburn, nausea and vomiting. Genitourinary: Negative for dysuria, frequency and urgency. Musculoskeletal: Negative for back pain, joint pain, myalgias and neck pain. Neurological: Negative. Endo/Heme/Allergies: Does not bruise/bleed easily. Psychiatric/Behavioral: Negative for depression. The patient is not nervous/anxious. Visit Vitals  /80 (BP 1 Location: Left arm, BP Patient Position: Sitting)   Pulse 69   Temp 98.7 °F (37.1 °C) (Oral)   Resp 16   Ht 5' (1.524 m)   Wt 185 lb (83.9 kg)   SpO2 98%   BMI 36.13 kg/m²       Physical Exam   Constitutional: She is oriented to person, place, and time. She appears well-developed and well-nourished. HENT:   Head: Normocephalic and atraumatic. Eyes: Pupils are equal, round, and reactive to light. EOM are normal.   Neck: Normal range of motion. Neck supple. Cardiovascular: Normal rate and regular rhythm. No murmur heard. Pulmonary/Chest: Effort normal. No respiratory distress. Abdominal: Soft. Bowel sounds are normal. She exhibits no distension. There is no tenderness. There is no guarding. Neurological: She is alert and oriented to person, place, and time. Skin: Skin is warm and dry. Foot exam  - skin intact, mild dryness w no fissures, no rashes, no significant ulcers or callous formation. Sensation intact by microfilament or light touch     Psychiatric: She has a normal mood and affect. Her behavior is normal.         Current Outpatient Medications   Medication Sig    simvastatin (ZOCOR) 20 mg tablet TAKE 1 TABLET DAILY AT BEDTIME FOR CHOLESTEROL    lisinopril (PRINIVIL, ZESTRIL) 10 mg tablet TAKE ONE TABLET BY MOUTH EVERY DAY    amLODIPine (NORVASC) 5 mg tablet Take 1 Tab by mouth daily.  metFORMIN ER (GLUCOPHAGE XR) 500 mg tablet Take 4 Tabs by mouth daily (with dinner).  esomeprazole (NEXIUM) 20 mg capsule TAKE 1 CAPSULE BY MOUTH EVERY DAY    glucose blood VI test strips (FREESTYLE TEST) strip For fasting BS testing once daily.  ICD-10: E11.9    gabapentin (NEURONTIN) 100 mg capsule TAKE ONE CAPSULE BY MOUTH THREE TIMES A DAY AS NEEDED    omega 3-DHA-EPA-fish oil (FISH OIL) 1,000 mg (120 mg-180 mg) capsule Take 4 Caps by mouth daily.  naproxen sodium (ALEVE) 220 mg tablet Take 2 Tabs by mouth two (2) times daily (with meals).  metoprolol succinate (TOPROL-XL) 50 mg XL tablet TAKE ONE (1) TABLET(S) ONCE DAILY    fluticasone (FLONASE) 50 mcg/actuation nasal spray     loratadine (CLARITIN) 10 mg tablet Take 10 mg by mouth daily.  CALCIUM CARBONATE (TUMS PO) Take  by mouth daily as needed.  FREESTYLE LANCETS 28 gauge misc FOR USE IN LANCET DEVICE    prednisoLONE acetate (PRED FORTE) 1 % ophthalmic suspension Administer 1 Drop to both eyes daily.  oxybutynin chloride XL (DITROPAN XL) 10 mg CR tablet Take 10 mg by mouth daily.  CALCIUM CARBONATE/VITAMIN D3 (CALCIUM + D PO) Take  by mouth two (2) times a day.  MULTI-VITAMIN PO Take 1 Tab by mouth daily.  aspirin delayed-release 81 mg tablet take 81 mg by mouth daily.  albuterol (PROVENTIL HFA, VENTOLIN HFA, PROAIR HFA) 90 mcg/actuation inhaler Take 2 Puffs by inhalation every six (6) hours as needed for Wheezing. No current facility-administered medications for this visit.          Past Medical History:   Diagnosis Date    Anemia     Cystocele     Diabetes ([address]     Diabetic neuropathy, painful ([address]     Hepatitis B     Hypercholesterolemia     Hypertension     Microalbuminuria     Mild nonproliferative diabetic retinopathy (HCC)     PAF (paroxysmal atrial fibrillation) ([address]     [facility] follows    Rectocele     Retinopathy     Rosacea     Ruptured disc, cervical     Sleep apnea     Stenosis, cervical spine       Past Surgical History:   Procedure Laterality Date    HX BREAST BIOPSY Left 2009    HX HERNIA REPAIR      umbilical    HX MENISCUS REPAIR  7/11/14    HX OTHER SURGICAL  05/09/2018    Bladder Botox     HX OVARIAN CYST REMOVAL  1973      Social History     Tobacco Use    Smoking status: Never Smoker    Smokeless tobacco: Never Used   Substance Use Topics    Alcohol use: No     Alcohol/week: 0.0 oz     Comment: very rarely       Family History   Problem Relation Age of Onset    Hypertension Mother     Thyroid Disease Mother     Heart Failure Father     Heart Disease Father     Thyroid Disease Sister     Thyroid Disease Sister     Heart Attack Other     Cancer Maternal Aunt         esophageal    Cancer Paternal Aunt         breast        Allergies   Allergen Reactions    Sulfa (Sulfonamide Antibiotics) Unknown (comments)        Assessment/Plan  Diagnoses and all orders for this visit:    1. Type 2 diabetes mellitus with nephropathy (HCC)-very well controlled. Discussed that if still below 6 we consider cutting metformin down to 1000 mg  -     HEMOGLOBIN A1C WITH EAG    2. Pure hypercholesterolemia -stable no changes needed    3. Stage 3 chronic kidney disease ([facility] -repeat renal function today  -     CBC WITH AUTOMATED DIFF  -     METABOLIC PANEL, BASIC    4. Hypertension, unspecified type -better controlled with amlodipine. Continue this    5. PAF (paroxysmal atrial fibrillation) (HCC) -no recent episodes. Patient on aspirin    6. Severe obesity (BMI 35.0-39. 9) with comorbidity ([facility] -counseled on weight loss and information provided at discharge    Follow-up and Dispositions    · Return in about 4 months (around 7/21/2019). Marito Law MD  3/21/2019        Discussed the patient's BMI with her. The BMI follow up plan is as follows:     dietary management education, guidance, and counseling  encourage exercise  monitor weight  prescribed dietary intake    An After Visit Summary was printed and given to the patient.

## 2019-03-22 LAB
BASOPHILS # BLD AUTO: 0 X10E3/UL (ref 0–0.2)
BASOPHILS NFR BLD AUTO: 1 %
BUN SERPL-MCNC: 25 MG/DL (ref 8–27)
BUN/CREAT SERPL: 23 (ref 12–28)
CALCIUM SERPL-MCNC: 9.4 MG/DL (ref 8.7–10.3)
CHLORIDE SERPL-SCNC: 106 MMOL/L (ref 96–106)
CO2 SERPL-SCNC: 22 MMOL/L (ref 20–29)
CREAT SERPL-MCNC: 1.07 MG/DL (ref 0.57–1)
EOSINOPHIL # BLD AUTO: 0.4 X10E3/UL (ref 0–0.4)
EOSINOPHIL NFR BLD AUTO: 6 %
ERYTHROCYTE [DISTWIDTH] IN BLOOD BY AUTOMATED COUNT: 14.3 % (ref 12.3–15.4)
EST. AVERAGE GLUCOSE BLD GHB EST-MCNC: 120 MG/DL
GLUCOSE SERPL-MCNC: 89 MG/DL (ref 65–99)
HBA1C MFR BLD: 5.8 % (ref 4.8–5.6)
HCT VFR BLD AUTO: 36.3 % (ref 34–46.6)
HGB BLD-MCNC: 11.9 G/DL (ref 11.1–15.9)
IMM GRANULOCYTES # BLD AUTO: 0 X10E3/UL (ref 0–0.1)
IMM GRANULOCYTES NFR BLD AUTO: 0 %
INTERPRETATION: NORMAL
LYMPHOCYTES # BLD AUTO: 1.4 X10E3/UL (ref 0.7–3.1)
LYMPHOCYTES NFR BLD AUTO: 21 %
Lab: NORMAL
MCH RBC QN AUTO: 28.5 PG (ref 26.6–33)
MCHC RBC AUTO-ENTMCNC: 32.8 G/DL (ref 31.5–35.7)
MCV RBC AUTO: 87 FL (ref 79–97)
MONOCYTES # BLD AUTO: 0.4 X10E3/UL (ref 0.1–0.9)
MONOCYTES NFR BLD AUTO: 6 %
NEUTROPHILS # BLD AUTO: 4.6 X10E3/UL (ref 1.4–7)
NEUTROPHILS NFR BLD AUTO: 66 %
PLATELET # BLD AUTO: 226 X10E3/UL (ref 150–379)
POTASSIUM SERPL-SCNC: 4.5 MMOL/L (ref 3.5–5.2)
RBC # BLD AUTO: 4.18 X10E6/UL (ref 3.77–5.28)
SODIUM SERPL-SCNC: 143 MMOL/L (ref 134–144)
WBC # BLD AUTO: 6.9 X10E3/UL (ref 3.4–10.8)

## 2019-03-26 RX ORDER — LANCETS 28 GAUGE
EACH MISCELLANEOUS
Qty: 100 LANCET | Refills: 3 | Status: SHIPPED | OUTPATIENT
Start: 2019-03-26 | End: 2020-11-02

## 2019-04-03 ENCOUNTER — TELEPHONE (OUTPATIENT)
Dept: CARDIOLOGY CLINIC | Age: 69
End: 2019-04-03

## 2019-04-03 NOTE — TELEPHONE ENCOUNTER
Notified patient that her treatment assignment for the duration of the REDUCE-IT study was Vascepa (Icosapent Ethyl, ).       REDUCE-IT Study-  \"A Multi-Center, Prospective, Randomized, Double-Blind, Placebo-Controlled, Parallel-Group Study to Evaluate the Effect of [id number] on Cardiovascular Health and Mortality in Hypertriglyceridemic Patients with Cardiovascular Disease or at [address] for Cardiovascular Disease:  REDUCE-IT (Reduction of Cardiovascular Events with EPA- Intervention Trial)

## 2019-05-16 ENCOUNTER — TELEPHONE (OUTPATIENT)
Dept: INTERNAL MEDICINE CLINIC | Age: 69
End: 2019-05-16

## 2019-05-16 RX ORDER — SIMVASTATIN 20 MG/1
TABLET, FILM COATED ORAL
Qty: 90 TAB | Refills: 1 | Status: SHIPPED | OUTPATIENT
Start: 2019-05-16 | End: 2019-11-11 | Stop reason: SDUPTHER

## 2019-05-16 NOTE — TELEPHONE ENCOUNTER
Lindy Brittle called to check the status for the refill fax for Simvastatin 20mg. Thanks.     Lindy Brittle: [phone]

## 2019-06-05 DIAGNOSIS — I10 ESSENTIAL HYPERTENSION: Primary | ICD-10-CM

## 2019-06-05 RX ORDER — AMLODIPINE BESYLATE 5 MG/1
5 TABLET ORAL DAILY
Qty: 90 TAB | Refills: 3 | Status: SHIPPED | OUTPATIENT
Start: 2019-06-05 | End: 2020-05-26

## 2019-06-05 NOTE — TELEPHONE ENCOUNTER
Request for norvasc 5mg daily. Last office visit 2-27-19, next office visit 8-28-19.  Refills per verbal order from Dr. Jonathan Call.

## 2019-07-01 ENCOUNTER — TELEPHONE (OUTPATIENT)
Dept: INTERNAL MEDICINE CLINIC | Age: 69
End: 2019-07-01

## 2019-07-01 NOTE — TELEPHONE ENCOUNTER
----- Message from Sandra Oliveros sent at 7/1/2019  4:21 PM EDT -----  Regarding: Dr. Anjelica Boland   Pt is requesting a Rx refill for something to help with her UTI currently (Pt stated that she receives this often) going to the pharmacy on file for her which is [facility]. Best contact number is [phone].

## 2019-07-02 NOTE — TELEPHONE ENCOUNTER
----- Message from Melissa Bird sent at 7/2/2019 11:49 AM EDT -----  Regarding: Dr. Yepez : [phone]  Pt returning call. Best contact [phone].

## 2019-07-03 ENCOUNTER — OFFICE VISIT (OUTPATIENT)
Dept: INTERNAL MEDICINE CLINIC | Age: 69
End: 2019-07-03

## 2019-07-03 ENCOUNTER — HOSPITAL ENCOUNTER (OUTPATIENT)
Dept: LAB | Age: 69
Discharge: HOME | End: 2019-07-03
Payer: MEDICARE

## 2019-07-03 VITALS
HEART RATE: 58 BPM | WEIGHT: 189.4 LBS | OXYGEN SATURATION: 97 % | HEIGHT: 60 IN | BODY MASS INDEX: 37.18 KG/M2 | SYSTOLIC BLOOD PRESSURE: 137 MMHG | RESPIRATION RATE: 18 BRPM | DIASTOLIC BLOOD PRESSURE: 74 MMHG | TEMPERATURE: 98 F

## 2019-07-03 DIAGNOSIS — R53.83 OTHER FATIGUE: ICD-10-CM

## 2019-07-03 DIAGNOSIS — N39.0 URINARY TRACT INFECTION WITHOUT HEMATURIA, SITE UNSPECIFIED: Primary | ICD-10-CM

## 2019-07-03 DIAGNOSIS — R15.9 INCONTINENCE OF FECES, UNSPECIFIED FECAL INCONTINENCE TYPE: ICD-10-CM

## 2019-07-03 LAB
BILIRUB UR QL STRIP: NEGATIVE
GLUCOSE UR-MCNC: NEGATIVE MG/DL
KETONES P FAST UR STRIP-MCNC: NEGATIVE MG/DL
PH UR STRIP: 5.5 [PH] (ref 4.6–8)
PROT UR QL STRIP: NEGATIVE
SP GR UR STRIP: 1.01 (ref 1–1.03)
UA UROBILINOGEN AMB POC: NORMAL (ref 0.2–1)
URINALYSIS CLARITY POC: CLEAR
URINALYSIS COLOR POC: YELLOW
URINE BLOOD POC: NEGATIVE
URINE LEUKOCYTES POC: NORMAL
URINE NITRITES POC: NEGATIVE

## 2019-07-03 PROCEDURE — 87086 URINE CULTURE/COLONY COUNT: CPT

## 2019-07-06 LAB — BACTERIA UR CULT: NORMAL

## 2019-07-11 RX ORDER — METFORMIN HYDROCHLORIDE 500 MG/1
1000 TABLET, EXTENDED RELEASE ORAL
Qty: 180 TAB | Refills: 0 | Status: SHIPPED | OUTPATIENT
Start: 2019-07-11 | End: 2019-12-18 | Stop reason: SDUPTHER

## 2019-07-11 NOTE — TELEPHONE ENCOUNTER
[id number] - [address], [address] [facility] [phone]    Pharmacy called requesting a refill on patients medication.      Glucophage  MG tablet

## 2019-08-02 ENCOUNTER — OFFICE VISIT (OUTPATIENT)
Dept: INTERNAL MEDICINE CLINIC | Age: 69
End: 2019-08-02

## 2019-08-02 ENCOUNTER — HOSPITAL ENCOUNTER (OUTPATIENT)
Dept: LAB | Age: 69
Discharge: HOME | End: 2019-08-02
Payer: MEDICARE

## 2019-08-02 VITALS
RESPIRATION RATE: 16 BRPM | OXYGEN SATURATION: 98 % | BODY MASS INDEX: 36.32 KG/M2 | HEIGHT: 60 IN | DIASTOLIC BLOOD PRESSURE: 78 MMHG | TEMPERATURE: 98.1 F | HEART RATE: 58 BPM | WEIGHT: 185 LBS | SYSTOLIC BLOOD PRESSURE: 134 MMHG

## 2019-08-02 DIAGNOSIS — E55.9 VITAMIN D DEFICIENCY: ICD-10-CM

## 2019-08-02 DIAGNOSIS — E11.9 TYPE 2 DIABETES MELLITUS WITHOUT COMPLICATION, WITHOUT LONG-TERM CURRENT USE OF INSULIN (HCC): Chronic | ICD-10-CM

## 2019-08-02 DIAGNOSIS — G62.9 NEUROPATHY: ICD-10-CM

## 2019-08-02 DIAGNOSIS — I10 ESSENTIAL HYPERTENSION WITH GOAL BLOOD PRESSURE LESS THAN 130/80: ICD-10-CM

## 2019-08-02 DIAGNOSIS — I10 HYPERTENSION, UNSPECIFIED TYPE: ICD-10-CM

## 2019-08-02 DIAGNOSIS — I48.0 PAF (PAROXYSMAL ATRIAL FIBRILLATION) (HCC): ICD-10-CM

## 2019-08-02 DIAGNOSIS — N39.0 RECURRENT UTI: ICD-10-CM

## 2019-08-02 DIAGNOSIS — E66.01 SEVERE OBESITY (BMI 35.0-39.9) WITH COMORBIDITY (HCC): ICD-10-CM

## 2019-08-02 DIAGNOSIS — Z00.00 MEDICARE ANNUAL WELLNESS VISIT, SUBSEQUENT: Primary | ICD-10-CM

## 2019-08-02 DIAGNOSIS — E78.00 PURE HYPERCHOLESTEROLEMIA: Chronic | ICD-10-CM

## 2019-08-02 DIAGNOSIS — Z13.31 SCREENING FOR DEPRESSION: ICD-10-CM

## 2019-08-02 DIAGNOSIS — Z71.89 ADVANCED DIRECTIVES, COUNSELING/DISCUSSION: ICD-10-CM

## 2019-08-02 DIAGNOSIS — N18.30 STAGE 3 CHRONIC KIDNEY DISEASE (HCC): ICD-10-CM

## 2019-08-02 PROBLEM — R33.9 INCOMPLETE EMPTYING OF BLADDER: Status: ACTIVE | Noted: 2018-05-23

## 2019-08-02 PROCEDURE — 82306 VITAMIN D 25 HYDROXY: CPT

## 2019-08-02 PROCEDURE — 82043 UR ALBUMIN QUANTITATIVE: CPT

## 2019-08-02 PROCEDURE — 80061 LIPID PANEL: CPT

## 2019-08-02 PROCEDURE — 82607 VITAMIN B-12: CPT

## 2019-08-02 PROCEDURE — 83036 HEMOGLOBIN GLYCOSYLATED A1C: CPT

## 2019-08-02 PROCEDURE — 80048 BASIC METABOLIC PNL TOTAL CA: CPT

## 2019-08-02 PROCEDURE — 36415 COLL VENOUS BLD VENIPUNCTURE: CPT

## 2019-08-02 RX ORDER — LISINOPRIL 10 MG/1
TABLET ORAL
Qty: 90 TAB | Refills: 1 | Status: SHIPPED | OUTPATIENT
Start: 2019-08-02 | End: 2020-01-27 | Stop reason: SDUPTHER

## 2019-08-02 NOTE — ACP (ADVANCE CARE PLANNING)
Advance Care Planning    Advance Care Planning (ACP) Provider Conversation Snapshot    Date of ACP Conversation: 08/02/19  Persons included in Conversation:  patient  Length of ACP Conversation in minutes:  <16 minutes (Non-Billable)    Authorized Decision Maker (if patient is incapable of making informed decisions):    This person is:   Healthcare Agent/Medical Power of  under Advance Directive            For Patients with Decision Making Capacity:   Values/Goals: Exploration of values, goals, and preferences if recovery is not expected, even with continued medical treatment in the event of:  Imminent death  Severe, permanent brain injury    Conversation Outcomes / Follow-Up Plan:   Referral made for ACP follow-up assistance to:  nurse

## 2019-08-02 NOTE — PATIENT INSTRUCTIONS
Medicare Wellness Visit, Female     The best way to live healthy is to have a lifestyle where you eat a well-balanced diet, exercise regularly, limit alcohol use, and quit all forms of tobacco/nicotine, if applicable. Regular preventive services are another way to keep healthy. Preventive services (vaccines, screening tests, monitoring & exams) can help personalize your care plan, which helps you manage your own care. Screening tests can find health problems at the earliest stages, when they are easiest to treat. Antoine Watt follows the current, evidence-based guidelines published by the [facility] ([facility]) when recommending preventive services for our patients. Because we follow these guidelines, sometimes recommendations change over time as research supports it. (For example, mammograms used to be recommended annually. Even though Medicare will still pay for an annual mammogram, the newer guidelines recommend a mammogram every two years for women of average risk.)  Of course, you and your doctor may decide to screen more often for some diseases, based on your risk and your health status. Preventive services for you include:  - Medicare offers their members a free annual wellness visit, which is time for you and your primary care provider to discuss and plan for your preventive service needs. Take advantage of this benefit every year!  -All adults over the age of 72 should receive the recommended pneumonia vaccines. Current USPSTF guidelines recommend a series of two vaccines for the best pneumonia protection.   -All adults should have a flu vaccine yearly and a tetanus vaccine every 10 years. All adults age 61 and older should receive a shingles vaccine once in their lifetime.    -A bone mass density test is recommended when a woman turns 65 to screen for osteoporosis. This test is only recommended one time, as a screening.  Some providers will use this same test as a disease monitoring tool if you already have osteoporosis. -All adults age 38-68 who are overweight should have a diabetes screening test once every three years.   -Other screening tests and preventive services for persons with diabetes include: an eye exam to screen for diabetic retinopathy, a kidney function test, a foot exam, and stricter control over your cholesterol.   -Cardiovascular screening for adults with routine risk involves an electrocardiogram (ECG) at intervals determined by your doctor.   -Colorectal cancer screenings should be done for adults age 54-65 with no increased risk factors for colorectal cancer. There are a number of acceptable methods of screening for this type of cancer. Each test has its own benefits and drawbacks. Discuss with your doctor what is most appropriate for you during your annual wellness visit. The different tests include: colonoscopy (considered the best screening method), a fecal occult blood test, a fecal DNA test, and sigmoidoscopy. -Breast cancer screenings are recommended every other year for women of normal risk, age 54-69.  -Cervical cancer screenings for women over age 72 are only recommended with certain risk factors.   -All adults born between [facility] should be screened once for Hepatitis C.      Here is a list of your current Health Maintenance items (your personalized list of preventive services) with a due date:  Health Maintenance Due   Topic Date Due    Annual Well Visit  09/28/2018    Cholesterol Test   08/01/2019    Flu Vaccine  08/01/2019

## 2019-08-03 LAB
25(OH)D3+25(OH)D2 SERPL-MCNC: 27.9 NG/ML (ref 30–100)
ALBUMIN/CREAT UR: 8.9 MG/G CREAT (ref 0–30)
BUN SERPL-MCNC: 29 MG/DL (ref 8–27)
BUN/CREAT SERPL: 20 (ref 12–28)
CALCIUM SERPL-MCNC: 9.6 MG/DL (ref 8.7–10.3)
CHLORIDE SERPL-SCNC: 104 MMOL/L (ref 96–106)
CHOLEST SERPL-MCNC: 141 MG/DL (ref 100–199)
CO2 SERPL-SCNC: 22 MMOL/L (ref 20–29)
CREAT SERPL-MCNC: 1.47 MG/DL (ref 0.57–1)
CREAT UR-MCNC: 62 MG/DL
EST. AVERAGE GLUCOSE BLD GHB EST-MCNC: 131 MG/DL
GLUCOSE SERPL-MCNC: 94 MG/DL (ref 65–99)
HBA1C MFR BLD: 6.2 % (ref 4.8–5.6)
HDLC SERPL-MCNC: 48 MG/DL
INTERPRETATION, 910389: NORMAL
INTERPRETATION: NORMAL
LDLC SERPL CALC-MCNC: 53 MG/DL (ref 0–99)
Lab: NORMAL
MICROALBUMIN UR-MCNC: 5.5 UG/ML
PDF IMAGE, 910387: NORMAL
POTASSIUM SERPL-SCNC: 4.9 MMOL/L (ref 3.5–5.2)
SODIUM SERPL-SCNC: 140 MMOL/L (ref 134–144)
TRIGL SERPL-MCNC: 202 MG/DL (ref 0–149)
VIT B12 SERPL-MCNC: 640 PG/ML (ref 232–1245)
VLDLC SERPL CALC-MCNC: 40 MG/DL (ref 5–40)

## 2019-08-14 ENCOUNTER — TELEPHONE (OUTPATIENT)
Dept: INTERNAL MEDICINE CLINIC | Age: 69
End: 2019-08-14

## 2019-08-14 NOTE — TELEPHONE ENCOUNTER
Pt reports stomach pain, nausea, bloating, belching and reflux yesterday, diarrhea and 1 episode of vomiting this morning. Pt states she is feeling better now. Offered to schedule same day appt and advised Pt to push fluids. Pt verbalized understanding and will call office back this afternoon if not feeling better.

## 2019-08-14 NOTE — TELEPHONE ENCOUNTER
----- Message from Ira Rees sent at 8/14/2019  8:16 AM EDT -----  Regarding: Sarahy Day / telephone   General Message/Vendor Calls      Reason for call:      Callback required yes/no and why: patient is requesting a call back regarding GI symptoms and wanted to speak with Doctor       Best contact number(s): [phone]      Details to clarify the request: patient is requesting a call back regarding GI symptoms and wanted to speak with Doctor

## 2019-08-28 ENCOUNTER — OFFICE VISIT (OUTPATIENT)
Dept: CARDIOLOGY CLINIC | Age: 69
End: 2019-08-28

## 2019-08-28 VITALS
OXYGEN SATURATION: 99 % | BODY MASS INDEX: 36.12 KG/M2 | HEIGHT: 60 IN | WEIGHT: 184 LBS | SYSTOLIC BLOOD PRESSURE: 130 MMHG | HEART RATE: 52 BPM | RESPIRATION RATE: 16 BRPM | DIASTOLIC BLOOD PRESSURE: 80 MMHG

## 2019-08-28 DIAGNOSIS — I10 ESSENTIAL HYPERTENSION: ICD-10-CM

## 2019-08-28 DIAGNOSIS — I48.0 PAF (PAROXYSMAL ATRIAL FIBRILLATION) (HCC): Primary | ICD-10-CM

## 2019-08-28 DIAGNOSIS — E78.00 PURE HYPERCHOLESTEROLEMIA: ICD-10-CM

## 2019-08-28 DIAGNOSIS — I35.8 SYSTOLIC MURMUR OF AORTA: ICD-10-CM

## 2019-08-28 NOTE — PROGRESS NOTES
Hansa Ramirez     1950       Jennifer Nesbitt MD, [facility]  Date of Visit-8/28/2019   PCP is Tom Valencia, [address] [facility] [place]  [facility]  HPI:  Hansa Ramirez is a 68 y.o. female   11 month f/u of PAF. Echo in August 2018 was normal and loop showed no AF. Overall the pt states she is doing well. Pt states that she will occasional feel palpations, but rarely. Pt has had some chest pain, but does not know if it is actually cardiac related. Pt denies dizziness upon standing up. Pt complains of edema, and states that lately she has had more in the right foot than in the left. The pain she describes is brief in seconds and twinge like  Denies syncope or shortness of breath at rest      Assessment/Plan:      1. PAF (paroxysmal atrial fibrillation) (HCC)  Controlled on BB. We have discussed blood thinner and risk and benefits. She does have DM, HTN, is female, and has age above 72. This would give her a CHADS score of 4. She has not had AF for some time and we decided and agreed she does not need an [address]. Can continue ASA. 2. Systolic murmur of aorta  Last echo the aortic valve appeared normal and thus this is probably sclerosis and is not stenosis. 3. Essential hypertension  Well controlled. We have tried to stop amlodipine in the past but it has been needed. The edema has been fairly minimal.     4. Pure hypercholesterolemia  Lipids are excellent. F/u in 1 year  Future Appointments   Date Time Provider Khalif Lundy   9/16/2020 10:40 AM Dayana Mills MD [facility] SCHED        [facility] CHF Meds             lisinopril (PRINIVIL, ZESTRIL) 10 mg tablet (Taking) TAKE ONE TABLET BY MOUTH EVERY DAY    amLODIPine (NORVASC) 5 mg tablet (Taking) Take 1 Tab by mouth daily.     simvastatin (ZOCOR) 20 mg tablet (Taking) TAKE 1 TABLET DAILY AT BEDTIME FOR CHOLESTEROL    omega 3-DHA-EPA-fish oil (FISH OIL) 1,000 mg (120 mg-180 mg) capsule (Taking) Take 4 Caps by mouth daily. metoprolol succinate (TOPROL-XL) 50 mg XL tablet (Taking) TAKE ONE (1) TABLET(S) ONCE DAILY    aspirin delayed-release 81 mg tablet (Taking) take 81 mg by mouth daily. Impression:   1. PAF (paroxysmal atrial fibrillation) ([address]    2. Systolic murmur of aorta    3. Essential hypertension    4. Pure hypercholesterolemia       Cardiac History: In clinical trial= REDUCE-IT Study- \"A Multi-Center, Prospective, Randomized, Double-Blind, Placebo-Controlled, Parallel-Group Study to Evaluate the Effect of [id number] on Cardiovascular Health and Mortality in Hypertriglyceridemic Patients with Cardiovascular Disease or at High Risk for Cardiovascular Disease: REDUCE-IT (Reduction of Cardiovascular Events with EPA- Intervention Trial)     PAF  Echo 2-5-13= normal  [facility] in 13,15,18 WNL   [facility] 11-6-18 6'50\" normal perfusion, EF 73%     ROS-except as noted above. . A complete cardiac and respiratory are reviewed and negative except as above ; Resp-denies wheezing  or productive cough,. Const- No unusual weight loss or fever; Neuro-no recent seizure or CVA ; GI- No BRBPR, abdom pain, bloating ; - no  hematuria   see supplement sheet, initialed and to be scanned by staff  Past Medical History:   Diagnosis Date    Anemia     Cystocele     Diabetes ([address]     Diabetic neuropathy, painful ([address]     Hepatitis B     Hypercholesterolemia     Hypertension     Microalbuminuria     Mild nonproliferative diabetic retinopathy ([address]     PAF (paroxysmal atrial fibrillation) ([address]     [facility] follows    Rectocele     Retinopathy     Rosacea     Ruptured disc, cervical     Sleep apnea     Stenosis, cervical spine       Social Hx= reports that she has never smoked. She has never used smokeless tobacco. She reports that she does not drink alcohol or use drugs.      Exam and Labs:  /80 (BP 1 Location: Left arm, BP Patient Position: Sitting)   Pulse (!) 52   Resp 16   Ht 5' (1.524 m)   Wt 184 lb (83.5 kg)   SpO2 99%   BMI 35.94 kg/m² Constitutional:  NAD, comfortable  Head: NC,AT. Eyes: No scleral icterus. Neck:  Neck supple. No JVD present. Throat: moist mucous membranes. Chest: Effort normal & normal respiratory excursion . Neurological: alert, conversant and oriented . Skin: Skin is not cold. No obvious systemic rash noted. Not diaphoretic. No erythema. Psychiatric:  Grossly normal mood and affect. Behavior appears normal. Extremities:  no clubbing or cyanosis. Abdomen: non distended    Lungs:breath sounds normal. No stridor. distress, wheezes or  Rales. Heart:1/6 MANSI at the RUSB normal rate, regular rhythm, normal S1, S2, no murmurs, rubs, clicks or gallops , PMI non displaced. Edema: Edema is none.   Lab Results   Component Value Date/Time    Cholesterol, total 141 08/02/2019 12:52 PM    HDL Cholesterol 48 08/02/2019 12:52 PM    LDL, calculated 53 08/02/2019 12:52 PM    Triglyceride 202 (H) 08/02/2019 12:52 PM    CHOL/HDL Ratio 3.3 08/17/2010 03:47 AM     Lab Results   Component Value Date/Time    Sodium 140 08/02/2019 12:52 PM    Potassium 4.9 08/02/2019 12:52 PM    Chloride 104 08/02/2019 12:52 PM    CO2 22 08/02/2019 12:52 PM    Anion gap 14 12/14/2018 02:02 AM    Glucose 94 08/02/2019 12:52 PM    BUN 29 (H) 08/02/2019 12:52 PM    Creatinine 1.47 (H) 08/02/2019 12:52 PM    BUN/Creatinine ratio 20 08/02/2019 12:52 PM    GFR est AA 41 (L) 08/02/2019 12:52 PM    GFR est non-AA 36 (L) 08/02/2019 12:52 PM    Calcium 9.6 08/02/2019 12:52 PM      Wt Readings from Last 3 Encounters:   08/28/19 184 lb (83.5 kg)   08/02/19 185 lb (83.9 kg)   07/03/19 189 lb 6.4 oz (85.9 kg)      BP Readings from Last 3 Encounters:   08/28/19 130/80   08/02/19 134/78   07/03/19 137/74      Current Outpatient Medications   Medication Sig    lisinopril (PRINIVIL, ZESTRIL) 10 mg tablet TAKE ONE TABLET BY MOUTH EVERY DAY    metFORMIN ER (GLUCOPHAGE XR) 500 mg tablet Take 2 Tabs by mouth daily (with dinner).  amLODIPine (NORVASC) 5 mg tablet Take 1 Tab by mouth daily.  simvastatin (ZOCOR) 20 mg tablet TAKE 1 TABLET DAILY AT BEDTIME FOR CHOLESTEROL    lancets (FREESTYLE LANCETS) 28 gauge misc FOR USE IN LANCET DEVICE    esomeprazole (NEXIUM) 20 mg capsule TAKE 1 CAPSULE BY MOUTH EVERY DAY    glucose blood VI test strips (FREESTYLE TEST) strip For fasting BS testing once daily. ICD-10: E11.9    gabapentin (NEURONTIN) 100 mg capsule TAKE ONE CAPSULE BY MOUTH THREE TIMES A DAY AS NEEDED    omega 3-DHA-EPA-fish oil (FISH OIL) 1,000 mg (120 mg-180 mg) capsule Take 4 Caps by mouth daily.  metoprolol succinate (TOPROL-XL) 50 mg XL tablet TAKE ONE (1) TABLET(S) ONCE DAILY    fluticasone (FLONASE) 50 mcg/actuation nasal spray     loratadine (CLARITIN) 10 mg tablet Take 10 mg by mouth daily.  CALCIUM CARBONATE (TUMS PO) Take  by mouth daily as needed.  prednisoLONE acetate (PRED FORTE) 1 % ophthalmic suspension Administer 1 Drop to both eyes daily.  oxybutynin chloride XL (DITROPAN XL) 10 mg CR tablet Take 10 mg by mouth daily.  CALCIUM CARBONATE/VITAMIN D3 (CALCIUM + D PO) Take  by mouth two (2) times a day.  MULTI-VITAMIN PO Take 1 Tab by mouth daily.  aspirin delayed-release 81 mg tablet take 81 mg by mouth daily. No current facility-administered medications for this visit. Impression see above.       Written by Chrissy Miranda, as dictated by Chidi Mariano MD.

## 2019-08-28 NOTE — PROGRESS NOTES
Visit Vitals  /80 (BP 1 Location: Left arm, BP Patient Position: Sitting)   Pulse (!) 52   Resp 16   Ht 5' (1.524 m)   Wt 184 lb (83.5 kg)   SpO2 99%   BMI 35.94 kg/m²

## 2019-08-28 NOTE — Clinical Note
8/28/19 Patient: Venita Benedict YOB: 1950 Date of Visit: 8/28/2019 Joan Vernon MD 
[place] [address] [address] [facility] [id number] VIA In Basket Dear Joan Vernon MD, Thank you for referring Ms. Bella Weir to [facility] for evaluation. My notes for this consultation are attached. If you have questions, please do not hesitate to call me. I look forward to following your patient along with you.  
 
 
Sincerely, 
 
Cindi Brunner MD

## 2019-09-17 ENCOUNTER — OFFICE VISIT (OUTPATIENT)
Dept: INTERNAL MEDICINE CLINIC | Age: 69
End: 2019-09-17

## 2019-09-17 ENCOUNTER — HOSPITAL ENCOUNTER (OUTPATIENT)
Dept: LAB | Age: 69
Discharge: HOME | End: 2019-09-17
Payer: MEDICARE

## 2019-09-17 VITALS
OXYGEN SATURATION: 98 % | SYSTOLIC BLOOD PRESSURE: 136 MMHG | DIASTOLIC BLOOD PRESSURE: 80 MMHG | BODY MASS INDEX: 37.11 KG/M2 | HEIGHT: 60 IN | WEIGHT: 189 LBS | HEART RATE: 66 BPM | RESPIRATION RATE: 18 BRPM | TEMPERATURE: 98 F

## 2019-09-17 DIAGNOSIS — E11.9 TYPE 2 DIABETES MELLITUS WITHOUT COMPLICATION, WITHOUT LONG-TERM CURRENT USE OF INSULIN (HCC): Chronic | ICD-10-CM

## 2019-09-17 DIAGNOSIS — N18.30 STAGE 3 CHRONIC KIDNEY DISEASE (HCC): ICD-10-CM

## 2019-09-17 DIAGNOSIS — I10 HYPERTENSION, UNSPECIFIED TYPE: ICD-10-CM

## 2019-09-17 DIAGNOSIS — R35.0 URINARY FREQUENCY: ICD-10-CM

## 2019-09-17 DIAGNOSIS — R60.9 EDEMA, UNSPECIFIED TYPE: Primary | ICD-10-CM

## 2019-09-17 PROCEDURE — 36415 COLL VENOUS BLD VENIPUNCTURE: CPT

## 2019-09-17 PROCEDURE — 81003 URINALYSIS AUTO W/O SCOPE: CPT

## 2019-09-17 PROCEDURE — 80069 RENAL FUNCTION PANEL: CPT

## 2019-09-17 PROCEDURE — 82043 UR ALBUMIN QUANTITATIVE: CPT

## 2019-09-17 RX ORDER — FUROSEMIDE 20 MG/1
20 TABLET ORAL
Qty: 30 TAB | Refills: 1 | Status: SHIPPED | OUTPATIENT
Start: 2019-09-17 | End: 2019-10-07

## 2019-09-17 RX ORDER — FUROSEMIDE 20 MG/1
20 TABLET ORAL DAILY
Qty: 30 TAB | Refills: 1 | Status: SHIPPED | OUTPATIENT
Start: 2019-09-17 | End: 2019-09-17 | Stop reason: SDUPTHER

## 2019-09-17 NOTE — PROGRESS NOTES
Jamie Peters is a 68 y.o. female who presents today for Leg Swelling and Foot Swelling  . She has a history of   Patient Active Problem List   Diagnosis Code    [facility] mellitus[facility] E11.9    Hyperlipidemia E78.5    Hepatitis B B19.10    GERD (gastroesophageal reflux disease) K21.9    Rosacea L71.9    AR (allergic rhinitis) J30.9    Intervertebral disk disease M51.9    Uterine prolapse N81.4    Incontinence R32    HTN (hypertension) I10    Anemia D64.9    PAF (paroxysmal atrial fibrillation) ([facility]) I48.0    Peripheral neuropathy G62.9    Pre-ulcerative corn or callous L84    Advanced care planning/counseling discussion Z71.89    Type 2 diabetes mellitus with hemoglobin A1c goal of less than 7.0% ([facility]) E11.9    Type 2 diabetes mellitus with nephropathy ([facility]) E11.21    Stage 3 chronic kidney disease ([facility]) N18.3    Severe obesity (BMI 35.0-39. 9) with comorbidity ([facility]) L43.68    Systolic murmur of aorta A70.4    History of arthritis Z87.39    Foot drop M21.379    Snoring R06.83    Mild obstructive sleep apnea G47.33    Primary insomnia F51.01    Insomnia with sleep apnea G47.00, G47.30    Type 2 diabetes mellitus with diabetic neuropathy ([facility]) E11.40    Incomplete emptying of bladder R33.9    Overactive bladder N32.81    Urge incontinence of urine N39.41   . Today patient is here for an acute visit. Carlota Hall Hypertension -blood pressures been stable. Still on low CCB. Hypertension ROS: taking medications as instructed, no medication side effects noted, no TIA's, no chest pain on exertion, no dyspnea on exertion, no swelling of ankles     reports that she has never smoked. She has never used smokeless tobacco.    reports that she does not drink alcohol. BP Readings from Last 2 Encounters:   09/17/19 136/80   08/28/19 130/80     Problem visit:  Jamie Peters is here for complaint of foot and ankle swelling. .  Problem began 2 week(s) ago.   Severity is moderate  Character of problem: Worse in the evening. Heavy sensation. Better in the morning, but still present. No dietary changes, though she admits to not being very cautious about sodium intake. Denies any long travels. She has been on amlodipine for some time the dose has been stable. Associated symptoms include: none. No warmth to her calves. Has been exercising a bit more. Only medication changes has been a cranberry supplement. She does admit to us bit more urinary frequency recently. Denies any dysuria or painful urination. Notes that blood sugars have been stable. ROS  Review of Systems   Constitutional: Negative for chills, fever and weight loss. HENT: Negative for congestion and sore throat. Eyes: Negative for blurred vision, double vision and photophobia. Respiratory: Negative for cough and shortness of breath. Cardiovascular: Positive for leg swelling. Negative for chest pain, palpitations, orthopnea and claudication. Gastrointestinal: Negative for abdominal pain, constipation, diarrhea, heartburn, nausea and vomiting. Genitourinary: Negative for dysuria, frequency and urgency. Musculoskeletal: Negative for joint pain and myalgias. Skin: Negative for rash. Neurological: Negative. Negative for headaches. Endo/Heme/Allergies: Does not bruise/bleed easily. Psychiatric/Behavioral: Negative for memory loss and suicidal ideas. Visit Vitals  /80 (BP 1 Location: Left arm, BP Patient Position: Sitting)   Pulse 66   Temp 98 °F (36.7 °C) (Oral)   Resp 18   Ht 5' (1.524 m)   Wt 189 lb (85.7 kg)   SpO2 98%   BMI 36.91 kg/m²       Physical Exam   Constitutional: She is oriented to person, place, and time. She appears well-developed and well-nourished. HENT:   Head: Normocephalic and atraumatic. Cardiovascular: Normal rate and regular rhythm. No murmur heard. Pulmonary/Chest: Effort normal. No stridor. No respiratory distress. Abdominal: Soft.  Bowel sounds are normal. She exhibits no distension and no mass. There is no tenderness. There is no guarding. Musculoskeletal: Normal range of motion. She exhibits edema. She exhibits no tenderness or deformity. 1+ pitting edema to midshin. Neurological: She is alert and oriented to person, place, and time. Skin: Skin is warm and dry. Psychiatric: She has a normal mood and affect. Her behavior is normal.         Current Outpatient Medications   Medication Sig    lisinopril (PRINIVIL, ZESTRIL) 10 mg tablet TAKE ONE TABLET BY MOUTH EVERY DAY    metFORMIN ER (GLUCOPHAGE XR) 500 mg tablet Take 2 Tabs by mouth daily (with dinner).  amLODIPine (NORVASC) 5 mg tablet Take 1 Tab by mouth daily.  simvastatin (ZOCOR) 20 mg tablet TAKE 1 TABLET DAILY AT BEDTIME FOR CHOLESTEROL    lancets (FREESTYLE LANCETS) 28 gauge misc FOR USE IN LANCET DEVICE    esomeprazole (NEXIUM) 20 mg capsule TAKE 1 CAPSULE BY MOUTH EVERY DAY    glucose blood VI test strips (FREESTYLE TEST) strip For fasting BS testing once daily. ICD-10: E11.9    gabapentin (NEURONTIN) 100 mg capsule TAKE ONE CAPSULE BY MOUTH THREE TIMES A DAY AS NEEDED    omega 3-DHA-EPA-fish oil (FISH OIL) 1,000 mg (120 mg-180 mg) capsule Take 4 Caps by mouth daily.  metoprolol succinate (TOPROL-XL) 50 mg XL tablet TAKE ONE (1) TABLET(S) ONCE DAILY    fluticasone (FLONASE) 50 mcg/actuation nasal spray     CALCIUM CARBONATE (TUMS PO) Take  by mouth daily as needed.  prednisoLONE acetate (PRED FORTE) 1 % ophthalmic suspension Administer 1 Drop to both eyes daily.  oxybutynin chloride XL (DITROPAN XL) 10 mg CR tablet Take 10 mg by mouth daily.  CALCIUM CARBONATE/VITAMIN D3 (CALCIUM + D PO) Take  by mouth two (2) times a day.  MULTI-VITAMIN PO Take 1 Tab by mouth daily.  aspirin delayed-release 81 mg tablet take 81 mg by mouth daily.  loratadine (CLARITIN) 10 mg tablet Take 10 mg by mouth daily. No current facility-administered medications for this visit. Past Medical History:   Diagnosis Date    Anemia     Cystocele     Diabetes [facility]     Diabetic neuropathy, painful (HCC)     Hepatitis B     Hypercholesterolemia     Hypertension     Microalbuminuria     Mild nonproliferative diabetic retinopathy (HCC)     PAF (paroxysmal atrial fibrillation) ([facility]     [facility] 3-24-13 ER-[facility] follows    Rectocele     Retinopathy     Rosacea     Ruptured disc, cervical     Sleep apnea     Stenosis, cervical spine       Past Surgical History:   Procedure Laterality Date    HX BREAST BIOPSY Left 2009    HX HERNIA REPAIR      umbilical    HX MENISCUS REPAIR  7/11/14    HX OTHER SURGICAL  05/09/2018    Bladder Botox     HX OVARIAN CYST REMOVAL  1973      Social History     Tobacco Use    Smoking status: Never Smoker    Smokeless tobacco: Never Used   Substance Use Topics    Alcohol use: No     Alcohol/week: 0.0 standard drinks     Comment: very rarely       Family History   Problem Relation Age of Onset    Hypertension Mother     Thyroid Disease Mother     Heart Failure Father     Heart Disease Father     Thyroid Disease Sister     Thyroid Disease Sister     Heart Attack Other     Cancer Maternal Aunt         esophageal    Cancer Paternal Aunt         breast        Allergies   Allergen Reactions    Sulfa (Sulfonamide Antibiotics) Unknown (comments)        Assessment/Plan  Diagnoses and all orders for this visit:    1. Edema, unspecified type - Lower extremity dependent edema worsened due to unknown reason. Patient has been slightly more physically active. Her physical exam reassuring and low likelihood of DVT. We discussed using Lasix for several days. Will check creatinine and check urine protein. Considerations for decreasing or d/c'ing calcium channel blocker if this continues. Would also get ultrasound if this continued  -     RENAL FUNCTION PANEL  -     MICROALBUMIN, UR, RAND W/ MICROALB/CREAT RATIO  -     furosemide (LASIX) 20 mg tablet; Take 1 Tab by mouth daily as needed (swelling). 2. Urinary frequency - Has been a bit up. We will check UA  -     URINALYSIS W/ RFLX MICROSCOPIC    3. Hypertension, unspecified type - Well-controlled with current therapy    4. Stage 3 chronic kidney disease (HCC) - Repeat levels  -     MICROALBUMIN, UR, RAND W/ MICROALB/CREAT RATIO    5. Type 2 diabetes mellitus without complication, without long-term current use of insulin (HCC) - Sugars have been under good control            Tejal Boateng MD  9/17/2019    This note was created with the help of speech recognition software Kenny Moy) and may contain some 'sound alike' errors.

## 2019-09-17 NOTE — PATIENT INSTRUCTIONS
Leg and Ankle Edema: Care Instructions  Your Care Instructions  Swelling in the legs, ankles, and feet is called edema. It is common after you sit or stand for a while. Long plane flights or car rides often cause swelling in the legs and feet. You may also have swelling if you have to stand for long periods of time at your job. Problems with the veins in the legs (varicose veins) and changes in hormones can also cause swelling. Sometimes the swelling in the ankles and feet is caused by a more serious problem, such as heart failure, infection, blood clots, or liver or kidney disease. Follow-up care is a key part of your treatment and safety. Be sure to make and go to all appointments, and call your doctor if you are having problems. It's also a good idea to know your test results and keep a list of the medicines you take. How can you care for yourself at home? · If your doctor gave you medicine, take it as prescribed. Call your doctor if you think you are having a problem with your medicine. · Whenever you are resting, raise your legs up. Try to keep the swollen area higher than the level of your heart. · Take breaks from standing or sitting in one position. ? Walk around to increase the blood flow in your lower legs. ? Move your feet and ankles often while you stand, or tighten and relax your leg muscles. · Wear support stockings. Put them on in the morning, before swelling gets worse. · Eat a balanced diet. Lose weight if you need to. · Limit the amount of salt (sodium) in your diet. Salt holds fluid in the body and may increase swelling. When should you call for help? Call 911 anytime you think you may need emergency care. For example, call if:    · You have symptoms of a blood clot in your lung (called a pulmonary embolism). These may include:  ? Sudden chest pain. ? Trouble breathing. ?  Coughing up blood.    Call your doctor now or seek immediate medical care if:    · You have signs of a blood clot, such as:  ? Pain in your calf, back of the knee, thigh, or groin. ? Redness and swelling in your leg or groin.     · You have symptoms of infection, such as:  ? Increased pain, swelling, warmth, or redness. ? Red streaks or pus. ? A fever.    Watch closely for changes in your health, and be sure to contact your doctor if:    · Your swelling is getting worse.     · You have new or worsening pain in your legs.     · You do not get better as expected. Where can you learn more? Go to http://lizabeth-jose raul.info/. Enter E028 in the search box to learn more about \"Leg and Ankle Edema: Care Instructions. \"  Current as of: September 23, 2018  Content Version: 12.1  © 6127-8282 [employer]. Care instructions adapted under license by [employer] (which disclaims liability or warranty for this information). If you have questions about a medical condition or this instruction, always ask your healthcare professional. [place] [address] any warranty or liability for your use of this information.

## 2019-09-18 LAB
ALBUMIN SERPL-MCNC: 4 G/DL (ref 3.5–4.8)
ALBUMIN/CREAT UR: 10.7 MG/G CREAT (ref 0–30)
APPEARANCE UR: CLEAR
BILIRUB UR QL STRIP: NEGATIVE
BUN SERPL-MCNC: 23 MG/DL (ref 8–27)
BUN/CREAT SERPL: 19 (ref 12–28)
CALCIUM SERPL-MCNC: 9.7 MG/DL (ref 8.7–10.3)
CHLORIDE SERPL-SCNC: 102 MMOL/L (ref 96–106)
CO2 SERPL-SCNC: 23 MMOL/L (ref 20–29)
COLOR UR: YELLOW
CREAT SERPL-MCNC: 1.24 MG/DL (ref 0.57–1)
CREAT UR-MCNC: 41.1 MG/DL
GLUCOSE SERPL-MCNC: 110 MG/DL (ref 65–99)
GLUCOSE UR QL: NEGATIVE
HGB UR QL STRIP: NEGATIVE
INTERPRETATION: NORMAL
KETONES UR QL STRIP: NEGATIVE
LEUKOCYTE ESTERASE UR QL STRIP: NEGATIVE
MICRO URNS: NORMAL
MICROALBUMIN UR-MCNC: 4.4 UG/ML
NITRITE UR QL STRIP: NEGATIVE
PH UR STRIP: 5.5 [PH] (ref 5–7.5)
PHOSPHATE SERPL-MCNC: 4 MG/DL (ref 2.5–4.5)
POTASSIUM SERPL-SCNC: 4.7 MMOL/L (ref 3.5–5.2)
PROT UR QL STRIP: NEGATIVE
SODIUM SERPL-SCNC: 141 MMOL/L (ref 134–144)
SP GR UR: 1.01 (ref 1–1.03)
UROBILINOGEN UR STRIP-MCNC: 0.2 MG/DL (ref 0.2–1)

## 2019-10-07 ENCOUNTER — OFFICE VISIT (OUTPATIENT)
Dept: INTERNAL MEDICINE CLINIC | Age: 69
End: 2019-10-07

## 2019-10-07 VITALS
OXYGEN SATURATION: 98 % | SYSTOLIC BLOOD PRESSURE: 136 MMHG | DIASTOLIC BLOOD PRESSURE: 82 MMHG | TEMPERATURE: 98.6 F | HEIGHT: 60 IN | WEIGHT: 188 LBS | RESPIRATION RATE: 16 BRPM | BODY MASS INDEX: 36.91 KG/M2 | HEART RATE: 57 BPM

## 2019-10-07 DIAGNOSIS — H10.13 ACUTE ATOPIC CONJUNCTIVITIS OF BOTH EYES: Primary | ICD-10-CM

## 2019-10-07 DIAGNOSIS — I10 ESSENTIAL HYPERTENSION: ICD-10-CM

## 2019-10-07 DIAGNOSIS — Z23 ENCOUNTER FOR IMMUNIZATION: ICD-10-CM

## 2019-10-07 DIAGNOSIS — R60.9 EDEMA, UNSPECIFIED TYPE: ICD-10-CM

## 2019-10-07 RX ORDER — KETOTIFEN FUMARATE 0.35 MG/ML
1 SOLUTION/ DROPS OPHTHALMIC 2 TIMES DAILY
COMMUNITY
End: 2021-03-01 | Stop reason: ALTCHOICE

## 2019-10-07 NOTE — PATIENT INSTRUCTIONS
Pinkeye: Care Instructions  Your Care Instructions    Pinkeye is redness and swelling of the eye surface and the conjunctiva (the lining of the eyelid and the covering of the white part of the eye). Pinkeye is also called conjunctivitis. Pinkeye is often caused by infection with bacteria or a virus. Dry air, allergies, smoke, and chemicals are other common causes. Pinkeye often clears on its own in 7 to 10 days. Antibiotics only help if the pinkeye is caused by bacteria. Pinkeye caused by infection spreads easily. If an allergy or chemical is causing pinkeye, it will not go away unless you can avoid whatever is causing it. Follow-up care is a key part of your treatment and safety. Be sure to make and go to all appointments, and call your doctor if you are having problems. It's also a good idea to know your test results and keep a list of the medicines you take. How can you care for yourself at home? · Wash your hands often. Always wash them before and after you treat pinkeye or touch your eyes or face. · Use moist cotton or a clean, wet cloth to remove crust. Wipe from the inside corner of the eye to the outside. Use a clean part of the cloth for each wipe. · Put cold or warm wet cloths on your eye a few times a day if the eye hurts. · Do not wear contact lenses or eye makeup until the pinkeye is gone. Throw away any eye makeup you were using when you got pinkeye. Clean your contacts and storage case. If you wear disposable contacts, use a new pair when your eye has cleared and it is safe to wear contacts again. · If the doctor gave you antibiotic ointment or eyedrops, use them as directed. Use the medicine for as long as instructed, even if your eye starts looking better soon. Keep the bottle tip clean, and do not let it touch the eye area. · To put in eyedrops or ointment:  ? Tilt your head back, and pull your lower eyelid down with one finger. ?  Drop or squirt the medicine inside the lower lid.  ? Close your eye for 30 to 60 seconds to let the drops or ointment move around. ? Do not touch the ointment or dropper tip to your eyelashes or any other surface. · Do not share towels, pillows, or washcloths while you have pinkeye. When should you call for help? Call your doctor now or seek immediate medical care if:    · You have pain in your eye, not just irritation on the surface.     · You have a change in vision or loss of vision.     · You have an increase in discharge from the eye.     · Your eye has not started to improve or begins to get worse within 48 hours after you start using antibiotics.     · Pinkeye lasts longer than 7 days.    Watch closely for changes in your health, and be sure to contact your doctor if you have any problems. Where can you learn more? Go to http://lizabeth-jose raul.info/. Enter Y392 in the search box to learn more about \"Pinkeye: Care Instructions. \"  Current as of: June 26, 2019  Content Version: 12.2  © 7568-8451 [employer]. Care instructions adapted under license by [employer] (which disclaims liability or warranty for this information). If you have questions about a medical condition or this instruction, always ask your healthcare professional. [address] any warranty or liability for your use of this information. Body Mass Index: Care Instructions  Your Care Instructions    Body mass index (BMI) can help you see if your weight is raising your risk for health problems. It uses a formula to compare how much you weigh with how tall you are. · A BMI lower than 18.5 is considered underweight. · A BMI between 18.5 and 24.9 is considered healthy. · A BMI between 25 and 29.9 is considered overweight. A BMI of 30 or higher is considered obese. If your BMI is in the normal range, it means that you have a lower risk for weight-related health problems.  If your BMI is in the overweight or obese range, you may be at increased risk for weight-related health problems, such as high blood pressure, heart disease, stroke, arthritis or joint pain, and diabetes. If your BMI is in the underweight range, you may be at increased risk for health problems such as fatigue, lower protection (immunity) against illness, muscle loss, bone loss, hair loss, and hormone problems. BMI is just one measure of your risk for weight-related health problems. You may be at higher risk for health problems if you are not active, you eat an unhealthy diet, or you drink too much alcohol or use tobacco products. Follow-up care is a key part of your treatment and safety. Be sure to make and go to all appointments, and call your doctor if you are having problems. It's also a good idea to know your test results and keep a list of the medicines you take. How can you care for yourself at home? · Practice healthy eating habits. This includes eating plenty of fruits, vegetables, whole grains, lean protein, and low-fat dairy. · If your doctor recommends it, get more exercise. Walking is a good choice. Bit by bit, increase the amount you walk every day. Try for at least 30 minutes on most days of the week. · Do not smoke. Smoking can increase your risk for health problems. If you need help quitting, talk to your doctor about stop-smoking programs and medicines. These can increase your chances of quitting for good. · Limit alcohol to 2 drinks a day for men and 1 drink a day for women. Too much alcohol can cause health problems. If you have a BMI higher than 25  · Your doctor may do other tests to check your risk for weight-related health problems. This may include measuring the distance around your waist. A waist measurement of more than 40 inches in men or 35 inches in women can increase the risk of weight-related health problems. · Talk with your doctor about steps you can take to stay healthy or improve your health.  You may need to make lifestyle changes to lose weight and stay healthy, such as changing your diet and getting regular exercise. If you have a BMI lower than 18.5  · Your doctor may do other tests to check your risk for health problems. · Talk with your doctor about steps you can take to stay healthy or improve your health. You may need to make lifestyle changes to gain or maintain weight and stay healthy, such as getting more healthy foods in your diet and doing exercises to build muscle. Where can you learn more? Go to http://lizabeth-jose raul.info/. Enter S176 in the search box to learn more about \"Body Mass Index: Care Instructions. \"  Current as of: October 13, 2016  Content Version: 11.4  © 9990-9038 [employer] Care instructions adapted under license by [employer] (which disclaims liability or warranty for this information). If you have questions about a medical condition or this instruction, always ask your healthcare professional. [address] any warranty or liability for your use of this information.

## 2019-10-07 NOTE — PROGRESS NOTES
Florin Wetzel is a 68 y.o. female who presents today for Eye Problem  . She has a history of   Patient Active Problem List   Diagnosis Code    DM (diabetes mellitus) ([facility] [address] E11.9    Hyperlipidemia E78.5    Hepatitis B B19.10    GERD (gastroesophageal reflux disease) K21.9    Rosacea L71.9    AR (allergic rhinitis) J30.9    Intervertebral disk disease M51.9    Uterine prolapse N81.4    Incontinence R32    HTN (hypertension) I10    Anemia D64.9    PAF (paroxysmal atrial fibrillation) ([facility]) I48.0    Peripheral neuropathy G62.9    Pre-ulcerative corn or callous L84    Advanced care planning/counseling discussion Z71.89    Type 2 diabetes mellitus with hemoglobin A1c goal of less than 7.0% ([facility]) E11.9    Type 2 diabetes mellitus with nephropathy ([facility]) E11.21    Stage 3 chronic kidney disease ([facility]) N18.3    Severe obesity (BMI 35.0-39. 9) with comorbidity ([facility]) S03.58    Systolic murmur of aorta L75.3    History of arthritis Z87.39    Foot drop M21.379    Snoring R06.83    Mild obstructive sleep apnea G47.33    Primary insomnia F51.01    Insomnia with sleep apnea G47.00, G47.30    Type 2 diabetes mellitus with diabetic neuropathy ([facility]) E11.40    Incomplete emptying of bladder R33.9    Overactive bladder N32.81    Urge incontinence of urine N39.41   . Today patient is here for an acute visit. Problem visit:  Florin Wetzel is here for complaint of bilateral eye discomfort. Problem began 10 day(s) ago. Severity is mild  Character of problem: Crusting and itching. Recently stopped steroid eyedrops for retinopathy. Hypertension - stable. reports that she has never smoked. She has never used smokeless tobacco.    reports that she does not drink alcohol. BP Readings from Last 2 Encounters:   10/07/19 136/82   09/17/19 136/80     She continues to have mild pitting edema which does sometimes get uncomfortable evening. She does have PRN Lasix but has not been taking this. We discussed that we could try changing her calcium channel blocker to hydrochlorothiazide but patient would like to see how she does over time. ROS  Review of Systems   Constitutional: Negative for chills, fever and weight loss. HENT: Negative for congestion and sore throat. Eyes: Positive for pain and redness. Negative for blurred vision, double vision, photophobia and discharge. Respiratory: Negative for cough and shortness of breath. Cardiovascular: Positive for leg swelling. Negative for chest pain, palpitations and orthopnea. Gastrointestinal: Negative for abdominal pain, constipation, diarrhea, heartburn, nausea and vomiting. Genitourinary: Negative for dysuria, frequency and urgency. Musculoskeletal: Negative for joint pain and myalgias. Skin: Negative for rash. Neurological: Negative. Negative for headaches. Endo/Heme/Allergies: Does not bruise/bleed easily. Psychiatric/Behavioral: Negative for memory loss and suicidal ideas. Visit Vitals  /82 (BP 1 Location: Left arm, BP Patient Position: Sitting)   Pulse (!) 57   Temp 98.6 °F (37 °C) (Oral)   Resp 16   Ht 5' (1.524 m)   Wt 188 lb (85.3 kg)   SpO2 98%   BMI 36.72 kg/m²       Physical Exam   Constitutional: She is oriented to person, place, and time. She appears well-developed and well-nourished. HENT:   Head: Normocephalic and atraumatic. Cardiovascular: Normal rate and regular rhythm. No murmur heard. Pulmonary/Chest: Effort normal. No respiratory distress. Musculoskeletal:   Mild pitting edema to bilateral lower extremities. Neurological: She is alert and oriented to person, place, and time. Skin: Skin is warm and dry. Psychiatric: She has a normal mood and affect.  Her behavior is normal.         Current Outpatient Medications   Medication Sig    lisinopril (PRINIVIL, ZESTRIL) 10 mg tablet TAKE ONE TABLET BY MOUTH EVERY DAY    metFORMIN ER (GLUCOPHAGE XR) 500 mg tablet Take 2 Tabs by mouth daily (with dinner).  amLODIPine (NORVASC) 5 mg tablet Take 1 Tab by mouth daily.  simvastatin (ZOCOR) 20 mg tablet TAKE 1 TABLET DAILY AT BEDTIME FOR CHOLESTEROL    lancets (FREESTYLE LANCETS) 28 gauge misc FOR USE IN LANCET DEVICE    esomeprazole (NEXIUM) 20 mg capsule TAKE 1 CAPSULE BY MOUTH EVERY DAY    gabapentin (NEURONTIN) 100 mg capsule TAKE ONE CAPSULE BY MOUTH THREE TIMES A DAY AS NEEDED    omega 3-DHA-EPA-fish oil (FISH OIL) 1,000 mg (120 mg-180 mg) capsule Take 4 Caps by mouth daily.  metoprolol succinate (TOPROL-XL) 50 mg XL tablet TAKE ONE (1) TABLET(S) ONCE DAILY    fluticasone (FLONASE) 50 mcg/actuation nasal spray     CALCIUM CARBONATE (TUMS PO) Take  by mouth daily as needed.  oxybutynin chloride XL (DITROPAN XL) 10 mg CR tablet Take 10 mg by mouth daily.  CALCIUM CARBONATE/VITAMIN D3 (CALCIUM + D PO) Take  by mouth two (2) times a day.  MULTI-VITAMIN PO Take 1 Tab by mouth daily.  aspirin delayed-release 81 mg tablet take 81 mg by mouth daily.  furosemide (LASIX) 20 mg tablet Take 1 Tab by mouth daily as needed (swelling).  glucose blood VI test strips (FREESTYLE TEST) strip For fasting BS testing once daily. ICD-10: E11.9    loratadine (CLARITIN) 10 mg tablet Take 10 mg by mouth daily.  prednisoLONE acetate (PRED FORTE) 1 % ophthalmic suspension Administer 1 Drop to both eyes daily. No current facility-administered medications for this visit.          Past Medical History:   Diagnosis Date    Anemia     Cystocele     Diabetes ([address]     Diabetic neuropathy, painful (HCC)     Hepatitis B     Hypercholesterolemia     Hypertension     Microalbuminuria     Mild nonproliferative diabetic retinopathy (HCC)     PAF (paroxysmal atrial fibrillation) ([address]     [facility] 3-24-13 ER-[facility] follows    Rectocele     Retinopathy     Rosacea     Ruptured disc, cervical     Sleep apnea     Stenosis, cervical spine       Past Surgical History:   Procedure Laterality Date    HX BREAST BIOPSY Left 2009    HX HERNIA REPAIR      umbilical    HX MENISCUS REPAIR  7/11/14    HX OTHER SURGICAL  05/09/2018    Bladder Botox     HX OVARIAN CYST REMOVAL  1973      Social History     Tobacco Use    Smoking status: Never Smoker    Smokeless tobacco: Never Used   Substance Use Topics    Alcohol use: No     Alcohol/week: 0.0 standard drinks     Comment: very rarely       Family History   Problem Relation Age of Onset    Hypertension Mother     Thyroid Disease Mother     Heart Failure Father     Heart Disease Father     Thyroid Disease Sister     Thyroid Disease Sister     Heart Attack Other     Cancer Maternal Aunt         esophageal    Cancer Paternal Aunt         breast        Allergies   Allergen Reactions    Sulfa (Sulfonamide Antibiotics) Unknown (comments)        Assessment/Plan  Diagnoses and all orders for this visit:    1. Acute atopic conjunctivitis of both eyes -worsening symptoms likely caused by stopping topical steroids. Patient to  over-the-counter antihistamine drops and see how she does. No signs of infection. 2. Essential hypertension -well controlled but she is having some lower extremity pitting edema. Continue PRN Lasix for the time being. We could consider adding hydrochlorothiazide and stopping calcium channel blocker if this continues. 3. Edema, unspecified type            Madelin Mcclain MD  10/7/2019    This note was created with the help of speech recognition software Suhail Muller) and may contain some 'sound alike' errors. Discussed the patient's BMI with her. The BMI follow up plan is as follows:     dietary management education, guidance, and counseling  encourage exercise  monitor weight  prescribed dietary intake    An After Visit Summary was printed and given to the patient.

## 2019-11-06 DIAGNOSIS — E11.9 TYPE 2 DIABETES MELLITUS WITH HEMOGLOBIN A1C GOAL OF LESS THAN 7.0% (HCC): ICD-10-CM

## 2019-11-11 RX ORDER — SIMVASTATIN 20 MG/1
TABLET, FILM COATED ORAL
Qty: 90 TAB | Refills: 1 | Status: SHIPPED | OUTPATIENT
Start: 2019-11-11 | End: 2020-05-08 | Stop reason: SDUPTHER

## 2019-11-12 RX ORDER — METOPROLOL SUCCINATE 50 MG/1
TABLET, EXTENDED RELEASE ORAL
Qty: 90 TAB | Refills: 3 | Status: SHIPPED | OUTPATIENT
Start: 2019-11-12 | End: 2020-11-09

## 2019-11-12 NOTE — TELEPHONE ENCOUNTER
Requested Prescriptions     Signed Prescriptions Disp Refills    metoprolol succinate (TOPROL-XL) 50 mg XL tablet 90 Tab 3     Sig: TAKE 1 TABLET BY MOUTH EVERY DAY     Authorizing Provider: Alexandra Oquendo     Ordering User: Javier Anthony     Verbal order per .    Future Appointments   Date Time Provider Khalif Lundy   9/16/2020 10:40 AM Will Vallejo MD [facility]

## 2019-11-25 ENCOUNTER — TELEPHONE (OUTPATIENT)
Dept: INTERNAL MEDICINE CLINIC | Age: 69
End: 2019-11-25

## 2019-11-25 NOTE — TELEPHONE ENCOUNTER
Pt called and wants to speak to the nurse regarding seeing another doctor. I verified her mobile.     Thank you

## 2019-11-25 NOTE — TELEPHONE ENCOUNTER
Pt was seen for possible acute conjunctivitis in October. Pt used antihistamine eye drops without any relief. She f/u with retina specialist shortly after her appt and was advised to use liquid tears. Pt reports there has been no improvement, she is still having redness, mild discharge and swelling at least in one eye off and on. Pt has an appt with Dr Jordan Lomeli in January 2020. Pt would like to know if she should f/u with you or see Dr Ricardo Sierra sooner than January?

## 2019-11-26 NOTE — TELEPHONE ENCOUNTER
Appt has been scheduled with Dr Charly Herrera, [facility] location. Dec 4, 2019, 2:20PM    Notified Pt and she verbalized understanding.

## 2019-12-18 NOTE — TELEPHONE ENCOUNTER
----- Message from Sylvia Coello sent at 12/18/2019  4:56 PM EST -----  Regarding: Dr. Shukla Nurse / telephone  Best contact number(s): [phone]  Name of medication and dosage if known: Metformin - 500 mg 2 tabs / daily with dinner  Is patient out of this medication (yes/no): Yes  Pharmacy name: [facility]  Pharmacy listed in chart? (yes/no):  Unknown  Pharmacy phone number: [phone]  Pharmacy Fax Number :  Date of last visit: October 07, 2019  Details to clarify:

## 2019-12-19 RX ORDER — METFORMIN HYDROCHLORIDE 500 MG/1
1000 TABLET, EXTENDED RELEASE ORAL
Qty: 180 TAB | Refills: 0 | Status: SHIPPED | OUTPATIENT
Start: 2019-12-19 | End: 2020-03-17 | Stop reason: SDUPTHER

## 2020-01-27 DIAGNOSIS — I10 ESSENTIAL HYPERTENSION WITH GOAL BLOOD PRESSURE LESS THAN 130/80: ICD-10-CM

## 2020-01-27 RX ORDER — LISINOPRIL 10 MG/1
TABLET ORAL
Qty: 90 TAB | Refills: 1 | Status: SHIPPED | OUTPATIENT
Start: 2020-01-27 | End: 2020-07-24

## 2020-02-19 ENCOUNTER — OFFICE VISIT (OUTPATIENT)
Dept: INTERNAL MEDICINE CLINIC | Age: 70
End: 2020-02-19

## 2020-02-19 VITALS
HEART RATE: 64 BPM | DIASTOLIC BLOOD PRESSURE: 74 MMHG | SYSTOLIC BLOOD PRESSURE: 112 MMHG | OXYGEN SATURATION: 98 % | WEIGHT: 185 LBS | TEMPERATURE: 98.1 F | BODY MASS INDEX: 34.93 KG/M2 | HEIGHT: 61 IN | RESPIRATION RATE: 16 BRPM

## 2020-02-19 DIAGNOSIS — R60.9 EDEMA, UNSPECIFIED TYPE: ICD-10-CM

## 2020-02-19 DIAGNOSIS — I10 HYPERTENSION, UNSPECIFIED TYPE: ICD-10-CM

## 2020-02-19 DIAGNOSIS — J06.9 UPPER RESPIRATORY TRACT INFECTION, UNSPECIFIED TYPE: Primary | ICD-10-CM

## 2020-02-19 RX ORDER — ALBUTEROL SULFATE 90 UG/1
AEROSOL, METERED RESPIRATORY (INHALATION)
COMMUNITY
End: 2021-03-01 | Stop reason: ALTCHOICE

## 2020-02-19 NOTE — PATIENT INSTRUCTIONS

## 2020-02-19 NOTE — PROGRESS NOTES
Larissa Day is a 65 y.o. female who presents today for Wheezing and Cough  . She has a history of   Patient Active Problem List   Diagnosis Code    DM (diabetes mellitus) ([facility] [address] E11.9    Hyperlipidemia E78.5    Hepatitis B B19.10    GERD (gastroesophageal reflux disease) K21.9    Rosacea L71.9    AR (allergic rhinitis) J30.9    Intervertebral disk disease M51.9    Uterine prolapse N81.4    Incontinence R32    HTN (hypertension) I10    Anemia D64.9    PAF (paroxysmal atrial fibrillation) ([facility]) I48.0    Peripheral neuropathy G62.9    Pre-ulcerative corn or callous L84    Advanced care planning/counseling discussion Z71.89    Type 2 diabetes mellitus with hemoglobin A1c goal of less than 7.0% ([facility]) E11.9    Type 2 diabetes mellitus with nephropathy ([facility]) E11.21    Stage 3 chronic kidney disease ([facility]) N18.3    Severe obesity (BMI 35.0-39. 9) with comorbidity ([facility]) G10.20    Systolic murmur of aorta I29.0    History of arthritis Z87.39    Foot drop M21.379    Snoring R06.83    Mild obstructive sleep apnea G47.33    Primary insomnia F51.01    Insomnia with sleep apnea G47.00, G47.30    Type 2 diabetes mellitus with diabetic neuropathy ([facility]) E11.40    Incomplete emptying of bladder R33.9    Overactive bladder N32.81    Urge incontinence of urine N39.41   . Today patient is here for an acute visit. .   she does not have other concerns. Upper respiratory illness:  Larissa Day presents with complaints of dry cough, myalgias, fever and hoarseness for 4 days. no nausea and no vomiting . Patient was seen at an urgent care visit office and did swab negative for flu. Given her ongoing symptoms I decided to treat her with Tamiflu either way. She notes that she did feel better but since has noted some mild wheezing. Hypertension-still has some mild lower extremity edema. Not painful. Does not progressing.   Hypertension ROS: taking medications as instructed, no medication side effects noted, no TIA's, no chest pain on exertion, no dyspnea on exertion     reports that she has never smoked. She has never used smokeless tobacco.    reports no history of alcohol use. BP Readings from Last 2 Encounters:   02/19/20 112/74   10/07/19 136/82         ROS  Review of Systems   Constitutional: Negative for chills, fever and weight loss. HENT: Positive for congestion. Negative for ear discharge, ear pain, hearing loss, nosebleeds, sinus pain, sore throat and tinnitus. Eyes: Negative for blurred vision, double vision, photophobia and pain. Respiratory: Positive for cough and shortness of breath. Negative for hemoptysis, sputum production, wheezing and stridor. Cardiovascular: Negative for chest pain, palpitations, orthopnea, claudication and leg swelling. Gastrointestinal: Negative for abdominal pain, constipation, diarrhea, heartburn, nausea and vomiting. Genitourinary: Negative for dysuria, frequency and urgency. Musculoskeletal: Negative for joint pain and myalgias. Skin: Negative for rash. Neurological: Negative. Negative for headaches. Endo/Heme/Allergies: Does not bruise/bleed easily. Psychiatric/Behavioral: Negative for memory loss and suicidal ideas. Visit Vitals  /74 (BP 1 Location: Left arm, BP Patient Position: Sitting)   Pulse 64   Temp 98.1 °F (36.7 °C) (Oral)   Resp 16   Ht 5' 1\" (1.549 m)   Wt 185 lb (83.9 kg)   SpO2 98%   BMI 34.96 kg/m²       Physical Exam  Constitutional:       General: She is not in acute distress. Appearance: Normal appearance. She is well-developed. HENT:      Head: Normocephalic and atraumatic. Right Ear: Tympanic membrane normal.      Left Ear: Tympanic membrane normal.      Nose: Nose normal.      Mouth/Throat:      Mouth: Mucous membranes are dry. Eyes:      Extraocular Movements: Extraocular movements intact. Pupils: Pupils are equal, round, and reactive to light.    Neck:      Musculoskeletal: Normal range of motion and neck supple. Thyroid: No thyromegaly. Cardiovascular:      Rate and Rhythm: Normal rate and regular rhythm. Heart sounds: No murmur. Pulmonary:      Effort: Pulmonary effort is normal.      Breath sounds: Normal breath sounds. No wheezing. Comments: Normal BS bilaterally. No wheezing heard on exam  Abdominal:      General: Bowel sounds are normal. There is no distension. Palpations: Abdomen is soft. Skin:     General: Skin is warm and dry. Neurological:      Mental Status: She is alert and oriented to person, place, and time. Cranial Nerves: No cranial nerve deficit. Psychiatric:         Behavior: Behavior normal.           Current Outpatient Medications   Medication Sig    albuterol (PROVENTIL HFA, VENTOLIN HFA, PROAIR HFA) 90 mcg/actuation inhaler Take  by inhalation.  lisinopril (PRINIVIL, ZESTRIL) 10 mg tablet TAKE ONE TABLET BY MOUTH EVERY DAY    metFORMIN ER (GLUCOPHAGE XR) 500 mg tablet Take 2 Tabs by mouth daily (with dinner).  metoprolol succinate (TOPROL-XL) 50 mg XL tablet TAKE 1 TABLET BY MOUTH EVERY DAY    simvastatin (ZOCOR) 20 mg tablet TAKE 1 TABLET DAILY AT BEDTIME FOR CHOLESTEROL    glucose blood VI test strips (FREESTYLE TEST) strip For fasting BS testing once daily. ICD-10: E11.9    ketotifen (ZADITOR) 0.025 % (0.035 %) ophthalmic solution Administer 1 Drop to both eyes two (2) times a day.  amLODIPine (NORVASC) 5 mg tablet Take 1 Tab by mouth daily.  lancets (FREESTYLE LANCETS) 28 gauge misc FOR USE IN LANCET DEVICE    esomeprazole (NEXIUM) 20 mg capsule TAKE 1 CAPSULE BY MOUTH EVERY DAY    omega 3-DHA-EPA-fish oil (FISH OIL) 1,000 mg (120 mg-180 mg) capsule Take 4 Caps by mouth daily.  fluticasone (FLONASE) 50 mcg/actuation nasal spray     CALCIUM CARBONATE (TUMS PO) Take  by mouth daily as needed.  oxybutynin chloride XL (DITROPAN XL) 10 mg CR tablet Take 10 mg by mouth daily.     CALCIUM CARBONATE/VITAMIN D3 (CALCIUM + D PO) Take  by mouth two (2) times a day.  MULTI-VITAMIN PO Take 1 Tab by mouth daily.  aspirin delayed-release 81 mg tablet take 81 mg by mouth daily.  gabapentin (NEURONTIN) 100 mg capsule TAKE ONE CAPSULE BY MOUTH THREE TIMES A DAY AS NEEDED     No current facility-administered medications for this visit. Past Medical History:   Diagnosis Date    Anemia     Cystocele     Diabetes ([facility] [address]     Diabetic neuropathy, painful (HCC)     Hepatitis B     Hypercholesterolemia     Hypertension     Microalbuminuria     Mild nonproliferative diabetic retinopathy (HCC)     PAF (paroxysmal atrial fibrillation) ([facility] [address]     [facility] 3-24-13 ER-[facility] follows    Rectocele     Retinopathy     Rosacea     Ruptured disc, cervical     Sleep apnea     Stenosis, cervical spine       Past Surgical History:   Procedure Laterality Date    HX BREAST BIOPSY Left 2009    HX HERNIA REPAIR      umbilical    HX MENISCUS REPAIR  7/11/14    HX OTHER SURGICAL  05/09/2018    Bladder Botox     HX OVARIAN CYST REMOVAL  1973      Social History     Tobacco Use    Smoking status: Never Smoker    Smokeless tobacco: Never Used   Substance Use Topics    Alcohol use: No     Alcohol/week: 0.0 standard drinks     Comment: very rarely       Family History   Problem Relation Age of Onset    Hypertension Mother     Thyroid Disease Mother     Heart Failure Father     Heart Disease Father     Thyroid Disease Sister     Thyroid Disease Sister     Heart Attack Other     Cancer Maternal Aunt         esophageal    Cancer Paternal Aunt         breast        Allergies   Allergen Reactions    Sulfa (Sulfonamide Antibiotics) Unknown (comments)        Assessment/Plan  Diagnoses and all orders for this visit:    1. Upper respiratory tract infection, unspecified type-this may very well been flu and she seems to be improving. Mild wheezing in the evening.   Okay for her to use PRN albuterol but she is not very symptomatic from this. Physical exam today unremarkable. 2. Hypertension, unspecified type-blood pressure stable. 3. Edema, unspecified type-very mild to bilateral feet. Likely due to calcium channel blocker. Continue to monitor            Frances Simmonds, MD  2/19/2020    This note was created with the help of speech recognition software Jennie Montes) and may contain some 'sound alike' errors.

## 2020-03-17 RX ORDER — METFORMIN HYDROCHLORIDE 500 MG/1
1000 TABLET, EXTENDED RELEASE ORAL
Qty: 180 TAB | Refills: 0 | Status: SHIPPED | OUTPATIENT
Start: 2020-03-17 | End: 2020-06-13

## 2020-05-08 RX ORDER — SIMVASTATIN 20 MG/1
TABLET, FILM COATED ORAL
Qty: 90 TAB | Refills: 1 | Status: SHIPPED | OUTPATIENT
Start: 2020-05-08 | End: 2020-11-01

## 2020-05-21 ENCOUNTER — TELEPHONE (OUTPATIENT)
Dept: INTERNAL MEDICINE CLINIC | Age: 70
End: 2020-05-21

## 2020-05-21 NOTE — TELEPHONE ENCOUNTER
----- Message from [employer] sent at 5/20/2020  4:47 PM EDT -----  Regarding: Dr. Ryan Bronson: [phone]  Caller's first and last name and relationship to patient (if not the patient):  Best contact number: [phone]  Preferred date and time: June 1 and 2; June 19  Scheduled appointment date and time: n/a  Reason for appointment: PREop  Details to clarify the request:  PT has Cataract surgery on 6/22. Preop between 6/1 and 6/2   PT also requesting follow up with labs.

## 2020-05-22 DIAGNOSIS — I10 ESSENTIAL HYPERTENSION: ICD-10-CM

## 2020-05-26 RX ORDER — AMLODIPINE BESYLATE 5 MG/1
TABLET ORAL
Qty: 90 TAB | Refills: 3 | Status: SHIPPED | OUTPATIENT
Start: 2020-05-26 | End: 2020-12-15

## 2020-05-26 NOTE — TELEPHONE ENCOUNTER
Cardiologist: Dr. Piper Choudhury    Last appt: 8/14/2018  Future Appointments   Date Time Provider Khalif Tuckeri   6/12/2020 10:45 AM Letty De Leon MD [address] [address]   6/19/2020 10:20 AM MAMMOGRAM, SAJI GANNON SCHED   6/19/2020 10:50 AM Nir Granado MD [address] [place] [address]   9/16/2020 10:40 AM West Kennedy MD CAVS JAGDEEP SCHED       Requested Prescriptions     Signed Prescriptions Disp Refills    amLODIPine (NORVASC) 5 mg tablet 90 Tab 3     Sig: TAKE 1 TABLET BY MOUTH EVERY DAY     Authorizing Provider: Blair Combs     Ordering User: KATTY Villeda         Refills VO per Dr. Piper Choudhury.

## 2020-06-12 ENCOUNTER — HOSPITAL ENCOUNTER (OUTPATIENT)
Dept: LAB | Age: 70
Discharge: HOME | End: 2020-06-12

## 2020-06-12 ENCOUNTER — OFFICE VISIT (OUTPATIENT)
Dept: INTERNAL MEDICINE CLINIC | Age: 70
End: 2020-06-12

## 2020-06-12 VITALS
SYSTOLIC BLOOD PRESSURE: 138 MMHG | RESPIRATION RATE: 16 BRPM | DIASTOLIC BLOOD PRESSURE: 82 MMHG | HEART RATE: 52 BPM | WEIGHT: 184 LBS | BODY MASS INDEX: 34.74 KG/M2 | OXYGEN SATURATION: 99 % | TEMPERATURE: 98.5 F | HEIGHT: 61 IN

## 2020-06-12 DIAGNOSIS — I10 HYPERTENSION, UNSPECIFIED TYPE: ICD-10-CM

## 2020-06-12 DIAGNOSIS — R68.2 DRY MOUTH: ICD-10-CM

## 2020-06-12 DIAGNOSIS — E55.9 VITAMIN D DEFICIENCY: ICD-10-CM

## 2020-06-12 DIAGNOSIS — E78.00 PURE HYPERCHOLESTEROLEMIA: Chronic | ICD-10-CM

## 2020-06-12 DIAGNOSIS — E66.9 OBESITY (BMI 30.0-34.9): ICD-10-CM

## 2020-06-12 DIAGNOSIS — E11.21 TYPE 2 DIABETES MELLITUS WITH NEPHROPATHY (HCC): ICD-10-CM

## 2020-06-12 DIAGNOSIS — N18.30 STAGE 3 CHRONIC KIDNEY DISEASE (HCC): ICD-10-CM

## 2020-06-12 DIAGNOSIS — R13.12 OROPHARYNGEAL DYSPHAGIA: ICD-10-CM

## 2020-06-12 DIAGNOSIS — Z01.818 PREOP EXAMINATION: Primary | ICD-10-CM

## 2020-06-12 DIAGNOSIS — I48.0 PAF (PAROXYSMAL ATRIAL FIBRILLATION) (HCC): ICD-10-CM

## 2020-06-12 LAB
25(OH)D3 SERPL-MCNC: 31 NG/ML (ref 30–100)
ALBUMIN SERPL-MCNC: 3.7 G/DL (ref 3.5–5)
ALBUMIN/GLOB SERPL: 0.9 {RATIO} (ref 1.1–2.2)
ALP SERPL-CCNC: 77 U/L (ref 45–117)
ALT SERPL-CCNC: 37 U/L (ref 12–78)
ANION GAP SERPL CALC-SCNC: 9 MMOL/L (ref 5–15)
AST SERPL-CCNC: 30 U/L (ref 15–37)
BASOPHILS # BLD: 0.1 K/UL (ref 0–0.1)
BASOPHILS NFR BLD: 1 % (ref 0–1)
BILIRUB SERPL-MCNC: 0.3 MG/DL (ref 0.2–1)
BUN SERPL-MCNC: 28 MG/DL (ref 6–20)
BUN/CREAT SERPL: 23 (ref 12–20)
CALCIUM SERPL-MCNC: 9.8 MG/DL (ref 8.5–10.1)
CHLORIDE SERPL-SCNC: 109 MMOL/L (ref 97–108)
CHOLEST SERPL-MCNC: 161 MG/DL
CO2 SERPL-SCNC: 20 MMOL/L (ref 21–32)
CREAT SERPL-MCNC: 1.22 MG/DL (ref 0.55–1.02)
CREAT UR-MCNC: 28.6 MG/DL
DIFFERENTIAL METHOD BLD: NORMAL
EOSINOPHIL # BLD: 0.3 K/UL (ref 0–0.4)
EOSINOPHIL NFR BLD: 4 % (ref 0–7)
ERYTHROCYTE [DISTWIDTH] IN BLOOD BY AUTOMATED COUNT: 13.3 % (ref 11.5–14.5)
EST. AVERAGE GLUCOSE BLD GHB EST-MCNC: 131 MG/DL
GLOBULIN SER CALC-MCNC: 3.9 G/DL (ref 2–4)
GLUCOSE SERPL-MCNC: 102 MG/DL (ref 65–100)
HBA1C MFR BLD: 6.2 % (ref 4–5.6)
HCT VFR BLD AUTO: 40.3 % (ref 35–47)
HDLC SERPL-MCNC: 55 MG/DL
HDLC SERPL: 2.9 {RATIO} (ref 0–5)
HGB BLD-MCNC: 12.6 G/DL (ref 11.5–16)
IMM GRANULOCYTES # BLD AUTO: 0 K/UL (ref 0–0.04)
IMM GRANULOCYTES NFR BLD AUTO: 0 % (ref 0–0.5)
LDLC SERPL CALC-MCNC: 60.2 MG/DL (ref 0–100)
LIPID PROFILE,FLP: ABNORMAL
LYMPHOCYTES # BLD: 1.2 K/UL (ref 0.8–3.5)
LYMPHOCYTES NFR BLD: 15 % (ref 12–49)
MCH RBC QN AUTO: 28.6 PG (ref 26–34)
MCHC RBC AUTO-ENTMCNC: 31.3 G/DL (ref 30–36.5)
MCV RBC AUTO: 91.4 FL (ref 80–99)
MICROALBUMIN UR-MCNC: 1.94 MG/DL
MICROALBUMIN/CREAT UR-RTO: 68 MG/G (ref 0–30)
MONOCYTES # BLD: 0.3 K/UL (ref 0–1)
MONOCYTES NFR BLD: 5 % (ref 5–13)
NEUTS SEG # BLD: 5.8 K/UL (ref 1.8–8)
NEUTS SEG NFR BLD: 75 % (ref 32–75)
NRBC # BLD: 0 K/UL (ref 0–0.01)
NRBC BLD-RTO: 0 PER 100 WBC
PLATELET # BLD AUTO: 241 K/UL (ref 150–400)
PMV BLD AUTO: 10 FL (ref 8.9–12.9)
POTASSIUM SERPL-SCNC: 4.6 MMOL/L (ref 3.5–5.1)
PROT SERPL-MCNC: 7.6 G/DL (ref 6.4–8.2)
RBC # BLD AUTO: 4.41 M/UL (ref 3.8–5.2)
SODIUM SERPL-SCNC: 138 MMOL/L (ref 136–145)
TRIGL SERPL-MCNC: 229 MG/DL (ref ?–150)
VLDLC SERPL CALC-MCNC: 45.8 MG/DL
WBC # BLD AUTO: 7.6 K/UL (ref 3.6–11)

## 2020-06-12 RX ORDER — MONTELUKAST SODIUM 10 MG/1
TABLET ORAL
COMMUNITY
Start: 2020-06-01 | End: 2022-01-28 | Stop reason: ALTCHOICE

## 2020-06-12 NOTE — PROGRESS NOTES
Estrella Hodges is a 65 y.o. female who presents today for Pre-op Exam; Diabetes; and Hypertension  . She has a history of   Patient Active Problem List   Diagnosis Code    DM (diabetes mellitus) ([facility] [address] E11.9    Hyperlipidemia E78.5    Hepatitis B B19.10    GERD (gastroesophageal reflux disease) K21.9    Rosacea L71.9    AR (allergic rhinitis) J30.9    Intervertebral disk disease M51.9    Uterine prolapse N81.4    Incontinence R32    HTN (hypertension) I10    Anemia D64.9    PAF (paroxysmal atrial fibrillation) ([facility]) I48.0    Peripheral neuropathy G62.9    Pre-ulcerative corn or callous L84    Advanced care planning/counseling discussion Z71.89    Type 2 diabetes mellitus with hemoglobin A1c goal of less than 7.0% ([facility]) E11.9    Type 2 diabetes mellitus with nephropathy ([facility]) E11.21    Stage 3 chronic kidney disease ([facility]) N18.3    Severe obesity (BMI 35.0-39. 9) with comorbidity ([facility]) J62.41    Systolic murmur of aorta V78.6    History of arthritis Z87.39    Foot drop M21.379    Snoring R06.83    Mild obstructive sleep apnea G47.33    Primary insomnia F51.01    Insomnia with sleep apnea G47.00, G47.30    Type 2 diabetes mellitus with diabetic neuropathy ([facility]) E11.40    Incomplete emptying of bladder R33.9    Overactive bladder N32.81    Urge incontinence of urine N39.41   . Today patient is here for Pre-op visit. Planning on having R cataract surgery on 6/22/2020. Has not had any issues with anesthesia in the past.  This will be done under MAC anesthesia. Dr. Heidi Mays is the surgeon. Patient will continue her aspirin. Having more dry mouth recently. Has had some mild dysphagia in the recent past. No pain. Worse with solids than  With liquids. Notes that she is also been diagnosed with dry eyes. We discussed Sjogren's. We also discussed using Biotene and rewetting drops. Diabetes Mellitus follow-up - continues metformin.    Last hemoglobin a1c   Lab Results   Component Value Date/Time    Hemoglobin A1c 6.2 (H) 08/02/2019 12:52 PM    Hemoglobin A1c (POC) 5.6 01/31/2018 11:30 AM    Hemoglobin A1c, External 5.6 04/19/2016     No components found for: POCA1C, HBA1C POC  medication compliance: compliant all of the time. Diabetic diet compliance: compliant most of the time. Further diabetic ROS: no polyuria or polydipsia, no chest pain, dyspnea or TIA's, no numbness, tingling or pain in extremities. Microalbuminuria:   Lab Results   Component Value Date/Time    Microalbumin/Creat ratio (mg/g creat) 14 12/16/2009 10:00 AM    Microalb/Creat ratio (ug/mg creat.) 10.7 09/17/2019 02:34 PM    Microalbumin,urine random 1.77 12/16/2009 10:00 AM     Hypertension - stable. Hypertension ROS: taking medications as instructed, no medication side effects noted, no TIA's, no chest pain on exertion, no dyspnea on exertion, no swelling of ankles     reports that she has never smoked. She has never used smokeless tobacco.    reports no history of alcohol use. BP Readings from Last 2 Encounters:   06/12/20 138/82   02/19/20 112/74     Hyperlipidemia  Remains on simvastatin. ROS: taking medications as instructed, no medication side effects noted  No new myalgias, no joint pains, no weakness  No TIA's, no chest pain on exertion, no dyspnea on exertion, no swelling of ankles. Lab Results   Component Value Date/Time    Cholesterol, total 141 08/02/2019 12:52 PM    HDL Cholesterol 48 08/02/2019 12:52 PM    LDL, calculated 53 08/02/2019 12:52 PM    VLDL, calculated 40 08/02/2019 12:52 PM    Triglyceride 202 (H) 08/02/2019 12:52 PM    CHOL/HDL Ratio 3.3 08/17/2010 03:47 AM         ROS  Review of Systems   Constitutional: Negative for chills, fever and weight loss. HENT: Negative for ear discharge, ear pain, hearing loss, nosebleeds, sinus pain and tinnitus. Dry mouth   Respiratory: Negative for cough and shortness of breath.     Cardiovascular: Negative for chest pain, palpitations and leg swelling. Gastrointestinal: Negative for abdominal pain, nausea and vomiting. Neurological: Negative. Negative for dizziness, tremors, sensory change, speech change and headaches. Endo/Heme/Allergies: Does not bruise/bleed easily. Psychiatric/Behavioral: Negative for depression. The patient is not nervous/anxious. Visit Vitals  /82 (BP 1 Location: Left arm, BP Patient Position: Sitting)   Pulse (!) 52   Temp 98.5 °F (36.9 °C) (Oral)   Resp 16   Ht 5' 1\" (1.549 m)   Wt 184 lb (83.5 kg)   SpO2 99%   BMI 34.77 kg/m²       Physical Exam  Constitutional:       General: She is not in acute distress. Appearance: She is well-developed. HENT:      Head: Normocephalic and atraumatic. Right Ear: Tympanic membrane normal.      Left Ear: Tympanic membrane normal.      Nose: Nose normal.      Mouth/Throat:      Mouth: Mucous membranes are moist.   Eyes:      Conjunctiva/sclera: Conjunctivae normal.   Neck:      Musculoskeletal: Normal range of motion and neck supple. Thyroid: No thyromegaly. Cardiovascular:      Rate and Rhythm: Normal rate and regular rhythm. Heart sounds: No murmur. Pulmonary:      Effort: Pulmonary effort is normal.      Breath sounds: Normal breath sounds. No wheezing. Abdominal:      General: Bowel sounds are normal. There is no distension. Palpations: Abdomen is soft. Musculoskeletal:      Comments: Foot exam  - skin intact, mild dryness w no fissures, no rashes, no significant ulcers or callous formation. Sensation intact by microfilament or light touch  Very mild LE edema. Skin:     General: Skin is warm and dry. Neurological:      Mental Status: She is alert and oriented to person, place, and time. Cranial Nerves: No cranial nerve deficit.    Psychiatric:         Behavior: Behavior normal.           Current Outpatient Medications   Medication Sig    montelukast (SINGULAIR) 10 mg tablet     amLODIPine (NORVASC) 5 mg tablet TAKE 1 TABLET BY MOUTH EVERY DAY    simvastatin (ZOCOR) 20 mg tablet TAKE 1 TABLET DAILY AT BEDTIME FOR CHOLESTEROL    metFORMIN ER (GLUCOPHAGE XR) 500 mg tablet Take 2 Tabs by mouth daily (with dinner).  albuterol (PROVENTIL HFA, VENTOLIN HFA, PROAIR HFA) 90 mcg/actuation inhaler Take  by inhalation.  lisinopril (PRINIVIL, ZESTRIL) 10 mg tablet TAKE ONE TABLET BY MOUTH EVERY DAY    metoprolol succinate (TOPROL-XL) 50 mg XL tablet TAKE 1 TABLET BY MOUTH EVERY DAY    glucose blood VI test strips (FREESTYLE TEST) strip For fasting BS testing once daily. ICD-10: E11.9    lancets (FREESTYLE LANCETS) 28 gauge misc FOR USE IN LANCET DEVICE    esomeprazole (NEXIUM) 20 mg capsule TAKE 1 CAPSULE BY MOUTH EVERY DAY    omega 3-DHA-EPA-fish oil (FISH OIL) 1,000 mg (120 mg-180 mg) capsule Take 4 Caps by mouth daily.  fluticasone (FLONASE) 50 mcg/actuation nasal spray     CALCIUM CARBONATE (TUMS PO) Take  by mouth daily as needed.  oxybutynin chloride XL (DITROPAN XL) 10 mg CR tablet Take 10 mg by mouth daily.  CALCIUM CARBONATE/VITAMIN D3 (CALCIUM + D PO) Take  by mouth two (2) times a day.  MULTI-VITAMIN PO Take 1 Tab by mouth daily.  aspirin delayed-release 81 mg tablet take 81 mg by mouth daily.  ketotifen (ZADITOR) 0.025 % (0.035 %) ophthalmic solution Administer 1 Drop to both eyes two (2) times a day.  gabapentin (NEURONTIN) 100 mg capsule TAKE ONE CAPSULE BY MOUTH THREE TIMES A DAY AS NEEDED     No current facility-administered medications for this visit.          Past Medical History:   Diagnosis Date    Anemia     Cystocele     Diabetes ([facility] [address]     Diabetic neuropathy, painful (HCC)     Hepatitis B     Hypercholesterolemia     Hypertension     Microalbuminuria     Mild nonproliferative diabetic retinopathy (HCC)     PAF (paroxysmal atrial fibrillation) ([facility] [address]     [facility] 3-24-13 ER-[facility] follows    Rectocele     Retinopathy     Rosacea     Ruptured disc, cervical     Sleep apnea     Stenosis, cervical spine       Past Surgical History:   Procedure Laterality Date    HX BREAST BIOPSY Left 2009    HX HERNIA REPAIR      umbilical    HX MENISCUS REPAIR  7/11/14    HX OTHER SURGICAL  05/09/2018    Bladder Botox     HX OVARIAN CYST REMOVAL  1973      Social History     Tobacco Use    Smoking status: Never Smoker    Smokeless tobacco: Never Used   Substance Use Topics    Alcohol use: No     Alcohol/week: 0.0 standard drinks     Comment: very rarely       Family History   Problem Relation Age of Onset    Hypertension Mother     Thyroid Disease Mother     Heart Failure Father     Heart Disease Father     Thyroid Disease Sister     Thyroid Disease Sister     Heart Attack Other     Cancer Maternal Aunt         esophageal    Cancer Paternal Aunt         breast        Allergies   Allergen Reactions    Sulfa (Sulfonamide Antibiotics) Unknown (comments)         Assessment/Plan  Diagnoses and all orders for this visit:    1. Preop examination-patient stable to proceed with elective cataract surgery. No changes needed. 2. Type 2 diabetes mellitus with nephropathy (HCC)-repeat A1c  -     HEMOGLOBIN A1C WITH EAG; Future  -     MICROALBUMIN, UR, RAND W/ MICROALB/CREAT RATIO; Future    3. PAF (paroxysmal atrial fibrillation) ([facility])-most recent evaluation has been negative. Continue  metoprolol. 4. Obesity (BMI 30-34. 9) with comorbidity (HCC)-weight has been stable. 5. Stage 3 chronic kidney disease (HCC)-repeat renal function    6. Hypertension, unspecified type-blood pressure stable  -     METABOLIC PANEL, COMPREHENSIVE; Future  -     CBC WITH AUTOMATED DIFF; Future    7. Pure hypercholesterolemia-repeat lipids  -     LIPID PANEL; Future    8. Vitamin D deficiency  -     VITAMIN D, 25 HYDROXY; Future    9. Oropharyngeal dysphagia-may be due to decreased saliva production. Patient does note also occasionally food getting stuck in her midesophagus.   We will get order a swallowing study to evaluate. Denies any reflux.  -     XR BA SWALLOW ESOPHOGRAM; Future    10. Dry mouth-likely Sjogren's. Maged Salinas MD  6/12/2020    This note was created with the help of speech recognition software Meghna Yost) and may contain some 'sound alike' errors.

## 2020-06-15 ENCOUNTER — DOCUMENTATION ONLY (OUTPATIENT)
Dept: INTERNAL MEDICINE CLINIC | Age: 70
End: 2020-06-15

## 2020-06-18 NOTE — PROGRESS NOTES
Darlyn Ying is a ,  65 y.o. female [facility] whose LMP was on  who presents for her annual checkup. She is menopausal and amenorrheic. She is having on going bladder incontinence. Hormone Status:    She is not having vasomotor symptoms. The patient is not using HRT. She has not had any vaginal bleeding. She reports no gynecologic symptoms. Sexual history:    She  reports previously being sexually active. Medical conditions:    Since her last annual GYN exam about two years ago, she has had the following changes in her health history: none. Surgical history confirmed with patient. has a past surgical history that includes hx meniscus repair (14); hx breast biopsy (Left, ); hx ovarian cyst removal (); hx hernia repair; and hx other surgical (2018). Pap and Mammogram History:    Her most recent Pap smear was normal obtained 3 year(s) ago. Last 2 Paps in this office were normal.    The patient had her mammogram today in our office    Breast Cancer History/Substance Abuse:     She does have a family history of breast cancer. Osteoporosis History:    Family history does not include a first or second degree relative with osteopenia or osteoporosis. A bone density scan was obtained  and revealed normal bone density. She is currently taking calcium and vit D.     Past Medical History:   Diagnosis Date    Anemia     Cystocele     Diabetes ([address]     Diabetic neuropathy, painful ([address]     Hepatitis B     Hypercholesterolemia     Hypertension     Microalbuminuria     Mild nonproliferative diabetic retinopathy (HCC)     PAF (paroxysmal atrial fibrillation) ([address]     [facility] 3-24-13 ER-[facility] follows    Rectocele     Retinopathy     Rosacea     Ruptured disc, cervical     Sleep apnea     Stenosis, cervical spine      Past Surgical History:   Procedure Laterality Date    HX BREAST BIOPSY Left     HX HERNIA REPAIR      umbilical    HX MENISCUS REPAIR  7/11/14    HX OTHER SURGICAL  05/09/2018    Bladder Botox     HX OVARIAN CYST REMOVAL  1973     Current Outpatient Medications   Medication Sig Dispense Refill    metFORMIN ER (GLUCOPHAGE XR) 500 mg tablet TAKE 2 TABLETS BY MOUTH EVERY DAY WITH DINNER 180 Tab 1    montelukast (SINGULAIR) 10 mg tablet       amLODIPine (NORVASC) 5 mg tablet TAKE 1 TABLET BY MOUTH EVERY DAY 90 Tab 3    simvastatin (ZOCOR) 20 mg tablet TAKE 1 TABLET DAILY AT BEDTIME FOR CHOLESTEROL 90 Tab 1    albuterol (PROVENTIL HFA, VENTOLIN HFA, PROAIR HFA) 90 mcg/actuation inhaler Take  by inhalation.  lisinopril (PRINIVIL, ZESTRIL) 10 mg tablet TAKE ONE TABLET BY MOUTH EVERY DAY 90 Tab 1    metoprolol succinate (TOPROL-XL) 50 mg XL tablet TAKE 1 TABLET BY MOUTH EVERY DAY 90 Tab 3    glucose blood VI test strips (FREESTYLE TEST) strip For fasting BS testing once daily. ICD-10: E11.9 100 Strip 11    ketotifen (ZADITOR) 0.025 % (0.035 %) ophthalmic solution Administer 1 Drop to both eyes two (2) times a day.  lancets (FREESTYLE LANCETS) 28 gauge misc FOR USE IN LANCET DEVICE 100 Lancet 3    esomeprazole (NEXIUM) 20 mg capsule TAKE 1 CAPSULE BY MOUTH EVERY DAY  6    omega 3-DHA-EPA-fish oil (FISH OIL) 1,000 mg (120 mg-180 mg) capsule Take 4 Caps by mouth daily.  fluticasone (FLONASE) 50 mcg/actuation nasal spray       CALCIUM CARBONATE (TUMS PO) Take  by mouth daily as needed.  oxybutynin chloride XL (DITROPAN XL) 10 mg CR tablet Take 10 mg by mouth daily.  CALCIUM CARBONATE/VITAMIN D3 (CALCIUM + D PO) Take  by mouth two (2) times a day.  MULTI-VITAMIN PO Take 1 Tab by mouth daily.  aspirin delayed-release 81 mg tablet take 81 mg by mouth daily.        Allergies: Sulfa (sulfonamide antibiotics)   Social History     Socioeconomic History    Marital status:      Spouse name: Not on file    Number of children: Not on file    Years of education: Not on file    Highest education level: Not on file   Occupational History    Not on file   Social Needs    Financial resource strain: Not on file    Food insecurity     Worry: Not on file     Inability: Not on file    Transportation needs     Medical: Not on file     Non-medical: Not on file   Tobacco Use    Smoking status: Never Smoker    Smokeless tobacco: Never Used   Substance and Sexual Activity    Alcohol use: No     Alcohol/week: 0.0 standard drinks     Comment: very rarely     Drug use: No    Sexual activity: Not Currently   Lifestyle    Physical activity     Days per week: Not on file     Minutes per session: Not on file    Stress: Not on file   Relationships    Social connections     Talks on phone: Not on file     Gets together: Not on file     Attends Muslim service: Not on file     Active member of club or organization: Not on file     Attends meetings of clubs or organizations: Not on file     Relationship status: Not on file    Intimate partner violence     Fear of current or ex partner: Not on file     Emotionally abused: Not on file     Physically abused: Not on file     Forced sexual activity: Not on file   Other Topics Concern    Not on file   Social History Narrative    Not on file     Tobacco History:  reports that she has never smoked. She has never used smokeless tobacco.  Alcohol Abuse:  reports no history of alcohol use. Drug Abuse:  reports no history of drug use.   Patient Active Problem List   Diagnosis Code    DM (diabetes mellitus) ([facility] [address] E11.9    Hyperlipidemia E78.5    Hepatitis B B19.10    GERD (gastroesophageal reflux disease) K21.9    Rosacea L71.9    AR (allergic rhinitis) J30.9    Intervertebral disk disease M51.9    Uterine prolapse N81.4    Incontinence R32    HTN (hypertension) I10    Anemia D64.9    PAF (paroxysmal atrial fibrillation) (HCC) I48.0    Peripheral neuropathy G62.9    Pre-ulcerative corn or callous L84    Advanced care planning/counseling discussion Z71.89    Type 2 diabetes mellitus with hemoglobin A1c goal of less than 7.0% ([facility]) E11.9    Type 2 diabetes mellitus with nephropathy ([facility]) E11.21    Stage 3 chronic kidney disease ([facility]) N18.3    Severe obesity (BMI 35.0-39. 9) with comorbidity ([facility]) O38.60    Systolic murmur of aorta J70.6    History of arthritis Z87.39    Foot drop M21.379    Snoring R06.83    Mild obstructive sleep apnea G47.33    Primary insomnia F51.01    Insomnia with sleep apnea G47.00, G47.30    Type 2 diabetes mellitus with diabetic neuropathy ([facility]) E11.40    Incomplete emptying of bladder R33.9    Overactive bladder N32.81    Urge incontinence of urine N39.41    Vitamin D deficiency E55.9       Review of Systems - History obtained from the patient  Constitutional: negative for weight loss, fever, night sweats  HEENT: negative for hearing loss, earache, congestion, snoring, sorethroat  CV: negative for chest pain, palpitations, edema  Resp: negative for cough, shortness of breath, wheezing  GI: negative for change in bowel habits, abdominal pain, black or bloody stools  : negative for frequency, dysuria, hematuria, vaginal discharge  MSK: negative for back pain, joint pain, muscle pain  Breast: negative for breast lumps, nipple discharge, galactorrhea  Skin :negative for itching, rash, hives  Neuro: negative for dizziness, headache, confusion, weakness  Psych: negative for anxiety, depression, change in mood  Heme/lymph: negative for bleeding, bruising, pallor    Physical Exam    Visit Vitals  /78   Pulse (!) 54   Ht 5' 1\" (1.549 m)   Wt 184 lb (83.5 kg)   BMI 34.77 kg/m²     Constitutional  · Appearance: well-nourished, well developed, alert, in no acute distress    HENT  · Head and Face: appears normal    Neck  · Inspection/Palpation: normal appearance, no masses or tenderness  · Lymph Nodes: no lymphadenopathy present    Chest  · Respiratory Effort: breathing normal    Breasts  · Inspection of Breasts: breasts symmetrical, no skin changes, no discharge present, nipple appearance normal, no skin retraction present  · Palpation of Breasts and Axillae: no masses present on palpation, no breast tenderness  · Axillary Lymph Nodes: no lymphadenopathy present    Gastrointestinal  · Abdominal Examination: abdomen non-tender to palpation, normal bowel sounds, no masses present  · Liver and spleen: no hepatomegaly present, spleen not palpable  · Hernias: no hernias identified    Genitourinary  · External Genitalia: normal appearance for age with atrophy, no discharge present, no tenderness present, no inflammatory lesions present, no masses present  · Vagina:atrophic vaginal vault with pale epithelium and flattening of rugae, without central or paravaginal defects, no discharge present, no inflammatory lesions present, no masses present  · Bladder: non-tender to palpation  · Urethra: appears normal  · Cervix: normal   · Uterus: normal size, shape and consistency, second degree prolapse  · Adnexa: no adnexal tenderness present, no adnexal masses present  · Perineum: perineum within normal limits, no evidence of trauma, no rashes or skin lesions present  · Anus: anus within normal limits, no hemorrhoids present  · Inguinal Lymph Nodes: no lymphadenopathy present    Skin  · General Inspection: no rash, no lesions identified    Neurologic/Psychiatric  · Mental Status:  · Orientation: grossly oriented to person, place and time  · Mood and Affect: mood normal, affect appropriate    . Assessment:  Routine gynecologic examination  Her current medical status is satisfactory with pelvic relaxation  Incontinence    Plan:  Counseled re: diet, exercise, healthy lifestyle  Return for yearly wellness visits  Rec annual mammogram  She will f/u with [facility] for her bladder and prolapse symptoms.

## 2020-06-19 ENCOUNTER — OFFICE VISIT (OUTPATIENT)
Dept: OBGYN CLINIC | Age: 70
End: 2020-06-19

## 2020-06-19 ENCOUNTER — HOSPITAL ENCOUNTER (OUTPATIENT)
Dept: GENERAL RADIOLOGY | Age: 70
Discharge: HOME | End: 2020-06-19
Attending: INTERNAL MEDICINE
Payer: MEDICARE

## 2020-06-19 VITALS
WEIGHT: 184 LBS | HEART RATE: 54 BPM | SYSTOLIC BLOOD PRESSURE: 123 MMHG | BODY MASS INDEX: 34.74 KG/M2 | HEIGHT: 61 IN | DIASTOLIC BLOOD PRESSURE: 78 MMHG

## 2020-06-19 DIAGNOSIS — R13.12 OROPHARYNGEAL DYSPHAGIA: ICD-10-CM

## 2020-06-19 DIAGNOSIS — Z01.419 WELL WOMAN EXAM WITH ROUTINE GYNECOLOGICAL EXAM: Primary | ICD-10-CM

## 2020-06-19 PROCEDURE — 74220 X-RAY XM ESOPHAGUS 1CNTRST: CPT

## 2020-07-24 DIAGNOSIS — I10 ESSENTIAL HYPERTENSION WITH GOAL BLOOD PRESSURE LESS THAN 130/80: ICD-10-CM

## 2020-07-24 RX ORDER — LISINOPRIL 10 MG/1
TABLET ORAL
Qty: 90 TAB | Refills: 1 | Status: SHIPPED | OUTPATIENT
Start: 2020-07-24 | End: 2020-12-15

## 2020-09-16 ENCOUNTER — OFFICE VISIT (OUTPATIENT)
Dept: CARDIOLOGY CLINIC | Age: 70
End: 2020-09-16
Payer: MEDICARE

## 2020-09-16 VITALS
HEIGHT: 61 IN | HEART RATE: 54 BPM | DIASTOLIC BLOOD PRESSURE: 78 MMHG | BODY MASS INDEX: 34.85 KG/M2 | SYSTOLIC BLOOD PRESSURE: 132 MMHG | WEIGHT: 184.6 LBS | OXYGEN SATURATION: 98 %

## 2020-09-16 DIAGNOSIS — E78.00 PURE HYPERCHOLESTEROLEMIA: ICD-10-CM

## 2020-09-16 DIAGNOSIS — I35.8 SYSTOLIC MURMUR OF AORTA: ICD-10-CM

## 2020-09-16 DIAGNOSIS — I48.0 PAF (PAROXYSMAL ATRIAL FIBRILLATION) (HCC): Primary | ICD-10-CM

## 2020-09-16 DIAGNOSIS — I10 ESSENTIAL HYPERTENSION: ICD-10-CM

## 2020-09-16 PROCEDURE — 1101F PT FALLS ASSESS-DOCD LE1/YR: CPT | Performed by: SPECIALIST

## 2020-09-16 PROCEDURE — 3017F COLORECTAL CA SCREEN DOC REV: CPT | Performed by: SPECIALIST

## 2020-09-16 PROCEDURE — G8752 SYS BP LESS 140: HCPCS | Performed by: SPECIALIST

## 2020-09-16 PROCEDURE — G8427 DOCREV CUR MEDS BY ELIG CLIN: HCPCS | Performed by: SPECIALIST

## 2020-09-16 PROCEDURE — 1090F PRES/ABSN URINE INCON ASSESS: CPT | Performed by: SPECIALIST

## 2020-09-16 PROCEDURE — G9899 SCRN MAM PERF RSLTS DOC: HCPCS | Performed by: SPECIALIST

## 2020-09-16 PROCEDURE — G8399 PT W/DXA RESULTS DOCUMENT: HCPCS | Performed by: SPECIALIST

## 2020-09-16 PROCEDURE — 93010 ELECTROCARDIOGRAM REPORT: CPT | Performed by: SPECIALIST

## 2020-09-16 PROCEDURE — G8432 DEP SCR NOT DOC, RNG: HCPCS | Performed by: SPECIALIST

## 2020-09-16 PROCEDURE — 99213 OFFICE O/P EST LOW 20 MIN: CPT | Performed by: SPECIALIST

## 2020-09-16 PROCEDURE — G0463 HOSPITAL OUTPT CLINIC VISIT: HCPCS | Performed by: SPECIALIST

## 2020-09-16 PROCEDURE — G8754 DIAS BP LESS 90: HCPCS | Performed by: SPECIALIST

## 2020-09-16 PROCEDURE — 93005 ELECTROCARDIOGRAM TRACING: CPT | Performed by: SPECIALIST

## 2020-09-16 PROCEDURE — G8417 CALC BMI ABV UP PARAM F/U: HCPCS | Performed by: SPECIALIST

## 2020-09-16 PROCEDURE — G8536 NO DOC ELDER MAL SCRN: HCPCS | Performed by: SPECIALIST

## 2020-09-16 NOTE — Clinical Note
9/16/20 Patient: Jarvis Villalba YOB: 1950 Date of Visit: 9/16/2020 Garima Fragoso MD 
[address] [facility] [address] [facility] [id number] VIA In Basket Dear Garima Fragoso MD, Thank you for referring Ms. Karla Payne to [facility] for evaluation. My notes for this consultation are attached. If you have questions, please do not hesitate to call me. I look forward to following your patient along with you.  
 
 
Sincerely, 
 
Dank Vega MD

## 2020-09-16 NOTE — PROGRESS NOTES
Jarvis Villalba     1950       Jennifer Corrigan MD, [facility] [place]  Date of Visit-9/16/2020   PCP is Cuauhtemoc Aguirre MD   [facility] [place]  [facility]  HPI:  Jarvis Villalba is a 65 y.o. female   1 year f/u of PAF. Echo in August 2018 was normal and loop showed no AF. Pt notes that she had one episode of arrhythmia since her last visit. Pt also reports increasing LE edema. She states she has a prescription for Lasix, but she is scared to takes it as she does not stay well hydrated. Pt states that she may have Sjogren's because she has dry mouth and dry eyes but she has not had a workup for it. Pt has not been exercising regularly secondary to COVID-19. She meets her woman's group once a month but she is wearing a mask. Denies chest pain, syncope or shortness of breath at rest, has no tachycardia, palpitations or sense of arrhythmia. EKG: SB at 56, Poor R-wave progression, nonspecific, consider old anterior infarct. Assessment/Plan:     Patient Instructions   We will see you back in 6 months. Future Appointments   Date Time Provider Khalif Nikkie   3/24/2021 11:00 AM Jennifer Bansal MD [facility] BS AMB      1. PAF (paroxysmal atrial fibrillation) ([address]  Known kari on BB  Controlled on BB. We have discussed blood thinner and risk and benefits. She does have DM, HTN, is female, and has age above 72. This would give her a CHADS score of 4. She had not had AF for some time unitl recent palsp and we decided and agreed she does not need an [address]. This may have been ectopics  Can continue ASA. 2. Systolic murmur of aorta  Last echo the aortic valve appeared normal and thus this is probably sclerosis and is not stenosis.       3. Essential hypertension  At goal , meds and possible side effects reviewed and patient denies  Key CAD CHF Meds             lisinopriL (PRINIVIL, ZESTRIL) 10 mg tablet (Taking) TAKE 1 TABLET BY MOUTH EVERY DAY    amLODIPine (NORVASC) 5 mg tablet (Taking) TAKE 1 TABLET BY MOUTH EVERY DAY    simvastatin (ZOCOR) 20 mg tablet (Taking) TAKE 1 TABLET DAILY AT BEDTIME FOR CHOLESTEROL    metoprolol succinate (TOPROL-XL) 50 mg XL tablet (Taking) TAKE 1 TABLET BY MOUTH EVERY DAY    omega 3-DHA-EPA-fish oil (FISH OIL) 1,000 mg (120 mg-180 mg) capsule (Taking) Take 4 Caps by mouth daily. aspirin delayed-release 81 mg tablet (Taking) take 81 mg by mouth daily. BP Readings from Last 6 Encounters:   09/16/20 132/78   06/19/20 123/78   06/12/20 138/82   02/19/20 112/74   10/07/19 136/82   09/17/19 136/80        Well controlled. We have tried to stop amlodipine in the past but it has been needed. The edema has been fairly minimal. we did discuss that she has noticed a little more edema. I talked to her about wearing compression stockings and elevating feet. She will work on salt intake. She can take the Lasix occasionally, but I'd rather she try other measures given her CKD. 4. Pure hypercholesterolemia  At goal , denies excess muscle aches or new liver issues  Key Antihyperlipidemia Meds             simvastatin (ZOCOR) 20 mg tablet (Taking) TAKE 1 TABLET DAILY AT BEDTIME FOR CHOLESTEROL    omega 3-DHA-EPA-fish oil (FISH OIL) 1,000 mg (120 mg-180 mg) capsule (Taking) Take 4 Caps by mouth daily. Lab Results   Component Value Date/Time    LDL, calculated 60.2 06/12/2020 12:50 PM       Lipids are excellent. F/u in 6 months     Impression:   1. PAF (paroxysmal atrial fibrillation) ([address]    2. Systolic murmur of aorta    3. Essential hypertension    4. Pure hypercholesterolemia       Cardiac History:    In clinical trial= REDUCE-IT Study- \"A Multi-Center, Prospective, Randomized, Double-Blind, Placebo-Controlled, Parallel-Group Study to Evaluate the Effect of [id number] on Cardiovascular Health and Mortality in Hypertriglyceridemic Patients with Cardiovascular Disease or at [address] for Cardiovascular Disease: REDUCE-IT (Reduction of Cardiovascular Events with EPA- Intervention Trial)     PAF  Echo 2-5-13= normal  [facility] in 13,15,18 WNL   [facility] 11-6-18 6'50\" normal perfusion, EF 73%     ROS-except as noted above. . A complete cardiac and respiratory are reviewed and negative except as above ; Resp-denies wheezing  or productive cough,. Const- No unusual weight loss or fever; Neuro-no recent seizure or CVA ; GI- No BRBPR, abdom pain, bloating ; - no  hematuria   see supplement sheet, initialed and to be scanned by staff  Past Medical History:   Diagnosis Date    Anemia     Cystocele     Diabetes ([address]     [facility] neuropathy, [facility]     Hepatitis B     Hypercholesterolemia     Hypertension     Microalbuminuria     Mild nonproliferative diabetic retinopathy ([address]     PAF (paroxysmal atrial fibrillation) ([facility]     [facility] 3-24-[facility] follows    Rectocele     Retinopathy     Rosacea     Ruptured disc, cervical     Sleep apnea     Stenosis, cervical spine       Social Hx= reports that she has never smoked. She has never used smokeless tobacco. She reports that she does not drink alcohol or use drugs. Exam and Labs:  /78 (BP 1 Location: Left arm, BP Patient Position: Sitting)   Pulse (!) 54   Ht 5' 1\" (1.549 m)   Wt 184 lb 9.6 oz (83.7 kg)   SpO2 98%   BMI 34.88 kg/m² Constitutional:  NAD, comfortable  Head: NC,AT. Eyes: No scleral icterus. Neck:  Neck supple. No JVD present. Throat: moist mucous membranes. Chest: Effort normal & normal respiratory excursion . Neurological: alert, conversant and oriented . Skin: Skin is not cold. No obvious systemic rash noted. Not diaphoretic. No erythema. Psychiatric:  Grossly normal mood and affect. Behavior appears normal. Extremities:  no clubbing or cyanosis. Abdomen: non distended    Lungs:breath sounds normal. No stridor. distress, wheezes or  Rales. Heart:1/6 at the RUSB normal rate, regular rhythm, normal S1, S2, no rubs, clicks or gallops , PMI non displaced. Edema: Edema is trace up to the mid shin. Lab Results   Component Value Date/Time    Cholesterol, total 161 06/12/2020 12:50 PM    HDL Cholesterol 55 06/12/2020 12:50 PM    LDL, calculated 60.2 06/12/2020 12:50 PM    Triglyceride 229 (H) 06/12/2020 12:50 PM    CHOL/HDL Ratio 2.9 06/12/2020 12:50 PM     Lab Results   Component Value Date/Time    Sodium 138 06/12/2020 12:50 PM    Potassium 4.6 06/12/2020 12:50 PM    Chloride 109 (H) 06/12/2020 12:50 PM    CO2 20 (L) 06/12/2020 12:50 PM    Anion gap 9 06/12/2020 12:50 PM    Glucose 102 (H) 06/12/2020 12:50 PM    BUN 28 (H) 06/12/2020 12:50 PM    Creatinine 1.22 (H) 06/12/2020 12:50 PM    BUN/Creatinine ratio 23 (H) 06/12/2020 12:50 PM    GFR est AA 53 (L) 06/12/2020 12:50 PM    GFR est non-AA 43 (L) 06/12/2020 12:50 PM    Calcium 9.8 06/12/2020 12:50 PM      Wt Readings from Last 3 Encounters:   09/16/20 184 lb 9.6 oz (83.7 kg)   06/19/20 184 lb (83.5 kg)   06/12/20 184 lb (83.5 kg)      BP Readings from Last 3 Encounters:   09/16/20 132/78   06/19/20 123/78   06/12/20 138/82      Current Outpatient Medications   Medication Sig    guaifenesin/phenylephrine HCl (CRANTEX PO) Take  by mouth.  lisinopriL (PRINIVIL, ZESTRIL) 10 mg tablet TAKE 1 TABLET BY MOUTH EVERY DAY    metFORMIN ER (GLUCOPHAGE XR) 500 mg tablet TAKE 2 TABLETS BY MOUTH EVERY DAY WITH DINNER    montelukast (SINGULAIR) 10 mg tablet     amLODIPine (NORVASC) 5 mg tablet TAKE 1 TABLET BY MOUTH EVERY DAY    simvastatin (ZOCOR) 20 mg tablet TAKE 1 TABLET DAILY AT BEDTIME FOR CHOLESTEROL    albuterol (PROVENTIL HFA, VENTOLIN HFA, PROAIR HFA) 90 mcg/actuation inhaler Take  by inhalation.  metoprolol succinate (TOPROL-XL) 50 mg XL tablet TAKE 1 TABLET BY MOUTH EVERY DAY    glucose blood VI test strips (FREESTYLE TEST) strip For fasting BS testing once daily.  ICD-10: E11.9    lancets (FREESTYLE LANCETS) 28 gauge misc FOR USE IN LANCET DEVICE    esomeprazole (NEXIUM) 20 mg capsule TAKE 1 CAPSULE BY MOUTH EVERY DAY    omega 3-DHA-EPA-fish oil (FISH OIL) 1,000 mg (120 mg-180 mg) capsule Take 4 Caps by mouth daily.  fluticasone (FLONASE) 50 mcg/actuation nasal spray     CALCIUM CARBONATE (TUMS PO) Take  by mouth daily as needed.  oxybutynin chloride XL (DITROPAN XL) 10 mg CR tablet Take 10 mg by mouth daily.  CALCIUM CARBONATE/VITAMIN D3 (CALCIUM + D PO) Take  by mouth two (2) times a day.  MULTI-VITAMIN PO Take 1 Tab by mouth daily.  aspirin delayed-release 81 mg tablet take 81 mg by mouth daily.  ketotifen (ZADITOR) 0.025 % (0.035 %) ophthalmic solution Administer 1 Drop to both eyes two (2) times a day. No current facility-administered medications for this visit. Impression see above.       Written by Dean Goncalves, as dictated by Von Mcintosh MD.

## 2020-09-16 NOTE — PROGRESS NOTES
Chief Complaint   Patient presents with    Annual Exam    Hypertension       Visit Vitals  /78 (BP 1 Location: Left arm, BP Patient Position: Sitting)   Pulse (!) 54   Ht 5' 1\" (1.549 m)   Wt 184 lb 9.6 oz (83.7 kg)   SpO2 98%   BMI 34.88 kg/m²       Chest pain denied  SOB - with activity  Dizziness denied  Swelling/Edema - BL/LE seems to get worse by afternoon, never completely subsides  Recent hospital visit denied  Refills denied No

## 2020-10-12 RX ORDER — FLUTICASONE PROPIONATE 50 MCG
2 SPRAY, SUSPENSION (ML) NASAL DAILY
Qty: 16 G | Refills: 3 | Status: SHIPPED | OUTPATIENT
Start: 2020-10-12 | End: 2021-03-31

## 2020-10-29 ENCOUNTER — TELEPHONE (OUTPATIENT)
Dept: INTERNAL MEDICINE CLINIC | Age: 70
End: 2020-10-29

## 2020-10-29 NOTE — TELEPHONE ENCOUNTER
Per patient she has a low grade fever of 99.9 and she feels achy, wants to know what she should do , asking for a nurse directly to [facility] with , she can be reached at [phone]

## 2020-10-29 NOTE — TELEPHONE ENCOUNTER
Patient paged me at 7:00 pm since call to office at 4:15 was not returned. Feels mildly tired. Mild nasal congestion for months, maybe a little worse. Temp 99.1 this AM.  Now is 100.5. No SOB, cough or chest congestion. No GI s/s no loss of smell or taste. Pulse ox 98% now. Had flu vaccine 3 weeks ago. Advised to monitor for s/s and self-quarantine for now. Going to [facility] tomorrow afternoon for COVID testing. [facility] all booked for testing until 2-3 days from now. Call back tomorrow if over weekend (PCP on call) if s/s are progressing. Check pulse ox, temp bid. To ER if progressive SOB, pulse ox declining quickly or < 90%. She is retired. Has been to [place] 3 days ago. Works with [employer] weekly, last 7 days ago. Goes out to dinner on Friday nights with 8 people. Wears mask. Had outside lunch yesterday with neighbor. Started feeling a little achy last night.

## 2020-11-01 RX ORDER — SIMVASTATIN 20 MG/1
TABLET, FILM COATED ORAL
Qty: 90 TAB | Refills: 1 | Status: SHIPPED | OUTPATIENT
Start: 2020-11-01 | End: 2021-05-03

## 2020-11-05 DIAGNOSIS — I48.0 PAF (PAROXYSMAL ATRIAL FIBRILLATION) (HCC): Primary | ICD-10-CM

## 2020-11-09 RX ORDER — METOPROLOL SUCCINATE 50 MG/1
50 TABLET, EXTENDED RELEASE ORAL DAILY
Qty: 90 TAB | Refills: 3 | Status: SHIPPED | OUTPATIENT
Start: 2020-11-09 | End: 2021-05-19 | Stop reason: SDUPTHER

## 2020-11-25 RX ORDER — METFORMIN HYDROCHLORIDE 500 MG/1
TABLET, EXTENDED RELEASE ORAL
Qty: 180 TAB | Refills: 1 | Status: SHIPPED | OUTPATIENT
Start: 2020-11-25 | End: 2021-05-17

## 2020-12-07 DIAGNOSIS — E11.9 TYPE 2 DIABETES MELLITUS WITH HEMOGLOBIN A1C GOAL OF LESS THAN 7.0% (HCC): ICD-10-CM

## 2020-12-07 RX ORDER — BLOOD SUGAR DIAGNOSTIC
STRIP MISCELLANEOUS
Qty: 100 STRIP | Refills: 11 | Status: SHIPPED | OUTPATIENT
Start: 2020-12-07 | End: 2022-01-11

## 2020-12-15 ENCOUNTER — OFFICE VISIT (OUTPATIENT)
Dept: INTERNAL MEDICINE CLINIC | Age: 70
End: 2020-12-15
Payer: MEDICARE

## 2020-12-15 VITALS
TEMPERATURE: 98.2 F | RESPIRATION RATE: 16 BRPM | DIASTOLIC BLOOD PRESSURE: 82 MMHG | OXYGEN SATURATION: 98 % | SYSTOLIC BLOOD PRESSURE: 130 MMHG | HEART RATE: 57 BPM | BODY MASS INDEX: 35.12 KG/M2 | WEIGHT: 186 LBS | HEIGHT: 61 IN

## 2020-12-15 DIAGNOSIS — I48.0 PAF (PAROXYSMAL ATRIAL FIBRILLATION) (HCC): ICD-10-CM

## 2020-12-15 DIAGNOSIS — E11.9 TYPE 2 DIABETES MELLITUS WITH HEMOGLOBIN A1C GOAL OF LESS THAN 7.0% (HCC): Primary | ICD-10-CM

## 2020-12-15 DIAGNOSIS — I10 ESSENTIAL HYPERTENSION WITH GOAL BLOOD PRESSURE LESS THAN 130/80: ICD-10-CM

## 2020-12-15 DIAGNOSIS — R10.13 EPIGASTRIC PAIN: ICD-10-CM

## 2020-12-15 DIAGNOSIS — R60.9 EDEMA, UNSPECIFIED TYPE: ICD-10-CM

## 2020-12-15 DIAGNOSIS — E66.01 SEVERE OBESITY (BMI 35.0-39.9) WITH COMORBIDITY (HCC): ICD-10-CM

## 2020-12-15 PROCEDURE — G8752 SYS BP LESS 140: HCPCS | Performed by: INTERNAL MEDICINE

## 2020-12-15 PROCEDURE — G8427 DOCREV CUR MEDS BY ELIG CLIN: HCPCS | Performed by: INTERNAL MEDICINE

## 2020-12-15 PROCEDURE — G8536 NO DOC ELDER MAL SCRN: HCPCS | Performed by: INTERNAL MEDICINE

## 2020-12-15 PROCEDURE — 1090F PRES/ABSN URINE INCON ASSESS: CPT | Performed by: INTERNAL MEDICINE

## 2020-12-15 PROCEDURE — 1101F PT FALLS ASSESS-DOCD LE1/YR: CPT | Performed by: INTERNAL MEDICINE

## 2020-12-15 PROCEDURE — 3044F HG A1C LEVEL LT 7.0%: CPT | Performed by: INTERNAL MEDICINE

## 2020-12-15 PROCEDURE — G0463 HOSPITAL OUTPT CLINIC VISIT: HCPCS | Performed by: INTERNAL MEDICINE

## 2020-12-15 PROCEDURE — 3017F COLORECTAL CA SCREEN DOC REV: CPT | Performed by: INTERNAL MEDICINE

## 2020-12-15 PROCEDURE — G8510 SCR DEP NEG, NO PLAN REQD: HCPCS | Performed by: INTERNAL MEDICINE

## 2020-12-15 PROCEDURE — G8754 DIAS BP LESS 90: HCPCS | Performed by: INTERNAL MEDICINE

## 2020-12-15 PROCEDURE — 2022F DILAT RTA XM EVC RTNOPTHY: CPT | Performed by: INTERNAL MEDICINE

## 2020-12-15 PROCEDURE — G8417 CALC BMI ABV UP PARAM F/U: HCPCS | Performed by: INTERNAL MEDICINE

## 2020-12-15 PROCEDURE — G9899 SCRN MAM PERF RSLTS DOC: HCPCS | Performed by: INTERNAL MEDICINE

## 2020-12-15 PROCEDURE — 99214 OFFICE O/P EST MOD 30 MIN: CPT | Performed by: INTERNAL MEDICINE

## 2020-12-15 PROCEDURE — G8399 PT W/DXA RESULTS DOCUMENT: HCPCS | Performed by: INTERNAL MEDICINE

## 2020-12-15 RX ORDER — SOLIFENACIN SUCCINATE 5 MG/1
TABLET, FILM COATED ORAL
COMMUNITY
End: 2021-06-02 | Stop reason: ALTCHOICE

## 2020-12-15 RX ORDER — AMLODIPINE BESYLATE 2.5 MG/1
TABLET ORAL
Qty: 90 TAB | Refills: 1 | Status: SHIPPED | OUTPATIENT
Start: 2020-12-15 | End: 2021-01-12 | Stop reason: SDUPTHER

## 2020-12-15 RX ORDER — CHOLECALCIFEROL (VITAMIN D3) 125 MCG
CAPSULE ORAL
COMMUNITY

## 2020-12-15 RX ORDER — LISINOPRIL 20 MG/1
TABLET ORAL
Qty: 90 TAB | Refills: 1 | Status: SHIPPED | OUTPATIENT
Start: 2020-12-15 | End: 2021-01-12

## 2020-12-15 NOTE — PROGRESS NOTES
Remy Wolf is a 65 y.o. female who presents today for Hypertension; GERD; and Diabetes  . She has a history of   Patient Active Problem List   Diagnosis Code    [facility] mellitus[facility] E11.9    Hyperlipidemia E78.5    Hepatitis B B19.10    GERD (gastroesophageal reflux disease) K21.9    Rosacea L71.9    AR (allergic rhinitis) J30.9    Intervertebral disk disease M51.9    Uterine prolapse N81.4    Incontinence R32    Essential hypertension with goal blood pressure less than 130/80 I10    Anemia D64.9    PAF (paroxysmal atrial fibrillation) [facility] I48.0    Peripheral neuropathy G62.9    Pre-ulcerative corn or callous L84    Advanced care planning/counseling discussion Z71.89    Type 2 diabetes mellitus with hemoglobin A1c goal of less than 7.0% ([facility]) E11.9    Type 2 diabetes mellitus with nephropathy ([facility]) E11.21    Stage 3 chronic kidney disease N18.30    Severe obesity (BMI 35.0-39. 9) with comorbidity ([facility]) J20.71    Systolic murmur of aorta V57.4    History of arthritis Z87.39    Foot drop M21.379    Snoring R06.83    Mild obstructive sleep apnea G47.33    Primary insomnia F51.01    Insomnia with sleep apnea G47.00, G47.30    Type 2 diabetes mellitus with diabetic neuropathy ([facility]) E11.40    Incomplete emptying of bladder R33.9    Overactive bladder N32.81    Urge incontinence of urine N39.41    Vitamin D deficiency E55.9   . Today patient is here for f/u.   she does have other concerns. Had an URI in late October. COVID testing was negative. Notes that occasionally she wakes up to go to the bathroom at night and after go to the bathroom she has some mild nausea or abdominal pain. We discussed that this may be mild GERD. She will try taking Tums for this. No issues since endoscopy.       Diabetes Mellitus follow-up  Last hemoglobin a1c   Lab Results   Component Value Date/Time    Hemoglobin A1c 6.2 (H) 06/12/2020 12:50 PM    Hemoglobin A1c (POC) 5.6 01/31/2018 11:30 AM    Hemoglobin A1c, External 5.6 04/19/2016     No components found for: POCA1C, HBA1C POC  medication compliance: compliant all of the time. Diabetic diet compliance: compliant most of the time. Home glucose monitoring: fasting values range 110-115. Hypoglycemic episodes:  none. Further diabetic ROS: no polyuria or polydipsia, no chest pain, dyspnea or TIA's, no numbness, tingling or pain in extremities. Microalbuminuria:   Lab Results   Component Value Date/Time    Microalbumin/Creat ratio (mg/g creat) 68 (H) 06/12/2020 12:50 PM    Microalbumin,urine random 1.94 06/12/2020 12:50 PM     Hypertension- still having some edema. Home readings are borderline. Hypertension ROS: taking medications as instructed, no medication side effects noted, no TIA's, no chest pain on exertion, no dyspnea on exertion, no swelling of ankles     reports that she has never smoked. She has never used smokeless tobacco.    reports no history of alcohol use. BP Readings from Last 2 Encounters:   12/15/20 130/82   09/16/20 132/78     PAfib: Remains off oral anticoagulation. Follows with cardiology regularly. She is on a baby aspirin. Notes palpitations have not been present. ROS  Review of Systems   Constitutional: Negative for chills, fever and weight loss. HENT: Negative for congestion and sore throat. Eyes: Negative for blurred vision, double vision, photophobia and pain. Respiratory: Negative for cough and shortness of breath. Cardiovascular: Positive for leg swelling (mild). Negative for chest pain and palpitations. Gastrointestinal: Negative for abdominal pain, constipation, diarrhea, heartburn, nausea and vomiting. Genitourinary: Negative for dysuria, frequency, hematuria and urgency. Musculoskeletal: Negative for back pain, joint pain, myalgias and neck pain. Skin: Negative for rash. Neurological: Negative. Negative for headaches.    Endo/Heme/Allergies: Does not bruise/bleed easily. Psychiatric/Behavioral: Negative for memory loss and suicidal ideas. Visit Vitals  /82   Pulse (!) 57   Temp 98.2 °F (36.8 °C) (Oral)   Resp 16   Ht 5' 1\" (1.549 m)   Wt 186 lb (84.4 kg)   SpO2 98%   BMI 35.14 kg/m²       Physical Exam  Constitutional:       General: She is not in acute distress. Appearance: She is well-developed. HENT:      Head: Normocephalic and atraumatic. Neck:      Musculoskeletal: Normal range of motion and neck supple. Thyroid: No thyromegaly. Cardiovascular:      Rate and Rhythm: Normal rate and regular rhythm. Heart sounds: No murmur. Pulmonary:      Effort: Pulmonary effort is normal.      Breath sounds: Normal breath sounds. No wheezing. Abdominal:      General: Bowel sounds are normal. There is no distension. Palpations: Abdomen is soft. Musculoskeletal:      Comments: Mild lower extremity edema   Skin:     General: Skin is warm and dry. Neurological:      Mental Status: She is alert and oriented to person, place, and time. Cranial Nerves: No cranial nerve deficit. Psychiatric:         Behavior: Behavior normal.           Current Outpatient Medications   Medication Sig    solifenacin (VESICARE) 5 mg tablet solifenacin 5 mg tablet   Take 1 tablet every day by oral route.  cholecalciferol, vitamin D3, (Vitamin D3) 50 mcg (2,000 unit) tab Take  by mouth.  amLODIPine (NORVASC) 2.5 mg tablet TAKE 1 TABLET BY MOUTH EVERY DAY    lisinopriL (PRINIVIL, ZESTRIL) 20 mg tablet TAKE 1 TABLET BY MOUTH EVERY DAY    glucose blood VI test strips (FreeStyle Test) strip TEST FASTING BLOOD SUGAR ONE TIME DAILY    metFORMIN ER (GLUCOPHAGE XR) 500 mg tablet TAKE 2 TABLETS BY MOUTH EVERY DAY WITH DINNER    metoprolol succinate (TOPROL-XL) 50 mg XL tablet Take 1 Tab by mouth daily.     lancets (FreeStyle Lancets) 28 gauge misc TEST ONCE DAILY    simvastatin (ZOCOR) 20 mg tablet TAKE 1 TABLET BY MOUTH AT BEDTIME FOR CHOLESTEROL    fluticasone propionate (FLONASE) 50 mcg/actuation nasal spray 2 Sprays by Both Nostrils route daily.  guaifenesin/phenylephrine HCl (CRANTEX PO) Take  by mouth.  montelukast (SINGULAIR) 10 mg tablet     esomeprazole (NEXIUM) 20 mg capsule TAKE 1 CAPSULE BY MOUTH EVERY DAY    omega 3-DHA-EPA-fish oil (FISH OIL) 1,000 mg (120 mg-180 mg) capsule Take 4 Caps by mouth daily.  CALCIUM CARBONATE (TUMS PO) Take  by mouth daily as needed.  CALCIUM CARBONATE/VITAMIN D3 (CALCIUM + D PO) Take  by mouth two (2) times a day.  MULTI-VITAMIN PO Take 1 Tab by mouth daily.  aspirin delayed-release 81 mg tablet take 81 mg by mouth daily.  albuterol (PROVENTIL HFA, VENTOLIN HFA, PROAIR HFA) 90 mcg/actuation inhaler Take  by inhalation.  ketotifen (ZADITOR) 0.025 % (0.035 %) ophthalmic solution Administer 1 Drop to both eyes two (2) times a day.  oxybutynin chloride XL (DITROPAN XL) 10 mg CR tablet Take 10 mg by mouth daily. No current facility-administered medications for this visit.          Past Medical History:   Diagnosis Date    Anemia     Cystocele     Diabetes ([address]     Diabetic neuropathy, painful ([address]     Hepatitis B     Hypercholesterolemia     Hypertension     Microalbuminuria     Mild nonproliferative diabetic retinopathy (HCC)     [facility]ox[facility]mal [facility]) [facility]     [facility] follows    Rectocele     Retinopathy     Rosacea     Ruptured disc, cervical     Sleep apnea     Stenosis, cervical spine       Past Surgical History:   Procedure Laterality Date    HX BREAST BIOPSY Left 2009    HX HERNIA REPAIR      umbilical    HX MENISCUS REPAIR  7/11/14    HX OTHER SURGICAL  05/09/2018    Bladder Botox     HX OVARIAN CYST REMOVAL  1973      Social History     Tobacco Use    Smoking status: Never Smoker    Smokeless tobacco: Never Used   Substance Use Topics    Alcohol use: No     Alcohol/week: 0.0 standard drinks     Comment: very rarely       Family History   Problem Relation Age of Onset    Hypertension Mother     Thyroid Disease Mother     Heart Failure Father     Heart Disease Father     Thyroid Disease Sister     Thyroid Disease Sister     Heart Attack Other     Cancer Maternal Aunt         esophageal    Cancer Paternal Aunt         breast        Allergies   Allergen Reactions    Sulfa (Sulfonamide Antibiotics) Unknown (comments)        Assessment/Plan  Diagnoses and all orders for this visit:    1. Type 2 diabetes mellitus with hemoglobin A1c goal of less than 7.0% ([facility])-blood sugars well controlled. -     METABOLIC PANEL, BASIC; Future  -     HEMOGLOBIN A1C WITH EAG; Future    2. Essential hypertension with goal blood pressure less than 130/80-stable, but lower extremity edema is a bit worse. We will try to back off Norvasc to 2.5 mg and increase lisinopril to 20 mg. In 2 weeks if blood pressures are still elevated will increase lisinopril to 40 mg.  -     amLODIPine (NORVASC) 2.5 mg tablet; TAKE 1 TABLET BY MOUTH EVERY DAY  -     lisinopriL (PRINIVIL, ZESTRIL) 20 mg tablet; TAKE 1 TABLET BY MOUTH EVERY DAY    3. PAF (paroxysmal atrial fibrillation) ([facility])-following with cardiology. On aspirin    4. Severe obesity (BMI 35.0-39. 9) with comorbidity ([facility])-counseled on weight loss. Weight has been stable through pandemic    5. Edema, unspecified type-see above. Discussed elevation as well as compression    6. Epigastric pain-nocturnal episodes. Discussed that this may be heartburn. EGD was unremarkable. Try taking Tums. Can ultrasound if this persists. Gus Leary MD  12/15/2020    This note was created with the help of speech recognition software Torrey Saldana) and may contain some 'sound alike' errors.

## 2020-12-16 LAB
ANION GAP SERPL CALC-SCNC: 6 MMOL/L (ref 5–15)
BUN SERPL-MCNC: 25 MG/DL (ref 6–20)
BUN/CREAT SERPL: 20 (ref 12–20)
CALCIUM SERPL-MCNC: 9.4 MG/DL (ref 8.5–10.1)
CHLORIDE SERPL-SCNC: 109 MMOL/L (ref 97–108)
CO2 SERPL-SCNC: 24 MMOL/L (ref 21–32)
CREAT SERPL-MCNC: 1.23 MG/DL (ref 0.55–1.02)
EST. AVERAGE GLUCOSE BLD GHB EST-MCNC: 126 MG/DL
GLUCOSE SERPL-MCNC: 109 MG/DL (ref 65–100)
HBA1C MFR BLD: 6 % (ref 4–5.6)
POTASSIUM SERPL-SCNC: 4.9 MMOL/L (ref 3.5–5.1)
SODIUM SERPL-SCNC: 139 MMOL/L (ref 136–145)

## 2020-12-24 ENCOUNTER — TELEPHONE (OUTPATIENT)
Dept: INTERNAL MEDICINE CLINIC | Age: 70
End: 2020-12-24

## 2020-12-24 NOTE — TELEPHONE ENCOUNTER
----- Message from [place] sent at 12/24/2020 10:18 AM EST -----  Regarding: Dr. Taylor Gonzalez Message/Vendor Calls    Caller's first and last name: Schuyler Dumontbrody      Reason for call: Dr. Adilene Jefferson had started the pt on a new medication that is not working well for her. Requesting a call back to discuss.        Callback required yes/no and why: yes      Best contact number(s): [phone]      Details to clarify the request: [phone] [facility]

## 2020-12-28 NOTE — TELEPHONE ENCOUNTER
Pt has concerns about her BP still running high, about [phone] for systolic, but diastolic seems to be running within a normal range of 70-80. Informed pt during her next in office apt to bring her BP machine with her as it may be out of calibration. Also informed pt to wait for her pulmonologist to get back with her about her sleep apnea.  Pt states understanding

## 2021-01-12 DIAGNOSIS — I10 ESSENTIAL HYPERTENSION WITH GOAL BLOOD PRESSURE LESS THAN 130/80: ICD-10-CM

## 2021-01-12 RX ORDER — LISINOPRIL 40 MG/1
TABLET ORAL
Qty: 90 TAB | Refills: 1 | Status: SHIPPED | OUTPATIENT
Start: 2021-01-12 | End: 2021-05-24 | Stop reason: SDUPTHER

## 2021-01-12 RX ORDER — AMLODIPINE BESYLATE 2.5 MG/1
TABLET ORAL
Qty: 90 TAB | Refills: 1 | Status: SHIPPED | OUTPATIENT
Start: 2021-01-12 | End: 2021-07-07 | Stop reason: SDUPTHER

## 2021-03-01 ENCOUNTER — OFFICE VISIT (OUTPATIENT)
Dept: INTERNAL MEDICINE CLINIC | Age: 71
End: 2021-03-01
Payer: MEDICARE

## 2021-03-01 VITALS
SYSTOLIC BLOOD PRESSURE: 138 MMHG | HEART RATE: 56 BPM | RESPIRATION RATE: 16 BRPM | DIASTOLIC BLOOD PRESSURE: 68 MMHG | BODY MASS INDEX: 35.87 KG/M2 | WEIGHT: 190 LBS | HEIGHT: 61 IN | OXYGEN SATURATION: 96 % | TEMPERATURE: 98.3 F

## 2021-03-01 DIAGNOSIS — Z01.818 PREOP GENERAL PHYSICAL EXAM: ICD-10-CM

## 2021-03-01 DIAGNOSIS — E11.9 TYPE 2 DIABETES MELLITUS WITHOUT COMPLICATION, WITHOUT LONG-TERM CURRENT USE OF INSULIN (HCC): ICD-10-CM

## 2021-03-01 DIAGNOSIS — N18.32 STAGE 3B CHRONIC KIDNEY DISEASE (HCC): ICD-10-CM

## 2021-03-01 DIAGNOSIS — I10 ESSENTIAL HYPERTENSION: ICD-10-CM

## 2021-03-01 DIAGNOSIS — H25.9 SENILE CATARACT OF LEFT EYE, UNSPECIFIED AGE-RELATED CATARACT TYPE: Primary | ICD-10-CM

## 2021-03-01 DIAGNOSIS — M79.662 PAIN OF LEFT CALF: ICD-10-CM

## 2021-03-01 PROCEDURE — G8536 NO DOC ELDER MAL SCRN: HCPCS | Performed by: NURSE PRACTITIONER

## 2021-03-01 PROCEDURE — G8399 PT W/DXA RESULTS DOCUMENT: HCPCS | Performed by: NURSE PRACTITIONER

## 2021-03-01 PROCEDURE — 3046F HEMOGLOBIN A1C LEVEL >9.0%: CPT | Performed by: NURSE PRACTITIONER

## 2021-03-01 PROCEDURE — G8417 CALC BMI ABV UP PARAM F/U: HCPCS | Performed by: NURSE PRACTITIONER

## 2021-03-01 PROCEDURE — 3017F COLORECTAL CA SCREEN DOC REV: CPT | Performed by: NURSE PRACTITIONER

## 2021-03-01 PROCEDURE — 2022F DILAT RTA XM EVC RTNOPTHY: CPT | Performed by: NURSE PRACTITIONER

## 2021-03-01 PROCEDURE — 1101F PT FALLS ASSESS-DOCD LE1/YR: CPT | Performed by: NURSE PRACTITIONER

## 2021-03-01 PROCEDURE — G0463 HOSPITAL OUTPT CLINIC VISIT: HCPCS | Performed by: NURSE PRACTITIONER

## 2021-03-01 PROCEDURE — G8754 DIAS BP LESS 90: HCPCS | Performed by: NURSE PRACTITIONER

## 2021-03-01 PROCEDURE — 1090F PRES/ABSN URINE INCON ASSESS: CPT | Performed by: NURSE PRACTITIONER

## 2021-03-01 PROCEDURE — G9899 SCRN MAM PERF RSLTS DOC: HCPCS | Performed by: NURSE PRACTITIONER

## 2021-03-01 PROCEDURE — G8752 SYS BP LESS 140: HCPCS | Performed by: NURSE PRACTITIONER

## 2021-03-01 PROCEDURE — G8432 DEP SCR NOT DOC, RNG: HCPCS | Performed by: NURSE PRACTITIONER

## 2021-03-01 PROCEDURE — G8427 DOCREV CUR MEDS BY ELIG CLIN: HCPCS | Performed by: NURSE PRACTITIONER

## 2021-03-01 PROCEDURE — 99214 OFFICE O/P EST MOD 30 MIN: CPT | Performed by: NURSE PRACTITIONER

## 2021-03-01 NOTE — PROGRESS NOTES
HISTORY OF PRESENT ILLNESS  Danuta Sherman is a 66 y.o. female. HPI  Danuta Sherman was referred for evaluation by:Dr. [facility] for Pre- Op Evaluation. Please see encounter details and orders for consultative summary. Type of surgery : Cataract extraction with lens implant  Surgery site : Left eye  Anesthesia type: Regional anesthesia  Date of procedure:  3/25/2021    Allergies: Allergies   Allergen Reactions    Sulfa (Sulfonamide Antibiotics) Unknown (comments)     Latex allergy: no  Prior reactions to anesthesia:  none    Functional status:  she is able to ambulate up 1 flights of step without shortness of breath, chest pain  Prior cardiac evaluation:   Sees Cardiology every 6 months; Paroxysmal Atrial Fibrillation controlled on Beta blocker; on ASA daily    Has noticed some soreness to left lateral calf over the past several days. Does not recall any specific injury to area. Denies any unusual edema in left leg; she has tendency to keep edema in bilateral lower legs but this has improved since her dose of Amlodipine was decreased and she has been wearing compression stockings on a more consistent basis. Denies warmth, redness, streaking in left calf. Blood pressure has been more controlled on current regimen of Lisinopril 40 mg daily, Amlodipine 2.5 mg daily, Metoprolol XL 50 mg daily. Denies chest pain, shortness of breath, headaches. Diabetes has been controlled on Metformin; admits that she has not been checking her blood sugars as regularly but has been running in 110's-120's fasting. She is very concerned about kidney function. Admits that she does not always stay well hydrated but plans to work on that.     Lab Results   Component Value Date/Time    Hemoglobin A1c 6.0 (H) 12/15/2020 12:57 PM    Hemoglobin A1c (POC) 5.6 01/31/2018 11:30 AM    Hemoglobin A1c, External 5.6 04/19/2016     Current Outpatient Medications   Medication Sig    lisinopriL (PRINIVIL, ZESTRIL) 40 mg tablet TAKE 1 TABLET BY MOUTH EVERY DAY    amLODIPine (NORVASC) 2.5 mg tablet TAKE 1 TABLET BY MOUTH EVERY DAY    solifenacin (VESICARE) 5 mg tablet solifenacin 5 mg tablet   Take 1 tablet every day by oral route.  cholecalciferol, vitamin D3, (Vitamin D3) 50 mcg (2,000 unit) tab Take  by mouth.  glucose blood VI test strips (FreeStyle Test) strip TEST FASTING BLOOD SUGAR ONE TIME DAILY    metFORMIN ER (GLUCOPHAGE XR) 500 mg tablet TAKE 2 TABLETS BY MOUTH EVERY DAY WITH DINNER    metoprolol succinate (TOPROL-XL) 50 mg XL tablet Take 1 Tab by mouth daily.  lancets (FreeStyle Lancets) 28 gauge misc TEST ONCE DAILY    simvastatin (ZOCOR) 20 mg tablet TAKE 1 TABLET BY MOUTH AT BEDTIME FOR CHOLESTEROL    fluticasone propionate (FLONASE) 50 mcg/actuation nasal spray 2 Sprays by Both Nostrils route daily.  guaifenesin/phenylephrine HCl (CRANTEX PO) Take  by mouth.  montelukast (SINGULAIR) 10 mg tablet     esomeprazole (NEXIUM) 20 mg capsule TAKE 1 CAPSULE BY MOUTH EVERY DAY    omega 3-DHA-EPA-fish oil (FISH OIL) 1,000 mg (120 mg-180 mg) capsule Take 4 Caps by mouth daily.  CALCIUM CARBONATE (TUMS PO) Take  by mouth daily as needed.  CALCIUM CARBONATE/VITAMIN D3 (CALCIUM + D PO) Take  by mouth two (2) times a day.  MULTI-VITAMIN PO Take 1 Tab by mouth daily.  aspirin delayed-release 81 mg tablet take 81 mg by mouth daily. No current facility-administered medications for this visit.       Past Medical History:   Diagnosis Date    Anemia     Cystocele     Diabetes [facility] [address]     Diabetic neuropathy, painful (HCC)     Hepatitis B     Hypercholesterolemia     Hypertension     Microalbuminuria     Mild nonproliferative diabetic retinopathy (HCC)     PAF (paroxysmal atrial fibrillation) ([facility] [address]     Chip 3-24-13 ER-Rolf follows    Rectocele     Retinopathy     Rosacea     Ruptured disc, cervical     Sleep apnea     Stenosis, cervical spine      Past Surgical History:   Procedure Laterality Date    HX BREAST BIOPSY Left 2009    HX CATARACT REMOVAL Right     HX COLONOSCOPY      HX DILATION AND CURETTAGE      HX ENDOSCOPY      HX HERNIA REPAIR      umbilical    HX MENISCUS REPAIR  7/11/14    HX OTHER SURGICAL  05/09/2018    Bladder Botox     HX OVARIAN CYST REMOVAL  1973     Social History     Tobacco Use    Smoking status: Never Smoker    Smokeless tobacco: Never Used   Substance Use Topics    Alcohol use: No     Alcohol/week: 0.0 standard drinks     Comment: very rarely     Drug use: No       Blood pressure 138/68, pulse (!) 56, temperature 98.3 °F (36.8 °C), temperature source Oral, resp. rate 16, height 5' 1\" (1.549 m), weight 190 lb (86.2 kg), SpO2 96 %. Review of Systems   Constitutional: Negative for chills, fever and malaise/fatigue. HENT: Negative for congestion and sore throat. Eyes: Positive for blurred vision. Respiratory: Negative for cough, sputum production and shortness of breath. Cardiovascular: Positive for leg swelling. Negative for chest pain, palpitations and claudication. Gastrointestinal: Negative for blood in stool, constipation, diarrhea, nausea and vomiting. Genitourinary: Positive for frequency. Negative for dysuria and urgency. Musculoskeletal: Positive for joint pain (left lateral calf). Skin: Negative for rash. Neurological: Negative for dizziness, tingling and headaches. Psychiatric/Behavioral: Negative for depression. The patient is not nervous/anxious. Physical Exam  Vitals signs and nursing note reviewed. Constitutional:       General: She is not in acute distress. Appearance: Normal appearance. HENT:      Head: Normocephalic and atraumatic. Right Ear: Tympanic membrane, ear canal and external ear normal.      Left Ear: Tympanic membrane, ear canal and external ear normal.      Nose: Nose normal.      Mouth/Throat:      Mouth: Mucous membranes are moist.      Pharynx: Oropharynx is clear.    Eyes: Extraocular Movements: Extraocular movements intact. Conjunctiva/sclera: Conjunctivae normal.   Neck:      Musculoskeletal: Normal range of motion and neck supple. Cardiovascular:      Rate and Rhythm: Normal rate and regular rhythm. Heart sounds: Murmur present. Pulmonary:      Effort: Pulmonary effort is normal.      Breath sounds: Normal breath sounds. No wheezing or rhonchi. Abdominal:      General: Bowel sounds are normal.      Palpations: Abdomen is soft. Tenderness: There is no abdominal tenderness. Musculoskeletal:      Left lower leg: She exhibits tenderness. Legs:       Comments: Mild tenderness to upper aspect of left lateral calf; posterior calf nontender; no warmth, streaking. Minimal bilateral ankle edema present. No left calf edema   Skin:     General: Skin is warm and dry. Findings: No rash. Neurological:      General: No focal deficit present. Mental Status: She is alert and oriented to person, place, and time. Psychiatric:         Mood and Affect: Mood normal.         ASSESSMENT and PLAN  Diagnoses and all orders for this visit:    1. Senile cataract of left eye, unspecified age-related cataract type    2. Preop general physical exam -- considered medically stable for upcoming low risk Left eye Cataract extraction with implantation of intraocular lens    3. Pain of left calf -- most likely MSK strain; symptoms improving    4. Essential hypertension -- stable on current regimen; plans to purchase a new blood pressure machine  -     METABOLIC PANEL, BASIC; Future    5. Type 2 diabetes mellitus without complication, without long-term current use of insulin (HCC) -- has been stable on Metformin    6.  Stage 3b chronic kidney disease -- increase intake of fluids; continue to monitor  -     METABOLIC PANEL, BASIC; Future      lab results and schedule of future lab studies reviewed with patient  reviewed diet, exercise and weight control  cardiovascular risk and specific lipid/LDL goals reviewed  reviewed medications and side effects in detail

## 2021-03-01 NOTE — PATIENT INSTRUCTIONS
Cataract Surgery: Before Your Surgery  What is cataract surgery? Cataracts are cloudy areas in the lens of your eye. Your lens is behind the colored part of your eye (iris). Its job is to focus light onto the back of your eye. In some people, cataracts prevent light from reaching the back of the eye. This can cause vision problems. Cataract surgery helps you see better. It replaces your natural lens, which has become cloudy, with a clear artificial one. There are two types of cataract surgery. Phacoemulsification (say \"toww-ra-ji-dlq-lxk-vzv-MAURA-shun\") is the most common type. The doctor makes a small cut in your eye. This cut is called an incision. The doctor uses a special ultrasound tool to break your cloudy lens apart. Sometimes a laser is used too. Then he or she removes the small pieces of the lens through the incision. In most cases, the doctor then inserts an artificial lens through the incision. Most people do not need stitches, because the incision is so small. If the doctor is not able to put in an artificial lens, you can wear a contact lens or thick glasses in place of your natural lens. Extracapsular extraction is a less common type of cataract surgery. The doctor makes a larger incision to remove the whole lens at once. After the doctor removes the lens, he or she stitches up the incision. Recovery from this type of surgery takes longer. Before either surgery, the doctor puts numbing drops in your eye. Some doctors use a shot instead. You may also get medicine to make you feel relaxed. You probably will not feel much pain. The surgery takes about 20 to 40 minutes. After surgery, you may have a bandage or shield on your eye. You will probably go home from surgery after 1 hour in the recovery room. Most people see better in 1 to 3 days. You may be able to go back to work or your normal routine in a few days.  It could take 3 to 10 weeks for your eye to completely heal. After your eye heals, you may still need to wear glasses, especially for reading. Follow-up care is a key part of your treatment and safety. Be sure to make and go to all appointments, and call your doctor if you are having problems. It's also a good idea to know your test results and keep a list of the medicines you take. How do you prepare for surgery? Surgery can be stressful. This information will help you understand what you can expect. And it will help you safely prepare for surgery. Preparing for surgery    · Be sure you have someone to take you home. Anesthesia and pain medicine will make it unsafe for you to drive or get home on your own.     · Understand exactly what surgery is planned, along with the risks, benefits, and other options.     · If you take aspirin or some other blood thinner, ask your doctor if you should stop taking it before your surgery. Make sure that you understand exactly what your doctor wants you to do. These medicines increase the risk of bleeding.     · Tell your doctor ALL the medicines, vitamins, supplements, and herbal remedies you take. Some may increase the risk of problems during your surgery. Your doctor will tell you if you should stop taking any of them before the surgery and how soon to do it.     · Make sure your doctor and the hospital have a copy of your advance directive. If you don't have one, you may want to prepare one. It lets others know your health care wishes. It's a good thing to have before any type of surgery or procedure. What happens on the day of surgery? · Follow the instructions exactly about when to stop eating and drinking. If you don't, your surgery may be canceled. If your doctor told you to take your medicines on the day of surgery, take them with only a sip of water.     · Take a bath or shower before you come in for your surgery. Do not apply lotions, perfumes, deodorants, or nail polish.     · Take off all jewelry and piercings.  And take out contact lenses, if you wear them. At the hospital or surgery center   · Bring a picture ID.     · The area for surgery is often marked to make sure there are no errors.     · You will be kept comfortable and safe by your anesthesia provider. The anesthesia may make you sleep. Or it may just numb the area being worked on.     · The surgery will take about 20 to 40 minutes. When should you call your doctor? · You have questions or concerns.     · You don't understand how to prepare for your surgery.     · You become ill before the surgery (such as fever, flu, or a cold).     · You need to reschedule or have changed your mind about having the surgery. Where can you learn more? Go to http://www.gray.com/  Enter K474 in the search box to learn more about \"Cataract Surgery: Before Your Surgery. \"  Current as of: December 18, 2019               Content Version: 12.6  © 7552-7865 [employer]. Care instructions adapted under license by [employer] (which disclaims liability or warranty for this information). If you have questions about a medical condition or this instruction, always ask your healthcare professional. [place] [address] any warranty or liability for your use of this information. Kidney Disease and High Blood Pressure: Care Instructions  Your Care Instructions     Long-term (chronic) kidney disease happens when the kidneys cannot remove waste and keep your body's fluids and chemicals in balance. Usually, the kidneys remove waste from the blood through the urine. When the kidneys are not working well, waste can build up so much that it poisons the body. Kidney disease can make you very tired. It also can cause swelling, or edema, in your legs or other areas of your body. High blood pressure is one of the major causes of chronic kidney disease. And kidney disease can also cause high blood pressure.  No matter which came first, having high blood pressure damages the tiny blood vessels in the kidneys. If you have high blood pressure, it is important to lower it. There are many things you can do to lower your blood pressure, which may help slow or stop the damage to your kidneys. Follow-up care is a key part of your treatment and safety. Be sure to make and go to all appointments, and call your doctor if you are having problems. It's also a good idea to know your test results and keep a list of the medicines you take. How can you care for yourself at home? · Be safe with medicines. Take your medicines exactly as prescribed. Call your doctor if you have any problems with your medicine. You will probably need more than one medicine to lower your blood pressure. You will get more details on the specific medicines your doctor prescribes. · Work with your doctor and a dietitian to plan meals that have the right amount of nutrients for you. You will probably have to limit salt, fluids, and protein. · Stay at a healthy weight. This is very important if you put on weight around the waist. Losing even 10 pounds can help you lower your blood pressure. · Manage other health problems such as diabetes and high cholesterol. You can help lower your risk for heart disease and blood vessel problems with a healthy lifestyle along with medicines. · Do not take ibuprofen (Advil, Motrin) or naproxen (Aleve), or similar medicines, unless your doctor tells you to. They may make chronic kidney disease worse. It is okay to take acetaminophen (Tylenol). · If your doctor recommends it, get more exercise. Walking is a good choice. Bit by bit, increase the amount you walk every day. Try for at least 30 minutes on most days of the week. You also may want to swim, bike, or do other activities. · Limit or avoid alcohol. Talk to your doctor about whether you can drink any alcohol. · Do not smoke or allow others to smoke around you.  If you need help quitting, talk to your doctor about stop-smoking programs and medicines. These can increase your chances of quitting for good. When should you call for help? Call 911 anytime you think you may need emergency care. For example, call if:    · You passed out (lost consciousness). Call your doctor now or seek immediate medical care if:    · You have new or worse nausea and vomiting.     · You have much less urine than normal, or you have no urine.     · You are feeling confused or cannot think clearly.     · You have new or more blood in your urine.     · You have new swelling.     · You are dizzy or lightheaded, or you feel like you may faint. Watch closely for changes in your health, and be sure to contact your doctor if:    · You do not get better as expected. Where can you learn more? Go to http://www.gray.com/  Enter V616 in the search box to learn more about \"Kidney Disease and High Blood Pressure: Care Instructions. \"  Current as of: April 15, 2020               Content Version: 12.6  © 2006-2020 [employer]. Care instructions adapted under license by [employer] (which disclaims liability or warranty for this information). If you have questions about a medical condition or this instruction, always ask your healthcare professional. [address] any warranty or liability for your use of this information.

## 2021-03-02 LAB
BUN SERPL-MCNC: 24 MG/DL (ref 8–27)
BUN/CREAT SERPL: 20 (ref 12–28)
CALCIUM SERPL-MCNC: 9.8 MG/DL (ref 8.7–10.3)
CHLORIDE SERPL-SCNC: 107 MMOL/L (ref 96–106)
CO2 SERPL-SCNC: 19 MMOL/L (ref 20–29)
CREAT SERPL-MCNC: 1.2 MG/DL (ref 0.57–1)
GLUCOSE SERPL-MCNC: 116 MG/DL (ref 65–99)
INTERPRETATION: NORMAL
POTASSIUM SERPL-SCNC: 4.8 MMOL/L (ref 3.5–5.2)
SODIUM SERPL-SCNC: 144 MMOL/L (ref 134–144)

## 2021-03-24 ENCOUNTER — OFFICE VISIT (OUTPATIENT)
Dept: CARDIOLOGY CLINIC | Age: 71
End: 2021-03-24
Payer: MEDICARE

## 2021-03-24 VITALS
BODY MASS INDEX: 36.63 KG/M2 | DIASTOLIC BLOOD PRESSURE: 76 MMHG | RESPIRATION RATE: 16 BRPM | HEART RATE: 75 BPM | WEIGHT: 194 LBS | HEIGHT: 61 IN | SYSTOLIC BLOOD PRESSURE: 130 MMHG | OXYGEN SATURATION: 99 %

## 2021-03-24 DIAGNOSIS — I48.0 PAF (PAROXYSMAL ATRIAL FIBRILLATION) (HCC): Primary | ICD-10-CM

## 2021-03-24 DIAGNOSIS — R06.02 SOB (SHORTNESS OF BREATH): ICD-10-CM

## 2021-03-24 DIAGNOSIS — I10 ESSENTIAL HYPERTENSION: ICD-10-CM

## 2021-03-24 DIAGNOSIS — E78.00 PURE HYPERCHOLESTEROLEMIA: ICD-10-CM

## 2021-03-24 DIAGNOSIS — I35.8 SYSTOLIC MURMUR OF AORTA: ICD-10-CM

## 2021-03-24 PROCEDURE — G9899 SCRN MAM PERF RSLTS DOC: HCPCS | Performed by: SPECIALIST

## 2021-03-24 PROCEDURE — 1101F PT FALLS ASSESS-DOCD LE1/YR: CPT | Performed by: SPECIALIST

## 2021-03-24 PROCEDURE — G8536 NO DOC ELDER MAL SCRN: HCPCS | Performed by: SPECIALIST

## 2021-03-24 PROCEDURE — G8417 CALC BMI ABV UP PARAM F/U: HCPCS | Performed by: SPECIALIST

## 2021-03-24 PROCEDURE — G8427 DOCREV CUR MEDS BY ELIG CLIN: HCPCS | Performed by: SPECIALIST

## 2021-03-24 PROCEDURE — 3017F COLORECTAL CA SCREEN DOC REV: CPT | Performed by: SPECIALIST

## 2021-03-24 PROCEDURE — G8510 SCR DEP NEG, NO PLAN REQD: HCPCS | Performed by: SPECIALIST

## 2021-03-24 PROCEDURE — G8754 DIAS BP LESS 90: HCPCS | Performed by: SPECIALIST

## 2021-03-24 PROCEDURE — 1090F PRES/ABSN URINE INCON ASSESS: CPT | Performed by: SPECIALIST

## 2021-03-24 PROCEDURE — 99214 OFFICE O/P EST MOD 30 MIN: CPT | Performed by: SPECIALIST

## 2021-03-24 PROCEDURE — G8752 SYS BP LESS 140: HCPCS | Performed by: SPECIALIST

## 2021-03-24 PROCEDURE — G8399 PT W/DXA RESULTS DOCUMENT: HCPCS | Performed by: SPECIALIST

## 2021-03-24 PROCEDURE — G0463 HOSPITAL OUTPT CLINIC VISIT: HCPCS | Performed by: SPECIALIST

## 2021-03-24 NOTE — Clinical Note
3/24/2021 Patient: Linda Lopez YOB: 1950 Date of Visit: 3/24/2021 Tai Grullon MD 
[address] [facility] [address] [facility] [address] [address] [employer] Dear Tai Grullon MD, Thank you for referring Ms. Earline Strong to [facility] for evaluation. My notes for this consultation are attached. If you have questions, please do not hesitate to call me. I look forward to following your patient along with you.  
 
 
Sincerely, 
 
Dianne Gaspar MD

## 2021-03-24 NOTE — PATIENT INSTRUCTIONS
Please have your blood work completed today. You have been scheduled for a lexiscan nuclear stress test and an echocaridogram.  We will call with results. Please arrive 15 minutes prior to your appointment. Wear comfortable clothing and walking or athletic shoes. Do not eat or drink anything, except water, for at least 2 hours prior to your appointment. Avoid tobacco products for at least 6 hours prior to your test.    Do not eat or drink anything containing caffeine, including but not limited to the following: chocolate, regular and decaffeinated coffee, soft drinks, or tea for at least 12-24 hours prior to your test.    Do not hold your scheduled medications prior to your test.     Your test will be performed on a 1 day protocol. This is determined by your height, weight, and other risk factors. For a 1 day test, please allow for 4 hours in the office that day.

## 2021-03-24 NOTE — PROGRESS NOTES
Rosa Montez is a 66 y.o. female    Chief Complaint   Patient presents with    Follow-up     6 mo f/u     Pt has her own blood pressure machine. Checked her bp on it and it was 150/88  Pt was wondering why the numbers are very different on hers. Chest pain : NO  SOB : NO  Dizziness : NO  Edema : Yes, it is getting much better  Refills : NO    Visit Vitals  /76   Pulse 75   Resp 16   Ht 5' 1\" (1.549 m)   Wt 194 lb (88 kg)   SpO2 99%   BMI 36.66 kg/m²       1. Have you been to the ER, urgent care clinic since your last visit? Hospitalized since your last visit? NO    2. Have you seen or consulted any other health care providers outside of the [address] [place] [address] since your last visit? Include any pap smears or colon screening.  NO

## 2021-03-24 NOTE — PROGRESS NOTES
Rizwan Quiñones     1950       Jennifer Hernandez MD, [facility]  Date of Visit-3/24/2021   PCP is Adiel White, [address] [facility] [place]  [facility]  HPI:  Rizwan Quiñones is a 66 y.o. female   11 month f/u of PAF. Echo in August 2018 was normal and loop showed no AF. Pt notes she has been fatigued and sedentary. She reports that her LE edema is worse than before so she is wearing compression stockings most days. She reports that she has gained weight since December and believes it is fluid. She states that she had a lot of SOB when walking up to the office. Pt notes she is having trouble taking her BP at home. She states there are times where she takes her BP at home and it is much higher than what she gets in a doctor's office later that day. She has tried 5 different BP cuffs. She also notes that her blood sugar has been higher than it usually is. Pt has received the COVID vaccine. Denies chest pain, syncope or shortness of breath at rest, has no tachycardia, palpitations or sense of arrhythmia. Assessment/Plan:     Patient Instructions   Please have your blood work completed today. You have been scheduled for a lexiscan nuclear stress test and an echocaridogram.  We will call with results. Please arrive 15 minutes prior to your appointment. Wear comfortable clothing and walking or athletic shoes. Do not eat or drink anything, except water, for at least 2 hours prior to your appointment. Avoid tobacco products for at least 6 hours prior to your test.    Do not eat or drink anything containing caffeine, including but not limited to the following: chocolate, regular and decaffeinated coffee, soft drinks, or tea for at least 12-24 hours prior to your test.    Do not hold your scheduled medications prior to your test.     Your test will be performed on a 1 day protocol. This is determined by your height, weight, and other risk factors.  For a 1 day test, please allow for 4 hours in the office that day. 1. PAF (paroxysmal atrial fibrillation) (HCC)  Appears to be in sinus. She has a variety of complaints including fatigue and edema. I would suggest we get an echo and stress. I would also like to see a pro-BNP. In the past we felt that the edema might be due to Amlodipine. If this is unrevealing then we would do a loop monitor. It had been a long time since she had AF and she had declined [address]. 2. Systolic murmur of aorta  Not heard today but she does have complaints of COSTA and edema. Will check BNP. 3. Essential hypertension  She is most upset about her variable BP. She has had a lot of trouble with cuffs. Here we get a normal BP. She'd had her Amlodipine reduced and her Lisinopril increased. Will continue to try to work on a BP diary. At goal , meds and possible side effects reviewed and patient denies  Key CAD CHF Meds             lisinopriL (PRINIVIL, ZESTRIL) 40 mg tablet (Taking) TAKE 1 TABLET BY MOUTH EVERY DAY    amLODIPine (NORVASC) 2.5 mg tablet (Taking) TAKE 1 TABLET BY MOUTH EVERY DAY    metoprolol succinate (TOPROL-XL) 50 mg XL tablet (Taking) Take 1 Tab by mouth daily. simvastatin (ZOCOR) 20 mg tablet (Taking) TAKE 1 TABLET BY MOUTH AT BEDTIME FOR CHOLESTEROL    omega 3-DHA-EPA-fish oil (FISH OIL) 1,000 mg (120 mg-180 mg) capsule (Taking) Take 4 Caps by mouth daily. aspirin delayed-release 81 mg tablet (Taking) take 81 mg by mouth daily. 03/24/21 130/76   03/01/21 138/68   12/15/20 130/82   09/16/20 132/78   06/19/20 123/78          4. Pure hypercholesterolemia  At goal , denies excess muscle aches or new liver issues  Key Antihyperlipidemia Meds             simvastatin (ZOCOR) 20 mg tablet (Taking) TAKE 1 TABLET BY MOUTH AT BEDTIME FOR CHOLESTEROL    omega 3-DHA-EPA-fish oil (FISH OIL) 1,000 mg (120 mg-180 mg) capsule (Taking) Take 4 Caps by mouth daily.          Lab Results   Component Value Date/Time LDL, calculated 60.2 06/12/2020 12:50 PM          Future Appointments   Date Time Provider Khalif Lundy   6/2/2021 10:15 AM Mary Cobos MD [facility]ending [facility] to [facility] BS AMB   9/29/2021 11:20 AM [facility]      F/u in 6 months     Impression:   1. PAF (paroxysmal atrial fibrillation) ([address]    2. Systolic murmur of aorta    3. Essential hypertension    4. Pure hypercholesterolemia    5. SOB (shortness of breath)       Cardiac History: In clinical trial= REDUCE-IT Study- \"A Multi-Center, Prospective, Randomized, Double-Blind, Placebo-Controlled, Parallel-Group Study to Evaluate the Effect of [id number] on Cardiovascular Health and Mortality in Hypertriglyceridemic Patients with Cardiovascular Disease or at High Risk for Cardiovascular Disease: REDUCE-IT (Reduction of Cardiovascular Events with EPA- Intervention Trial)     PAF  Echo 2-5-13= normal  [facility] in 13,15,18 WNL   [facility] 11-6-18 6'50\" normal perfusion, EF 73%     ROS-except as noted above. . A complete cardiac and respiratory are reviewed and negative except as above ; Resp-denies wheezing  or productive cough,. Const- No unusual weight loss or fever; Neuro-no recent seizure or CVA ; GI- No BRBPR, abdom pain, bloating ; - no  hematuria   see supplement sheet, initialed and to be scanned by staff  Past Medical History:   Diagnosis Date    Anemia     Cystocele     Diabetes ([address]     Diabetic neuropathy, painful ([address]     Hepatitis B     Hypercholesterolemia     Hypertension     Microalbuminuria     Mild nonproliferative diabetic retinopathy ([address]     PAF (paroxysmal atrial fibrillation) ([address]     [facility] 3-24-13 ER-[facility]olf follows    Rectocele     Retinopathy     Rosacea     Ruptured disc, cervical     Sleep apnea     Stenosis, cervical spine       Social Hx= reports that she has never smoked. She has never used smokeless tobacco. She reports that she does not drink alcohol or use drugs.      Exam and Labs:  /76   Pulse 75   Resp 16   Ht 5' 1\" (1.549 m)   Wt 194 lb (88 kg)   SpO2 99%   BMI 36.66 kg/m² Constitutional:  NAD, comfortable  Head: NC,AT. Eyes: No scleral icterus. Neck:  Neck supple. No JVD present. Throat: moist mucous membranes. Chest: Effort normal & normal respiratory excursion . Neurological: alert, conversant and oriented . Skin: Skin is not cold. No obvious systemic rash noted. Not diaphoretic. No erythema. Psychiatric:  Grossly normal mood and affect. Behavior appears normal. Extremities:  no clubbing or cyanosis. Abdomen: non distended    Lungs:breath sounds normal. No stridor. distress, wheezes or  Rales. Heart: normal rate, regular rhythm, normal S1, S2, no murmurs, rubs, clicks or gallops , PMI non displaced. Edema: Edema is none.   Lab Results   Component Value Date/Time    Cholesterol, total 161 06/12/2020 12:50 PM    HDL Cholesterol 55 06/12/2020 12:50 PM    LDL, calculated 60.2 06/12/2020 12:50 PM    Triglyceride 229 (H) 06/12/2020 12:50 PM    CHOL/HDL Ratio 2.9 06/12/2020 12:50 PM     Lab Results   Component Value Date/Time    Sodium 144 03/01/2021 11:21 AM    Potassium 4.8 03/01/2021 11:21 AM    Chloride 107 (H) 03/01/2021 11:21 AM    CO2 19 (L) 03/01/2021 11:21 AM    Anion gap 6 12/15/2020 12:57 PM    Glucose 116 (H) 03/01/2021 11:21 AM    BUN 24 03/01/2021 11:21 AM    Creatinine 1.20 (H) 03/01/2021 11:21 AM    BUN/Creatinine ratio 20 03/01/2021 11:21 AM    GFR est AA 52 (L) 03/01/2021 11:21 AM    GFR est non-AA 45 (L) 03/01/2021 11:21 AM    Calcium 9.8 03/01/2021 11:21 AM      Wt Readings from Last 3 Encounters:   03/24/21 194 lb (88 kg)   03/01/21 190 lb (86.2 kg)   12/15/20 186 lb (84.4 kg)      BP Readings from Last 3 Encounters:   03/24/21 130/76   03/01/21 138/68   12/15/20 130/82      Current Outpatient Medications   Medication Sig    lisinopriL (PRINIVIL, ZESTRIL) 40 mg tablet TAKE 1 TABLET BY MOUTH EVERY DAY    amLODIPine (NORVASC) 2.5 mg tablet TAKE 1 TABLET BY MOUTH EVERY DAY    solifenacin (VESICARE) 5 mg tablet solifenacin 5 mg tablet   Take 1 tablet every day by oral route.  cholecalciferol, vitamin D3, (Vitamin D3) 50 mcg (2,000 unit) tab Take  by mouth.  glucose blood VI test strips (FreeStyle Test) strip TEST FASTING BLOOD SUGAR ONE TIME DAILY    metFORMIN ER (GLUCOPHAGE XR) 500 mg tablet TAKE 2 TABLETS BY MOUTH EVERY DAY WITH DINNER    metoprolol succinate (TOPROL-XL) 50 mg XL tablet Take 1 Tab by mouth daily.  lancets (FreeStyle Lancets) 28 gauge misc TEST ONCE DAILY    simvastatin (ZOCOR) 20 mg tablet TAKE 1 TABLET BY MOUTH AT BEDTIME FOR CHOLESTEROL    fluticasone propionate (FLONASE) 50 mcg/actuation nasal spray 2 Sprays by Both Nostrils route daily.  montelukast (SINGULAIR) 10 mg tablet     esomeprazole (NEXIUM) 20 mg capsule TAKE 1 CAPSULE BY MOUTH EVERY DAY    omega 3-DHA-EPA-fish oil (FISH OIL) 1,000 mg (120 mg-180 mg) capsule Take 4 Caps by mouth daily.  CALCIUM CARBONATE (TUMS PO) Take  by mouth daily as needed.  CALCIUM CARBONATE/VITAMIN D3 (CALCIUM + D PO) Take  by mouth two (2) times a day.  MULTI-VITAMIN PO Take 1 Tab by mouth daily.  aspirin delayed-release 81 mg tablet take 81 mg by mouth daily. No current facility-administered medications for this visit. Impression see above.       Written by Charity Bravo, as dictated by Monica Betancur MD.

## 2021-03-31 RX ORDER — FLUTICASONE PROPIONATE 50 MCG
SPRAY, SUSPENSION (ML) NASAL
Qty: 1 BOTTLE | Refills: 3 | Status: SHIPPED | OUTPATIENT
Start: 2021-03-31 | End: 2021-08-19 | Stop reason: SDUPTHER

## 2021-04-14 ENCOUNTER — ANCILLARY PROCEDURE (OUTPATIENT)
Dept: CARDIOLOGY CLINIC | Age: 71
End: 2021-04-14
Payer: MEDICARE

## 2021-04-14 VITALS
SYSTOLIC BLOOD PRESSURE: 126 MMHG | BODY MASS INDEX: 36.63 KG/M2 | HEIGHT: 61 IN | WEIGHT: 194 LBS | DIASTOLIC BLOOD PRESSURE: 68 MMHG

## 2021-04-14 VITALS — HEIGHT: 61 IN | WEIGHT: 194 LBS | BODY MASS INDEX: 36.63 KG/M2

## 2021-04-14 DIAGNOSIS — I35.8 SYSTOLIC MURMUR OF AORTA: ICD-10-CM

## 2021-04-14 DIAGNOSIS — I48.0 PAF (PAROXYSMAL ATRIAL FIBRILLATION) (HCC): ICD-10-CM

## 2021-04-14 PROCEDURE — A9500 TC99M SESTAMIBI: HCPCS | Performed by: SPECIALIST

## 2021-04-14 PROCEDURE — 93306 TTE W/DOPPLER COMPLETE: CPT | Performed by: SPECIALIST

## 2021-04-14 PROCEDURE — 93018 CV STRESS TEST I&R ONLY: CPT | Performed by: SPECIALIST

## 2021-04-14 PROCEDURE — 78452 HT MUSCLE IMAGE SPECT MULT: CPT | Performed by: SPECIALIST

## 2021-04-14 PROCEDURE — 93016 CV STRESS TEST SUPVJ ONLY: CPT | Performed by: SPECIALIST

## 2021-04-14 RX ORDER — TETRAKIS(2-METHOXYISOBUTYLISOCYANIDE)COPPER(I) TETRAFLUOROBORATE 1 MG/ML
10 INJECTION, POWDER, LYOPHILIZED, FOR SOLUTION INTRAVENOUS ONCE
Status: COMPLETED | OUTPATIENT
Start: 2021-04-14 | End: 2021-04-14

## 2021-04-14 RX ORDER — TETRAKIS(2-METHOXYISOBUTYLISOCYANIDE)COPPER(I) TETRAFLUOROBORATE 1 MG/ML
30 INJECTION, POWDER, LYOPHILIZED, FOR SOLUTION INTRAVENOUS ONCE
Status: COMPLETED | OUTPATIENT
Start: 2021-04-14 | End: 2021-04-14

## 2021-04-14 RX ADMIN — TECHNETIUM TC 99M SESTAMIBI 25.5 MILLICURIE: 1 INJECTION, POWDER, FOR SOLUTION INTRAVENOUS at 14:30

## 2021-04-14 RX ADMIN — TECHNETIUM TC 99M SESTAMIBI 8.6 MILLICURIE: 1 INJECTION, POWDER, FOR SOLUTION INTRAVENOUS at 12:55

## 2021-04-14 RX ADMIN — REGADENOSON 0.4 MG: 0.08 INJECTION, SOLUTION INTRAVENOUS at 14:31

## 2021-04-18 LAB
STRESS BASELINE DIAS BP: 84 MMHG
STRESS BASELINE HR: 54 BPM
STRESS BASELINE SYS BP: 136 MMHG
STRESS O2 SAT PEAK: 99 %
STRESS O2 SAT REST: 99 %
STRESS PEAK DIAS BP: 84 MMHG
STRESS PEAK SYS BP: 136 MMHG
STRESS PERCENT HR ACHIEVED: 69 %
STRESS POST PEAK HR: 102 BPM
STRESS RATE PRESSURE PRODUCT: NORMAL BPM*MMHG
STRESS ST DEPRESSION: 0 MM
STRESS ST ELEVATION: 0 MM
STRESS TARGET HR: 147 BPM

## 2021-04-20 ENCOUNTER — TELEPHONE (OUTPATIENT)
Dept: CARDIOLOGY CLINIC | Age: 71
End: 2021-04-20

## 2021-04-20 NOTE — TELEPHONE ENCOUNTER
Patient is requesting to speak with someone regarding her recent results for her labs as well as her nuclear and echo.  Thanks     Phone: [phone]

## 2021-05-03 RX ORDER — SIMVASTATIN 20 MG/1
TABLET, FILM COATED ORAL
Qty: 90 TAB | Refills: 1 | Status: SHIPPED | OUTPATIENT
Start: 2021-05-03 | End: 2021-06-08 | Stop reason: SDUPTHER

## 2021-05-17 RX ORDER — METFORMIN HYDROCHLORIDE 500 MG/1
TABLET, EXTENDED RELEASE ORAL
Qty: 180 TAB | Refills: 1 | Status: SHIPPED | OUTPATIENT
Start: 2021-05-17 | End: 2021-05-24 | Stop reason: SDUPTHER

## 2021-05-19 DIAGNOSIS — I48.0 PAF (PAROXYSMAL ATRIAL FIBRILLATION) (HCC): ICD-10-CM

## 2021-05-19 RX ORDER — METOPROLOL SUCCINATE 50 MG/1
50 TABLET, EXTENDED RELEASE ORAL DAILY
Qty: 90 TABLET | Refills: 3 | Status: SHIPPED | OUTPATIENT
Start: 2021-05-19 | End: 2022-05-05

## 2021-05-20 ENCOUNTER — NURSE TRIAGE (OUTPATIENT)
Dept: OTHER | Facility: CLINIC | Age: 71
End: 2021-05-20

## 2021-05-20 NOTE — TELEPHONE ENCOUNTER
Patient called with concern for possible exposure to COVID due to an alert on her COVID jordon. Denies any s/s, fully vaccinated, denies travel. Attention Provider: Thank you for allowing me to participate in the care of your patient. The patient was connected to triage from [facility]. Please do not respond through this encounter as the response is not directed to a shared pool. Reason for Disposition   Information only question and nurse able to answer    Answer Assessment - Initial Assessment Questions  1. REASON FOR CALL or QUESTION: \"What is your reason for calling today? \" or \"How can I best help you? \" or \"What question do you have that I can help answer? \"      Advice pt to continue to use universal precautions. Follow up with PCP if becomes symptomatic.     Protocols used: INFORMATION ONLY CALL - NO TRIAGE-ADULT-[place]

## 2021-05-23 ENCOUNTER — HOSPITAL ENCOUNTER (EMERGENCY)
Age: 71
Discharge: HOME | End: 2021-05-23
Attending: EMERGENCY MEDICINE
Payer: MEDICARE

## 2021-05-23 ENCOUNTER — APPOINTMENT (OUTPATIENT)
Dept: CT IMAGING | Age: 71
End: 2021-05-23
Attending: EMERGENCY MEDICINE
Payer: MEDICARE

## 2021-05-23 VITALS
TEMPERATURE: 96.9 F | SYSTOLIC BLOOD PRESSURE: 169 MMHG | OXYGEN SATURATION: 95 % | DIASTOLIC BLOOD PRESSURE: 58 MMHG | BODY MASS INDEX: 37.89 KG/M2 | RESPIRATION RATE: 16 BRPM | WEIGHT: 193 LBS | HEART RATE: 58 BPM | HEIGHT: 60 IN

## 2021-05-23 DIAGNOSIS — N20.0 KIDNEY STONE: Primary | ICD-10-CM

## 2021-05-23 LAB
ALBUMIN SERPL-MCNC: 3.4 G/DL (ref 3.5–5)
ALBUMIN/GLOB SERPL: 0.8 {RATIO} (ref 1.1–2.2)
ALP SERPL-CCNC: 72 U/L (ref 45–117)
ALT SERPL-CCNC: 46 U/L (ref 12–78)
ANION GAP SERPL CALC-SCNC: 4 MMOL/L (ref 5–15)
APPEARANCE UR: CLEAR
AST SERPL-CCNC: 48 U/L (ref 15–37)
BACTERIA URNS QL MICRO: NEGATIVE /HPF
BASOPHILS # BLD: 0.1 K/UL (ref 0–0.1)
BASOPHILS NFR BLD: 1 % (ref 0–1)
BILIRUB SERPL-MCNC: 0.3 MG/DL (ref 0.2–1)
BILIRUB UR QL: NEGATIVE
BUN SERPL-MCNC: 28 MG/DL (ref 6–20)
BUN/CREAT SERPL: 21 (ref 12–20)
CALCIUM SERPL-MCNC: 8.7 MG/DL (ref 8.5–10.1)
CHLORIDE SERPL-SCNC: 111 MMOL/L (ref 97–108)
CO2 SERPL-SCNC: 24 MMOL/L (ref 21–32)
COLOR UR: ABNORMAL
COMMENT, HOLDF: NORMAL
CREAT SERPL-MCNC: 1.33 MG/DL (ref 0.55–1.02)
DIFFERENTIAL METHOD BLD: ABNORMAL
EOSINOPHIL # BLD: 0.8 K/UL (ref 0–0.4)
EOSINOPHIL NFR BLD: 8 % (ref 0–7)
EPITH CASTS URNS QL MICRO: ABNORMAL /LPF
ERYTHROCYTE [DISTWIDTH] IN BLOOD BY AUTOMATED COUNT: 13.5 % (ref 11.5–14.5)
GLOBULIN SER CALC-MCNC: 4.2 G/DL (ref 2–4)
GLUCOSE SERPL-MCNC: 152 MG/DL (ref 65–100)
GLUCOSE UR STRIP.AUTO-MCNC: NEGATIVE MG/DL
HCT VFR BLD AUTO: 41.1 % (ref 35–47)
HGB BLD-MCNC: 12.8 G/DL (ref 11.5–16)
HGB UR QL STRIP: ABNORMAL
IMM GRANULOCYTES # BLD AUTO: 0.1 K/UL (ref 0–0.04)
IMM GRANULOCYTES NFR BLD AUTO: 1 % (ref 0–0.5)
KETONES UR QL STRIP.AUTO: NEGATIVE MG/DL
LEUKOCYTE ESTERASE UR QL STRIP.AUTO: ABNORMAL
LIPASE SERPL-CCNC: 134 U/L (ref 73–393)
LYMPHOCYTES # BLD: 1.8 K/UL (ref 0.8–3.5)
LYMPHOCYTES NFR BLD: 19 % (ref 12–49)
MCH RBC QN AUTO: 28.7 PG (ref 26–34)
MCHC RBC AUTO-ENTMCNC: 31.1 G/DL (ref 30–36.5)
MCV RBC AUTO: 92.2 FL (ref 80–99)
MONOCYTES # BLD: 0.7 K/UL (ref 0–1)
MONOCYTES NFR BLD: 7 % (ref 5–13)
NEUTS SEG # BLD: 6 K/UL (ref 1.8–8)
NEUTS SEG NFR BLD: 64 % (ref 32–75)
NITRITE UR QL STRIP.AUTO: NEGATIVE
NRBC # BLD: 0 K/UL (ref 0–0.01)
NRBC BLD-RTO: 0 PER 100 WBC
PH UR STRIP: 5 [PH] (ref 5–8)
PLATELET # BLD AUTO: 218 K/UL (ref 150–400)
POTASSIUM SERPL-SCNC: 5.3 MMOL/L (ref 3.5–5.1)
PROT SERPL-MCNC: 7.6 G/DL (ref 6.4–8.2)
PROT UR STRIP-MCNC: ABNORMAL MG/DL
RBC # BLD AUTO: 4.46 M/UL (ref 3.8–5.2)
RBC #/AREA URNS HPF: ABNORMAL /HPF (ref 0–5)
RBC MORPH BLD: ABNORMAL
SAMPLES BEING HELD,HOLD: NORMAL
SODIUM SERPL-SCNC: 139 MMOL/L (ref 136–145)
SP GR UR REFRACTOMETRY: 1.01 (ref 1–1.03)
UROBILINOGEN UR QL STRIP.AUTO: 0.2 EU/DL (ref 0.2–1)
WBC # BLD AUTO: 9.5 K/UL (ref 3.6–11)
WBC URNS QL MICRO: ABNORMAL /HPF (ref 0–4)

## 2021-05-23 PROCEDURE — 80053 COMPREHEN METABOLIC PANEL: CPT

## 2021-05-23 PROCEDURE — 74176 CT ABD & PELVIS W/O CONTRAST: CPT

## 2021-05-23 PROCEDURE — 96375 TX/PRO/DX INJ NEW DRUG ADDON: CPT

## 2021-05-23 PROCEDURE — 36415 COLL VENOUS BLD VENIPUNCTURE: CPT

## 2021-05-23 PROCEDURE — 83690 ASSAY OF LIPASE: CPT

## 2021-05-23 PROCEDURE — 85025 COMPLETE CBC W/AUTO DIFF WBC: CPT

## 2021-05-23 PROCEDURE — 96374 THER/PROPH/DIAG INJ IV PUSH: CPT

## 2021-05-23 PROCEDURE — 74011250637 HC RX REV CODE- 250/637: Performed by: EMERGENCY MEDICINE

## 2021-05-23 PROCEDURE — 81001 URINALYSIS AUTO W/SCOPE: CPT

## 2021-05-23 PROCEDURE — 74011250636 HC RX REV CODE- 250/636: Performed by: EMERGENCY MEDICINE

## 2021-05-23 PROCEDURE — 99284 EMERGENCY DEPT VISIT MOD MDM: CPT

## 2021-05-23 RX ORDER — HYDROMORPHONE HYDROCHLORIDE 1 MG/ML
0.5 INJECTION, SOLUTION INTRAMUSCULAR; INTRAVENOUS; SUBCUTANEOUS
Status: COMPLETED | OUTPATIENT
Start: 2021-05-23 | End: 2021-05-23

## 2021-05-23 RX ORDER — TAMSULOSIN HYDROCHLORIDE 0.4 MG/1
0.4 CAPSULE ORAL
Status: COMPLETED | OUTPATIENT
Start: 2021-05-23 | End: 2021-05-23

## 2021-05-23 RX ORDER — HYDROCODONE BITARTRATE AND ACETAMINOPHEN 5; 325 MG/1; MG/1
1 TABLET ORAL
Qty: 10 TABLET | Refills: 0 | Status: SHIPPED | OUTPATIENT
Start: 2021-05-23 | End: 2021-05-26

## 2021-05-23 RX ORDER — HYDROCODONE BITARTRATE AND ACETAMINOPHEN 5; 325 MG/1; MG/1
1 TABLET ORAL
Status: COMPLETED | OUTPATIENT
Start: 2021-05-23 | End: 2021-05-23

## 2021-05-23 RX ORDER — METOCLOPRAMIDE HYDROCHLORIDE 5 MG/ML
10 INJECTION INTRAMUSCULAR; INTRAVENOUS
Status: COMPLETED | OUTPATIENT
Start: 2021-05-23 | End: 2021-05-23

## 2021-05-23 RX ORDER — ONDANSETRON 4 MG/1
4 TABLET, ORALLY DISINTEGRATING ORAL
Qty: 10 TABLET | Refills: 0 | Status: SHIPPED | OUTPATIENT
Start: 2021-05-23 | End: 2021-09-22 | Stop reason: ALTCHOICE

## 2021-05-23 RX ORDER — ONDANSETRON 2 MG/ML
4 INJECTION INTRAMUSCULAR; INTRAVENOUS
Status: COMPLETED | OUTPATIENT
Start: 2021-05-23 | End: 2021-05-23

## 2021-05-23 RX ORDER — TAMSULOSIN HYDROCHLORIDE 0.4 MG/1
0.4 CAPSULE ORAL DAILY
Qty: 7 CAPSULE | Refills: 0 | Status: SHIPPED | OUTPATIENT
Start: 2021-05-23 | End: 2021-05-30

## 2021-05-23 RX ADMIN — HYDROCODONE BITARTRATE AND ACETAMINOPHEN 1 TABLET: 5; 325 TABLET ORAL at 08:17

## 2021-05-23 RX ADMIN — ONDANSETRON 4 MG: 2 INJECTION INTRAMUSCULAR; INTRAVENOUS at 06:42

## 2021-05-23 RX ADMIN — TAMSULOSIN HYDROCHLORIDE 0.4 MG: 0.4 CAPSULE ORAL at 08:17

## 2021-05-23 RX ADMIN — HYDROMORPHONE HYDROCHLORIDE 0.5 MG: 1 INJECTION, SOLUTION INTRAMUSCULAR; INTRAVENOUS; SUBCUTANEOUS at 06:42

## 2021-05-23 RX ADMIN — METOCLOPRAMIDE 10 MG: 5 INJECTION, SOLUTION INTRAMUSCULAR; INTRAVENOUS at 08:16

## 2021-05-23 NOTE — DISCHARGE INSTRUCTIONS
We hope that we have addressed all of your medical concerns. The examination and treatment you received in the Emergency Department were for an emergent problem and were not intended as complete care. It is important that you follow up with your healthcare provider(s) for ongoing care. If your symptoms worsen or do not improve as expected, and you are unable to reach your usual health care provider(s), you should return to the Emergency Department. Today's healthcare is undergoing tremendous change, and patient satisfaction surveys are one of the many tools to assess the quality of medical care. You may receive a survey from the [employer] organization regarding your experience in the Emergency Department. I hope that your experience has been completely positive, particularly the medical care that I provided. As such, please participate in the survey; anything less than excellent does not meet my expectations or intentions. [facility] and [facility] participate in nationally recognized quality of care measures. If your blood pressure is greater than 120/80, as reported below, we urge that you seek medical care to address the potential of high blood pressure, commonly known as hypertension. Hypertension can be hereditary or can be caused by certain medical conditions, pain, stress, or \"white coat syndrome. \"       Please make an appointment with your health care provider(s) for follow up of your Emergency Department visit. VITALS:   Patient Vitals for the past 8 hrs:   Temp Pulse Resp BP SpO2   05/23/21 0730 -- -- -- (!) 167/95 95 %   05/23/21 0549 96.9 °F (36.1 °C) (!) 58 16 (!) 176/84 98 %          Thank you for allowing us to provide you with medical care today. We realize that you have many choices for your emergency care needs. Please choose us in the future for any continued health care needs. Yulissa Soriano, MD    [address] [facility].   Office: [phone]            Recent Results (from the past 24 hour(s))   CBC WITH AUTOMATED DIFF    Collection Time: 05/23/21  6:07 AM   Result Value Ref Range    WBC 9.5 3.6 - 11.0 K/uL    RBC 4.46 3.80 - 5.20 M/uL    HGB 12.8 11.5 - 16.0 g/dL    HCT 41.1 35.0 - 47.0 %    MCV 92.2 80.0 - 99.0 FL    MCH 28.7 26.0 - 34.0 PG    MCHC 31.1 30.0 - 36.5 g/dL    RDW 13.5 11.5 - 14.5 %    PLATELET 534 178 - 576 K/uL    NRBC 0.0 0  WBC    ABSOLUTE NRBC 0.00 0.00 - 0.01 K/uL    NEUTROPHILS 64 32 - 75 %    LYMPHOCYTES 19 12 - 49 %    MONOCYTES 7 5 - 13 %    EOSINOPHILS 8 (H) 0 - 7 %    BASOPHILS 1 0 - 1 %    IMMATURE GRANULOCYTES 1 (H) 0.0 - 0.5 %    ABS. NEUTROPHILS 6.0 1.8 - 8.0 K/UL    ABS. LYMPHOCYTES 1.8 0.8 - 3.5 K/UL    ABS. MONOCYTES 0.7 0.0 - 1.0 K/UL    ABS. EOSINOPHILS 0.8 (H) 0.0 - 0.4 K/UL    ABS. BASOPHILS 0.1 0.0 - 0.1 K/UL    ABS. IMM. GRANS. 0.1 (H) 0.00 - 0.04 K/UL    DF SMEAR SCANNED      RBC COMMENTS NORMOCYTIC, NORMOCHROMIC     METABOLIC PANEL, COMPREHENSIVE    Collection Time: 05/23/21  6:07 AM   Result Value Ref Range    Sodium 139 136 - 145 mmol/L    Potassium 5.3 (H) 3.5 - 5.1 mmol/L    Chloride 111 (H) 97 - 108 mmol/L    CO2 24 21 - 32 mmol/L    Anion gap 4 (L) 5 - 15 mmol/L    Glucose 152 (H) 65 - 100 mg/dL    BUN 28 (H) 6 - 20 MG/DL    Creatinine 1.33 (H) 0.55 - 1.02 MG/DL    BUN/Creatinine ratio 21 (H) 12 - 20      GFR est AA 47 (L) >60 ml/min/1.73m2    GFR est non-AA 39 (L) >60 ml/min/1.73m2    Calcium 8.7 8.5 - 10.1 MG/DL    Bilirubin, total 0.3 0.2 - 1.0 MG/DL    ALT (SGPT) 46 12 - 78 U/L    AST (SGOT) 48 (H) 15 - 37 U/L    Alk.  phosphatase 72 45 - 117 U/L    Protein, total 7.6 6.4 - 8.2 g/dL    Albumin 3.4 (L) 3.5 - 5.0 g/dL    Globulin 4.2 (H) 2.0 - 4.0 g/dL    A-G Ratio 0.8 (L) 1.1 - 2.2     LIPASE    Collection Time: 05/23/21  6:07 AM   Result Value Ref Range    Lipase 134 73 - 393 U/L   URINALYSIS W/MICROSCOPIC    Collection Time: 05/23/21  6:07 AM   Result Value Ref Range    Color YELLOW/STRAW      Appearance CLEAR CLEAR      Specific gravity 1.015 1.003 - 1.030      pH (UA) 5.0 5.0 - 8.0      Protein TRACE (A) NEG mg/dL    Glucose Negative NEG mg/dL    Ketone Negative NEG mg/dL    Bilirubin Negative NEG      Blood LARGE (A) NEG      Urobilinogen 0.2 0.2 - 1.0 EU/dL    Nitrites Negative NEG      Leukocyte Esterase TRACE (A) NEG      WBC 5-10 0 - 4 /hpf    RBC 10-20 0 - 5 /hpf    Epithelial cells FEW FEW /lpf    Bacteria Negative NEG /hpf   SAMPLES BEING HELD    Collection Time: 05/23/21  6:07 AM   Result Value Ref Range    SAMPLES BEING HELD 1UC TUBE     COMMENT        Add-on orders for these samples will be processed based on acceptable specimen integrity and analyte stability, which may vary by analyte. CT ABD PELV WO CONT    Result Date: 5/23/2021  CLINICAL HISTORY: right flank pain INDICATION: right flank pain COMPARISON: None CONTRAST:  None. TECHNIQUE: Thin axial images were obtained through the abdomen and pelvis. Coronal and sagittal reformats were generated. Oral contrast was not administered. CT dose reduction was achieved through use of a standardized protocol tailored for this examination and automatic exposure control for dose modulation. The absence of intravenous contrast material reduces the sensitivity for evaluation of visceral organs and vasculature including presence of small mass lesions, hemodynamically significant stenoses, dissections, mucosal abnormalities etc. FINDINGS: LOWER THORAX: No significant abnormality in the incidentally imaged lower chest. LIVER/GALLBLADDER: Granulomas. Gallbladder is within normal limits. CBD is not dilated. SPLEEN/PANCREAS:  Scattered granulomas. ADRENALS/KIDNEYS: Mild hydroureteronephrosis on the right. Perinephric stranding on the right. Distal right ureteral calculus measures 4 x 3 mm in size. STOMACH: Unremarkable. SMALL BOWEL/COLON: No dilatation or wall thickening. No dilatation or wall thickening. APPENDIX: Unremarkable. PERITONEUM: No ascites or pneumoperitoneum. RETROPERITONEUM: No lymphadenopathy or aortic aneurysm. REPRODUCTIVE ORGANS: Calcified uterine fibroids URINARY BLADDER: No mass or calculus. BONES: No destructive bone lesion. ADDITIONAL COMMENTS: N/A     Mild hydroureteronephrosis on the right with distal right ureteral calculus. Imaging findings suggestive of prior granulomatous infection. Hepatic and splenic granulomas. Calcified uterine fibroids.

## 2021-05-23 NOTE — ED NOTES
Patient reports \"I feel much better. \" Patient denies nausea and now rates pain as 4/10. Dr. Hue Quick notified.

## 2021-05-23 NOTE — ED NOTES
Verbal/Bedside report was given to [facility]  [facility] - B RN who will assume care of patient at this time. SBAR report consisted of ED summary, MAR, recent results, and additional pertinent information. Receiving nurse given opportunity to ask questions and seek clarifications. Receiving nurse aware of pending lab results and orders that are to be completed.

## 2021-05-23 NOTE — ED NOTES
Patient informed that she should not drive home and to call a friend/family to pick her up from ED upon discharge.

## 2021-05-23 NOTE — ED NOTES
Bedside shift change report given to [facility] - [facility] (oncoming nurse) by Marlon Burk (offgoing nurse). Report included the following information SBAR, Kardex, ED Summary and MAR.

## 2021-05-23 NOTE — ED NOTES
This nurse at bedside to administer mediations. Patient talking on phone. Patient rates pain as 8/10.

## 2021-05-23 NOTE — ED PROVIDER NOTES
HPI   67 yo F presents with right flank pain radiating to right groin, 7/10, associated with nausea, onset 4am this morning. Denies fever, chills, vomiting, diarrhea, constipation, dysuria, hematuria, cp. Did not take anything for pain. Hx of UTI but no hx of similar pain.   Past Medical History:   Diagnosis Date    Anemia     Cystocele     Diabetes ([facility]     Diabetic neuropathy, painful (HCC)     Hepatitis B     Hypercholesterolemia     Hypertension     Microalbuminuria     Mild nonproliferative diabetic retinopathy (HCC)     PAF (paroxysmal atrial fibrillation) ([facility]     [facility] 3-24-13 ER-[facility] follows    Rectocele     Retinopathy     Rosacea     Ruptured disc, cervical     Sleep apnea     Stenosis, cervical spine        Past Surgical History:   Procedure Laterality Date    HX BREAST BIOPSY Left 2009    HX CATARACT REMOVAL Right     HX COLONOSCOPY      HX DILATION AND CURETTAGE      HX ENDOSCOPY      HX HERNIA REPAIR      umbilical    HX MENISCUS REPAIR  7/11/14    HX OTHER SURGICAL  05/09/2018    Bladder Botox     HX OVARIAN CYST REMOVAL  1973         Family History:   Problem Relation Age of Onset    Hypertension Mother     Thyroid Disease Mother     Heart Failure Father     Heart Disease Father     Thyroid Disease Sister     Thyroid Disease Sister     Heart Attack Other     Cancer Maternal Aunt         esophageal    Cancer Paternal Aunt         breast       Social History     Socioeconomic History    Marital status:      Spouse name: Not on file    Number of children: Not on file    Years of education: Not on file    Highest education level: Not on file   Occupational History    Not on file   Tobacco Use    Smoking status: Never Smoker    Smokeless tobacco: Never Used   Vaping Use    Vaping Use: Never used   Substance and Sexual Activity    Alcohol use: No     Alcohol/week: 0.0 standard drinks     Comment: very rarely     Drug use: No    Sexual activity: Not Currently   Other Topics Concern    Not on file   Social History Narrative    Not on file     Social Determinants of Health     Financial Resource Strain:     Difficulty of Paying Living Expenses:    Food Insecurity:     Worried About Running Out of Food in the Last Year:     [address] in the Last Year:    Transportation Needs:     Lack of Transportation (Medical):  Lack of Transportation (Non-Medical):    Physical Activity:     Days of Exercise per Week:     Minutes of Exercise per Session:    Stress:     Feeling of Stress :    Social Connections:     Frequency of Communication with Friends and Family:     Frequency of Social Gatherings with Friends and Family:     Attends Congregational Services:     Active Member of Clubs or Organizations:     Attends Club or Organization Meetings:     Marital Status:    Intimate Partner Violence:     Fear of Current or Ex-Partner:     Emotionally Abused:     Physically Abused:     Sexually Abused: ALLERGIES: Sulfa (sulfonamide antibiotics)    Review of Systems   Constitutional: Negative for chills and fever. Respiratory: Negative for cough and shortness of breath. Cardiovascular: Negative for chest pain. Gastrointestinal: Positive for nausea. Negative for abdominal pain, diarrhea and vomiting. Genitourinary: Positive for flank pain. Negative for dysuria and hematuria. Musculoskeletal: Positive for back pain. Skin: Negative for rash. Neurological: Negative for headaches. All other systems reviewed and are negative.       Vitals:    05/23/21 0549   BP: (!) 176/84   Pulse: (!) 58   Resp: 16   Temp: 96.9 °F (36.1 °C)   SpO2: 98%   Weight: 87.5 kg (193 lb)   Height: 5' (1.524 m)            Physical Exam   Physical Examination: General appearance - alert, well appearing, and in no distress, oriented to person, place, and time and normal appearing weight  Eyes - pupils equal and reactive, extraocular eye movements intact  Neck - supple, no significant adenopathy  Chest - clear to auscultation, no wheezes, rales or rhonchi, symmetric air entry  Heart - normal rate, regular rhythm, normal S1, S2, no murmurs, rubs, clicks or gallops  Abdomen - soft, nontender, nondistended, no masses or organomegaly  Back exam - full range of motion, no tenderness, palpable spasm or pain on motion  Neurological - alert, oriented, normal speech, no focal findings or movement disorder noted  Musculoskeletal - no joint tenderness, deformity or swelling  Extremities - peripheral pulses normal, no pedal edema, no clubbing or cyanosis  Skin - normal coloration and turgor, no rashes, no suspicious skin lesions noted  MDM  Number of Diagnoses or Management Options     Amount and/or Complexity of Data Reviewed  Clinical lab tests: ordered and reviewed  Tests in the radiology section of CPT®: ordered and reviewed  Decide to obtain previous medical records or to obtain history from someone other than the patient: yes  Review and summarize past medical records: yes  Independent visualization of images, tracings, or specimens: yes    Patient Progress  Patient progress: improved         Procedures    Patient with kidney stone. Patient is afebrile nontoxic. Pain improved after meds given in ED. Will discharge with urology follow-up or return to ED for worsening symptoms.

## 2021-05-23 NOTE — ED TRIAGE NOTES
Patient arrives to ED with complaints of right lower back/flank pain and nausea    Patient woke up about 0400 with symptoms

## 2021-05-24 ENCOUNTER — TELEPHONE (OUTPATIENT)
Dept: INTERNAL MEDICINE CLINIC | Age: 71
End: 2021-05-24

## 2021-05-24 ENCOUNTER — PATIENT OUTREACH (OUTPATIENT)
Dept: CASE MANAGEMENT | Age: 71
End: 2021-05-24

## 2021-05-24 DIAGNOSIS — I10 ESSENTIAL HYPERTENSION WITH GOAL BLOOD PRESSURE LESS THAN 130/80: ICD-10-CM

## 2021-05-24 RX ORDER — LISINOPRIL 40 MG/1
TABLET ORAL
Qty: 90 TABLET | Refills: 1 | Status: SHIPPED | OUTPATIENT
Start: 2021-05-24 | End: 2021-09-29

## 2021-05-24 RX ORDER — METFORMIN HYDROCHLORIDE 500 MG/1
TABLET, EXTENDED RELEASE ORAL
Qty: 180 TABLET | Refills: 1 | Status: SHIPPED | OUTPATIENT
Start: 2021-05-24 | End: 2021-11-08

## 2021-05-24 NOTE — PROGRESS NOTES
Ambulatory Care Management Note    Date/Time:  5/24/2021 11:09 AM    This patient was received as a referral from Daily Assignment   Ambulatory Care Manager outreached to patient today to offer care management services. Introduction to self and role of care manager provided. Patient accepted care management services at this time. Follow up call scheduled at this time. Patient has Ambulatory Care Manager's contact number for for any questions or concerns. Educated patient about risk for severe COVID-19 due to risk factors according to CDC guidelines. ACM reviewed discharge instructions, medical action plan and red flag symptoms patient who verbalized understanding. Discussed COVID vaccination status yes. Education provided on COVID-19 vaccination as appropriate. Discussed exposure protocols and quarantine with CDC Guidelines. Patient was given an opportunity to verbalize any questions and concerns and agrees to contact ACM or health care provider for questions related to their healthcare.

## 2021-05-24 NOTE — TELEPHONE ENCOUNTER
Advised to call [facility]- if she needs an insurance referral she will call back with appointment details for us to process it.

## 2021-05-24 NOTE — TELEPHONE ENCOUNTER
----- Message from Lu Casper sent at 5/24/2021  9:45 AM EDT -----  Regarding: Dr. Bob Conde (if not patient): pt      Relationship of caller (if not patient): pt      Best contact number(s): [phone]      Name of medication and dosage if known: metformin, lisinopril      Is patient out of this medication (yes/no): no      Pharmacy name: [address] listed in chart? (yes/no): yes  Pharmacy phone number: [phone]        Details to clarify the request: [facility] has faxed a request without response.        Lu Casper

## 2021-05-24 NOTE — TELEPHONE ENCOUNTER
----- Message from Timmy Ananya sent at 5/24/2021 10:02 AM EDT -----  Regarding: MD Uriel/Telephone  General Message/Vendor Calls    Caller's first and last name: Self      Reason for call: Pt advised by ER to see Urologist, asking for referral.      Binu Troy required yes/no and why: Yes      Best contact number(s): [phone]      Details to clarify the request: Referral to see Urologist.      Timmy Hare

## 2021-05-28 ENCOUNTER — TELEPHONE (OUTPATIENT)
Dept: INTERNAL MEDICINE CLINIC | Age: 71
End: 2021-05-28

## 2021-05-28 ENCOUNTER — TELEPHONE (OUTPATIENT)
Dept: OBGYN CLINIC | Age: 71
End: 2021-05-28

## 2021-05-28 NOTE — TELEPHONE ENCOUNTER
Call received at 12:30PM  [address] patient    66year old patient last seen in the office on 6/19/2020 for annual exam and mammogram      Patient is wondering if she still needs to get a mammogram at her age      This nurse advised yes to continue with mammogram      Please confirm or advised other    Thank you

## 2021-05-28 NOTE — TELEPHONE ENCOUNTER
Patient called to inform us that she has some other stuff she would like to discuss in her upcoming appointment ,she does not know if we should make more time for this appointment or not.  Her call back number if needed is: [phone]

## 2021-06-02 ENCOUNTER — OFFICE VISIT (OUTPATIENT)
Dept: INTERNAL MEDICINE CLINIC | Age: 71
End: 2021-06-02
Payer: MEDICARE

## 2021-06-02 VITALS
OXYGEN SATURATION: 97 % | RESPIRATION RATE: 16 BRPM | WEIGHT: 193.6 LBS | HEIGHT: 60 IN | SYSTOLIC BLOOD PRESSURE: 128 MMHG | TEMPERATURE: 98.1 F | HEART RATE: 54 BPM | DIASTOLIC BLOOD PRESSURE: 86 MMHG | BODY MASS INDEX: 38.01 KG/M2

## 2021-06-02 DIAGNOSIS — I48.0 PAF (PAROXYSMAL ATRIAL FIBRILLATION) (HCC): ICD-10-CM

## 2021-06-02 DIAGNOSIS — E11.21 TYPE 2 DIABETES MELLITUS WITH NEPHROPATHY (HCC): ICD-10-CM

## 2021-06-02 DIAGNOSIS — M65.30 TRIGGER FINGER, UNSPECIFIED FINGER, UNSPECIFIED LATERALITY: ICD-10-CM

## 2021-06-02 DIAGNOSIS — I10 ESSENTIAL HYPERTENSION WITH GOAL BLOOD PRESSURE LESS THAN 130/80: Primary | ICD-10-CM

## 2021-06-02 DIAGNOSIS — N20.0 NEPHROLITHIASIS: ICD-10-CM

## 2021-06-02 DIAGNOSIS — R21 RASH: ICD-10-CM

## 2021-06-02 DIAGNOSIS — E55.9 VITAMIN D DEFICIENCY: ICD-10-CM

## 2021-06-02 DIAGNOSIS — E66.01 SEVERE OBESITY (BMI 35.0-39.9) WITH COMORBIDITY (HCC): ICD-10-CM

## 2021-06-02 DIAGNOSIS — G47.33 OSA (OBSTRUCTIVE SLEEP APNEA): ICD-10-CM

## 2021-06-02 DIAGNOSIS — E78.00 PURE HYPERCHOLESTEROLEMIA: Chronic | ICD-10-CM

## 2021-06-02 DIAGNOSIS — Z86.19 HISTORY OF HEPATITIS B: ICD-10-CM

## 2021-06-02 PROBLEM — E11.9 TYPE 2 DIABETES MELLITUS WITH HEMOGLOBIN A1C GOAL OF LESS THAN 7.0% (HCC): Status: RESOLVED | Noted: 2017-09-27 | Resolved: 2021-06-02

## 2021-06-02 PROCEDURE — G8752 SYS BP LESS 140: HCPCS | Performed by: INTERNAL MEDICINE

## 2021-06-02 PROCEDURE — 1090F PRES/ABSN URINE INCON ASSESS: CPT | Performed by: INTERNAL MEDICINE

## 2021-06-02 PROCEDURE — G8754 DIAS BP LESS 90: HCPCS | Performed by: INTERNAL MEDICINE

## 2021-06-02 PROCEDURE — 3017F COLORECTAL CA SCREEN DOC REV: CPT | Performed by: INTERNAL MEDICINE

## 2021-06-02 PROCEDURE — 2022F DILAT RTA XM EVC RTNOPTHY: CPT | Performed by: INTERNAL MEDICINE

## 2021-06-02 PROCEDURE — G8427 DOCREV CUR MEDS BY ELIG CLIN: HCPCS | Performed by: INTERNAL MEDICINE

## 2021-06-02 PROCEDURE — G9899 SCRN MAM PERF RSLTS DOC: HCPCS | Performed by: INTERNAL MEDICINE

## 2021-06-02 PROCEDURE — G8399 PT W/DXA RESULTS DOCUMENT: HCPCS | Performed by: INTERNAL MEDICINE

## 2021-06-02 PROCEDURE — 99215 OFFICE O/P EST HI 40 MIN: CPT | Performed by: INTERNAL MEDICINE

## 2021-06-02 PROCEDURE — 1101F PT FALLS ASSESS-DOCD LE1/YR: CPT | Performed by: INTERNAL MEDICINE

## 2021-06-02 PROCEDURE — G8536 NO DOC ELDER MAL SCRN: HCPCS | Performed by: INTERNAL MEDICINE

## 2021-06-02 PROCEDURE — G8510 SCR DEP NEG, NO PLAN REQD: HCPCS | Performed by: INTERNAL MEDICINE

## 2021-06-02 PROCEDURE — G0463 HOSPITAL OUTPT CLINIC VISIT: HCPCS | Performed by: INTERNAL MEDICINE

## 2021-06-02 PROCEDURE — 3046F HEMOGLOBIN A1C LEVEL >9.0%: CPT | Performed by: INTERNAL MEDICINE

## 2021-06-02 PROCEDURE — G8417 CALC BMI ABV UP PARAM F/U: HCPCS | Performed by: INTERNAL MEDICINE

## 2021-06-02 RX ORDER — ESTRADIOL 0.1 MG/G
CREAM VAGINAL
COMMUNITY
End: 2022-01-28 | Stop reason: ALTCHOICE

## 2021-06-02 NOTE — PROGRESS NOTES
Rosa Montez is a 66 y.o. female who presents today for Follow-up (HTN)  . She has a history of   Patient Active Problem List   Diagnosis Code    DM (diabetes mellitus) ([facility] [address] E11.9    Hyperlipidemia E78.5    Hepatitis B B19.10    GERD (gastroesophageal reflux disease) K21.9    Rosacea L71.9    AR (allergic rhinitis) J30.9    Intervertebral disk disease M51.9    Uterine prolapse N81.4    Incontinence R32    Essential hypertension with goal blood pressure less than 130/80 I10    Anemia D64.9    PAF (paroxysmal atrial fibrillation) (HCC) I48.0    Peripheral neuropathy G62.9    Pre-ulcerative corn or callous L84    Advanced care planning/counseling discussion Z71.89    Type 2 diabetes mellitus with nephropathy (HCC) E11.21    Stage 3 chronic kidney disease (HCC) N18.30    Severe obesity (BMI 35.0-39. 9) with comorbidity (HCC) U68.72    Systolic murmur of aorta D48.9    History of arthritis Z87.39    Foot drop M21.379    Snoring R06.83    Mild obstructive sleep apnea G47.33    Primary insomnia F51.01    Insomnia with sleep apnea G47.00, G47.30    Type 2 diabetes mellitus with diabetic neuropathy (HCC) E11.40    Incomplete emptying of bladder R33.9    Overactive bladder N32.81    Urge incontinence of urine N39.41    Vitamin D deficiency E55.9   . Today patient here for follow-up. [facility] ER Visit: Patient was in the ER on 23 May and ultimately found to have a right-sided kidney stone. Creatinine was slightly elevated. She has since seen Dr. Julio Carrion with urology. Still gets some occasional twinges. She does not believe that she has passed the stone yet. DM: on low dose metformin. Home fasting blood sugars have been a bit higher than previous. Weight has trended up. Unclear what the cause of this was. History of Hep B. ? If this had passed. Had [facility] hepatology at [facility] in the past    Hypertension-we have made some changes to her medications due to lower extremity edema. Ultimately she is tolerating lisinopril 40 mg as well as amlodipine at 2.5 mg. She also remains on metoprolol. She does have a history of A. fib. Hypertension ROS: taking medications as instructed, no medication side effects noted, no TIA's, no chest pain on exertion, no dyspnea on exertion, no swelling of ankles     reports that she has never smoked. She has never used smokeless tobacco.    reports no history of alcohol use. BP Readings from Last 2 Encounters:   06/02/21 128/86   05/23/21 (!) 169/58     Hyperlipidemia  Patient remains on Zocor. We will repeat her lipids today. ROS: taking medications as instructed, no medication side effects noted  No new myalgias, no joint pains, no weakness  No TIA's, no chest pain on exertion, no dyspnea on exertion, no swelling of ankles. Lab Results   Component Value Date/Time    Cholesterol, total 161 06/12/2020 12:50 PM    HDL Cholesterol 55 06/12/2020 12:50 PM    LDL, calculated 60.2 06/12/2020 12:50 PM    VLDL, calculated 45.8 06/12/2020 12:50 PM    Triglyceride 229 (H) 06/12/2020 12:50 PM    CHOL/HDL Ratio 2.9 06/12/2020 12:50 PM     Working with Uro-GYN, Next step is surgery. She is considering this. Considering in just doing sling. Does have some dry scaly skin around her elbows. This does not hurt or itch. ROS  Review of Systems   Constitutional: Positive for malaise/fatigue. Negative for chills, fever and weight loss. Some weight gain   HENT: Negative for congestion, ear discharge, ear pain, hearing loss, sore throat and tinnitus. Eyes: Negative for blurred vision, double vision and photophobia. Respiratory: Negative for cough and shortness of breath. Cardiovascular: Negative for chest pain, palpitations and leg swelling. Gastrointestinal: Negative for abdominal pain, constipation, diarrhea, heartburn, nausea and vomiting. Genitourinary: Negative for dysuria, frequency and urgency. Musculoskeletal: Positive for back pain. Negative for joint pain and myalgias. Skin: Negative for rash. Neurological: Negative. Negative for headaches. Endo/Heme/Allergies: Does not bruise/bleed easily. Psychiatric/Behavioral: Negative for depression, hallucinations, memory loss, substance abuse and suicidal ideas. The patient is not nervous/anxious. Visit Vitals  /86 (BP 1 Location: Left upper arm, BP Patient Position: Sitting, BP Cuff Size: Adult)   Pulse (!) 54   Temp 98.1 °F (36.7 °C) (Oral)   Resp 16   Ht 5' (1.524 m)   Wt 193 lb 9.6 oz (87.8 kg)   SpO2 97%   BMI 37.81 kg/m²       Physical Exam  Constitutional:       General: She is not in acute distress. Appearance: She is well-developed. HENT:      Head: Normocephalic and atraumatic. Right Ear: Tympanic membrane normal.      Left Ear: Tympanic membrane normal.      Nose: Nose normal.   Neck:      Thyroid: No thyromegaly. Cardiovascular:      Rate and Rhythm: Normal rate and regular rhythm. Heart sounds: No murmur heard. Pulmonary:      Effort: Pulmonary effort is normal.      Breath sounds: Normal breath sounds. No wheezing. Abdominal:      General: Bowel sounds are normal. There is no distension. Palpations: Abdomen is soft. Comments: No CVA tenderness   Musculoskeletal:      Cervical back: Normal range of motion and neck supple. Comments: Trigger finger to middle and ring finger. Skin:     General: Skin is warm and dry. Comments: Foot exam  - skin intact, mild dryness w no fissures, no rashes, no significant ulcers or callous formation. Sensation intact by microfilament or light touch     Neurological:      Mental Status: She is alert and oriented to person, place, and time. Cranial Nerves: No cranial nerve deficit.    Psychiatric:         Behavior: Behavior normal.           Current Outpatient Medications   Medication Sig    estradioL (Estrace) 0.01 % (0.1 mg/gram) vaginal cream Estrace 0.01% (0.1 mg/gram) vaginal cream place 1 g vaginally with applicator or finger nightly for 2 weeks and then 2-3 x weekly thereafter    metFORMIN ER (GLUCOPHAGE XR) 500 mg tablet TAKE 2 TABLETS BY MOUTH EVERY DAY WITH DINNER    lisinopriL (PRINIVIL, ZESTRIL) 40 mg tablet TAKE 1 TABLET BY MOUTH EVERY DAY    ondansetron (Zofran ODT) 4 mg disintegrating tablet Take 1 Tablet by mouth every eight (8) hours as needed for Nausea.  metoprolol succinate (TOPROL-XL) 50 mg XL tablet Take 1 Tablet by mouth daily.  simvastatin (ZOCOR) 20 mg tablet TAKE 1 TABLET BY MOUTH AT BEDTIME FOR CHOLESTEROL    fluticasone propionate (FLONASE) 50 mcg/actuation nasal spray INSTILL 2 SPRAYS IN EACH NOSTRIL EVERY DAY (GENERIC FOR FLONASE)    amLODIPine (NORVASC) 2.5 mg tablet TAKE 1 TABLET BY MOUTH EVERY DAY    cholecalciferol, vitamin D3, (Vitamin D3) 50 mcg (2,000 unit) tab Take  by mouth.  glucose blood VI test strips (FreeStyle Test) strip TEST FASTING BLOOD SUGAR ONE TIME DAILY    lancets (FreeStyle Lancets) 28 gauge misc TEST ONCE DAILY    montelukast (SINGULAIR) 10 mg tablet     esomeprazole (NEXIUM) 20 mg capsule TAKE 1 CAPSULE BY MOUTH EVERY DAY    omega 3-DHA-EPA-fish oil (FISH OIL) 1,000 mg (120 mg-180 mg) capsule Take 4 Caps by mouth daily.  CALCIUM CARBONATE (TUMS PO) Take  by mouth daily as needed.  CALCIUM CARBONATE/VITAMIN D3 (CALCIUM + D PO) Take  by mouth two (2) times a day.  MULTI-VITAMIN PO Take 1 Tab by mouth daily.  aspirin delayed-release 81 mg tablet take 81 mg by mouth daily. No current facility-administered medications for this visit.         Past Medical History:   Diagnosis Date    Anemia     Cystocele     Diabetes ([address]     Diabetic neuropathy, painful ([address]     Hepatitis B     Hypercholesterolemia     Hypertension     Microalbuminuria     Mild nonproliferative diabetic retinopathy (HCC)     PAF (paroxysmal atrial fibrillation) ([address]     [facility] follows    Rectocele     Retinopathy  Rosacea     Ruptured disc, cervical     Sleep apnea     Stenosis, cervical spine       Past Surgical History:   Procedure Laterality Date    HX BREAST BIOPSY Left 2009    HX CATARACT REMOVAL Right     HX CATARACT REMOVAL Left 03/2021    HX COLONOSCOPY      HX DILATION AND CURETTAGE      HX ENDOSCOPY      HX HERNIA REPAIR      umbilical    HX MENISCUS REPAIR  7/11/14    HX OTHER SURGICAL  05/09/2018    Bladder Botox     HX OVARIAN CYST REMOVAL  1973      Social History     Tobacco Use    Smoking status: Never Smoker    Smokeless tobacco: Never Used   Substance Use Topics    Alcohol use: No     Alcohol/week: 0.0 standard drinks     Comment: very rarely       Family History   Problem Relation Age of Onset    Hypertension Mother     Thyroid Disease Mother     Heart Failure Father     Heart Disease Father     Thyroid Disease Sister     Thyroid Disease Sister     Heart Attack Other     Cancer Maternal Aunt         esophageal    Cancer Paternal Aunt         breast        Allergies   Allergen Reactions    Sulfa (Sulfonamide Antibiotics) Unknown (comments)        Assessment/Plan  Diagnoses and all orders for this visit:    1. Essential hypertension with goal blood pressure less than 130/80- stable. -     METABOLIC PANEL, COMPREHENSIVE; Future    2. Type 2 diabetes mellitus with nephropathy (HCC) -fasting sugars up a bit at home. Repeat A1c  -     MICROALBUMIN, UR, RAND W/ MICROALB/CREAT RATIO; Future  -     HEMOGLOBIN A1C WITH EAG; Future    3. Severe obesity (BMI 35.0-39. 9) with comorbidity (HCC)-weight has trended up. Unclear of trigger. 4. PAF (paroxysmal atrial fibrillation) ([facility])-has not had any palpitations    5. Pure hypercholesterolemia -repeat lipids  -     LIPID PANEL; Future    6. Nephrolithiasis -seeing urology. Not currently having pain  -     METABOLIC PANEL, COMPREHENSIVE; Future    7. Vitamin D deficiency  -     VITAMIN D, 25 HYDROXY; Future    8.  History of hepatitis B -believes that she was told that she had passed this in the past.  We will repeat studies to make sure there is no active hepatitis B.  -     HEPATITIS B, QT, PCR; Future  -     HEP B SURFACE AB; Future  -     HBV CORE AB, IGG/IGM; Future    9. RODERICK (obstructive sleep apnea) -working with management of CPAP. 10. Trigger finger, unspecified finger, unspecified laterality -patient to try topical anti-inflammatories. If this worsens will refer to Ortho    11. Rash -likely mild eczematous rash. Patient to monitor      Total face-to face time was 50+ minutes, greater than 50% of which was spent in counseling and coordination of care. The diagnosis, treatment and various other items were discussed in detail: Test results, medication options, possible side effects, lifestyle changes        Eren Cuello MD  6/2/2021    This note was created with the help of speech recognition software Deepika Horne) and may contain some 'sound alike' errors.

## 2021-06-02 NOTE — PATIENT INSTRUCTIONS
Trigger Finger: Care Instructions  Overview  A trigger finger is a finger stuck in a bent position. It happens when the tendon that bends and straightens the thumb or finger can't slide smoothly under the ligaments that hold the tendon against the bones. In most cases, it's caused by a bump (nodule) that forms on the tendon. The bent finger usually straightens out on its own. A trigger finger can be painful. But it normally isn't a serious problem. Trigger fingers seem to occur more in some groups of people. These groups include:  · People who have diabetes or arthritis. · People who have injured their hands in the past.  · Musicians. · People who  tools often. Rest and exercises may help your finger relax so that it can bend. You may get a corticosteroid shot. This can reduce swelling and pain. Your doctor may put a splint on your finger. It will give your finger some rest. You may need surgery if the finger keeps locking in a bent position. Follow-up care is a key part of your treatment and safety. Be sure to make and go to all appointments, and call your doctor if you are having problems. It's also a good idea to know your test results and keep a list of the medicines you take. How can you care for yourself at home? · If your doctor put a splint on your finger, wear it as directed. Don't take it off until your doctor says you can. · You may need to change your activities to avoid movements that irritate the finger. · Take your medicines exactly as prescribed. Call your doctor if you think you are having a problem with your medicine. · Ask your doctor if you can take an over-the-counter pain medicine, such as acetaminophen (Tylenol), ibuprofen (Advil, Motrin), or naproxen (Aleve). Be safe with medicines. Read and follow all instructions on the label. · If your doctor recommends exercises, do them as directed. When should you call for help?    Call your doctor now or seek immediate medical care if:    · Your finger locks in a bent position and will not straighten. Watch closely for changes in your health, and be sure to contact your doctor if:    · You do not get better as expected. Where can you learn more? Go to http://www.[employer]ura.com/  Enter M826 in the search box to learn more about \"Trigger Finger: Care Instructions. \"  Current as of: November 16, 2020               Content Version: 12.8  © 2006-2021 [employer]. Care instructions adapted under license by [employer] (which disclaims liability or warranty for this information). If you have questions about a medical condition or this instruction, always ask your healthcare professional. [address] any warranty or liability for your use of this information.

## 2021-06-03 LAB
25(OH)D3 SERPL-MCNC: 28.5 NG/ML (ref 30–100)
ALBUMIN SERPL-MCNC: 3.5 G/DL (ref 3.5–5)
ALBUMIN/GLOB SERPL: 0.9 {RATIO} (ref 1.1–2.2)
ALP SERPL-CCNC: 76 U/L (ref 45–117)
ALT SERPL-CCNC: 44 U/L (ref 12–78)
ANION GAP SERPL CALC-SCNC: 4 MMOL/L (ref 5–15)
AST SERPL-CCNC: 41 U/L (ref 15–37)
BILIRUB SERPL-MCNC: 0.3 MG/DL (ref 0.2–1)
BUN SERPL-MCNC: 31 MG/DL (ref 6–20)
BUN/CREAT SERPL: 24 (ref 12–20)
CALCIUM SERPL-MCNC: 9.4 MG/DL (ref 8.5–10.1)
CHLORIDE SERPL-SCNC: 109 MMOL/L (ref 97–108)
CHOLEST SERPL-MCNC: 147 MG/DL
CO2 SERPL-SCNC: 25 MMOL/L (ref 21–32)
CREAT SERPL-MCNC: 1.27 MG/DL (ref 0.55–1.02)
CREAT UR-MCNC: 24.6 MG/DL
EST. AVERAGE GLUCOSE BLD GHB EST-MCNC: 143 MG/DL
GLOBULIN SER CALC-MCNC: 3.8 G/DL (ref 2–4)
GLUCOSE SERPL-MCNC: 116 MG/DL (ref 65–100)
HBA1C MFR BLD: 6.6 % (ref 4–5.6)
HBV SURFACE AB SER QL: NONREACTIVE
HBV SURFACE AB SER-ACNC: <3.1 MIU/ML
HDLC SERPL-MCNC: 51 MG/DL
HDLC SERPL: 2.9 {RATIO} (ref 0–5)
LDLC SERPL CALC-MCNC: 59 MG/DL (ref 0–100)
MICROALBUMIN UR-MCNC: 0.76 MG/DL
MICROALBUMIN/CREAT UR-RTO: 31 MG/G (ref 0–30)
POTASSIUM SERPL-SCNC: 5.4 MMOL/L (ref 3.5–5.1)
PROT SERPL-MCNC: 7.3 G/DL (ref 6.4–8.2)
SODIUM SERPL-SCNC: 138 MMOL/L (ref 136–145)
TRIGL SERPL-MCNC: 185 MG/DL (ref ?–150)
VLDLC SERPL CALC-MCNC: 37 MG/DL

## 2021-06-04 LAB
HBV CORE AB SERPL QL IA: NEGATIVE
HBV CORE IGM SERPL QL IA: NEGATIVE
HBV DNA # SERPL NAA+PROBE: NORMAL COPIES/ML
HBV DNA SERPL NAA+PROBE-ACNC: NORMAL IU/ML
HBV DNA SERPL NAA+PROBE-LOG IU: NORMAL LOG10 IU/ML
HBV DNA SERPL NAA+PROBE-LOG#: NORMAL LOG10COPY/ML
TEST INFORMATION: NORMAL

## 2021-06-08 RX ORDER — SIMVASTATIN 20 MG/1
TABLET, FILM COATED ORAL
Qty: 90 TABLET | Refills: 1 | Status: SHIPPED | OUTPATIENT
Start: 2021-06-08 | End: 2022-01-19

## 2021-06-09 ENCOUNTER — PATIENT OUTREACH (OUTPATIENT)
Dept: CASE MANAGEMENT | Age: 71
End: 2021-06-09

## 2021-06-10 NOTE — PROGRESS NOTES
Shannon Antunez is a ,  66 y.o. female [address] [facility] [place] whose LMP was on  who presents for her annual checkup. She is menopausal and amenorrheic. She is having urinary incontinence    Hormone Status:    She is not having vasomotor symptoms. The patient is not using HRT. She has not had any vaginal bleeding. She reports no gynecologic symptoms. Sexual history:    She  reports previously being sexually active. Medical conditions:    Since her last annual GYN exam about one year ago, she has had the following changes in her health history: kidney stones, urinary incontinence (she is seeing urology for both)   Surgical history confirmed with patient. has a past surgical history that includes hx meniscus repair (14); hx breast biopsy (Left, ); hx ovarian cyst removal (); hx hernia repair; hx other surgical (2018); hx cataract removal (Right); hx colonoscopy; hx endoscopy; hx dilation and curettage; and hx cataract removal (Left, 2021). Pap and Mammogram History:    Her most recent Pap smear was normal obtained 5 year(s) ago. The patient had her mammogram today in our office    Breast Cancer History/Substance Abuse:     She does have a family history of breast cancer. Osteoporosis History:    Family history does not include a first or second degree relative with osteopenia or osteoporosis. A bone density scan was obtained  and revealed normal bone density. She is currently taking calcium and vit D.     Past Medical History:   Diagnosis Date    Anemia     Calculus of kidney     Cystocele     Diabetes ([address]     Diabetic neuropathy, painful (HCC)     Hepatitis B     Hypercholesterolemia     Hypertension     Microalbuminuria     Mild nonproliferative diabetic retinopathy (HCC)     PAF (paroxysmal atrial fibrillation) ([address]     [facility] 3-24-13 JANICE-Rolf follows    Rectocele     Retinopathy     Rosacea     Ruptured disc, cervical     Sleep apnea     Stenosis, cervical spine      Past Surgical History:   Procedure Laterality Date    HX BREAST BIOPSY Left 2009    HX CATARACT REMOVAL Right     HX CATARACT REMOVAL Left 03/2021    HX COLONOSCOPY      HX DILATION AND CURETTAGE      HX ENDOSCOPY      HX HERNIA REPAIR      umbilical    HX MENISCUS REPAIR  7/11/14    HX OTHER SURGICAL  05/09/2018    Bladder Botox     HX OVARIAN CYST REMOVAL  1973     Current Outpatient Medications   Medication Sig Dispense Refill    simvastatin (ZOCOR) 20 mg tablet TAKE 1 TABLET BY MOUTH AT BEDTIME FOR CHOLESTEROL 90 Tablet 1    estradioL (Estrace) 0.01 % (0.1 mg/gram) vaginal cream Estrace 0.01% (0.1 mg/gram) vaginal cream   place 1 g vaginally with applicator or finger nightly for 2 weeks and then 2-3 x weekly thereafter      metFORMIN ER (GLUCOPHAGE XR) 500 mg tablet TAKE 2 TABLETS BY MOUTH EVERY DAY WITH DINNER 180 Tablet 1    lisinopriL (PRINIVIL, ZESTRIL) 40 mg tablet TAKE 1 TABLET BY MOUTH EVERY DAY 90 Tablet 1    ondansetron (Zofran ODT) 4 mg disintegrating tablet Take 1 Tablet by mouth every eight (8) hours as needed for Nausea. 10 Tablet 0    metoprolol succinate (TOPROL-XL) 50 mg XL tablet Take 1 Tablet by mouth daily. 90 Tablet 3    fluticasone propionate (FLONASE) 50 mcg/actuation nasal spray INSTILL 2 SPRAYS IN EACH NOSTRIL EVERY DAY (GENERIC FOR FLONASE) 1 Bottle 3    amLODIPine (NORVASC) 2.5 mg tablet TAKE 1 TABLET BY MOUTH EVERY DAY 90 Tab 1    cholecalciferol, vitamin D3, (Vitamin D3) 50 mcg (2,000 unit) tab Take  by mouth.  glucose blood VI test strips (FreeStyle Test) strip TEST FASTING BLOOD SUGAR ONE TIME DAILY 100 Strip 11    lancets (FreeStyle Lancets) 28 gauge misc TEST ONCE DAILY 100 Lancet 3    montelukast (SINGULAIR) 10 mg tablet       esomeprazole (NEXIUM) 20 mg capsule TAKE 1 CAPSULE BY MOUTH EVERY DAY  6    omega 3-DHA-EPA-fish oil (FISH OIL) 1,000 mg (120 mg-180 mg) capsule Take 4 Caps by mouth daily.       CALCIUM CARBONATE (TUMS PO) Take  by mouth daily as needed.  CALCIUM CARBONATE/VITAMIN D3 (CALCIUM + D PO) Take  by mouth two (2) times a day.  MULTI-VITAMIN PO Take 1 Tab by mouth daily.  aspirin delayed-release 81 mg tablet take 81 mg by mouth daily. Allergies: Sulfa (sulfonamide antibiotics)   Social History     Socioeconomic History    Marital status:      Spouse name: Not on file    Number of children: Not on file    Years of education: Not on file    Highest education level: Not on file   Occupational History    Not on file   Tobacco Use    Smoking status: Never Smoker    Smokeless tobacco: Never Used   Vaping Use    Vaping Use: Never used   Substance and Sexual Activity    Alcohol use: No     Alcohol/week: 0.0 standard drinks     Comment: very rarely     Drug use: No    Sexual activity: Not Currently   Other Topics Concern    Not on file   Social History Narrative    Not on file     Social Determinants of Health     Financial Resource Strain:     Difficulty of Paying Living Expenses:    Food Insecurity:     Worried About Running Out of Food in the Last Year:     Ran Out of Food in the Last Year:    Transportation Needs:     Lack of Transportation (Medical):  Lack of Transportation (Non-Medical):    Physical Activity:     Days of Exercise per Week:     Minutes of Exercise per Session:    Stress:     Feeling of Stress :    Social Connections:     Frequency of Communication with Friends and Family:     Frequency of Social Gatherings with Friends and Family:     Attends Zoroastrian Services:     Active Member of Clubs or Organizations:     Attends Club or Organization Meetings:     Marital Status:    Intimate Partner Violence:     Fear of Current or Ex-Partner:     Emotionally Abused:     Physically Abused:     Sexually Abused: Tobacco History:  reports that she has never smoked.  She has never used smokeless tobacco.  Alcohol Abuse:  reports no history of alcohol use. Drug Abuse:  reports no history of drug use. Patient Active Problem List   Diagnosis Code    DM (diabetes mellitus) ([facility] E11.9    Hyperlipidemia E78.5    Hepatitis B B19.10    GERD (gastroesophageal reflux disease) K21.9    Rosacea L71.9    AR (allergic rhinitis) J30.9    Intervertebral disk disease M51.9    Uterine prolapse N81.4    Incontinence R32    Essential hypertension with goal blood pressure less than 130/80 I10    Anemia D64.9    PAF (paroxysmal atrial fibrillation) ([facility]) I48.0    Peripheral neuropathy G62.9    Pre-ulcerative corn or callous L84    Advanced care planning/counseling discussion Z71.89    Type 2 diabetes mellitus with nephropathy ([facility]) E11.21    Stage 3 chronic kidney disease ([facility]) N18.30    Severe obesity (BMI 35.0-39. 9) with comorbidity ([facility]) V68.45    Systolic murmur of aorta O67.3    History of arthritis Z87.39    Foot drop M21.379    Snoring R06.83    RODERICK (obstructive sleep apnea) G47.33    Primary insomnia F51.01    Insomnia with sleep apnea G47.00, G47.30    Type 2 diabetes mellitus with diabetic neuropathy ([facility]) E11.40    Incomplete emptying of bladder R33.9    Overactive bladder N32.81    Urge incontinence of urine N39.41    Vitamin D deficiency E55.9       Review of Systems - History obtained from the patient  Constitutional: negative for weight loss, fever, night sweats  HEENT: negative for hearing loss, earache, congestion, snoring, sorethroat  CV: negative for chest pain, palpitations, edema  Resp: negative for cough, shortness of breath, wheezing  GI: negative for change in bowel habits, abdominal pain, black or bloody stools  : negative for frequency, dysuria, hematuria, vaginal discharge  MSK: negative for back pain, joint pain, muscle pain  Breast: negative for breast lumps, nipple discharge, galactorrhea  Skin :negative for itching, rash, hives  Neuro: negative for dizziness, headache, confusion, weakness  Psych: negative for anxiety, depression, change in mood  Heme/lymph: negative for bleeding, bruising, pallor    Physical Exam    Visit Vitals  /80   Ht 5' (1.524 m)   Wt 193 lb (87.5 kg)   BMI 37.69 kg/m²     Constitutional  · Appearance: well-nourished, well developed, alert, in no acute distress    HENT  · Head and Face: appears normal    Neck  · Inspection/Palpation: normal appearance, no masses or tenderness  · Lymph Nodes: no lymphadenopathy present    Chest  · Respiratory Effort: breathing normal    Breasts  · Inspection of Breasts: breasts symmetrical, no skin changes, no discharge present, nipple appearance normal, no skin retraction present  · Palpation of Breasts and Axillae: no masses present on palpation, no breast tenderness  · Axillary Lymph Nodes: no lymphadenopathy present    Gastrointestinal  · Abdominal Examination: abdomen non-tender to palpation, normal bowel sounds, no masses present  · Liver and spleen: no hepatomegaly present, spleen not palpable  · Hernias: no hernias identified    Genitourinary  · External Genitalia: normal appearance for age with atrophy, no discharge present, no tenderness present, no inflammatory lesions present, no masses present  · Vagina:atrophic vaginal vault with pale epithelium and flattening of rugae, cystocele and rectocele present, no discharge present, no inflammatory lesions present, no masses present  · Bladder: non-tender to palpation  · Urethra: appears normal  · Cervix: normal with descent  · Uterus: normal size, shape and consistency  · Adnexa: no adnexal tenderness present, no adnexal masses present  · Perineum: perineum within normal limits, no evidence of trauma, no rashes or skin lesions present  · Anus: anus within normal limits, no hemorrhoids present  · Inguinal Lymph Nodes: no lymphadenopathy present    Skin  · General Inspection: no rash, no lesions identified    Neurologic/Psychiatric  · Mental Status:  · Orientation: grossly oriented to person, place and time  · Mood and Affect: mood normal, affect appropriate    . Assessment:  Routine gynecologic examination  Her current medical status is satisfactory with no evidence of significant gynecologic issues.   Cystocele and rectocele present with uterine desent    Plan:  Counseled re: diet, exercise, healthy lifestyle  Return for yearly wellness visits  Rec annual mammogram  She is seeing [facility] for treatment of incontinence and pelvic prolapse

## 2021-06-21 ENCOUNTER — OFFICE VISIT (OUTPATIENT)
Dept: OBGYN CLINIC | Age: 71
End: 2021-06-21

## 2021-06-21 VITALS
DIASTOLIC BLOOD PRESSURE: 80 MMHG | WEIGHT: 193 LBS | SYSTOLIC BLOOD PRESSURE: 128 MMHG | HEIGHT: 60 IN | BODY MASS INDEX: 37.89 KG/M2

## 2021-06-21 DIAGNOSIS — Z01.419 WELL WOMAN EXAM WITH ROUTINE GYNECOLOGICAL EXAM: Primary | ICD-10-CM

## 2021-06-21 PROCEDURE — G9899 SCRN MAM PERF RSLTS DOC: HCPCS | Performed by: OBSTETRICS & GYNECOLOGY

## 2021-06-21 PROCEDURE — 1101F PT FALLS ASSESS-DOCD LE1/YR: CPT | Performed by: OBSTETRICS & GYNECOLOGY

## 2021-06-21 PROCEDURE — G8432 DEP SCR NOT DOC, RNG: HCPCS | Performed by: OBSTETRICS & GYNECOLOGY

## 2021-06-21 PROCEDURE — G8754 DIAS BP LESS 90: HCPCS | Performed by: OBSTETRICS & GYNECOLOGY

## 2021-06-21 PROCEDURE — G0101 CA SCREEN;PELVIC/BREAST EXAM: HCPCS | Performed by: OBSTETRICS & GYNECOLOGY

## 2021-06-21 PROCEDURE — 3017F COLORECTAL CA SCREEN DOC REV: CPT | Performed by: OBSTETRICS & GYNECOLOGY

## 2021-06-21 PROCEDURE — G8752 SYS BP LESS 140: HCPCS | Performed by: OBSTETRICS & GYNECOLOGY

## 2021-06-21 PROCEDURE — 1090F PRES/ABSN URINE INCON ASSESS: CPT | Performed by: OBSTETRICS & GYNECOLOGY

## 2021-06-21 PROCEDURE — G8417 CALC BMI ABV UP PARAM F/U: HCPCS | Performed by: OBSTETRICS & GYNECOLOGY

## 2021-06-29 ENCOUNTER — PATIENT OUTREACH (OUTPATIENT)
Dept: CASE MANAGEMENT | Age: 71
End: 2021-06-29

## 2021-06-30 NOTE — PROGRESS NOTES
Ambulatory Care Management Note      Date/Time:  6/30/2021 5:29 PM    Top Challenges reviewed with the provider   · [facility] ED on 5/23/21 for lower right back pain-acute onset, diagnosed with kidney stone  · OB/GYN Follow up on 6/21/21   · Scheduled for cardiology follow up on 9/29/21  · No ACP on file  · 6/3/21-HA1C was 6.6  · Overdue Medicare Wellness     Ambulatory  contacted patient for discussion and case management of self care  Summary of patients top problems:   1. Diabetes-Continue to take metformin, states her blood sugars has been running higher than normal.  2. HTN- Complain of lower extremity edema, now on Lisinorpril 40 mg, amlodipine and metoprolol. History of A-Fib. PCP/Specialist follow up:   Future Appointments   Date Time Provider Khalif Lundy   9/29/2021 11:20 AM Rl Godinez MD [facility] BS AMB          Goals      Patient/Family verbalizes understanding of self-management of Diabetes. 06/30/21    Outreach completed   ED visit on 5/23/21, no other ED visits in 30 days   Complete medication review,  (ie. action, side effects, missed dose, etc.) Review roles of different meds.  Educated on Importance of checking blood sugars, keeping a log, and understanding BS goals   Discussed the importance to prevent, recognize, and manage high and low blood sugars   Discussed meal planning and carb counting   Discussed importance of physical activity.  Encouraged to call doctor or nurse if blood sugar is high and you don't know why.  ACM will follow up in 7-10 days. Contact information given. Pmk.         Self-schedules and keeps appointments       06/30/21      Completed GYN follow up on 6/21/21   Will attend  cardiology appointment on 9/29   Instructed patient on the importance of follow up appointments with PCP and           specialty appointments.  Instructed patient on how to access supportive resources.  ACM will follow up in 7-10 days.  Contact information given. Pmk. Patient verbalized understanding of all information discussed. Patient has this Nurse Navigators contact information for any further questions, concerns, or needs.

## 2021-07-07 DIAGNOSIS — I10 ESSENTIAL HYPERTENSION WITH GOAL BLOOD PRESSURE LESS THAN 130/80: ICD-10-CM

## 2021-07-07 RX ORDER — AMLODIPINE BESYLATE 2.5 MG/1
TABLET ORAL
Qty: 90 TABLET | Refills: 1 | Status: SHIPPED | OUTPATIENT
Start: 2021-07-07 | End: 2021-12-10 | Stop reason: SDUPTHER

## 2021-07-16 ENCOUNTER — PATIENT OUTREACH (OUTPATIENT)
Dept: CASE MANAGEMENT | Age: 71
End: 2021-07-16

## 2021-07-19 NOTE — PROGRESS NOTES
Goals Addressed                 This Visit's Progress     Coordinate pain management plan for patient. 7/16/21   Outreach completed   Admits to passing her kidney stone which resolved her abd pain   Using her CPAP at night has helped with sleep   Admits to having problems with trigger finger pain   Will call PCP for follow up within next 7 days.  Will attend Cardiology follow up as scheduled. pmk         Patient/Family verbalizes understanding of self-management of Diabetes. 06/30/21    Outreach completed   ED visit on 5/23/21, no other ED visits in 30 days   Complete medication review,  (ie. action, side effects, missed dose, etc.) Review roles of different meds.  Educated on Importance of checking blood sugars, keeping a log, and understanding BS goals   Discussed the importance to prevent, recognize, and manage high and low blood sugars   Discussed meal planning and carb counting   Discussed importance of physical activity.  Encouraged to call doctor or nurse if blood sugar is high and you don't know why.  ACM will follow up in 7-10 days. Contact information given. Pmk.    7/16/21   Outreach completed, says BS are at goal   BS running 120-130s   Will continue to check BS at least bid.  pmk

## 2021-08-06 ENCOUNTER — PATIENT OUTREACH (OUTPATIENT)
Dept: CASE MANAGEMENT | Age: 71
End: 2021-08-06

## 2021-08-19 RX ORDER — FLUTICASONE PROPIONATE 50 MCG
2 SPRAY, SUSPENSION (ML) NASAL DAILY
Qty: 1 BOTTLE | Refills: 3 | Status: SHIPPED | OUTPATIENT
Start: 2021-08-19 | End: 2022-07-05

## 2021-08-23 RX ORDER — FLUTICASONE PROPIONATE 50 MCG
2 SPRAY, SUSPENSION (ML) NASAL DAILY
Qty: 1 BOTTLE | Refills: 3 | Status: CANCELLED | OUTPATIENT
Start: 2021-08-23

## 2021-08-23 NOTE — TELEPHONE ENCOUNTER
[facility] 90 day supply of fluticasone proprionate (Flonase) and should be shipped out in 8-10 days. Pt informed, was thankful for the call and had no further questions.

## 2021-09-22 ENCOUNTER — OFFICE VISIT (OUTPATIENT)
Dept: INTERNAL MEDICINE CLINIC | Age: 71
End: 2021-09-22
Payer: MEDICARE

## 2021-09-22 VITALS
RESPIRATION RATE: 16 BRPM | BODY MASS INDEX: 37.85 KG/M2 | OXYGEN SATURATION: 98 % | SYSTOLIC BLOOD PRESSURE: 139 MMHG | DIASTOLIC BLOOD PRESSURE: 76 MMHG | TEMPERATURE: 98.3 F | HEART RATE: 57 BPM | WEIGHT: 192.8 LBS | HEIGHT: 60 IN

## 2021-09-22 DIAGNOSIS — M65.30 TRIGGER FINGER, UNSPECIFIED FINGER, UNSPECIFIED LATERALITY: ICD-10-CM

## 2021-09-22 DIAGNOSIS — Z01.818 PREOP EXAMINATION: Primary | ICD-10-CM

## 2021-09-22 DIAGNOSIS — I10 ESSENTIAL HYPERTENSION WITH GOAL BLOOD PRESSURE LESS THAN 130/80: ICD-10-CM

## 2021-09-22 DIAGNOSIS — G47.33 OSA (OBSTRUCTIVE SLEEP APNEA): ICD-10-CM

## 2021-09-22 DIAGNOSIS — E11.21 TYPE 2 DIABETES MELLITUS WITH NEPHROPATHY (HCC): ICD-10-CM

## 2021-09-22 PROCEDURE — 3044F HG A1C LEVEL LT 7.0%: CPT | Performed by: INTERNAL MEDICINE

## 2021-09-22 PROCEDURE — 1090F PRES/ABSN URINE INCON ASSESS: CPT | Performed by: INTERNAL MEDICINE

## 2021-09-22 PROCEDURE — G0463 HOSPITAL OUTPT CLINIC VISIT: HCPCS | Performed by: INTERNAL MEDICINE

## 2021-09-22 PROCEDURE — G8432 DEP SCR NOT DOC, RNG: HCPCS | Performed by: INTERNAL MEDICINE

## 2021-09-22 PROCEDURE — G8427 DOCREV CUR MEDS BY ELIG CLIN: HCPCS | Performed by: INTERNAL MEDICINE

## 2021-09-22 PROCEDURE — G8417 CALC BMI ABV UP PARAM F/U: HCPCS | Performed by: INTERNAL MEDICINE

## 2021-09-22 PROCEDURE — G9899 SCRN MAM PERF RSLTS DOC: HCPCS | Performed by: INTERNAL MEDICINE

## 2021-09-22 PROCEDURE — 3017F COLORECTAL CA SCREEN DOC REV: CPT | Performed by: INTERNAL MEDICINE

## 2021-09-22 PROCEDURE — 2022F DILAT RTA XM EVC RTNOPTHY: CPT | Performed by: INTERNAL MEDICINE

## 2021-09-22 PROCEDURE — G8399 PT W/DXA RESULTS DOCUMENT: HCPCS | Performed by: INTERNAL MEDICINE

## 2021-09-22 PROCEDURE — G8536 NO DOC ELDER MAL SCRN: HCPCS | Performed by: INTERNAL MEDICINE

## 2021-09-22 PROCEDURE — 99214 OFFICE O/P EST MOD 30 MIN: CPT | Performed by: INTERNAL MEDICINE

## 2021-09-22 PROCEDURE — G8752 SYS BP LESS 140: HCPCS | Performed by: INTERNAL MEDICINE

## 2021-09-22 PROCEDURE — G8754 DIAS BP LESS 90: HCPCS | Performed by: INTERNAL MEDICINE

## 2021-09-22 PROCEDURE — 1101F PT FALLS ASSESS-DOCD LE1/YR: CPT | Performed by: INTERNAL MEDICINE

## 2021-09-22 NOTE — PROGRESS NOTES
Latashasilver Stone  Identified pt with two pt identifiers(name and ). Chief Complaint   Patient presents with    Pre-op Exam     RM19// pt is presenting today for pre-op exam to have hysterectomy on 10/5 wants to discuss trigger finger maybe time for treatment       1. Have you been to the ER, urgent care clinic since your last visit? Hospitalized since your last visit? NO    2. Have you seen or consulted any other health care providers outside of the [address] [place] [address] since your last visit? Include any pap smears or colon screening. NO      Provider notified of reason for visit, vitals and flowsheets obtained on patients.      Patient received paperwork for advance directive during previous visit but has not completed at this time     Reviewed record In preparation for visit, huddled with provider and have obtained necessary documentation      Health Maintenance Due   Topic    Medicare Yearly Exam     Flu Vaccine (1)       Wt Readings from Last 3 Encounters:   21 192 lb 12.8 oz (87.5 kg)   21 193 lb (87.5 kg)   21 193 lb 9.6 oz (87.8 kg)     Temp Readings from Last 3 Encounters:   21 98.3 °F (36.8 °C) (Oral)   21 98.1 °F (36.7 °C) (Oral)   21 96.9 °F (36.1 °C)     BP Readings from Last 3 Encounters:   21 139/76   21 128/80   21 128/86     Pulse Readings from Last 3 Encounters:   21 (!) 57   21 (!) 54   21 (!) 58     Vitals:    21 1027   BP: 139/76   Pulse: (!) 57   Resp: 16   Temp: 98.3 °F (36.8 °C)   TempSrc: Oral   SpO2: 98%   Weight: 192 lb 12.8 oz (87.5 kg)   Height: 5' (1.524 m)   PainSc:   0 - No pain         Learning Assessment:  :     Learning Assessment 2015 2015 8/15/2014   PRIMARY LEARNER - Patient Patient   HIGHEST LEVEL OF EDUCATION - PRIMARY LEARNER  - - 4 [place] LEARNER - - NONE   CO-LEARNER CAREGIVER - - No   PRIMARY LANGUAGE ENGLISH ENGLISH ENGLISH   LEARNER PREFERENCE PRIMARY - LISTENING DEMONSTRATION   ANSWERED BY - PATIENT self   RELATIONSHIP - SELF SELF       Depression Screening:  :     3 most recent PHQ Screens 6/2/2021   Little interest or pleasure in doing things Not at all   Feeling down, depressed, irritable, or hopeless Not at all   Total Score PHQ 2 0       Fall Risk Assessment:  :     Fall Risk Assessment, last 12 mths 6/30/2021   Able to walk? Yes   Fall in past 12 months? 0   Do you feel unsteady? 0   Are you worried about falling 0       Abuse Screening:  :     Abuse Screening Questionnaire 12/15/2020 6/12/2020 2/19/2020 10/7/2019 9/17/2019 3/21/2019 11/27/2018   Do you ever feel afraid of your partner? N N N N N N N   Are you in a relationship with someone who physically or mentally threatens you? N N N N N N N   Is it safe for you to go home? Y Y Y Y Y Y Y       ADL Screening:  :     ADL Assessment 9/27/2017   Feeding yourself No Help Needed   Getting from bed to chair No Help Needed   Getting dressed No Help Needed   Bathing or showering No Help Needed   Walk across the room (includes cane/walker) No Help Needed   Using the telphone No Help Needed   Taking your medications No Help Needed   Preparing meals No Help Needed   Managing money (expenses/bills) No Help Needed   Moderately strenuous housework (laundry) No Help Needed   Shopping for personal items (toiletries/medicines) No Help Needed   Shopping for groceries No Help Needed   Driving No Help Needed   Climbing a flight of stairs No Help Needed   Getting to places beyond walking distances No Help Needed         Medication reconciliation up to date and corrected with patient at this time.

## 2021-09-22 NOTE — PROGRESS NOTES
Ramon Vincent is a 66 y.o. female who presents today for Pre-op Exam (RM19// pt is presenting today for pre-op exam to have hysterectomy on 10/5 wants to discuss trigger finger maybe time for treatment)  . She has a history of   Patient Active Problem List   Diagnosis Code    DM (diabetes mellitus) ([facility] E11.9    Hyperlipidemia E78.5    Hepatitis B B19.10    GERD (gastroesophageal reflux disease) K21.9    Rosacea L71.9    AR (allergic rhinitis) J30.9    Intervertebral disk disease M51.9    Uterine prolapse N81.4    Incontinence R32    Essential hypertension with goal blood pressure less than 130/80 I10    Anemia D64.9    PAF (paroxysmal atrial fibrillation) ([facility]) I48.0    Peripheral neuropathy G62.9    Pre-ulcerative corn or callous L84    Advanced care planning/counseling discussion Z71.89    Type 2 diabetes mellitus with nephropathy ([facility]) E11.21    Stage 3 chronic kidney disease (HCC) N18.30    Severe obesity (BMI 35.0-39. 9) with comorbidity (HCC) L39.18    Systolic murmur of aorta G15.7    History of arthritis Z87.39    Foot drop M21.379    Snoring R06.83    RODERICK (obstructive sleep apnea) G47.33    Primary insomnia F51.01    Insomnia with sleep apnea G47.00, G47.30    Type 2 diabetes mellitus with diabetic neuropathy (HCC) E11.40    Incomplete emptying of bladder R33.9    Overactive bladder N32.81    Urge incontinence of urine N39.41    Vitamin D deficiency E55.9   . Today patient is here for preop. Preop:   Scheduled for Vaginal Hysterectomy Dr. Harmony Staley on 5 October. Patient's blood pressure and other risk factors have been relatively well controlled. Patient has tolerated anesthesia in the past.  She has had an issue with waking up while under anesthesia. She apparently will have a preop visit at [facility] before her surgery. We do not have any preop forms indicating which blood work they would like to have drawn. Urinary symptoms are worsening.    Denies any limitations due to chest pain or shortness of breath. Sugars have been well controlled though they have slightly trended up. Stress levels have been a bit elevated. Hypertension - BP is stable. Hypertension ROS: taking medications as instructed, no medication side effects noted, no TIA's, no chest pain on exertion, no dyspnea on exertion, no swelling of ankles     reports that she has never smoked. She has never used smokeless tobacco.    reports no history of alcohol use. BP Readings from Last 2 Encounters:   09/22/21 139/76   06/21/21 128/80     Seeing Cardiology on 9/29/2021. Trigger finger of R hand. Getting a bit worse. ROS  Review of Systems   Constitutional: Negative for chills, fever and weight loss. HENT: Negative for congestion and sore throat. Eyes: Negative for blurred vision, double vision and photophobia. Respiratory: Negative for cough and shortness of breath. Cardiovascular: Negative for chest pain, palpitations and leg swelling. Gastrointestinal: Negative for abdominal pain, constipation, diarrhea, heartburn, nausea and vomiting. Musculoskeletal: Negative for joint pain and myalgias. Skin: Negative for rash. Neurological: Negative. Negative for headaches. Endo/Heme/Allergies: Does not bruise/bleed easily. Psychiatric/Behavioral: Negative for memory loss and suicidal ideas. Stress is up a bit       Visit Vitals  /76 (BP 1 Location: Left upper arm, BP Patient Position: Sitting, BP Cuff Size: Large adult)   Pulse (!) 57   Temp 98.3 °F (36.8 °C) (Oral)   Resp 16   Ht 5' (1.524 m)   Wt 192 lb 12.8 oz (87.5 kg)   SpO2 98%   BMI 37.65 kg/m²       Physical Exam  Constitutional:       General: She is not in acute distress. Appearance: She is well-developed. HENT:      Head: Normocephalic and atraumatic. Neck:      Thyroid: No thyromegaly. Cardiovascular:      Rate and Rhythm: Normal rate and regular rhythm. Heart sounds: No murmur heard. Pulmonary:      Effort: Pulmonary effort is normal.      Breath sounds: Normal breath sounds. No wheezing. Abdominal:      General: Bowel sounds are normal. There is no distension. Palpations: Abdomen is soft. Musculoskeletal:      Cervical back: Normal range of motion and neck supple. Comments: Trigger finger to R hand. Skin:     General: Skin is warm and dry. Neurological:      Mental Status: She is alert and oriented to person, place, and time. Cranial Nerves: No cranial nerve deficit. Psychiatric:         Behavior: Behavior normal.           Current Outpatient Medications   Medication Sig    fluticasone propionate (FLONASE) 50 mcg/actuation nasal spray 2 Sprays by Both Nostrils route daily.  amLODIPine (NORVASC) 2.5 mg tablet TAKE 1 TABLET BY MOUTH EVERY DAY    simvastatin (ZOCOR) 20 mg tablet TAKE 1 TABLET BY MOUTH AT BEDTIME FOR CHOLESTEROL    estradioL (Estrace) 0.01 % (0.1 mg/gram) vaginal cream Estrace 0.01% (0.1 mg/gram) vaginal cream   place 1 g vaginally with applicator or finger nightly for 2 weeks and then 2-3 x weekly thereafter    metFORMIN ER (GLUCOPHAGE XR) 500 mg tablet TAKE 2 TABLETS BY MOUTH EVERY DAY WITH DINNER    lisinopriL (PRINIVIL, ZESTRIL) 40 mg tablet TAKE 1 TABLET BY MOUTH EVERY DAY    metoprolol succinate (TOPROL-XL) 50 mg XL tablet Take 1 Tablet by mouth daily.  cholecalciferol, vitamin D3, (Vitamin D3) 50 mcg (2,000 unit) tab Take  by mouth.  glucose blood VI test strips (FreeStyle Test) strip TEST FASTING BLOOD SUGAR ONE TIME DAILY    lancets (FreeStyle Lancets) 28 gauge misc TEST ONCE DAILY    montelukast (SINGULAIR) 10 mg tablet     esomeprazole (NEXIUM) 20 mg capsule TAKE 1 CAPSULE BY MOUTH EVERY DAY    omega 3-DHA-EPA-fish oil (FISH OIL) 1,000 mg (120 mg-180 mg) capsule Take 4 Caps by mouth daily.  CALCIUM CARBONATE (TUMS PO) Take  by mouth daily as needed.     CALCIUM CARBONATE/VITAMIN D3 (CALCIUM + D PO) Take  by mouth two (2) times a day.  MULTI-VITAMIN PO Take 1 Tab by mouth daily.  aspirin delayed-release 81 mg tablet take 81 mg by mouth daily.  ondansetron (Zofran ODT) 4 mg disintegrating tablet Take 1 Tablet by mouth every eight (8) hours as needed for Nausea. (Patient not taking: Reported on 9/22/2021)     No current facility-administered medications for this visit.         Past Medical History:   Diagnosis Date    Anemia     Calculus of kidney     Cystocele     Diabetes ([facility] [address] [facility]     Diabetic neuropathy, painful (HCC)     Hepatitis B     Hypercholesterolemia     Hypertension     Microalbuminuria     Mild nonproliferative diabetic retinopathy (HCC)     PAF (paroxysmal atrial fibrillation) ([facility] [address]     [facility] follows    Rectocele     Retinopathy     Rosacea     Ruptured disc, cervical     Sleep apnea     Stenosis, cervical spine       Past Surgical History:   Procedure Laterality Date    HX BREAST BIOPSY Left 2009    HX CATARACT REMOVAL Right     HX CATARACT REMOVAL Left 03/2021    HX COLONOSCOPY      HX CYST INCISION AND DRAINAGE      HX DILATION AND CURETTAGE      HX ENDOSCOPY      HX HERNIA REPAIR      umbilical    HX MENISCUS REPAIR  7/11/14    HX OTHER SURGICAL  05/09/2018    Bladder Botox     HX OVARIAN CYST REMOVAL  1973      Social History     Tobacco Use    Smoking status: Never Smoker    Smokeless tobacco: Never Used   Substance Use Topics    Alcohol use: No     Alcohol/week: 0.0 standard drinks     Comment: very rarely       Family History   Problem Relation Age of Onset    Hypertension Mother     Thyroid Disease Mother     Heart Failure Father     Heart Disease Father     Thyroid Disease Sister     Thyroid Disease Sister     Heart Attack Other     Cancer Maternal Aunt         esophageal    Breast Cancer Maternal Aunt     Cancer Paternal Aunt         breast        Allergies   Allergen Reactions    Sulfa (Sulfonamide Antibiotics) Unknown (comments) Assessment/Plan  Diagnoses and all orders for this visit:    1. Preop examination-no changes needed before proceeding with elective surgery. She does have a preop visit. Hold ASA for 7 days before surgery. No Metformin before procedure. 2. Essential hypertension with goal blood pressure less than 130/80-     3. Type 2 diabetes mellitus with nephropathy (HCC)-repeat A1c in December    4. RODERICK (obstructive sleep apnea)    5. Trigger finger, unspecified finger, unspecified laterality-patient to use topical Voltaren gel. Suggest seeing hand orthopedist if this persist.            Eliseo Driver MD  9/22/2021    This note was created with the help of speech recognition software Joyce Nguyễn) and may contain some 'sound alike' errors.

## 2021-09-29 ENCOUNTER — TRANSCRIBE ORDER (OUTPATIENT)
Dept: REGISTRATION | Age: 71
End: 2021-09-29

## 2021-09-29 ENCOUNTER — OFFICE VISIT (OUTPATIENT)
Dept: CARDIOLOGY CLINIC | Age: 71
End: 2021-09-29
Payer: MEDICARE

## 2021-09-29 ENCOUNTER — HOSPITAL ENCOUNTER (OUTPATIENT)
Dept: PREADMISSION TESTING | Age: 71
Discharge: HOME | End: 2021-09-29
Payer: MEDICARE

## 2021-09-29 VITALS
HEIGHT: 61 IN | WEIGHT: 189.82 LBS | HEART RATE: 52 BPM | RESPIRATION RATE: 16 BRPM | TEMPERATURE: 98.1 F | DIASTOLIC BLOOD PRESSURE: 82 MMHG | SYSTOLIC BLOOD PRESSURE: 130 MMHG | BODY MASS INDEX: 35.84 KG/M2

## 2021-09-29 VITALS
BODY MASS INDEX: 37.89 KG/M2 | HEART RATE: 58 BPM | RESPIRATION RATE: 15 BRPM | HEIGHT: 60 IN | SYSTOLIC BLOOD PRESSURE: 128 MMHG | OXYGEN SATURATION: 99 % | DIASTOLIC BLOOD PRESSURE: 70 MMHG | WEIGHT: 193 LBS

## 2021-09-29 DIAGNOSIS — E78.00 PURE HYPERCHOLESTEROLEMIA: ICD-10-CM

## 2021-09-29 DIAGNOSIS — Z01.812 PRE-PROCEDURE LAB EXAM: Primary | ICD-10-CM

## 2021-09-29 DIAGNOSIS — R06.02 SOB (SHORTNESS OF BREATH): ICD-10-CM

## 2021-09-29 DIAGNOSIS — I35.8 SYSTOLIC MURMUR OF AORTA: ICD-10-CM

## 2021-09-29 DIAGNOSIS — I48.0 PAF (PAROXYSMAL ATRIAL FIBRILLATION) (HCC): Primary | ICD-10-CM

## 2021-09-29 DIAGNOSIS — I10 ESSENTIAL HYPERTENSION: ICD-10-CM

## 2021-09-29 LAB
ALBUMIN SERPL-MCNC: 3.3 G/DL (ref 3.5–5)
ALBUMIN/GLOB SERPL: 0.8 {RATIO} (ref 1.1–2.2)
ALP SERPL-CCNC: 73 U/L (ref 45–117)
ALT SERPL-CCNC: 38 U/L (ref 12–78)
ANION GAP SERPL CALC-SCNC: 4 MMOL/L (ref 5–15)
APPEARANCE UR: CLEAR
APTT PPP: 24.6 SEC (ref 22.1–31)
AST SERPL-CCNC: 28 U/L (ref 15–37)
BACTERIA URNS QL MICRO: NEGATIVE /HPF
BASOPHILS # BLD: 0.1 K/UL (ref 0–0.1)
BASOPHILS NFR BLD: 1 % (ref 0–1)
BILIRUB SERPL-MCNC: 0.4 MG/DL (ref 0.2–1)
BILIRUB UR QL: NEGATIVE
BUN SERPL-MCNC: 30 MG/DL (ref 6–20)
BUN/CREAT SERPL: 23 (ref 12–20)
CALCIUM SERPL-MCNC: 9.6 MG/DL (ref 8.5–10.1)
CHLORIDE SERPL-SCNC: 109 MMOL/L (ref 97–108)
CO2 SERPL-SCNC: 26 MMOL/L (ref 21–32)
COLOR UR: NORMAL
CREAT SERPL-MCNC: 1.29 MG/DL (ref 0.55–1.02)
DIFFERENTIAL METHOD BLD: NORMAL
EOSINOPHIL # BLD: 0.3 K/UL (ref 0–0.4)
EOSINOPHIL NFR BLD: 5 % (ref 0–7)
EPITH CASTS URNS QL MICRO: NORMAL /LPF
ERYTHROCYTE [DISTWIDTH] IN BLOOD BY AUTOMATED COUNT: 13.1 % (ref 11.5–14.5)
EST. AVERAGE GLUCOSE BLD GHB EST-MCNC: 134 MG/DL
GLOBULIN SER CALC-MCNC: 3.9 G/DL (ref 2–4)
GLUCOSE SERPL-MCNC: 110 MG/DL (ref 65–100)
GLUCOSE UR STRIP.AUTO-MCNC: NEGATIVE MG/DL
HBA1C MFR BLD: 6.3 % (ref 4–5.6)
HCT VFR BLD AUTO: 38.7 % (ref 35–47)
HGB BLD-MCNC: 12.4 G/DL (ref 11.5–16)
HGB UR QL STRIP: NEGATIVE
HYALINE CASTS URNS QL MICRO: NORMAL /LPF (ref 0–5)
IMM GRANULOCYTES # BLD AUTO: 0 K/UL (ref 0–0.04)
IMM GRANULOCYTES NFR BLD AUTO: 0 % (ref 0–0.5)
INR PPP: 1 (ref 0.9–1.1)
KETONES UR QL STRIP.AUTO: NEGATIVE MG/DL
LEUKOCYTE ESTERASE UR QL STRIP.AUTO: NEGATIVE
LYMPHOCYTES # BLD: 1.6 K/UL (ref 0.8–3.5)
LYMPHOCYTES NFR BLD: 22 % (ref 12–49)
MCH RBC QN AUTO: 29.3 PG (ref 26–34)
MCHC RBC AUTO-ENTMCNC: 32 G/DL (ref 30–36.5)
MCV RBC AUTO: 91.5 FL (ref 80–99)
MONOCYTES # BLD: 0.5 K/UL (ref 0–1)
MONOCYTES NFR BLD: 7 % (ref 5–13)
NEUTS SEG # BLD: 4.6 K/UL (ref 1.8–8)
NEUTS SEG NFR BLD: 65 % (ref 32–75)
NITRITE UR QL STRIP.AUTO: NEGATIVE
NRBC # BLD: 0 K/UL (ref 0–0.01)
NRBC BLD-RTO: 0 PER 100 WBC
PH UR STRIP: 5 [PH] (ref 5–8)
PLATELET # BLD AUTO: 220 K/UL (ref 150–400)
PMV BLD AUTO: 9.4 FL (ref 8.9–12.9)
POTASSIUM SERPL-SCNC: 5.5 MMOL/L (ref 3.5–5.1)
PROT SERPL-MCNC: 7.2 G/DL (ref 6.4–8.2)
PROT UR STRIP-MCNC: NEGATIVE MG/DL
PROTHROMBIN TIME: 10.4 SEC (ref 9–11.1)
RBC # BLD AUTO: 4.23 M/UL (ref 3.8–5.2)
RBC #/AREA URNS HPF: NORMAL /HPF (ref 0–5)
SODIUM SERPL-SCNC: 139 MMOL/L (ref 136–145)
SP GR UR REFRACTOMETRY: 1.01 (ref 1–1.03)
THERAPEUTIC RANGE,PTTT: NORMAL SECS (ref 58–77)
UROBILINOGEN UR QL STRIP.AUTO: 0.2 EU/DL (ref 0.2–1)
WBC # BLD AUTO: 7.1 K/UL (ref 3.6–11)
WBC URNS QL MICRO: NORMAL /HPF (ref 0–4)

## 2021-09-29 PROCEDURE — G0463 HOSPITAL OUTPT CLINIC VISIT: HCPCS | Performed by: SPECIALIST

## 2021-09-29 PROCEDURE — 36415 COLL VENOUS BLD VENIPUNCTURE: CPT

## 2021-09-29 PROCEDURE — 93010 ELECTROCARDIOGRAM REPORT: CPT | Performed by: SPECIALIST

## 2021-09-29 PROCEDURE — G8417 CALC BMI ABV UP PARAM F/U: HCPCS | Performed by: SPECIALIST

## 2021-09-29 PROCEDURE — G8399 PT W/DXA RESULTS DOCUMENT: HCPCS | Performed by: SPECIALIST

## 2021-09-29 PROCEDURE — G9899 SCRN MAM PERF RSLTS DOC: HCPCS | Performed by: SPECIALIST

## 2021-09-29 PROCEDURE — 85025 COMPLETE CBC W/AUTO DIFF WBC: CPT

## 2021-09-29 PROCEDURE — 87086 URINE CULTURE/COLONY COUNT: CPT

## 2021-09-29 PROCEDURE — 85730 THROMBOPLASTIN TIME PARTIAL: CPT

## 2021-09-29 PROCEDURE — 85610 PROTHROMBIN TIME: CPT

## 2021-09-29 PROCEDURE — G8536 NO DOC ELDER MAL SCRN: HCPCS | Performed by: SPECIALIST

## 2021-09-29 PROCEDURE — 99214 OFFICE O/P EST MOD 30 MIN: CPT | Performed by: SPECIALIST

## 2021-09-29 PROCEDURE — 83036 HEMOGLOBIN GLYCOSYLATED A1C: CPT

## 2021-09-29 PROCEDURE — G8432 DEP SCR NOT DOC, RNG: HCPCS | Performed by: SPECIALIST

## 2021-09-29 PROCEDURE — 1101F PT FALLS ASSESS-DOCD LE1/YR: CPT | Performed by: SPECIALIST

## 2021-09-29 PROCEDURE — G8754 DIAS BP LESS 90: HCPCS | Performed by: SPECIALIST

## 2021-09-29 PROCEDURE — 81001 URINALYSIS AUTO W/SCOPE: CPT

## 2021-09-29 PROCEDURE — 80053 COMPREHEN METABOLIC PANEL: CPT

## 2021-09-29 PROCEDURE — 1090F PRES/ABSN URINE INCON ASSESS: CPT | Performed by: SPECIALIST

## 2021-09-29 PROCEDURE — 93005 ELECTROCARDIOGRAM TRACING: CPT | Performed by: SPECIALIST

## 2021-09-29 PROCEDURE — G8752 SYS BP LESS 140: HCPCS | Performed by: SPECIALIST

## 2021-09-29 PROCEDURE — 3017F COLORECTAL CA SCREEN DOC REV: CPT | Performed by: SPECIALIST

## 2021-09-29 PROCEDURE — G8427 DOCREV CUR MEDS BY ELIG CLIN: HCPCS | Performed by: SPECIALIST

## 2021-09-29 RX ORDER — LISINOPRIL 20 MG/1
20 TABLET ORAL DAILY
Qty: 90 TABLET | Refills: 1
Start: 2021-09-29 | End: 2021-12-10 | Stop reason: ALTCHOICE

## 2021-09-29 NOTE — PERIOP NOTES
DO NOT EAT OR DRINK ANYTHING AFTER MIDNIGHT, except as instructed THE NIGHT BEFORE SURGERY. PT GIVEN OPPORTUNITY TO ASK ADDITIONAL QUESTIONS. Patient given two bottles CHG soap and instruction sheet, instructions for use reviewed with patient. Patient given surgical site infection information FAQs handout and hand hygiene tips sheet. Pre-operative instructions reviewed and patient verbalizes understanding of instructions. Patient has been given the opportunity to ask additional questions. [address] [facility] APPOINTMENTS REVIEWED AND GIVEN TO PATIENT. COVID-19 INSTRUCTION SHEET ALSO REVIEWED AND GIVEN TO PATIENT.

## 2021-09-29 NOTE — PATIENT INSTRUCTIONS
In one month cut your Lisinopril in half. Please keep a blood pressure diary. Please check your blood pressure three times a week at different times. Be sure to make sure you are sitting still for at least five minutes with both feet flat on the floor and arm heart level. Please write down the blood pressure, heart rate, date and time. When you have 10 numbers send us a [employer] message or call us at [phone]. We will see you back in 6 months.

## 2021-09-29 NOTE — Clinical Note
9/29/2021    Patient: Aquiles Sabillon   YOB: 1950   Date of Visit: 9/29/2021     Anjana Arguello MD  [address]  [address] [place] [address]  Via In [facility] [address]    Dear Anjana Arguello MD,      Thank you for referring Ms. Kadie Aguilar to [facility] for evaluation. My notes for this consultation are attached. If you have questions, please do not hesitate to call me. I look forward to following your patient along with you.       Sincerely,    [place] MD

## 2021-09-29 NOTE — PROGRESS NOTES
Lisa Alejandre     1950       Jennifer Michel MD, [facility]  Date of Visit-9/29/2021   PCP is Colby Lares, [address] and [facility] [place]  [facility]  HPI:  Lisa Alejandre is a 66 y.o. female   6 month f/u of   · PAF. · Echo in August 2018 was normal and loop showed no AF.     Today. .. Pt states that she had a dental device for 3 years. She notes that she believed her sleep apnea was affecting her blood glucose levels. She reports her levels have been around 125. Pt states that she will be having pelvic floor surgery next week. Pt says that she needs to improve her alcohol drinking and that her potassium levels have been increased recently. Denies chest pain, edema, syncope or shortness of breath at rest, has no tachycardia  Notes rare palpitations she saw Dr. Ruth Ann Hernández for pelvic floor issues incontinence and prolapse and Dr. Ellis Lacey for sleep apnea started CPAP   she had a kidney stone in May 2021 and has had blood work   she has a intolerance to vaginal cream   her amlodipine was decreased to 2.5 the lisinopril increased to 40. EKG: SB WNL    Assessment/Plan:     Patient Instructions   In one month cut your Lisinopril in half. Please keep a blood pressure diary. Please check your blood pressure three times a week at different times. Be sure to make sure you are sitting still for at least five minutes with both feet flat on the floor and arm heart level. Please write down the blood pressure, heart rate, date and time. When you have 10 numbers send us a [employer] message or call us at [phone]. We will see you back in 6 months. 1. PAF (paroxysmal atrial fibrillation) (HCC)  Remains in sinus. Her fatigue and edema have improved. This seemed to be from Amlodipine. We had gotten an echo and stress test and they were generally favorable.    It has been a  Long time since she has had Afib and have not started [address] without discussion with her.  03/24/21    ECHO ADULT COMPLETE 04/18/2021 4/18/2021    Interpretation Summary  · LV: Calculated LVEF is 69%. Biplane method used to measure ejection fraction. Normal cavity size and systolic function (ejection fraction normal). Borderline hypertrophy. Inconclusive left ventricular diastolic function. · The left ventricular outflow tract mean gradient is 7.9 mmHg. The left ventricular outflow tract peak gradient is 21.3 mmHg. · LA: Mildly dilated left atrium. · AO: Mild ascending aorta dilatation. Ascending aorta diameter = 3.7 cm. · Coronary sinus dilated. 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 · PA: Pulmonary arterial systolic pressure is 32 mmHg. Signed by: Wset Kennedy MD on 4/18/2021  6:47 PM    04/14/21    NUCLEAR CARDIAC STRESS TEST 04/18/2021 4/20/2021    Interpretation Summary  · SPECT: Left ventricular function post-stress was normal. Calculated ejection fraction is 73%. There is no evidence of transient ischemic dilation (TID). · Baseline ECG: Normal sinus rhythm. · SPECT: Left ventricular perfusion is normal. Myocardial perfusion imaging supports a low risk stress test.    within normal limits    Signed by: West Kennedy MD on 4/18/2021  5:22 PM     - AMB POC EKG ROUTINE W/ 12 LEADS, INTER & REP    2. Systolic murmur of aorta  It is notably focal to just the RUSB. Echo reviewed. 3. Essential hypertension  BP which had justyna problematic is now much  Better controlled. She is using CPAP which is helping, notes though potassium and creatinine is higher than before. suggested that one month out from surgery, cut lisinopril to 20 mg and check BP diary for a month. If BP stable, stay on lower dose and check labs with PCP. At goal , meds and possible side effects reviewed and patient denies  Key CAD CHF Meds             lisinopriL (PRINIVIL, ZESTRIL) 20 mg tablet (Taking) Take 1 Tablet by mouth daily.     amLODIPine (NORVASC) 2.5 mg tablet (Taking) TAKE 1 TABLET BY MOUTH EVERY DAY    simvastatin (ZOCOR) 20 mg tablet (Taking) TAKE 1 TABLET BY MOUTH AT BEDTIME FOR CHOLESTEROL    metoprolol succinate (TOPROL-XL) 50 mg XL tablet (Taking) Take 1 Tablet by mouth daily. omega 3-DHA-EPA-fish oil (FISH OIL) 1,000 mg (120 mg-180 mg) capsule (Taking) Take 4 Caps by mouth daily. aspirin delayed-release 81 mg tablet (Taking) take 81 mg by mouth daily. BP Readings from Last 6 Encounters:   10/06/21 113/77   09/29/21 130/82   09/29/21 128/70   09/22/21 139/76   06/21/21 128/80   06/02/21 128/86        4. Pure hypercholesterolemia  Lipids on high potency statin as appropriate for secondary prevention. At goal , denies excess muscle aches or new liver issues  Key Antihyperlipidemia Meds             simvastatin (ZOCOR) 20 mg tablet (Taking) TAKE 1 TABLET BY MOUTH AT BEDTIME FOR CHOLESTEROL    omega 3-DHA-EPA-fish oil (FISH OIL) 1,000 mg (120 mg-180 mg) capsule (Taking) Take 4 Caps by mouth daily. Lab Results   Component Value Date/Time    LDL, calculated 59 06/02/2021 12:23 PM         5. SOB (shortness of breath)  improved      F/u in 6 months     Impression:   1. PAF (paroxysmal atrial fibrillation) ([address]    2. Systolic murmur of aorta    3. Essential hypertension    4. Pure hypercholesterolemia    5. SOB (shortness of breath)       Cardiac History: In clinical trial= REDUCE-IT Study- \"A Multi-Center, Prospective, Randomized, Double-Blind, Placebo-Controlled, Parallel-Group Study to Evaluate the Effect of [id number] on Cardiovascular Health and Mortality in Hypertriglyceridemic Patients with Cardiovascular Disease or at High Risk for Cardiovascular Disease: REDUCE-IT (Reduction of Cardiovascular Events with EPA- Intervention Trial)     PAF  Echo 2-5-13= normal  [facility] in 13,15,18 WNL   [facility] 11-6-18 6'50\" normal perfusion, EF 73%     Future Appointments   Date Time Provider Khalif Lundy   3/30/2022 11:20 AM Jennifer Bansal MD [facility] BS AMB        ROS-except as noted above. . A complete cardiac and respiratory are reviewed and negative except as above ; Resp-denies wheezing  or productive cough,. Const- No unusual weight loss or fever; Neuro-no recent seizure or CVA ; GI- No BRBPR, abdom pain, bloating ; - no  hematuria   see supplement sheet, initialed and to be scanned by staff  Past Medical History:   Diagnosis Date    Anemia     Calculus of kidney     Cystocele     Diabetes ([facility]     Diabetic neuropathy, painful ([facility]     Hepatitis B     Hypercholesterolemia     Hypertension     Microalbuminuria     Mild nonproliferative diabetic retinopathy ([facility] [address]     [facility]     [facility] follows    Rectocele     Retinopathy     Rosacea     Ruptured disc, cervical     Sleep apnea     Stenosis, cervical spine       Social Hx= reports that she has never smoked. She has never used smokeless tobacco. She reports that she does not drink alcohol and does not use drugs. Exam and Labs:  /70 (BP 1 Location: Left arm, BP Patient Position: Sitting)   Pulse (!) 58   Resp 15   Ht 5' (1.524 m)   Wt 193 lb (87.5 kg)   SpO2 99%   BMI 37.69 kg/m² Constitutional:  NAD, comfortable  Head: NC,AT. Eyes: No scleral icterus. Neck:  Neck supple. No JVD present. Throat: moist mucous membranes. Chest: Effort normal & normal respiratory excursion . Neurological: alert, conversant and oriented . Skin: Skin is not cold. No obvious systemic rash noted. Not diaphoretic. No erythema. Psychiatric:  Grossly normal mood and affect. Behavior appears normal. Extremities:  no clubbing or cyanosis. Abdomen: non distended    Lungs:breath sounds normal. No stridor. distress, wheezes or  Rales. Heart:2/6 RUSB murmur normal rate, regular rhythm, normal S1, S2, no rubs, clicks or gallops , PMI non displaced. Edema: Edema is none.   Lab Results   Component Value Date/Time    Cholesterol, total 147 06/02/2021 12:23 PM    HDL Cholesterol 51 06/02/2021 12:23 PM LDL, calculated 59 06/02/2021 12:23 PM    Triglyceride 185 (H) 06/02/2021 12:23 PM    CHOL/HDL Ratio 2.9 06/02/2021 12:23 PM     Lab Results   Component Value Date/Time    Sodium 138 06/02/2021 12:23 PM    Potassium 5.4 (H) 06/02/2021 12:23 PM    Chloride 109 (H) 06/02/2021 12:23 PM    CO2 25 06/02/2021 12:23 PM    Anion gap 4 (L) 06/02/2021 12:23 PM    Glucose 116 (H) 06/02/2021 12:23 PM    BUN 31 (H) 06/02/2021 12:23 PM    Creatinine 1.27 (H) 06/02/2021 12:23 PM    BUN/Creatinine ratio 24 (H) 06/02/2021 12:23 PM    GFR est AA 50 (L) 06/02/2021 12:23 PM    GFR est non-AA 41 (L) 06/02/2021 12:23 PM    Calcium 9.4 06/02/2021 12:23 PM      Wt Readings from Last 3 Encounters:   09/29/21 193 lb (87.5 kg)   09/22/21 192 lb 12.8 oz (87.5 kg)   06/21/21 193 lb (87.5 kg)      BP Readings from Last 3 Encounters:   09/29/21 128/70   09/22/21 139/76   06/21/21 128/80      Current Outpatient Medications   Medication Sig    fluticasone propionate (FLONASE) 50 mcg/actuation nasal spray 2 Sprays by Both Nostrils route daily.  amLODIPine (NORVASC) 2.5 mg tablet TAKE 1 TABLET BY MOUTH EVERY DAY    simvastatin (ZOCOR) 20 mg tablet TAKE 1 TABLET BY MOUTH AT BEDTIME FOR CHOLESTEROL    metFORMIN ER (GLUCOPHAGE XR) 500 mg tablet TAKE 2 TABLETS BY MOUTH EVERY DAY WITH DINNER    lisinopriL (PRINIVIL, ZESTRIL) 40 mg tablet TAKE 1 TABLET BY MOUTH EVERY DAY    metoprolol succinate (TOPROL-XL) 50 mg XL tablet Take 1 Tablet by mouth daily.  cholecalciferol, vitamin D3, (Vitamin D3) 50 mcg (2,000 unit) tab Take  by mouth.  glucose blood VI test strips (FreeStyle Test) strip TEST FASTING BLOOD SUGAR ONE TIME DAILY    lancets (FreeStyle Lancets) 28 gauge misc TEST ONCE DAILY    montelukast (SINGULAIR) 10 mg tablet     esomeprazole (NEXIUM) 20 mg capsule TAKE 1 CAPSULE BY MOUTH EVERY DAY    omega 3-DHA-EPA-fish oil (FISH OIL) 1,000 mg (120 mg-180 mg) capsule Take 4 Caps by mouth daily.     CALCIUM CARBONATE (TUMS PO) Take  by mouth daily as needed.  CALCIUM CARBONATE/VITAMIN D3 (CALCIUM + D PO) Take  by mouth two (2) times a day.  MULTI-VITAMIN PO Take 1 Tab by mouth daily.  aspirin delayed-release 81 mg tablet take 81 mg by mouth daily.  estradioL (Estrace) 0.01 % (0.1 mg/gram) vaginal cream Estrace 0.01% (0.1 mg/gram) vaginal cream   place 1 g vaginally with applicator or finger nightly for 2 weeks and then 2-3 x weekly thereafter     No current facility-administered medications for this visit. Impression see above.       Written by Vito Vivas, as dictated by Oscar Reeves MD.

## 2021-09-30 ENCOUNTER — HOSPITAL ENCOUNTER (OUTPATIENT)
Dept: PREADMISSION TESTING | Age: 71
Discharge: HOME | End: 2021-09-30
Payer: MEDICARE

## 2021-09-30 DIAGNOSIS — Z01.812 PRE-PROCEDURE LAB EXAM: ICD-10-CM

## 2021-09-30 LAB
BACTERIA SPEC CULT: NORMAL
SERVICE CMNT-IMP: NORMAL

## 2021-09-30 PROCEDURE — U0005 INFEC AGEN DETEC AMPLI PROBE: HCPCS

## 2021-09-30 NOTE — PERIOP NOTES
9/30/21 @ 1106 - LABS FAXED VIA CC TO PT'S PCP.     9/30/21 @ 1113 - LABS FAXED TO SURG. OFFICE. 9/30/21 @ 6399 - ATTEMPTED TO REPORT ABNORMAL LABS. ON HOLD FOR OVER 28 MINUTES. 9/30/21 @ 1400 - SECOND ATTEMPT TO REPORT ABNORMAL LAB. ON HOLD TOO LONG.

## 2021-10-01 LAB
SARS-COV-2, XPLCVT: NOT DETECTED
SOURCE, COVRS: NORMAL

## 2021-10-05 ENCOUNTER — ANESTHESIA EVENT (OUTPATIENT)
Dept: SURGERY | Age: 71
End: 2021-10-05
Payer: MEDICARE

## 2021-10-05 ENCOUNTER — ANESTHESIA (OUTPATIENT)
Dept: SURGERY | Age: 71
End: 2021-10-05
Payer: MEDICARE

## 2021-10-05 ENCOUNTER — HOSPITAL ENCOUNTER (OUTPATIENT)
Age: 71
Setting detail: OBSERVATION
Discharge: HOME | End: 2021-10-06
Attending: OBSTETRICS & GYNECOLOGY | Admitting: OBSTETRICS & GYNECOLOGY
Payer: MEDICARE

## 2021-10-05 PROBLEM — N81.9 GENITAL PROLAPSE: Status: ACTIVE | Noted: 2021-10-05

## 2021-10-05 PROBLEM — N81.2 INCOMPLETE UTEROVAGINAL PROLAPSE: Status: ACTIVE | Noted: 2021-10-05

## 2021-10-05 PROBLEM — N39.3 STRESS INCONTINENCE: Status: ACTIVE | Noted: 2021-10-05

## 2021-10-05 LAB
GLUCOSE BLD STRIP.AUTO-MCNC: 139 MG/DL (ref 65–117)
GLUCOSE BLD STRIP.AUTO-MCNC: 189 MG/DL (ref 65–117)
GLUCOSE BLD STRIP.AUTO-MCNC: 195 MG/DL (ref 65–117)
SERVICE CMNT-IMP: ABNORMAL

## 2021-10-05 PROCEDURE — 77030019927 HC TBNG IRR CYSTO BAXT -A: Performed by: OBSTETRICS & GYNECOLOGY

## 2021-10-05 PROCEDURE — 88305 TISSUE EXAM BY PATHOLOGIST: CPT

## 2021-10-05 PROCEDURE — 74011250636 HC RX REV CODE- 250/636: Performed by: ANESTHESIOLOGY

## 2021-10-05 PROCEDURE — 77030008684 HC TU ET CUF COVD -B: Performed by: ANESTHESIOLOGY

## 2021-10-05 PROCEDURE — 77030002933 HC SUT MCRYL J&J -A: Performed by: OBSTETRICS & GYNECOLOGY

## 2021-10-05 PROCEDURE — 74011000250 HC RX REV CODE- 250: Performed by: OBSTETRICS & GYNECOLOGY

## 2021-10-05 PROCEDURE — 77030002966 HC SUT PDS J&J -A: Performed by: OBSTETRICS & GYNECOLOGY

## 2021-10-05 PROCEDURE — 77030003666 HC NDL SPINAL BD -A: Performed by: OBSTETRICS & GYNECOLOGY

## 2021-10-05 PROCEDURE — 74011250636 HC RX REV CODE- 250/636: Performed by: NURSE ANESTHETIST, CERTIFIED REGISTERED

## 2021-10-05 PROCEDURE — 2709999900 HC NON-CHARGEABLE SUPPLY: Performed by: OBSTETRICS & GYNECOLOGY

## 2021-10-05 PROCEDURE — 76010000132 HC OR TIME 2.5 TO 3 HR: Performed by: OBSTETRICS & GYNECOLOGY

## 2021-10-05 PROCEDURE — 77030021052 HC RNG RETRCTR STAY COOP -A: Performed by: OBSTETRICS & GYNECOLOGY

## 2021-10-05 PROCEDURE — 77030014008 HC SPNG HEMSTAT J&J -C: Performed by: OBSTETRICS & GYNECOLOGY

## 2021-10-05 PROCEDURE — 74011636637 HC RX REV CODE- 636/637: Performed by: OBSTETRICS & GYNECOLOGY

## 2021-10-05 PROCEDURE — 74011250637 HC RX REV CODE- 250/637: Performed by: OBSTETRICS & GYNECOLOGY

## 2021-10-05 PROCEDURE — 77030040361 HC SLV COMPR DVT MDII -B: Performed by: OBSTETRICS & GYNECOLOGY

## 2021-10-05 PROCEDURE — 77030026438 HC STYL ET INTUB CARD -A: Performed by: ANESTHESIOLOGY

## 2021-10-05 PROCEDURE — 82962 GLUCOSE BLOOD TEST: CPT

## 2021-10-05 PROCEDURE — 99218 HC RM OBSERVATION: CPT

## 2021-10-05 PROCEDURE — 74011000250 HC RX REV CODE- 250: Performed by: NURSE ANESTHETIST, CERTIFIED REGISTERED

## 2021-10-05 PROCEDURE — 77030010507 HC ADH SKN DERMBND J&J -B: Performed by: OBSTETRICS & GYNECOLOGY

## 2021-10-05 PROCEDURE — 77030031139 HC SUT VCRL2 J&J -A: Performed by: OBSTETRICS & GYNECOLOGY

## 2021-10-05 PROCEDURE — C1771 REP DEV, URINARY, W/SLING: HCPCS | Performed by: OBSTETRICS & GYNECOLOGY

## 2021-10-05 PROCEDURE — 77030002924 HC SUT GORTX WLGO -B: Performed by: OBSTETRICS & GYNECOLOGY

## 2021-10-05 PROCEDURE — 77030040830 HC CATH URETH FOL MDII -A: Performed by: OBSTETRICS & GYNECOLOGY

## 2021-10-05 PROCEDURE — 77030018789 HC NDL SUT ANCH -A: Performed by: OBSTETRICS & GYNECOLOGY

## 2021-10-05 PROCEDURE — 76210000006 HC OR PH I REC 0.5 TO 1 HR: Performed by: OBSTETRICS & GYNECOLOGY

## 2021-10-05 PROCEDURE — 74011250637 HC RX REV CODE- 250/637: Performed by: ANESTHESIOLOGY

## 2021-10-05 PROCEDURE — 74011000254 HC RX REV CODE- 254: Performed by: NURSE ANESTHETIST, CERTIFIED REGISTERED

## 2021-10-05 PROCEDURE — 77030042556 HC PNCL CAUT -B: Performed by: OBSTETRICS & GYNECOLOGY

## 2021-10-05 PROCEDURE — 74011000258 HC RX REV CODE- 258: Performed by: NURSE ANESTHETIST, CERTIFIED REGISTERED

## 2021-10-05 PROCEDURE — 77030003029 HC SUT VCRL J&J -B: Performed by: OBSTETRICS & GYNECOLOGY

## 2021-10-05 PROCEDURE — 76060000036 HC ANESTHESIA 2.5 TO 3 HR: Performed by: OBSTETRICS & GYNECOLOGY

## 2021-10-05 DEVICE — CONTINENCE SYSTEM
Type: IMPLANTABLE DEVICE | Site: VAGINA | Status: FUNCTIONAL
Brand: GYNECARE TVT EXACT

## 2021-10-05 RX ORDER — ONDANSETRON 2 MG/ML
4 INJECTION INTRAMUSCULAR; INTRAVENOUS AS NEEDED
Status: DISCONTINUED | OUTPATIENT
Start: 2021-10-05 | End: 2021-10-05 | Stop reason: HOSPADM

## 2021-10-05 RX ORDER — SODIUM CHLORIDE 0.9 % (FLUSH) 0.9 %
5-40 SYRINGE (ML) INJECTION EVERY 8 HOURS
Status: DISCONTINUED | OUTPATIENT
Start: 2021-10-05 | End: 2021-10-05 | Stop reason: HOSPADM

## 2021-10-05 RX ORDER — SODIUM CHLORIDE, SODIUM LACTATE, POTASSIUM CHLORIDE, CALCIUM CHLORIDE 600; 310; 30; 20 MG/100ML; MG/100ML; MG/100ML; MG/100ML
100 INJECTION, SOLUTION INTRAVENOUS CONTINUOUS
Status: DISCONTINUED | OUTPATIENT
Start: 2021-10-05 | End: 2021-10-05 | Stop reason: HOSPADM

## 2021-10-05 RX ORDER — SODIUM CHLORIDE 0.9 % (FLUSH) 0.9 %
5-40 SYRINGE (ML) INJECTION AS NEEDED
Status: DISCONTINUED | OUTPATIENT
Start: 2021-10-05 | End: 2021-10-05 | Stop reason: HOSPADM

## 2021-10-05 RX ORDER — INDOCYANINE GREEN AND WATER 25 MG
KIT INJECTION AS NEEDED
Status: DISCONTINUED | OUTPATIENT
Start: 2021-10-05 | End: 2021-10-05 | Stop reason: HOSPADM

## 2021-10-05 RX ORDER — LIDOCAINE HYDROCHLORIDE 20 MG/ML
INJECTION, SOLUTION EPIDURAL; INFILTRATION; INTRACAUDAL; PERINEURAL AS NEEDED
Status: DISCONTINUED | OUTPATIENT
Start: 2021-10-05 | End: 2021-10-05 | Stop reason: HOSPADM

## 2021-10-05 RX ORDER — MORPHINE SULFATE 2 MG/ML
1 INJECTION, SOLUTION INTRAMUSCULAR; INTRAVENOUS
Status: DISCONTINUED | OUTPATIENT
Start: 2021-10-05 | End: 2021-10-06 | Stop reason: HOSPADM

## 2021-10-05 RX ORDER — FENTANYL CITRATE 50 UG/ML
50 INJECTION, SOLUTION INTRAMUSCULAR; INTRAVENOUS AS NEEDED
Status: DISCONTINUED | OUTPATIENT
Start: 2021-10-05 | End: 2021-10-05 | Stop reason: HOSPADM

## 2021-10-05 RX ORDER — MIDAZOLAM HYDROCHLORIDE 1 MG/ML
0.5 INJECTION, SOLUTION INTRAMUSCULAR; INTRAVENOUS
Status: DISCONTINUED | OUTPATIENT
Start: 2021-10-05 | End: 2021-10-05 | Stop reason: HOSPADM

## 2021-10-05 RX ORDER — SODIUM CHLORIDE 0.9 % (FLUSH) 0.9 %
5-40 SYRINGE (ML) INJECTION AS NEEDED
Status: DISCONTINUED | OUTPATIENT
Start: 2021-10-05 | End: 2021-10-06 | Stop reason: HOSPADM

## 2021-10-05 RX ORDER — ROPIVACAINE HYDROCHLORIDE 5 MG/ML
30 INJECTION, SOLUTION EPIDURAL; INFILTRATION; PERINEURAL AS NEEDED
Status: DISCONTINUED | OUTPATIENT
Start: 2021-10-05 | End: 2021-10-05 | Stop reason: HOSPADM

## 2021-10-05 RX ORDER — FENTANYL CITRATE 50 UG/ML
INJECTION, SOLUTION INTRAMUSCULAR; INTRAVENOUS AS NEEDED
Status: DISCONTINUED | OUTPATIENT
Start: 2021-10-05 | End: 2021-10-05 | Stop reason: HOSPADM

## 2021-10-05 RX ORDER — OXYCODONE AND ACETAMINOPHEN 5; 325 MG/1; MG/1
1 TABLET ORAL
Status: DISCONTINUED | OUTPATIENT
Start: 2021-10-05 | End: 2021-10-06 | Stop reason: HOSPADM

## 2021-10-05 RX ORDER — HYDROMORPHONE HYDROCHLORIDE 2 MG/ML
INJECTION, SOLUTION INTRAMUSCULAR; INTRAVENOUS; SUBCUTANEOUS AS NEEDED
Status: DISCONTINUED | OUTPATIENT
Start: 2021-10-05 | End: 2021-10-05 | Stop reason: HOSPADM

## 2021-10-05 RX ORDER — SODIUM CHLORIDE, SODIUM LACTATE, POTASSIUM CHLORIDE, CALCIUM CHLORIDE 600; 310; 30; 20 MG/100ML; MG/100ML; MG/100ML; MG/100ML
INJECTION, SOLUTION INTRAVENOUS
Status: DISCONTINUED | OUTPATIENT
Start: 2021-10-05 | End: 2021-10-05 | Stop reason: HOSPADM

## 2021-10-05 RX ORDER — DEXAMETHASONE SODIUM PHOSPHATE 4 MG/ML
INJECTION, SOLUTION INTRA-ARTICULAR; INTRALESIONAL; INTRAMUSCULAR; INTRAVENOUS; SOFT TISSUE AS NEEDED
Status: DISCONTINUED | OUTPATIENT
Start: 2021-10-05 | End: 2021-10-05 | Stop reason: HOSPADM

## 2021-10-05 RX ORDER — PHENYLEPHRINE HCL IN 0.9% NACL 0.4MG/10ML
SYRINGE (ML) INTRAVENOUS AS NEEDED
Status: DISCONTINUED | OUTPATIENT
Start: 2021-10-05 | End: 2021-10-05 | Stop reason: HOSPADM

## 2021-10-05 RX ORDER — ONDANSETRON 2 MG/ML
4 INJECTION INTRAMUSCULAR; INTRAVENOUS
Status: DISCONTINUED | OUTPATIENT
Start: 2021-10-05 | End: 2021-10-06 | Stop reason: HOSPADM

## 2021-10-05 RX ORDER — METOPROLOL SUCCINATE 50 MG/1
50 TABLET, EXTENDED RELEASE ORAL DAILY
Status: DISCONTINUED | OUTPATIENT
Start: 2021-10-06 | End: 2021-10-06 | Stop reason: HOSPADM

## 2021-10-05 RX ORDER — CEFAZOLIN SODIUM 1 G/3ML
INJECTION, POWDER, FOR SOLUTION INTRAMUSCULAR; INTRAVENOUS AS NEEDED
Status: DISCONTINUED | OUTPATIENT
Start: 2021-10-05 | End: 2021-10-05 | Stop reason: HOSPADM

## 2021-10-05 RX ORDER — SODIUM CHLORIDE, SODIUM LACTATE, POTASSIUM CHLORIDE, CALCIUM CHLORIDE 600; 310; 30; 20 MG/100ML; MG/100ML; MG/100ML; MG/100ML
100 INJECTION, SOLUTION INTRAVENOUS CONTINUOUS
Status: DISCONTINUED | OUTPATIENT
Start: 2021-10-05 | End: 2021-10-06 | Stop reason: HOSPADM

## 2021-10-05 RX ORDER — PROPOFOL 10 MG/ML
INJECTION, EMULSION INTRAVENOUS AS NEEDED
Status: DISCONTINUED | OUTPATIENT
Start: 2021-10-05 | End: 2021-10-05 | Stop reason: HOSPADM

## 2021-10-05 RX ORDER — DOCUSATE SODIUM 100 MG/1
100 CAPSULE, LIQUID FILLED ORAL 2 TIMES DAILY
Status: DISCONTINUED | OUTPATIENT
Start: 2021-10-05 | End: 2021-10-06 | Stop reason: HOSPADM

## 2021-10-05 RX ORDER — SODIUM CHLORIDE 9 MG/ML
25 INJECTION, SOLUTION INTRAVENOUS CONTINUOUS
Status: DISCONTINUED | OUTPATIENT
Start: 2021-10-05 | End: 2021-10-05 | Stop reason: HOSPADM

## 2021-10-05 RX ORDER — SODIUM CHLORIDE 0.9 % (FLUSH) 0.9 %
5-40 SYRINGE (ML) INJECTION EVERY 8 HOURS
Status: DISCONTINUED | OUTPATIENT
Start: 2021-10-05 | End: 2021-10-06 | Stop reason: HOSPADM

## 2021-10-05 RX ORDER — MIDAZOLAM HYDROCHLORIDE 1 MG/ML
INJECTION, SOLUTION INTRAMUSCULAR; INTRAVENOUS AS NEEDED
Status: DISCONTINUED | OUTPATIENT
Start: 2021-10-05 | End: 2021-10-05 | Stop reason: HOSPADM

## 2021-10-05 RX ORDER — DIPHENHYDRAMINE HYDROCHLORIDE 50 MG/ML
12.5 INJECTION, SOLUTION INTRAMUSCULAR; INTRAVENOUS AS NEEDED
Status: DISCONTINUED | OUTPATIENT
Start: 2021-10-05 | End: 2021-10-05 | Stop reason: HOSPADM

## 2021-10-05 RX ORDER — HYDROMORPHONE HYDROCHLORIDE 1 MG/ML
0.5 INJECTION, SOLUTION INTRAMUSCULAR; INTRAVENOUS; SUBCUTANEOUS
Status: DISCONTINUED | OUTPATIENT
Start: 2021-10-05 | End: 2021-10-05 | Stop reason: HOSPADM

## 2021-10-05 RX ORDER — INSULIN LISPRO 100 [IU]/ML
INJECTION, SOLUTION INTRAVENOUS; SUBCUTANEOUS
Status: DISCONTINUED | OUTPATIENT
Start: 2021-10-05 | End: 2021-10-06 | Stop reason: HOSPADM

## 2021-10-05 RX ORDER — MIDAZOLAM HYDROCHLORIDE 1 MG/ML
1 INJECTION, SOLUTION INTRAMUSCULAR; INTRAVENOUS AS NEEDED
Status: DISCONTINUED | OUTPATIENT
Start: 2021-10-05 | End: 2021-10-05 | Stop reason: HOSPADM

## 2021-10-05 RX ORDER — IBUPROFEN 600 MG/1
600 TABLET ORAL
Status: DISCONTINUED | OUTPATIENT
Start: 2021-10-05 | End: 2021-10-06 | Stop reason: HOSPADM

## 2021-10-05 RX ORDER — EPHEDRINE SULFATE/0.9% NACL/PF 50 MG/5 ML
SYRINGE (ML) INTRAVENOUS AS NEEDED
Status: DISCONTINUED | OUTPATIENT
Start: 2021-10-05 | End: 2021-10-05 | Stop reason: HOSPADM

## 2021-10-05 RX ORDER — ACETAMINOPHEN 325 MG/1
650 TABLET ORAL ONCE
Status: COMPLETED | OUTPATIENT
Start: 2021-10-05 | End: 2021-10-05

## 2021-10-05 RX ORDER — DIPHENOXYLATE HYDROCHLORIDE AND ATROPINE SULFATE 2.5; .025 MG/1; MG/1
TABLET ORAL EVERY OTHER DAY
COMMUNITY

## 2021-10-05 RX ORDER — FENTANYL CITRATE 50 UG/ML
25 INJECTION, SOLUTION INTRAMUSCULAR; INTRAVENOUS
Status: DISCONTINUED | OUTPATIENT
Start: 2021-10-05 | End: 2021-10-05 | Stop reason: HOSPADM

## 2021-10-05 RX ORDER — MAGNESIUM SULFATE 100 %
4 CRYSTALS MISCELLANEOUS AS NEEDED
Status: DISCONTINUED | OUTPATIENT
Start: 2021-10-05 | End: 2021-10-06 | Stop reason: HOSPADM

## 2021-10-05 RX ORDER — LABETALOL HYDROCHLORIDE 5 MG/ML
INJECTION, SOLUTION INTRAVENOUS AS NEEDED
Status: DISCONTINUED | OUTPATIENT
Start: 2021-10-05 | End: 2021-10-05 | Stop reason: HOSPADM

## 2021-10-05 RX ORDER — ROCURONIUM BROMIDE 10 MG/ML
INJECTION, SOLUTION INTRAVENOUS AS NEEDED
Status: DISCONTINUED | OUTPATIENT
Start: 2021-10-05 | End: 2021-10-05 | Stop reason: HOSPADM

## 2021-10-05 RX ORDER — BUPIVACAINE HYDROCHLORIDE AND EPINEPHRINE 2.5; 5 MG/ML; UG/ML
INJECTION, SOLUTION EPIDURAL; INFILTRATION; INTRACAUDAL; PERINEURAL AS NEEDED
Status: DISCONTINUED | OUTPATIENT
Start: 2021-10-05 | End: 2021-10-05 | Stop reason: HOSPADM

## 2021-10-05 RX ORDER — LIDOCAINE HYDROCHLORIDE 10 MG/ML
0.1 INJECTION, SOLUTION EPIDURAL; INFILTRATION; INTRACAUDAL; PERINEURAL AS NEEDED
Status: DISCONTINUED | OUTPATIENT
Start: 2021-10-05 | End: 2021-10-05 | Stop reason: HOSPADM

## 2021-10-05 RX ORDER — DEXTROSE 50 % IN WATER (D50W) INTRAVENOUS SYRINGE
25-50 AS NEEDED
Status: DISCONTINUED | OUTPATIENT
Start: 2021-10-05 | End: 2021-10-06 | Stop reason: HOSPADM

## 2021-10-05 RX ORDER — MORPHINE SULFATE 2 MG/ML
2 INJECTION, SOLUTION INTRAMUSCULAR; INTRAVENOUS
Status: DISCONTINUED | OUTPATIENT
Start: 2021-10-05 | End: 2021-10-05 | Stop reason: HOSPADM

## 2021-10-05 RX ADMIN — FENTANYL CITRATE 25 MCG: 50 INJECTION, SOLUTION INTRAMUSCULAR; INTRAVENOUS at 09:19

## 2021-10-05 RX ADMIN — CEFAZOLIN 2 G: 330 INJECTION, POWDER, FOR SOLUTION INTRAMUSCULAR; INTRAVENOUS at 08:40

## 2021-10-05 RX ADMIN — ROCURONIUM BROMIDE 10 MG: 10 SOLUTION INTRAVENOUS at 09:50

## 2021-10-05 RX ADMIN — DEXMEDETOMIDINE HYDROCHLORIDE 4 MCG: 100 INJECTION, SOLUTION, CONCENTRATE INTRAVENOUS at 09:51

## 2021-10-05 RX ADMIN — MIDAZOLAM 1 MG: 1 INJECTION INTRAMUSCULAR; INTRAVENOUS at 08:24

## 2021-10-05 RX ADMIN — ACETAMINOPHEN 650 MG: 325 TABLET ORAL at 08:21

## 2021-10-05 RX ADMIN — DEXMEDETOMIDINE HYDROCHLORIDE 4 MCG: 100 INJECTION, SOLUTION, CONCENTRATE INTRAVENOUS at 09:49

## 2021-10-05 RX ADMIN — ROCURONIUM BROMIDE 10 MG: 10 SOLUTION INTRAVENOUS at 08:33

## 2021-10-05 RX ADMIN — SODIUM CHLORIDE, POTASSIUM CHLORIDE, SODIUM LACTATE AND CALCIUM CHLORIDE 100 ML/HR: 600; 310; 30; 20 INJECTION, SOLUTION INTRAVENOUS at 08:11

## 2021-10-05 RX ADMIN — HYDROMORPHONE HYDROCHLORIDE 0.5 MG: 1 INJECTION, SOLUTION INTRAMUSCULAR; INTRAVENOUS; SUBCUTANEOUS at 11:20

## 2021-10-05 RX ADMIN — FENTANYL CITRATE 25 MCG: 50 INJECTION, SOLUTION INTRAMUSCULAR; INTRAVENOUS at 09:16

## 2021-10-05 RX ADMIN — SUGAMMADEX 200 MG: 100 INJECTION, SOLUTION INTRAVENOUS at 10:46

## 2021-10-05 RX ADMIN — Medication 10 MG: at 09:02

## 2021-10-05 RX ADMIN — LIDOCAINE HYDROCHLORIDE 80 MG: 20 INJECTION, SOLUTION EPIDURAL; INFILTRATION; INTRACAUDAL; PERINEURAL at 08:33

## 2021-10-05 RX ADMIN — ROCURONIUM BROMIDE 40 MG: 10 SOLUTION INTRAVENOUS at 08:34

## 2021-10-05 RX ADMIN — DOCUSATE SODIUM 100 MG: 100 CAPSULE, LIQUID FILLED ORAL at 18:13

## 2021-10-05 RX ADMIN — PROPOFOL 50 MG: 10 INJECTION, EMULSION INTRAVENOUS at 10:48

## 2021-10-05 RX ADMIN — Medication 40 MCG: at 09:00

## 2021-10-05 RX ADMIN — LABETALOL HYDROCHLORIDE 2.5 MG: 5 INJECTION INTRAVENOUS at 10:45

## 2021-10-05 RX ADMIN — DEXMEDETOMIDINE HYDROCHLORIDE 4 MCG: 100 INJECTION, SOLUTION, CONCENTRATE INTRAVENOUS at 08:30

## 2021-10-05 RX ADMIN — DEXMEDETOMIDINE HYDROCHLORIDE 4 MCG: 100 INJECTION, SOLUTION, CONCENTRATE INTRAVENOUS at 09:13

## 2021-10-05 RX ADMIN — Medication 40 MCG: at 08:52

## 2021-10-05 RX ADMIN — DEXMEDETOMIDINE HYDROCHLORIDE 4 MCG: 100 INJECTION, SOLUTION, CONCENTRATE INTRAVENOUS at 09:47

## 2021-10-05 RX ADMIN — HYDROMORPHONE HYDROCHLORIDE 0.2 MG: 2 INJECTION, SOLUTION INTRAMUSCULAR; INTRAVENOUS; SUBCUTANEOUS at 10:19

## 2021-10-05 RX ADMIN — IBUPROFEN 600 MG: 600 TABLET ORAL at 15:05

## 2021-10-05 RX ADMIN — LABETALOL HYDROCHLORIDE 2.5 MG: 5 INJECTION INTRAVENOUS at 10:59

## 2021-10-05 RX ADMIN — LABETALOL HYDROCHLORIDE 5 MG: 5 INJECTION INTRAVENOUS at 10:26

## 2021-10-05 RX ADMIN — SODIUM CHLORIDE, POTASSIUM CHLORIDE, SODIUM LACTATE AND CALCIUM CHLORIDE: 600; 310; 30; 20 INJECTION, SOLUTION INTRAVENOUS at 08:24

## 2021-10-05 RX ADMIN — IBUPROFEN 600 MG: 600 TABLET ORAL at 21:41

## 2021-10-05 RX ADMIN — FENTANYL CITRATE 50 MCG: 50 INJECTION, SOLUTION INTRAMUSCULAR; INTRAVENOUS at 08:28

## 2021-10-05 RX ADMIN — DEXAMETHASONE SODIUM PHOSPHATE 8 MG: 4 INJECTION, SOLUTION INTRAMUSCULAR; INTRAVENOUS at 08:36

## 2021-10-05 RX ADMIN — INDOCYANINE GREEN AND WATER 2.5 MG: KIT at 09:54

## 2021-10-05 RX ADMIN — Medication 40 MCG: at 08:49

## 2021-10-05 RX ADMIN — INSULIN LISPRO 2 UNITS: 100 INJECTION, SOLUTION INTRAVENOUS; SUBCUTANEOUS at 18:13

## 2021-10-05 RX ADMIN — PROPOFOL 180 MG: 10 INJECTION, EMULSION INTRAVENOUS at 08:33

## 2021-10-05 NOTE — PROGRESS NOTES
TRANSFER - IN REPORT:    Verbal report received from Reps RN (name) on Isai Miller  being received from PACU (unit) for routine post - op      Report consisted of patients Situation, Background, Assessment and   Recommendations(SBAR). Information from the following report(s) SBAR, Kardex, OR Summary, Intake/Output, MAR and Recent Results was reviewed with the receiving nurse. Opportunity for questions and clarification was provided. Assessment completed upon patients arrival to unit and care assumed.

## 2021-10-05 NOTE — ANESTHESIA POSTPROCEDURE EVALUATION
Procedure(s):  VAGINAL HYSTERECTOMY  UTEROSACRAL LIGAMENT SUSPENSION, ANTERIOR AND POSTERIOR COLPORRHAPHY, TVT EXACT MIDURETHRAL SLING AND CYSTOSCOPY  . .    general    Anesthesia Post Evaluation        Patient location during evaluation: PACU  Note status: Adequate. Level of consciousness: responsive to verbal stimuli and sleepy but conscious  Pain management: satisfactory to patient  Airway patency: patent  Anesthetic complications: no  Cardiovascular status: acceptable  Respiratory status: acceptable  Hydration status: acceptable  Comments: +Post-Anesthesia Evaluation and Assessment    Patient: Lanette Krabbe MRN: [id number]  SSN: [id number]   YOB: 1950  Age: 66 y.o. Sex: female          Cardiovascular Function/Vital Signs    /61   Pulse (!) 50   Temp (!) 35.9 °C (96.7 °F)   Resp 13   Ht 5' 0.5\" (1.537 m)   Wt 86.2 kg (190 lb)   SpO2 98%   BMI 36.50 kg/m²     Patient is status post Procedure(s):  VAGINAL HYSTERECTOMY  UTEROSACRAL LIGAMENT SUSPENSION, ANTERIOR AND POSTERIOR COLPORRHAPHY, TVT EXACT MIDURETHRAL SLING AND CYSTOSCOPY  . .    Nausea/Vomiting: Controlled. Postoperative hydration reviewed and adequate. Pain:  Pain Scale 1: Numeric (0 - 10) (10/05/21 1115)  Pain Intensity 1: 5 (10/05/21 1115)   Managed. Neurological Status:   Neuro (WDL): Within Defined Limits (10/05/21 1115)   At baseline. Mental Status and Level of Consciousness: Arousable. Pulmonary Status:   O2 Device: Nasal cannula;Oral airway (10/05/21 1115)   Adequate oxygenation and airway patent. Complications related to anesthesia: None    Post-anesthesia assessment completed. No concerns. I have evaluated the patient and the patient is stable and ready to be discharged from PACU .     Signed By: Pete Isaacs MD    10/5/2021        INITIAL Post-op Vital signs:   Vitals Value Taken Time   /61 10/05/21 1145   Temp 35.9 °C (96.7 °F) 10/05/21 1115   Pulse 50 10/05/21 1159   Resp 11 10/05/21 1159   SpO2 95 % 10/05/21 1159   Vitals shown include unvalidated device data.

## 2021-10-05 NOTE — OP NOTES
Date:10/05/21    Patient: Erika Saini    Surgery Performed: Vaginal hysterectomy, uterosacral ligament suspension, posterior repair, cystoscopy, TVT Exact midurethral sling    Pre-operative diagnosis: Stage 3 pelvic organ prolapse    Post-operative diagnosis: same    Surgeon: Estrellita Ralph. Jorge Schilder, MD    Assistants: SA x 2    Anesthesia: General endotracheal    Specimens: Uterus, fibroid    Estimated Blood Loss: 100    Drains: Beckford catheter    Complications: none    Prosthetic Devices: TVT Exact Midurethral Sling    Findings: Stage 3 prolapse and cystocele, normal appearing uterus enlarged with fundal hard calcified fibroid, tubes and ovaries. Cystoscopy with brisk efflux from bilateral ureteral jets; no foreign body, mass, or stone seen in bladder or urethra. Normal rectal exam. Rectal prolapse noted. Indications: Patient with symptomatic stage 3 pelvic organ prolapse and stress urinary incontinence underwent the above procedures understanding the alternatives, purpose, risks, benefits, complications, and omar-operative course. She was not sexually active. Disposition: stable, to PACU    Technique:  She was placed under general anesthesia without difficulty and placed in the high supine lithotomy position in candy-cane stirrups with careful attention to upper and lower extremity positioning to avoid joint, muscle or nerve injury. Her left leg wanted to straighten despite maneuvering the stirrup so we gently placed a band behind her knee and secured it to the left arm board so that during the surgery, her leg would not accidentally straighten. At the end of the procedure, all appeared well. She was prepped and draped in the normal sterile fashion. After time-out was performed and clipping was performed, a Beckford catheter was placed in the sterile field. Two Noel clamps were placed on the cervix and with downward traction, I noted the posterior vaginal wall rectal reflection.  With a digital rectal exam, I confirmed this. I then identified the anterior reflection and demarcated my circumferential incision. 0.25% Marcaine was injected circumferentially and then I dissected both sharply with a blade and bluntly, and then using curved Martin scissors, I cut to the fascia. I made the posterior wall colpotomy sharply, and placed a weighted retractor. I then turned my attention to the anterior vaginal wall and with meticulous sharp dissection with Metzenbaum scissors and intermittent blunt dissection, I was able to push the bladder up and find the peritoneal fold. I entered into the veiscovaginal space without any bladder injury. A Earl retractor was used for excellent bladder protection and elevation. No hematuria was noted at that time in the Beckford catheter. Bilateral ureters were palpated in the anterior vaginal wall laterally and well away from where I planned to take my uterosacral ligament clamp bites. I then used Roxane clamps to come across the bilateral uterosacral ligaments and suture ligate them with a 0-vicryl suture. I hugged the cervix and the uterus and came across my broad ligament sequentially with the Roxane clamp and eventually across the utero-ovarian ligament, which was doubly suture ligated with 2-0 Vicryl suture. I then identified the left ovary and tube only through palaption as they were scarred high. I left them in place. The right tube and ovary were also palpated and seemed normal but left in situ for fear of creating bleeding if attempted removal. The cervix, uterus were sent for permanent specimen to Pathology. At this point, I packed the bowel away with a moist laparotomy sponge and with elevation of the anterior vaginal wall and bladder, I was able to identify the bilateral uterosacral ligaments at the level of the ischial spine.  Using a long Allis clamp, I was able to tent them away from the pelvic sidewall and placed 2 Myra-nima sutures on a CV-2 needle away from the pelvic sidewall at the level of the ischial spine both on the right and then on the left and held 4 anteriorly and 4 posteriorly. At this point, a 70 degree rigid cystoscopy revealed no urethral or bladder injury. Bilateral ureteral orrifice efflux was seen both on and off tension of the uterosacral sutures with the assistance of fluorecin. The Beckford catheter was replaced. With a Lone Star retractor and a Scherbak retractor in place, I was able to have excellent visualization of the anterior vaginal wall, which was shortened and without a cystocele. At this point, I proceeded with the uterosacral suture placement and placed 4 posterior and 4 anterior Mayfield-carlos and PDS sutures through the level of the apex with careful attention not to go through the epithelium given their permanent nature and then held these. I also performed Hendrickson angle sutures of 0-vicryl suture at both vaginal apex corners for hemostasis. The patient was then placed in steep Trendelenburg with the small bowel falling away, and I did my 4 sequential uterosacral pulley sutures with excellent elevation of the anterior and apical vaginal wall. A 70-degree rigid cystoscopy again revealed no bladder injury, no urethral injury and bilateral ureteral orifice efflux. The Beckford catheter was replaced. The Mayfield-Carlos and PDS sutures were trimmed on the knot and the vaginal cuff was closed with interrupted 0 vicryl sutures with excellent hemostasis and oversewing of the Mayfield-carlos sutures. I then proceeded with the sling. I made a 2 cm suburethral incision after injecting local anesthetic and 10 cc of sterile water retropubically bilaterally. I  developed my 2 TVT tunnels both sharply and bluntly onto the bilateral pubic rami and quelled any bleeding along the way.   With the bladder drained, urethra deflected to the ipsilateral side and the patient relaxed, I inserted my TVT exact trocar to 45 degree angle on the right sided previously made tunnel, perforating the endopelvic fascia, dropping my hand and coming out my anticipated mons incision. With the trocar in place I performed a 70-degree rigid cystoscopy, which revealed no bladder or urethral injury. This was done in a similar fashion with the bladder drained and the urethral deflected to the ipsilateral side on the left side and again there was no bladder or urethral injury noted. The sling was brought through and then tensioned in a tension free manner with a pair of heavy curved Martin scissors resting comfortably beneath the sling. With the sling lying flat, I removed the plastic sheeth. The area was copiously irrigated and hemostasis was assured. The excess mons pubis mesh was trimmed. The skin edges were released. Hemostasis was assured and these incisions were closed with Dermabond. I then trimmed the excess vaginal epithelial skin from the anterior repair and closed this incision with a running 2-0 vicryl suture. I then turned my attention to the posterior vaginal wall, which was redundant with a gaping introitus. There was also noted rectal prolapse. I made a anjana-shaped incision, demarcated by Allis clamps over the posterior vaginal wall after injecting with 0.25% Marcaine and de-epithelializing the area carefully without any underlying rectal injury. I then stopped any bleeding with cautery and assured hemostasis and then released my epithelial edges and dissected out slightly laterally and then plicated the midline with a series of 2-0 PDS sutures thus eliminating any enterocele or rectocele and making the vaginal caliber normal sized. The incision was closed with a running a 2-0 vicryl locked suture. Rectal exam was negative for any injury. Lap sponge and needle counts were correct x 2. A vaginal packing was placed. The patient tolerated the procedure well and was extubated without difficulty.

## 2021-10-05 NOTE — BRIEF OP NOTE
Brief Postoperative Note    Patient: Estrella Hodges  YOB: 1950  MRN: [id number]    Date of Procedure: 10/5/2021     Pre-Op Diagnosis: PROLAPSE, STRESS INCONTINENCE    Post-Op Diagnosis: Same as preoperative diagnosis. Procedure(s):  VAGINAL HYSTERECTOMY  UTEROSACRAL LIGAMENT SUSPENSION, ANTERIOR AND POSTERIOR COLPORRHAPHY, TVT EXACT MIDURETHRAL SLING AND CYSTOSCOPY  . Surgeon(s):  Diana Callejas MD    Surgical Assistant: Surg Asst-1: Timo Mcgee    Anesthesia: General     Estimated Blood Loss (mL): less than 534     Complications: None    Specimens:   ID Type Source Tests Collected by Time Destination   1 : UTERUS AND FIBROID Fresh Uterus  Diana Callejas MD 10/5/2021 0932 Pathology        Implants:   Implant Name Type Inv.  Item Serial No.  Lot No. LRB No. Used Action   SLING INCONT RETROPUBIC CONTINENCE SYS GYNECARE TVT EXACT - [id number]  SLING INCONT RETROPUBIC CONTINENCE SYS GYNECARE TVT EXACT  [id number]_ [id number] N/A 1 Implanted       Drains: * No LDAs found *    Findings: Enlarged fibroid uterus, normally palpated ovaries but scarred high in pelvic, normal post procedure cystoscopy with B UO efflux and normal rectal exam.    Electronically Signed by Jj Beltre MD on 10/5/2021 at 11:02 AM

## 2021-10-05 NOTE — ANESTHESIA PREPROCEDURE EVALUATION
Relevant Problems   RESPIRATORY SYSTEM   (+) RODERICK (obstructive sleep apnea)      CARDIOVASCULAR   (+) Essential hypertension with goal blood pressure less than 130/80   (+) PAF (paroxysmal atrial fibrillation) (HCC)   (+) Systolic murmur of aorta      GASTROINTESTINAL   (+) GERD (gastroesophageal reflux disease)   (+) Hepatitis B      RENAL FAILURE   (+) Stage 3 chronic kidney disease (HCC)      ENDOCRINE   (+) DM (diabetes mellitus) (HCC)   (+) Severe obesity (BMI 35.0-39. 9) with comorbidity (HCC)   (+) Type 2 diabetes mellitus with diabetic neuropathy (HCC)   (+) Type 2 diabetes mellitus with nephropathy (HCC)      HEMATOLOGY   (+) Anemia       Anesthetic History   No history of anesthetic complications            Review of Systems / Medical History  Patient summary reviewed, nursing notes reviewed and pertinent labs reviewed    Pulmonary  Within defined limits      Sleep apnea           Neuro/Psych   Within defined limits           Cardiovascular  Within defined limits  Hypertension        Dysrhythmias : atrial fibrillation  Hyperlipidemia    Exercise tolerance: >4 METS     GI/Hepatic/Renal  Within defined limits   GERD      Liver disease     Endo/Other  Within defined limits  Diabetes    Obesity, arthritis and anemia     Other Findings   Comments: Left ventricle: Size was normal. Systolic function was normal. Ejection  fraction was estimated in the range of 65 % to 70 %. There were no  regional wall motion abnormalities.  Wall thickness was at the upper limits  of normal.              Physical Exam    Airway  Mallampati: II  TM Distance: > 6 cm  Neck ROM: normal range of motion   Mouth opening: Normal     Cardiovascular  Regular rate and rhythm,  S1 and S2 normal,  no murmur, click, rub, or gallop             Dental  No notable dental hx       Pulmonary  Breath sounds clear to auscultation               Abdominal  GI exam deferred       Other Findings            Anesthetic Plan    ASA: 3  Anesthesia type: general          Induction: Intravenous  Anesthetic plan and risks discussed with: Patient

## 2021-10-05 NOTE — PERIOP NOTES
PATIENT POSITIONING WITH LEGS IN CANDY CANE STIRRUPS. LEFT LEG SECURED WITH STRAP SO THAT IT WOULD REMAIN IN NATURAL POSITION TO PREVENT HYPEREXTENDING.

## 2021-10-05 NOTE — PERIOP NOTES
ATTEMPTED TO REACH FAMILY OF PATIENT TO UPDATE OF PATIENT STATUS, UNABLE TO REACH, NO VOICEMAIL WAS LEFT ON UNIDENTIFIED VOICEMAIL.

## 2021-10-06 VITALS
SYSTOLIC BLOOD PRESSURE: 113 MMHG | DIASTOLIC BLOOD PRESSURE: 77 MMHG | OXYGEN SATURATION: 97 % | TEMPERATURE: 97.9 F | RESPIRATION RATE: 17 BRPM | WEIGHT: 190 LBS | HEIGHT: 61 IN | HEART RATE: 76 BPM | BODY MASS INDEX: 35.87 KG/M2

## 2021-10-06 LAB
ERYTHROCYTE [DISTWIDTH] IN BLOOD BY AUTOMATED COUNT: 13.1 % (ref 11.5–14.5)
GLUCOSE BLD STRIP.AUTO-MCNC: 131 MG/DL (ref 65–117)
HCT VFR BLD AUTO: 37.5 % (ref 35–47)
HGB BLD-MCNC: 11.9 G/DL (ref 11.5–16)
MCH RBC QN AUTO: 29.2 PG (ref 26–34)
MCHC RBC AUTO-ENTMCNC: 31.7 G/DL (ref 30–36.5)
MCV RBC AUTO: 91.9 FL (ref 80–99)
NRBC # BLD: 0 K/UL (ref 0–0.01)
NRBC BLD-RTO: 0 PER 100 WBC
PLATELET # BLD AUTO: 177 K/UL (ref 150–400)
PMV BLD AUTO: 9.6 FL (ref 8.9–12.9)
RBC # BLD AUTO: 4.08 M/UL (ref 3.8–5.2)
SERVICE CMNT-IMP: ABNORMAL
WBC # BLD AUTO: 12.9 K/UL (ref 3.6–11)

## 2021-10-06 PROCEDURE — 82962 GLUCOSE BLOOD TEST: CPT

## 2021-10-06 PROCEDURE — 36415 COLL VENOUS BLD VENIPUNCTURE: CPT

## 2021-10-06 PROCEDURE — 85027 COMPLETE CBC AUTOMATED: CPT

## 2021-10-06 PROCEDURE — 99218 HC RM OBSERVATION: CPT

## 2021-10-06 PROCEDURE — 74011250637 HC RX REV CODE- 250/637: Performed by: OBSTETRICS & GYNECOLOGY

## 2021-10-06 RX ORDER — IBUPROFEN 600 MG/1
600 TABLET ORAL
Qty: 50 TABLET | Refills: 0 | Status: SHIPPED | OUTPATIENT
Start: 2021-10-06 | End: 2021-12-10 | Stop reason: ALTCHOICE

## 2021-10-06 RX ORDER — DOCUSATE SODIUM 100 MG/1
100 CAPSULE, LIQUID FILLED ORAL 2 TIMES DAILY
Qty: 60 CAPSULE | Refills: 2 | Status: SHIPPED | OUTPATIENT
Start: 2021-10-06 | End: 2021-12-10 | Stop reason: ALTCHOICE

## 2021-10-06 RX ADMIN — DOCUSATE SODIUM 100 MG: 100 CAPSULE, LIQUID FILLED ORAL at 08:57

## 2021-10-06 RX ADMIN — METOPROLOL SUCCINATE 50 MG: 50 TABLET, EXTENDED RELEASE ORAL at 08:57

## 2021-10-06 RX ADMIN — IBUPROFEN 600 MG: 600 TABLET ORAL at 11:37

## 2021-10-06 NOTE — PROGRESS NOTES
FELISA PLAN:  RUR-Obs  Disposition-Home with family  Transportation by family  F/U-with PCP/Specialist as needed    Observation notice provided in writing to patient and/or caregiver as well as verbal explanation of the policy. Patients who are in outpatient status also receive the Observation notice. Patient has received notice and or patient representative has received via secure email, fax, or certified mail based on patient representative's preference. Patient verified her demographic information and gave copies of her ACP and POA. Copies placed in patient's chart to be scanned into Variable. Care Management Interventions  PCP Verified by CM: Yes  Palliative Care Criteria Met (RRAT>21 & CHF Dx)?: No  Mode of Transport at Discharge:  Other (see comment) (Private car)  Transition of Care Consult (CM Consult): Discharge Planning, Other (Lives alone)  MyChart Signup: No  Discharge Durable Medical Equipment: No  Health Maintenance Reviewed: Yes  Physical Therapy Consult: No  Occupational Therapy Consult: No  Speech Therapy Consult: No  Support Systems: Other Family Member(s)  Confirm Follow Up Transport: Family  Discharge Location  Discharge Placement: Home with family assistance

## 2021-10-06 NOTE — PROGRESS NOTES
Urogyn note-    S- Doing well, flatus, voided, axel PO, minimal pain or bleeding    O-  Visit Vitals  BP (!) 144/88 (BP 1 Location: Right upper arm, BP Patient Position: At rest)   Pulse 73   Temp 98.2 °F (36.8 °C)   Resp 16   Ht 5' 0.5\" (1.537 m)   Wt 86.2 kg (190 lb)   SpO2 96%   BMI 36.50 kg/m²       Intake/Output Summary (Last 24 hours) at 10/6/2021 0720  Last data filed at 10/5/2021 2358  Gross per 24 hour   Intake 1500 ml   Output 2700 ml   Net -1200 ml     Recent Results (from the past 12 hour(s))   CBC W/O DIFF    Collection Time: 10/06/21  4:29 AM   Result Value Ref Range    WBC 12.9 (H) 3.6 - 11.0 K/uL    RBC 4.08 3.80 - 5.20 M/uL    HGB 11.9 11.5 - 16.0 g/dL    HCT 37.5 35.0 - 47.0 %    MCV 91.9 80.0 - 99.0 FL    MCH 29.2 26.0 - 34.0 PG    MCHC 31.7 30.0 - 36.5 g/dL    RDW 13.1 11.5 - 14.5 %    PLATELET 515 522 - 705 K/uL    MPV 9.6 8.9 - 12.9 FL    NRBC 0.0 0  WBC    ABSOLUTE NRBC 0.00 0.00 - 0.01 K/uL   GLUCOSE, POC    Collection Time: 10/06/21  6:44 AM   Result Value Ref Range    Glucose (POC) 131 (H) 65 - 117 mg/dL    Performed by Ewa COOLEY  Abd distended but soft and NT  No CT    A- POD 1 VH and suspension and sling  P-  Ambulate  PO pain meds  Voiding- monitor and scan prn  Dispo- home

## 2021-10-06 NOTE — DISCHARGE INSTRUCTIONS
Post-Operative Care Instructions  following pelvic reconstruction surgery    [facility]  [phone]    For the next four weeks, these instructions should be followed as carefully as possible. Please contact my office if you have any questions and ask for my nurse. Please review and follow these instructions: In the first couple of days, take it easy but remain mildly active. Be aware that you will experience some pain and discomfort in the vagina and anal area. This is normal and will lessen within a few days. Week 1: For the first week at home you should perform NO household activities such as laundry, vacuuming, shopping, or gardening. DO NOT lift anything heavier than a one gallon milk jug. If possible avoid climbing stairs more than once per day. DO NOT use a tampon or insert anything in the vagina unless you were prescribed a vaginal estrogen cream. NO intercourse for at least six weeks. Take a recommended laxative to reduce the risk of straining with bowel movements. Please note that a normal bowel movement will not damage your repair. You may experience some vaginal bleeding. A small amount is normal. If your flow is heavier than a period, please call my office. Discharge may occur until the sutures dissolve in 4-6 weeks. Weeks 2: Gradually increase your physical activity, but specifically you should still not lift anything heavier than the milk jug. Walking is good for you-just no aerobics, jogging or impact exercises. You may begin to drive at this time if you feel your comfort and reaction time have returned. Make sure you have an appointment with me 4 weeks after your surgery. Weeks 4: Gradually resume normal activities. Sexual activity can be resumed after six weeks if you were cleared at your post-operative appointment. You may take a shower after 48 hours have passed. Do not clean the vagina with any soaps. Water is sufficient. Please avoid sitting in bath water until Dr. Sunita Koenig gives you the ok. Please call the office or seek immediate medical attention if you have vaginal bleeding greater than 1 pad an hour, malodorous vaginal discharge that looks like pus, chest pain or shortness of breath, uncontrollable nausea and vomiting, a fever greater than 100.5 degrees, or uncontrollable pain. Dont hesitate to call my office with any questions or concerns and ask for my nurse. A physician is on call 24 hours a day, seven days a week for any urgent issues. After business hours and on weekends and holidays, call [phone] and ask to have the doctor on call paged.

## 2021-10-06 NOTE — PROGRESS NOTES
Bedside and Verbal shift change report given to Latrice Case RN  (oncoming nurse) by SWATHI Sutherland  (offgoing nurse). Report included the following information SBAR, Kardex, OR Summary, Intake/Output, MAR and Recent Results. I have reviewed discharge instructions with the patient. The patient verbalized understanding. Pt given copy of discharge instructions. Denies any further questions at this time. Iv removed.

## 2021-10-06 NOTE — PROGRESS NOTES
Bedside and Verbal shift change report given to ALBERTO Eddy, Student and Idalia Palma RN (oncoming nurse) by Harsha Weir RN (offgoing nurse). Report included the following information SBAR, Kardex, Procedure Summary, Intake/Output, MAR and Recent Results.

## 2021-11-08 RX ORDER — METFORMIN HYDROCHLORIDE 500 MG/1
TABLET, EXTENDED RELEASE ORAL
Qty: 180 TABLET | Refills: 3 | Status: SHIPPED | OUTPATIENT
Start: 2021-11-08 | End: 2022-11-03

## 2021-12-08 ENCOUNTER — NURSE TRIAGE (OUTPATIENT)
Dept: OTHER | Facility: CLINIC | Age: 71
End: 2021-12-08

## 2021-12-08 NOTE — TELEPHONE ENCOUNTER
Received call from Joshua Parekh    at [facility] with Red Flag Complaint. Brief description of triage: 67 y/o with  Elevated blood pressure and tingling of hands at night   156/96 today, 176/92 yesterday     Pt is on a cpap machine  Reports she has arrhthymias       Triage indicates for patient to seen in office today or tomorrow       Care advice provided, patient verbalizes understanding; denies any other questions or concerns; instructed to call back for any new or worsening symptoms. Writer provided warm transfer to [facility]  at [facility] for appointment scheduling. Attention Provider: Thank you for allowing me to participate in the care of your patient. The patient was connected to triage in response to information provided to the [facility]. Please do not respond through this encounter as the response is not directed to a shared pool. Reason for Disposition   Patient wants to be seen    Answer Assessment - Initial Assessment Questions  1. BLOOD PRESSURE: \"What is the blood pressure? \" \"Did you take at least two measurements 5 minutes apart?\"      156/96    2. ONSET: \"When did you take your blood pressure? \"      This am     3. HOW: \"How did you obtain the blood pressure? \" (e.g., visiting nurse, automatic home BP monitor)      At home     4. HISTORY: \"Do you have a history of high blood pressure? \"        Yes    5. MEDICATIONS: Jamie Blazer you taking any medications for blood pressure? \" \"Have you missed any doses recently? \"      Denies     6. OTHER SYMPTOMS: \"Do you have any symptoms? \" (e.g., headache, chest pain, blurred vision, difficulty breathing, weakness)        Tingling of hands in     7. PREGNANCY: \"Is there any chance you are pregnant? \" \"When was your last menstrual period? \"     N/a    Protocols used: HIGH BLOOD PRESSURE-ADULT-[place]

## 2021-12-10 ENCOUNTER — OFFICE VISIT (OUTPATIENT)
Dept: INTERNAL MEDICINE CLINIC | Age: 71
End: 2021-12-10
Payer: MEDICARE

## 2021-12-10 VITALS
SYSTOLIC BLOOD PRESSURE: 140 MMHG | HEIGHT: 61 IN | DIASTOLIC BLOOD PRESSURE: 82 MMHG | TEMPERATURE: 98.4 F | RESPIRATION RATE: 18 BRPM | BODY MASS INDEX: 36.25 KG/M2 | HEART RATE: 53 BPM | OXYGEN SATURATION: 98 % | WEIGHT: 192 LBS

## 2021-12-10 DIAGNOSIS — I10 ESSENTIAL HYPERTENSION WITH GOAL BLOOD PRESSURE LESS THAN 130/80: Primary | ICD-10-CM

## 2021-12-10 DIAGNOSIS — R20.0 ARM NUMBNESS: ICD-10-CM

## 2021-12-10 DIAGNOSIS — E11.21 TYPE 2 DIABETES MELLITUS WITH NEPHROPATHY (HCC): ICD-10-CM

## 2021-12-10 DIAGNOSIS — Z90.710 S/P HYSTERECTOMY: ICD-10-CM

## 2021-12-10 PROCEDURE — G8417 CALC BMI ABV UP PARAM F/U: HCPCS | Performed by: INTERNAL MEDICINE

## 2021-12-10 PROCEDURE — 99214 OFFICE O/P EST MOD 30 MIN: CPT | Performed by: INTERNAL MEDICINE

## 2021-12-10 PROCEDURE — 1101F PT FALLS ASSESS-DOCD LE1/YR: CPT | Performed by: INTERNAL MEDICINE

## 2021-12-10 PROCEDURE — G8399 PT W/DXA RESULTS DOCUMENT: HCPCS | Performed by: INTERNAL MEDICINE

## 2021-12-10 PROCEDURE — 3044F HG A1C LEVEL LT 7.0%: CPT | Performed by: INTERNAL MEDICINE

## 2021-12-10 PROCEDURE — 2022F DILAT RTA XM EVC RTNOPTHY: CPT | Performed by: INTERNAL MEDICINE

## 2021-12-10 PROCEDURE — G8510 SCR DEP NEG, NO PLAN REQD: HCPCS | Performed by: INTERNAL MEDICINE

## 2021-12-10 PROCEDURE — G9899 SCRN MAM PERF RSLTS DOC: HCPCS | Performed by: INTERNAL MEDICINE

## 2021-12-10 PROCEDURE — 3017F COLORECTAL CA SCREEN DOC REV: CPT | Performed by: INTERNAL MEDICINE

## 2021-12-10 PROCEDURE — G8536 NO DOC ELDER MAL SCRN: HCPCS | Performed by: INTERNAL MEDICINE

## 2021-12-10 PROCEDURE — G8754 DIAS BP LESS 90: HCPCS | Performed by: INTERNAL MEDICINE

## 2021-12-10 PROCEDURE — G8753 SYS BP > OR = 140: HCPCS | Performed by: INTERNAL MEDICINE

## 2021-12-10 PROCEDURE — 1090F PRES/ABSN URINE INCON ASSESS: CPT | Performed by: INTERNAL MEDICINE

## 2021-12-10 PROCEDURE — G0463 HOSPITAL OUTPT CLINIC VISIT: HCPCS | Performed by: INTERNAL MEDICINE

## 2021-12-10 PROCEDURE — G8427 DOCREV CUR MEDS BY ELIG CLIN: HCPCS | Performed by: INTERNAL MEDICINE

## 2021-12-10 RX ORDER — LISINOPRIL 40 MG/1
40 TABLET ORAL DAILY
COMMUNITY
End: 2021-12-13

## 2021-12-10 RX ORDER — AMLODIPINE BESYLATE 5 MG/1
5 TABLET ORAL
Qty: 90 TABLET | Refills: 1
Start: 2021-12-10 | End: 2021-12-14 | Stop reason: SDUPTHER

## 2021-12-10 NOTE — PROGRESS NOTES
Mame Rojas is a 66 y.o. female who presents today for Elevated Blood Pressure (x 2 wks; systolic range from 163-970 and dialostic ), Visual Problems (very minimal x 3 days), and Numbness (in hands while sleeping )  . She has a history of   Patient Active Problem List   Diagnosis Code    DM (diabetes mellitus) ([facility] [address] E11.9    Hyperlipidemia E78.5    Hepatitis B B19.10    GERD (gastroesophageal reflux disease) K21.9    Rosacea L71.9    AR (allergic rhinitis) J30.9    Intervertebral disk disease M51.9    Uterine prolapse N81.4    Incontinence R32    Essential hypertension with goal blood pressure less than 130/80 I10    Anemia D64.9    PAF (paroxysmal atrial fibrillation) ([facility]) I48.0    Peripheral neuropathy G62.9    Pre-ulcerative corn or callous L84    Advanced care planning/counseling discussion Z71.89    Type 2 diabetes mellitus with nephropathy ([facility]) E11.21    Stage 3 chronic kidney disease (HCC) N18.30    Severe obesity (BMI 35.0-39. 9) with comorbidity ([facility]) L96.71    Systolic murmur of aorta Y36.6    History of arthritis Z87.39    Foot drop M21.379    Snoring R06.83    RODERICK (obstructive sleep apnea) G47.33    Primary insomnia F51.01    Insomnia with sleep apnea G47.00, G47.30    Type 2 diabetes mellitus with diabetic neuropathy ([facility]) E11.40    Incomplete emptying of bladder R33.9    Overactive bladder N32.81    Urge incontinence of urine N39.41    Vitamin D deficiency E55.9    Incomplete uterovaginal prolapse N81.2    Stress incontinence N39.3    Genital prolapse N81.9   . Today patient is here for an acute visit. Briellelesly Loweryjamir Hypertension- Has been elevated. Has been monitoring this at home. Has seen it as high as the 160s and 170s. Patient denies any significant changes to her lifestyle. Reports that salt intake may be slightly up. No changes to medications.   Hypertension ROS: taking medications as instructed, no medication side effects noted, no TIA's, no chest pain on exertion, no dyspnea on exertion, no swelling of ankles     reports that she has never smoked. She has never used smokeless tobacco.    reports current alcohol use. BP Readings from Last 2 Encounters:   12/10/21 (!) 152/70   10/06/21 113/77     S/p Hysterectomy: This was done October of this year. Since she reports her urinary symptoms have not changed. No recent UTI's. Problem visit:  Travis Ferreira is here for complaint of L hand and arm. Problem began several months ago. Numbness when waking up  Movement makes this better. Does note history of cervical spine issues. Denies any pain. Denies any weakness. Denies any muscle wasting. DM: stable overall. Fasting BG are well controlled. ROS  Review of Systems   Constitutional: Negative for chills, fever and weight loss. HENT: Negative for congestion and sore throat. Eyes: Negative for blurred vision, double vision and photophobia. Respiratory: Negative for cough and shortness of breath. Cardiovascular: Negative for chest pain, palpitations and leg swelling. Gastrointestinal: Negative for abdominal pain, constipation, diarrhea, heartburn, nausea and vomiting. Genitourinary: Negative for dysuria, frequency and urgency. Musculoskeletal: Negative for joint pain and myalgias. Skin: Negative for rash. Neurological: Negative. Negative for headaches. Hand and arm numbness at times at night   Endo/Heme/Allergies: Does not bruise/bleed easily. Psychiatric/Behavioral: Negative for memory loss and suicidal ideas. Visit Vitals  BP (!) 152/70   Pulse (!) 53   Temp 98.4 °F (36.9 °C) (Oral)   Resp 18   Ht 5' 0.5\" (1.537 m)   Wt 192 lb (87.1 kg)   SpO2 98%   BMI 36.88 kg/m²       Physical Exam  Constitutional:       Appearance: She is well-developed. HENT:      Head: Normocephalic and atraumatic. Cardiovascular:      Rate and Rhythm: Normal rate and regular rhythm. Heart sounds: No murmur heard.       Pulmonary: Effort: Pulmonary effort is normal. No respiratory distress. Musculoskeletal:      Comments: Normal strength testing and deep tendon reflexes upper extremities   Skin:     General: Skin is warm and dry. Neurological:      Mental Status: She is alert and oriented to person, place, and time. Psychiatric:         Behavior: Behavior normal.           Current Outpatient Medications   Medication Sig    lancets (FreeStyle Lancets) 28 gauge misc TEST BLOOD SUGAR ONE TIME DAILY    metFORMIN ER (GLUCOPHAGE XR) 500 mg tablet TAKE 2 TABLETS DAILY WITH DINNER    docusate sodium (COLACE) 100 mg capsule Take 1 Capsule by mouth two (2) times a day for 90 days.  ibuprofen (MOTRIN) 600 mg tablet Take 1 Tablet by mouth every six (6) hours as needed for Pain.  cranberry fruit extract (CRANBERRY CONCENTRATE PO) Take  by mouth.  diphenoxylate-atropine (LomotiL) 2.5-0.025 mg per tablet Take  by mouth four (4) times daily as needed for Diarrhea.  lisinopriL (PRINIVIL, ZESTRIL) 20 mg tablet Take 1 Tablet by mouth daily. (Patient taking differently: Take 40 mg by mouth daily.)    fluticasone propionate (FLONASE) 50 mcg/actuation nasal spray 2 Sprays by Both Nostrils route daily. (Patient taking differently: 2 Sprays by Both Nostrils route nightly.)    amLODIPine (NORVASC) 2.5 mg tablet TAKE 1 TABLET BY MOUTH EVERY DAY (Patient taking differently: nightly. TAKE 1 TABLET BY MOUTH EVERY DAY)    simvastatin (ZOCOR) 20 mg tablet TAKE 1 TABLET BY MOUTH AT BEDTIME FOR CHOLESTEROL    estradioL (Estrace) 0.01 % (0.1 mg/gram) vaginal cream Estrace 0.01% (0.1 mg/gram) vaginal cream   place 1 g vaginally with applicator or finger nightly for 2 weeks and then 2-3 x weekly thereafter    metoprolol succinate (TOPROL-XL) 50 mg XL tablet Take 1 Tablet by mouth daily.  cholecalciferol, vitamin D3, (Vitamin D3) 50 mcg (2,000 unit) tab Take  by mouth.     glucose blood VI test strips (FreeStyle Test) strip TEST FASTING BLOOD SUGAR ONE TIME DAILY    montelukast (SINGULAIR) 10 mg tablet     esomeprazole (NEXIUM) 20 mg capsule nightly.  omega 3-DHA-EPA-fish oil (FISH OIL) 1,000 mg (120 mg-180 mg) capsule Take 4 Caps by mouth daily.  CALCIUM CARBONATE (TUMS PO) Take  by mouth daily as needed. (Patient not taking: Reported on 10/5/2021)    CALCIUM CARBONATE/VITAMIN D3 (CALCIUM + D PO) Take  by mouth two (2) times a day.  MULTI-VITAMIN PO Take 1 Tab by mouth daily.  aspirin delayed-release 81 mg tablet take 81 mg by mouth daily. No current facility-administered medications for this visit. Past Medical History:   Diagnosis Date    Adverse effect of anesthesia     WOKE UP IN RR ON VENTILATOR    Anemia     Arthritis     Calculus of kidney     Chronic kidney disease     STAGE 3    Cystocele     Diabetes ([address]     Diabetic neuropathy, painful (HCC)     GERD (gastroesophageal reflux disease)     Hepatitis B     Hypercholesterolemia     Hypertension     Microalbuminuria     Mild nonproliferative diabetic retinopathy (HCC)     PAF (paroxysmal atrial fibrillation) ([address]     [facility] follows    Rectocele     Retinopathy     Rosacea     Ruptured disc, cervical     Sleep apnea     Stenosis, cervical spine       Past Surgical History:   Procedure Laterality Date    HX BREAST BIOPSY Left 2009    HX CATARACT REMOVAL Right     HX CATARACT REMOVAL Left 03/2021    HX COLONOSCOPY      HX CYST INCISION AND DRAINAGE Left     BREAST CYST    HX DILATION AND CURETTAGE      HX ENDOSCOPY      HX GYN      OVARIAN CYST REMOVED    HX HERNIA REPAIR      umbilical    HX HYSTERECTOMY Bilateral 10/05/2021    HX MENISCUS REPAIR  7/11/14    HX OTHER SURGICAL  05/09/2018    Bladder Botox     HX OVARIAN CYST REMOVAL  1973    HX UROLOGICAL        Social History     Tobacco Use    Smoking status: Never Smoker    Smokeless tobacco: Never Used   Substance Use Topics    Alcohol use:  Yes     Alcohol/week: 0.0 standard drinks     Comment: very rarely       Family History   Problem Relation Age of Onset    Hypertension Mother     Thyroid Disease Mother     Stroke Mother         \"TIA\"    Heart Disease Father         MI    Thyroid Disease Sister     Hypertension Sister     Eczema Sister     Asthma Sister     Other Sister         MENTAL HEALTH ISSUES    Heart Attack Other     Arthritis Sister     Hypertension Sister     High Cholesterol Sister     Cancer Sister         SKIN    Arthritis Sister     Cancer Maternal Aunt         esophageal    Breast Cancer Maternal Aunt     Cancer Paternal Aunt         breast    Anesth Problems Neg Hx         Allergies   Allergen Reactions    Sulfa (Sulfonamide Antibiotics) Unknown (comments)     TOPICAL EYE / EYE MUCH WORSE        Assessment/Plan  Diagnoses and all orders for this visit:    1. Essential hypertension with goal blood pressure less than 130/80-above goal.  Not sure what triggered elevated blood pressures recently. Will increase amlodipine to 5. Otherwise could consider switching ACE inhibitor to ARB if swelling becomes an issue. -     amLODIPine (NORVASC) 5 mg tablet; Take 1 Tablet by mouth nightly. TAKE 1 TABLET BY MOUTH EVERY DAY    2. Type 2 diabetes mellitus with nephropathy (HCC)-well-controlled. We will repeat this at follow-up    3. Arm numbness-either vascular from sleeping position or neurologic from cervical spine. No significant pain. Monitor    4. S/P hysterectomy-continues to have some urinary issues. Darling Rodriguez MD  12/10/2021    This note was created with the help of speech recognition software [employer]) and may contain some 'sound alike' errors.

## 2021-12-10 NOTE — PROGRESS NOTES
Lanette Krabbe is a 66 y.o. female    Chief Complaint   Patient presents with    Elevated Blood Pressure     x 2 wks; systolic range from 078-122 and dialostic     Visual Problems     very minimal x 3 days       Visit Vitals  BP (!) 152/70   Pulse (!) 53   Temp 98.4 °F (36.9 °C) (Oral)   Resp 18   Ht 5' 0.5\" (1.537 m)   Wt 192 lb (87.1 kg)   SpO2 98%   BMI 36.88 kg/m²       3 most recent PHQ Screens 12/10/2021   Little interest or pleasure in doing things Not at all   Feeling down, depressed, irritable, or hopeless Not at all   Total Score PHQ 2 0       Fall Risk Assessment, last 12 mths 12/10/2021   Able to walk? Yes   Fall in past 12 months? 0   Do you feel unsteady? -   Are you worried about falling -       Abuse Screening Questionnaire 12/15/2020   Do you ever feel afraid of your partner? N   Are you in a relationship with someone who physically or mentally threatens you? N   Is it safe for you to go home? Y       1. Have you been to the ER, urgent care clinic since your last visit? Hospitalized since your last visit? No     2. Have you seen or consulted any other health care providers outside of the [address] [place] [address] since your last visit? Include any pap smears or colon screening.  No

## 2021-12-14 DIAGNOSIS — I10 ESSENTIAL HYPERTENSION WITH GOAL BLOOD PRESSURE LESS THAN 130/80: ICD-10-CM

## 2021-12-14 NOTE — TELEPHONE ENCOUNTER
Patient is calling back in regarding this medication. She wanted to speak with the nurse. Please call back and advise.

## 2021-12-15 RX ORDER — AMLODIPINE BESYLATE 5 MG/1
5 TABLET ORAL
Qty: 90 TABLET | Refills: 1 | Status: SHIPPED | OUTPATIENT
Start: 2021-12-15 | End: 2022-01-18 | Stop reason: DRUGHIGH

## 2021-12-15 NOTE — TELEPHONE ENCOUNTER
Spoke with pt she is taking the 5 mg (2.5mg x2) as discussed at ov 12/10/2021 but has enough for 1 week refill for 5 mg was denied, is it ok to send to [facility] pharmacy?

## 2021-12-22 ENCOUNTER — TELEPHONE (OUTPATIENT)
Dept: INTERNAL MEDICINE CLINIC | Age: 71
End: 2021-12-22

## 2021-12-28 ENCOUNTER — OFFICE VISIT (OUTPATIENT)
Dept: INTERNAL MEDICINE CLINIC | Age: 71
End: 2021-12-28
Payer: MEDICARE

## 2021-12-28 VITALS
OXYGEN SATURATION: 98 % | HEART RATE: 65 BPM | TEMPERATURE: 98.9 F | SYSTOLIC BLOOD PRESSURE: 138 MMHG | WEIGHT: 192.6 LBS | HEIGHT: 61 IN | DIASTOLIC BLOOD PRESSURE: 81 MMHG | BODY MASS INDEX: 36.36 KG/M2 | RESPIRATION RATE: 16 BRPM

## 2021-12-28 DIAGNOSIS — M10.9 ACUTE GOUT INVOLVING TOE OF LEFT FOOT, UNSPECIFIED CAUSE: Primary | ICD-10-CM

## 2021-12-28 DIAGNOSIS — E11.21 TYPE 2 DIABETES MELLITUS WITH NEPHROPATHY (HCC): ICD-10-CM

## 2021-12-28 DIAGNOSIS — L03.116 CELLULITIS OF LEFT LOWER EXTREMITY: ICD-10-CM

## 2021-12-28 PROCEDURE — 3017F COLORECTAL CA SCREEN DOC REV: CPT | Performed by: NURSE PRACTITIONER

## 2021-12-28 PROCEDURE — G8752 SYS BP LESS 140: HCPCS | Performed by: NURSE PRACTITIONER

## 2021-12-28 PROCEDURE — G8432 DEP SCR NOT DOC, RNG: HCPCS | Performed by: NURSE PRACTITIONER

## 2021-12-28 PROCEDURE — 1101F PT FALLS ASSESS-DOCD LE1/YR: CPT | Performed by: NURSE PRACTITIONER

## 2021-12-28 PROCEDURE — 3044F HG A1C LEVEL LT 7.0%: CPT | Performed by: NURSE PRACTITIONER

## 2021-12-28 PROCEDURE — G0463 HOSPITAL OUTPT CLINIC VISIT: HCPCS | Performed by: NURSE PRACTITIONER

## 2021-12-28 PROCEDURE — G8427 DOCREV CUR MEDS BY ELIG CLIN: HCPCS | Performed by: NURSE PRACTITIONER

## 2021-12-28 PROCEDURE — G8417 CALC BMI ABV UP PARAM F/U: HCPCS | Performed by: NURSE PRACTITIONER

## 2021-12-28 PROCEDURE — 2022F DILAT RTA XM EVC RTNOPTHY: CPT | Performed by: NURSE PRACTITIONER

## 2021-12-28 PROCEDURE — 99214 OFFICE O/P EST MOD 30 MIN: CPT | Performed by: NURSE PRACTITIONER

## 2021-12-28 PROCEDURE — G9899 SCRN MAM PERF RSLTS DOC: HCPCS | Performed by: NURSE PRACTITIONER

## 2021-12-28 PROCEDURE — 1090F PRES/ABSN URINE INCON ASSESS: CPT | Performed by: NURSE PRACTITIONER

## 2021-12-28 PROCEDURE — G8536 NO DOC ELDER MAL SCRN: HCPCS | Performed by: NURSE PRACTITIONER

## 2021-12-28 PROCEDURE — G8399 PT W/DXA RESULTS DOCUMENT: HCPCS | Performed by: NURSE PRACTITIONER

## 2021-12-28 PROCEDURE — G8754 DIAS BP LESS 90: HCPCS | Performed by: NURSE PRACTITIONER

## 2021-12-28 RX ORDER — CETIRIZINE HYDROCHLORIDE 10 MG/1
CAPSULE, LIQUID FILLED ORAL
COMMUNITY

## 2021-12-28 RX ORDER — METHYLPREDNISOLONE 4 MG/1
TABLET ORAL
Qty: 1 DOSE PACK | Refills: 0 | Status: SHIPPED | OUTPATIENT
Start: 2021-12-28 | End: 2022-01-18 | Stop reason: ALTCHOICE

## 2021-12-28 RX ORDER — CEPHALEXIN 500 MG/1
500 CAPSULE ORAL 3 TIMES DAILY
Qty: 21 CAPSULE | Refills: 0 | Status: SHIPPED | OUTPATIENT
Start: 2021-12-28 | End: 2022-01-18 | Stop reason: ALTCHOICE

## 2021-12-28 NOTE — PROGRESS NOTES
Allan Howe (: 1950) is a 66 y.o. female, established patient, here for evaluation of the following chief complaint(s):  Gout (left big toe, started  night)       ASSESSMENT/PLAN:  Below is the assessment and plan developed based on review of pertinent history, physical exam, labs, studies, and medications. 1. Acute gout involving toe of left foot, unspecified cause --clinically highly suggestive for gout flare; will treat with Medrol Dosepak in light of her diminished kidney function. Encourage adequate hydration.  -     methylPREDNISolone (MEDROL DOSEPACK) 4 mg tablet; Take as directed, Normal, Disp-1 Dose Pack, R-0  -     URIC ACID; Future    2. Cellulitis of left lower extremity --concerned about there being a possible cellulitic component to her current symptoms in light of redness spreading across forefoot; will treat with Keflex and follow-up in 2 days. -     CBC WITH AUTOMATED DIFF; Future  -     cephALEXin (KEFLEX) 500 mg capsule; Take 1 Capsule by mouth three (3) times daily. , Normal, Disp-21 Capsule, R-0    3. Type 2 diabetes mellitus with nephropathy (HCC)  -     METABOLIC PANEL, COMPREHENSIVE; Future  -     HEMOGLOBIN A1C WITH EAG; Future      Follow-up in office in 2 days    SUBJECTIVE/OBJECTIVE:  HPI    Patient of Dr. Toshia Reeves who presents with complaints of pain, redness, edema to right great toe and across right forefoot that began 2 days ago. Reports having a transient episode of redness and pain to left great toe about a week ago that resolved after 24 hours. Has been using diclofenac gel without improvement. Concerned about the possibility of gout and reports that her recent kidney stone she believes was a urate stone but we have no records of this. Reports she had a pedicure about 1 month ago where the corner of her left great toenail was nicked but she had not noted any redness to area immediately afterwards.   Denies fever, chills but has been feeling fatigued. Patient Active Problem List   Diagnosis Code    DM (diabetes mellitus) ([facility] [address] E11.9    Hyperlipidemia E78.5    Hepatitis B B19.10    GERD (gastroesophageal reflux disease) K21.9    Rosacea L71.9    AR (allergic rhinitis) J30.9    Intervertebral disk disease M51.9    Uterine prolapse N81.4    Incontinence R32    Essential hypertension with goal blood pressure less than 130/80 I10    Anemia D64.9    PAF (paroxysmal [facility] fibrillation) ([facility] I48.0    Peripheral neuropathy G62.9    Pre-ulcerative corn or callous L84    Advanced care planning/counseling discussion Z71.89    Type 2 diabetes mellitus with nephropathy ([facility]) E11.21    Stage 3 chronic kidney disease ([facility]) N18.30    Severe obesity (BMI 35.0-39. 9) with comorbidity ([facility]) V05.66    Systolic murmur of aorta U27.2    History of arthritis Z87.39    Foot drop M21.379    Snoring R06.83    RODERICK (obstructive sleep apnea) G47.33    Primary insomnia F51.01    Insomnia with sleep apnea G47.00, G47.30    Type 2 diabetes mellitus with diabetic neuropathy ([facility]) E11.40    Incomplete emptying of bladder R33.9    Overactive bladder N32.81    Urge incontinence of urine N39.41    Vitamin D deficiency E55.9    Incomplete uterovaginal prolapse N81.2    Stress incontinence N39.3    Genital prolapse N81.9     Past Surgical History:   Procedure Laterality Date    HX BREAST BIOPSY Left 2009    HX CATARACT REMOVAL Right     HX CATARACT REMOVAL Left 03/2021    HX COLONOSCOPY      HX CYST INCISION AND DRAINAGE Left     BREAST CYST    HX DILATION AND CURETTAGE      HX ENDOSCOPY      HX GYN      OVARIAN CYST REMOVED    HX HERNIA REPAIR      umbilical    HX HYSTERECTOMY Bilateral 10/05/2021    HX MENISCUS REPAIR  7/11/14    HX OTHER SURGICAL  05/09/2018    Bladder Botox     HX OVARIAN CYST REMOVAL  1973    HX UROLOGICAL       Social History     Socioeconomic History    Marital status:      Spouse name: Not on file    Number of children: Not on file    Years of education: Not on file    Highest education level: Not on file   Occupational History    Not on file   Tobacco Use    Smoking status: Never Smoker    Smokeless tobacco: Never Used   Vaping Use    Vaping Use: Never used   Substance and Sexual Activity    Alcohol use: Yes     Alcohol/week: 0.0 standard drinks     Comment: very rarely     Drug use: No    Sexual activity: Not Currently   Other Topics Concern    Not on file   Social History Narrative    Not on file     Social Determinants of Health     Financial Resource Strain:     Difficulty of Paying Living Expenses: Not on file   Food Insecurity:     Worried About Running Out of Food in the Last Year: Not on file    Gomez of Food in the Last Year: Not on file   Transportation Needs:     Lack of Transportation (Medical): Not on file    Lack of Transportation (Non-Medical):  Not on file   Physical Activity:     Days of Exercise per Week: Not on file    Minutes of Exercise per Session: Not on file   Stress:     Feeling of Stress : Not on file   Social Connections:     Frequency of Communication with Friends and Family: Not on file    Frequency of Social Gatherings with Friends and Family: Not on file    Attends Scientology Services: Not on file    Active Member of [address] [place] [address] [employer] or Organizations: Not on file    Attends Club or Organization Meetings: Not on file    Marital Status: Not on file   Intimate Partner Violence:     Fear of Current or Ex-Partner: Not on file    Emotionally Abused: Not on file    Physically Abused: Not on file    Sexually Abused: Not on file   Housing Stability:     Unable to Pay for Housing in the Last Year: Not on file    Number of [place] in the Last Year: Not on file    Unstable Housing in the Last Year: Not on file     Family History   Problem Relation Age of Onset    Hypertension Mother     Thyroid Disease Mother     Stroke Mother         \"TIA\"    Heart Disease Father MI    Thyroid Disease Sister     Hypertension Sister     Eczema Sister     Asthma Sister     Other Sister         MENTAL HEALTH ISSUES    Heart Attack Other     Arthritis Sister     Hypertension Sister     High Cholesterol Sister     Cancer Sister         SKIN    Arthritis Sister     Cancer Maternal Aunt         esophageal    Breast Cancer Maternal Aunt     Cancer Paternal Aunt         breast    Anesth Problems Neg Hx      Current Outpatient Medications   Medication Sig    Cetirizine (ZyrTEC) 10 mg cap Take  by mouth.  methylPREDNISolone (MEDROL DOSEPACK) 4 mg tablet Take as directed    cephALEXin (KEFLEX) 500 mg capsule Take 1 Capsule by mouth three (3) times daily.  amLODIPine (NORVASC) 5 mg tablet Take 1 Tablet by mouth nightly. TAKE 1 TABLET BY MOUTH EVERY DAY    lisinopriL (PRINIVIL, ZESTRIL) 40 mg tablet TAKE 1 TABLET DAILY    lancets (FreeStyle Lancets) 28 gauge misc TEST BLOOD SUGAR ONE TIME DAILY    metFORMIN ER (GLUCOPHAGE XR) 500 mg tablet TAKE 2 TABLETS DAILY WITH DINNER    cranberry fruit extract (CRANBERRY CONCENTRATE PO) Take  by mouth.  diphenoxylate-atropine (LomotiL) 2.5-0.025 mg per tablet Take  by mouth four (4) times daily as needed for Diarrhea.  fluticasone propionate (FLONASE) 50 mcg/actuation nasal spray 2 Sprays by Both Nostrils route daily. (Patient taking differently: 2 Sprays by Both Nostrils route nightly.)    simvastatin (ZOCOR) 20 mg tablet TAKE 1 TABLET BY MOUTH AT BEDTIME FOR CHOLESTEROL    estradioL (Estrace) 0.01 % (0.1 mg/gram) vaginal cream Estrace 0.01% (0.1 mg/gram) vaginal cream   place 1 g vaginally with applicator or finger nightly for 2 weeks and then 2-3 x weekly thereafter    metoprolol succinate (TOPROL-XL) 50 mg XL tablet Take 1 Tablet by mouth daily.  cholecalciferol, vitamin D3, (Vitamin D3) 50 mcg (2,000 unit) tab Take  by mouth.     glucose blood VI test strips (FreeStyle Test) strip TEST FASTING BLOOD SUGAR ONE TIME DAILY  esomeprazole (NEXIUM) 20 mg capsule nightly.  omega 3-DHA-EPA-fish oil (FISH OIL) 1,000 mg (120 mg-180 mg) capsule Take 4 Caps by mouth daily.  CALCIUM CARBONATE (TUMS PO) Take  by mouth daily as needed.  CALCIUM CARBONATE/VITAMIN D3 (CALCIUM + D PO) Take  by mouth two (2) times a day.  MULTI-VITAMIN PO Take 1 Tab by mouth daily.  aspirin delayed-release 81 mg tablet take 81 mg by mouth daily.  montelukast (SINGULAIR) 10 mg tablet  (Patient not taking: Reported on 12/28/2021)     No current facility-administered medications for this visit. Allergies   Allergen Reactions    Sulfa (Sulfonamide Antibiotics) Unknown (comments)     TOPICAL EYE / EYE MUCH WORSE     Immunization History   Administered Date(s) Administered    COVID-19, [place], Primary or Immunocompromised Series, MRNA, PF, 100mcg/0.5mL 02/20/2021, 03/16/2021, 11/04/2021    Influenza High Dose Vaccine PF 10/19/2015, 10/31/2016, 10/04/2017, 09/02/2018, 10/08/2020    Influenza Vaccine 09/20/2013, 10/06/2014    Influenza Vaccine (Tri) Adjuvanted (>65 Yrs FLUAD TRI 39698) 10/07/2019    Pneumococcal Conjugate (PCV-13) 09/07/2016    Pneumococcal Polysaccharide (PPSV-23) 10/06/2014    TD Vaccine 01/29/2009    Tdap 09/20/2013    Zoster Recombinant 01/28/2019, 04/22/2019, 04/22/2019    Zoster Vaccine, Live 06/14/2008, 01/28/2019       Review of Systems   Constitutional: Positive for fatigue. Negative for chills and fever. Respiratory: Negative for cough and shortness of breath. Cardiovascular: Negative for chest pain. Gastrointestinal: Negative for abdominal pain. Musculoskeletal: Positive for arthralgias and joint swelling. Skin: Positive for color change. Negative for rash. Neurological: Negative for headaches. Physical Exam  Vitals and nursing note reviewed. Constitutional:       Appearance: Normal appearance. She is obese. HENT:      Head: Normocephalic and atraumatic.    Cardiovascular:      Rate and Rhythm: Normal rate and regular rhythm. Pulmonary:      Effort: Pulmonary effort is normal.      Breath sounds: Normal breath sounds. Musculoskeletal:         General: Swelling present. Feet:    Feet:      Comments: Deepest erythema over left first MTP joint with fainter erythema across left forefoot. 1+ pitting edema to left forefoot and ankle. No left posterior calf pain. Skin:     Findings: Erythema present. Neurological:      General: No focal deficit present. Mental Status: She is alert and oriented to person, place, and time. Psychiatric:         Mood and Affect: Mood normal.         Behavior: Behavior normal.           On this date 12/28/2021 I have spent 30 minutes reviewing previous notes, test results and face to face with the patient discussing the diagnosis and importance of compliance with the treatment plan as well as documenting on the day of the visit. An electronic signature was used to authenticate this note.   -- Serenity Castro NP

## 2021-12-28 NOTE — PATIENT INSTRUCTIONS
Cellulitis: Care Instructions  Your Care Instructions     Cellulitis is a skin infection caused by bacteria, most often strep or staph. It often occurs after a break in the skin from a scrape, cut, bite, or puncture, or after a rash. Cellulitis may be treated without doing tests to find out what caused it. But your doctor may do tests, if needed, to look for a specific bacteria, like methicillin-resistant Staphylococcus aureus (MRSA). The doctor has checked you carefully, but problems can develop later. If you notice any problems or new symptoms, get medical treatment right away. Follow-up care is a key part of your treatment and safety. Be sure to make and go to all appointments, and call your doctor if you are having problems. It's also a good idea to know your test results and keep a list of the medicines you take. How can you care for yourself at home? · Take your antibiotics as directed. Do not stop taking them just because you feel better. You need to take the full course of antibiotics. · Prop up the infected area on pillows to reduce pain and swelling. Try to keep the area above the level of your heart as often as you can. · If your doctor told you how to care for your wound, follow your doctor's instructions. If you did not get instructions, follow this general advice:  ? Wash the wound with clean water 2 times a day. Don't use hydrogen peroxide or alcohol, which can slow healing. ? You may cover the wound with a thin layer of petroleum jelly, such as Vaseline, and a nonstick bandage. ? Apply more petroleum jelly and replace the bandage as needed. · Be safe with medicines. Take pain medicines exactly as directed. ? If the doctor gave you a prescription medicine for pain, take it as prescribed. ? If you are not taking a prescription pain medicine, ask your doctor if you can take an over-the-counter medicine.   To prevent cellulitis in the future  · Try to prevent cuts, scrapes, or other injuries to your skin. Cellulitis most often occurs where there is a break in the skin. · If you get a scrape, cut, mild burn, or bite, wash the wound with clean water as soon as you can to help avoid infection. Don't use hydrogen peroxide or alcohol, which can slow healing. · If you have swelling in your legs (edema), support stockings and good skin care may help prevent leg sores and cellulitis. · Take care of your feet, especially if you have diabetes or other conditions that increase the risk of infection. Wear shoes and socks. Do not go barefoot. If you have athlete's foot or other skin problems on your feet, talk to your doctor about how to treat them. When should you call for help? Call your doctor now or seek immediate medical care if:    · You have signs that your infection is getting worse, such as:  ? Increased pain, swelling, warmth, or redness. ? Red streaks leading from the area. ? Pus draining from the area. ? A fever.     · You get a rash. Watch closely for changes in your health, and be sure to contact your doctor if:    · You do not get better as expected. Where can you learn more? Go to http://www.gray.com/  Enter X309 in the search box to learn more about \"Cellulitis: Care Instructions. \"  Current as of: March 3, 2021               Content Version: 13.0  © 2006-2021 [employer]. Care instructions adapted under license by [employer] (which disclaims liability or warranty for this information). If you have questions about a medical condition or this instruction, always ask your healthcare professional. [place] [address] any warranty or liability for your use of this information. Purine-Restricted Diet: Care Instructions  Your Care Instructions     Purines are substances that are found in some foods. Your body turns purines into uric acid.  High levels of uric acid can cause gout, which is a form of arthritis that causes pain and inflammation in joints. You may be able to help control the amount of uric acid in your body by limiting high-purine foods in your diet. Follow-up care is a key part of your treatment and safety. Be sure to make and go to all appointments, and call your doctor if you are having problems. It's also a good idea to know your test results and keep a list of the medicines you take. How can you care for yourself at home? · Plan your meals and snacks around foods that are low in purines and are safe for you to eat. These foods include:  ? Green vegetables and tomatoes. ? Fruits. ? Whole-grain breads, rice, and cereals. ? Eggs, peanut butter, and nuts. ? Low-fat milk, cheese, and other milk products. ? Popcorn. ? Gelatin desserts, chocolate, cocoa, and cakes and sweets, in small amounts. · You can eat certain foods that are medium-high in purines, but eat them only once in a while. These foods include:  ? Legumes, such as dried beans and dried peas. You can have 1 cup cooked legumes each day. ? Asparagus, cauliflower, spinach, mushrooms, and green peas. ? Fish and seafood (other than very high-purine seafood). ? Oatmeal, wheat bran, and wheat germ. · Limit very high-purine foods, including:  ? Organ meats, such as liver, kidneys, sweetbreads, and brains. ? Meats, including rodriguez, beef, pork, and lamb. ? Game meats and any other meats in large amounts. ? Anchovies, sardines, herring, mackerel, and scallops. ? Gravy. ? Beer. Where can you learn more? Go to http://www.gray.com/  Enter F448 in the search box to learn more about \"Purine-Restricted Diet: Care Instructions. \"  Current as of: December 17, 2020               Content Version: 13.0  © 8668-4176 [employer]. Care instructions adapted under license by [employer] (which disclaims liability or warranty for this information).  If you have questions about a medical condition or this instruction, always ask your healthcare professional. [address] any warranty or liability for your use of this information. Gout: Care Instructions  Overview     Gout is a form of arthritis caused by a buildup of uric acid crystals in a joint. It causes sudden attacks of pain, swelling, redness, and stiffness, usually in one joint, especially the big toe. Gout usually comes on without a cause. But it can be brought on by drinking alcohol (especially beer), eating or drinking things made with high-fructose corn syrup, or eating seafood or red meat. Taking certain medicines, such as diuretics, can also trigger an attack of gout. Taking your medicines as prescribed and following up with your doctor regularly can help you avoid gout attacks in the future. Follow-up care is a key part of your treatment and safety. Be sure to make and go to all appointments, and call your doctor if you are having problems. It's also a good idea to know your test results and keep a list of the medicines you take. How can you care for yourself at home? · If the joint is swollen, put ice or a cold pack on the area for 10 to 20 minutes at a time. Put a thin cloth between the ice and your skin. · Prop up the sore limb on a pillow when you ice it or anytime you sit or lie down during the next 3 days. Try to keep it above the level of your heart. This can help reduce swelling. · Rest sore joints. Avoid activities that put weight or strain on the joints for a few days. Take short rest breaks from your regular activities during the day. · Take your medicines exactly as prescribed. Call your doctor if you think you are having a problem with your medicine. · Take pain medicines exactly as directed. ? If the doctor gave you a prescription medicine for pain, take it as prescribed. ? If you are not taking a prescription pain medicine, ask your doctor if you can take an over-the-counter medicine.   · Eat less seafood and red meat. · Avoid foods or drinks that are made with high-fructose corn syrup. · Check with your doctor before drinking alcohol. · Losing weight, if you are overweight, may help reduce attacks of gout. But do not go on a diet that causes rapid weight loss. Losing a lot of weight in a short amount of time can cause a gout attack. When should you call for help? Call your doctor now or seek immediate medical care if:    · You have a fever.     · The joint is so painful you cannot use it.     · You have sudden, unexplained swelling, redness, warmth, or severe pain in one or more joints. Watch closely for changes in your health, and be sure to contact your doctor if:    · You have joint pain.     · Your symptoms get worse or are not improving after 2 or 3 days. Where can you learn more? Go to http://www.gray.com/  Enter E531 in the search box to learn more about \"Gout: Care Instructions. \"  Current as of: April 30, 2021               Content Version: 13.0  © 2006-2021 [employer]. Care instructions adapted under license by [employer] (which disclaims liability or warranty for this information). If you have questions about a medical condition or this instruction, always ask your healthcare professional. [address] any warranty or liability for your use of this information.

## 2021-12-29 LAB
ALBUMIN SERPL-MCNC: 3.5 G/DL (ref 3.5–5)
ALBUMIN/GLOB SERPL: 0.9 {RATIO} (ref 1.1–2.2)
ALP SERPL-CCNC: 87 U/L (ref 45–117)
ALT SERPL-CCNC: 30 U/L (ref 12–78)
ANION GAP SERPL CALC-SCNC: 8 MMOL/L (ref 5–15)
AST SERPL-CCNC: 17 U/L (ref 15–37)
BASOPHILS # BLD: 0.1 K/UL (ref 0–0.1)
BASOPHILS NFR BLD: 1 % (ref 0–1)
BILIRUB SERPL-MCNC: 0.3 MG/DL (ref 0.2–1)
BUN SERPL-MCNC: 26 MG/DL (ref 6–20)
BUN/CREAT SERPL: 19 (ref 12–20)
CALCIUM SERPL-MCNC: 9.7 MG/DL (ref 8.5–10.1)
CHLORIDE SERPL-SCNC: 112 MMOL/L (ref 97–108)
CO2 SERPL-SCNC: 21 MMOL/L (ref 21–32)
CREAT SERPL-MCNC: 1.39 MG/DL (ref 0.55–1.02)
DIFFERENTIAL METHOD BLD: ABNORMAL
EOSINOPHIL # BLD: 0.3 K/UL (ref 0–0.4)
EOSINOPHIL NFR BLD: 3 % (ref 0–7)
ERYTHROCYTE [DISTWIDTH] IN BLOOD BY AUTOMATED COUNT: 13.7 % (ref 11.5–14.5)
EST. AVERAGE GLUCOSE BLD GHB EST-MCNC: 137 MG/DL
GLOBULIN SER CALC-MCNC: 4.1 G/DL (ref 2–4)
GLUCOSE SERPL-MCNC: 174 MG/DL (ref 65–100)
HBA1C MFR BLD: 6.4 % (ref 4–5.6)
HCT VFR BLD AUTO: 40.2 % (ref 35–47)
HGB BLD-MCNC: 11.7 G/DL (ref 11.5–16)
IMM GRANULOCYTES # BLD AUTO: 0 K/UL (ref 0–0.04)
IMM GRANULOCYTES NFR BLD AUTO: 0 % (ref 0–0.5)
LYMPHOCYTES # BLD: 1.4 K/UL (ref 0.8–3.5)
LYMPHOCYTES NFR BLD: 15 % (ref 12–49)
MCH RBC QN AUTO: 28.5 PG (ref 26–34)
MCHC RBC AUTO-ENTMCNC: 29.1 G/DL (ref 30–36.5)
MCV RBC AUTO: 97.8 FL (ref 80–99)
MONOCYTES # BLD: 0.6 K/UL (ref 0–1)
MONOCYTES NFR BLD: 7 % (ref 5–13)
NEUTS SEG # BLD: 7.4 K/UL (ref 1.8–8)
NEUTS SEG NFR BLD: 74 % (ref 32–75)
NRBC # BLD: 0 K/UL (ref 0–0.01)
NRBC BLD-RTO: 0 PER 100 WBC
PLATELET # BLD AUTO: 251 K/UL (ref 150–400)
PMV BLD AUTO: 9.7 FL (ref 8.9–12.9)
POTASSIUM SERPL-SCNC: 4.2 MMOL/L (ref 3.5–5.1)
PROT SERPL-MCNC: 7.6 G/DL (ref 6.4–8.2)
RBC # BLD AUTO: 4.11 M/UL (ref 3.8–5.2)
SODIUM SERPL-SCNC: 141 MMOL/L (ref 136–145)
URATE SERPL-MCNC: 7.4 MG/DL (ref 2.6–6)
WBC # BLD AUTO: 9.8 K/UL (ref 3.6–11)

## 2021-12-30 ENCOUNTER — OFFICE VISIT (OUTPATIENT)
Dept: INTERNAL MEDICINE CLINIC | Age: 71
End: 2021-12-30
Payer: MEDICARE

## 2021-12-30 VITALS
HEART RATE: 55 BPM | DIASTOLIC BLOOD PRESSURE: 60 MMHG | SYSTOLIC BLOOD PRESSURE: 139 MMHG | BODY MASS INDEX: 36.51 KG/M2 | WEIGHT: 193.4 LBS | HEIGHT: 61 IN | RESPIRATION RATE: 16 BRPM | TEMPERATURE: 98.6 F | OXYGEN SATURATION: 96 %

## 2021-12-30 DIAGNOSIS — I10 ESSENTIAL HYPERTENSION WITH GOAL BLOOD PRESSURE LESS THAN 130/80: ICD-10-CM

## 2021-12-30 DIAGNOSIS — M10.372 ACUTE GOUT DUE TO RENAL IMPAIRMENT INVOLVING TOE OF LEFT FOOT: Primary | ICD-10-CM

## 2021-12-30 DIAGNOSIS — E11.21 TYPE 2 DIABETES MELLITUS WITH NEPHROPATHY (HCC): ICD-10-CM

## 2021-12-30 PROCEDURE — 1101F PT FALLS ASSESS-DOCD LE1/YR: CPT | Performed by: NURSE PRACTITIONER

## 2021-12-30 PROCEDURE — 1090F PRES/ABSN URINE INCON ASSESS: CPT | Performed by: NURSE PRACTITIONER

## 2021-12-30 PROCEDURE — G8417 CALC BMI ABV UP PARAM F/U: HCPCS | Performed by: NURSE PRACTITIONER

## 2021-12-30 PROCEDURE — G8399 PT W/DXA RESULTS DOCUMENT: HCPCS | Performed by: NURSE PRACTITIONER

## 2021-12-30 PROCEDURE — G8536 NO DOC ELDER MAL SCRN: HCPCS | Performed by: NURSE PRACTITIONER

## 2021-12-30 PROCEDURE — 99214 OFFICE O/P EST MOD 30 MIN: CPT | Performed by: NURSE PRACTITIONER

## 2021-12-30 PROCEDURE — G0463 HOSPITAL OUTPT CLINIC VISIT: HCPCS | Performed by: NURSE PRACTITIONER

## 2021-12-30 PROCEDURE — 2022F DILAT RTA XM EVC RTNOPTHY: CPT | Performed by: NURSE PRACTITIONER

## 2021-12-30 PROCEDURE — G8754 DIAS BP LESS 90: HCPCS | Performed by: NURSE PRACTITIONER

## 2021-12-30 PROCEDURE — G8432 DEP SCR NOT DOC, RNG: HCPCS | Performed by: NURSE PRACTITIONER

## 2021-12-30 PROCEDURE — G8752 SYS BP LESS 140: HCPCS | Performed by: NURSE PRACTITIONER

## 2021-12-30 PROCEDURE — G9899 SCRN MAM PERF RSLTS DOC: HCPCS | Performed by: NURSE PRACTITIONER

## 2021-12-30 PROCEDURE — 3017F COLORECTAL CA SCREEN DOC REV: CPT | Performed by: NURSE PRACTITIONER

## 2021-12-30 PROCEDURE — G8427 DOCREV CUR MEDS BY ELIG CLIN: HCPCS | Performed by: NURSE PRACTITIONER

## 2021-12-30 PROCEDURE — 3044F HG A1C LEVEL LT 7.0%: CPT | Performed by: NURSE PRACTITIONER

## 2021-12-30 NOTE — PATIENT INSTRUCTIONS
Gout: Care Instructions  Overview     Gout is a form of arthritis caused by a buildup of uric acid crystals in a joint. It causes sudden attacks of pain, swelling, redness, and stiffness, usually in one joint, especially the big toe. Gout usually comes on without a cause. But it can be brought on by drinking alcohol (especially beer), eating or drinking things made with high-fructose corn syrup, or eating seafood or red meat. Taking certain medicines, such as diuretics, can also trigger an attack of gout. Taking your medicines as prescribed and following up with your doctor regularly can help you avoid gout attacks in the future. Follow-up care is a key part of your treatment and safety. Be sure to make and go to all appointments, and call your doctor if you are having problems. It's also a good idea to know your test results and keep a list of the medicines you take. How can you care for yourself at home? · If the joint is swollen, put ice or a cold pack on the area for 10 to 20 minutes at a time. Put a thin cloth between the ice and your skin. · Prop up the sore limb on a pillow when you ice it or anytime you sit or lie down during the next 3 days. Try to keep it above the level of your heart. This can help reduce swelling. · Rest sore joints. Avoid activities that put weight or strain on the joints for a few days. Take short rest breaks from your regular activities during the day. · Take your medicines exactly as prescribed. Call your doctor if you think you are having a problem with your medicine. · Take pain medicines exactly as directed. ? If the doctor gave you a prescription medicine for pain, take it as prescribed. ? If you are not taking a prescription pain medicine, ask your doctor if you can take an over-the-counter medicine. · Eat less seafood and red meat. · Avoid foods or drinks that are made with high-fructose corn syrup. · Check with your doctor before drinking alcohol.   · Losing weight, if you are overweight, may help reduce attacks of gout. But do not go on a diet that causes rapid weight loss. Losing a lot of weight in a short amount of time can cause a gout attack. When should you call for help? Call your doctor now or seek immediate medical care if:    · You have a fever.     · The joint is so painful you cannot use it.     · You have sudden, unexplained swelling, redness, warmth, or severe pain in one or more joints. Watch closely for changes in your health, and be sure to contact your doctor if:    · You have joint pain.     · Your symptoms get worse or are not improving after 2 or 3 days. Where can you learn more? Go to http://www.gray.com/  Enter E531 in the search box to learn more about \"Gout: Care Instructions. \"  Current as of: April 30, 2021               Content Version: 13.0  © 0162-5232 [employer]. Care instructions adapted under license by [employer] (which disclaims liability or warranty for this information). If you have questions about a medical condition or this instruction, always ask your healthcare professional. [address] any warranty or liability for your use of this information. Purine-Restricted Diet: Care Instructions  Your Care Instructions     Purines are substances that are found in some foods. Your body turns purines into uric acid. High levels of uric acid can cause gout, which is a form of arthritis that causes pain and inflammation in joints. You may be able to help control the amount of uric acid in your body by limiting high-purine foods in your diet. Follow-up care is a key part of your treatment and safety. Be sure to make and go to all appointments, and call your doctor if you are having problems. It's also a good idea to know your test results and keep a list of the medicines you take. How can you care for yourself at home?   · Plan your meals and snacks around foods that are low in purines and are safe for you to eat. These foods include:  ? Green vegetables and tomatoes. ? Fruits. ? Whole-grain breads, rice, and cereals. ? Eggs, peanut butter, and nuts. ? Low-fat milk, cheese, and other milk products. ? Popcorn. ? Gelatin desserts, chocolate, cocoa, and cakes and sweets, in small amounts. · You can eat certain foods that are medium-high in purines, but eat them only once in a while. These foods include:  ? Legumes, such as dried beans and dried peas. You can have 1 cup cooked legumes each day. ? Asparagus, cauliflower, spinach, mushrooms, and green peas. ? Fish and seafood (other than very high-purine seafood). ? Oatmeal, wheat bran, and wheat germ. · Limit very high-purine foods, including:  ? Organ meats, such as liver, kidneys, sweetbreads, and brains. ? Meats, including rodriguez, beef, pork, and lamb. ? Game meats and any other meats in large amounts. ? Anchovies, sardines, herring, mackerel, and scallops. ? Gravy. ? Beer. Where can you learn more? Go to http://www.gray.com/  Enter F448 in the search box to learn more about \"Purine-Restricted Diet: Care Instructions. \"  Current as of: December 17, 2020               Content Version: 13.0  © 2006-2021 [employer], [facility]. Care instructions adapted under license by [employer] (which disclaims liability or warranty for this information). If you have questions about a medical condition or this instruction, always ask your healthcare professional. [place] [address] any warranty or liability for your use of this information.

## 2021-12-30 NOTE — PROGRESS NOTES
Gaby Myers (: 1950) is a 66 y.o. female, established patient, here for evaluation of the following chief complaint(s):  Gout (follow up, feeling better)       ASSESSMENT/PLAN:  Below is the assessment and plan developed based on review of pertinent history, physical exam, labs, studies, and medications. 1. Acute gout due to renal impairment involving toe of left foot -- continue with course of Medrol dose pack which has been helping with symptoms. Can stop Keflex as WBC count normal and this does not appear to be cellulitic in nature. 2. Essential hypertension with goal blood pressure less than 130/80 -- will consult with her PCP Dr Beverly Robertson about possible change from Lisinopril to ARB in light of recent gout diagnosis and ankle edema    3. Type 2 Diabetes Mellitus with nephropathy ([facility] [address] -- will have her check blood sugars regularly while on Medrol dose pack and increase Metformin to 1500 mg at dinner while on steroid pack. Kidney function has decreased slightly. SUBJECTIVE/OBJECTIVE:  HPI    Patient of Dr Beverly Robertson who presents for follow up gout in left great toe MTP joint. Has noted improvement once she started taking oral steroids yesterday morning and is no longer having significant pitting edema in left forefoot. Continues to have some discomfort but overall feels like she is improving. Denies fever, chills. She is concerned about recent increase in dose of Amlodipine and increase in bilateral ankle edema. Her uric acid level was increased. Has never had diagnosis of gout in the past.  Lab Results   Component Value Date/Time    Uric acid 7.4 (H) 2021 04:19 PM       Reports she has been checking her blood sugars since she started on Medrol and she has seen some readings in 270 range. Currently on Metformin  mg 2 tabs with dinner.       Patient Active Problem List   Diagnosis Code    DM (diabetes mellitus) ([facility] [address] E11.9    Hyperlipidemia E78.5    Hepatitis B B19.10  GERD (gastroesophageal reflux disease) K21.9    Rosacea L71.9    AR (allergic rhinitis) J30.9    Intervertebral disk disease M51.9    Uterine prolapse N81.4    Incontinence R32    Essential hypertension with goal blood pressure less than 130/80 I10    Anemia D64.9    PAF (paroxysmal atrial fibrillation) ([facility]) I48.0    Peripheral neuropathy G62.9    Pre-ulcerative corn or callous L84    Advanced care planning/counseling discussion Z71.89    Type 2 diabetes mellitus with nephropathy ([facility]) E11.21    Stage 3 chronic kidney disease ([facility]) N18.30    Severe obesity (BMI 35.0-39. 9) with comorbidity ([facility]) W02.27    Systolic murmur of aorta N35.3    History of arthritis Z87.39    Foot drop M21.379    Snoring R06.83    RODERICK (obstructive sleep apnea) G47.33    Primary insomnia F51.01    Insomnia with sleep apnea G47.00, G47.30    Type 2 diabetes mellitus with diabetic neuropathy ([facility]) E11.40    Incomplete emptying of bladder R33.9    Overactive bladder N32.81    Urge incontinence of urine N39.41    Vitamin D deficiency E55.9    Incomplete uterovaginal prolapse N81.2    Stress incontinence N39.3    Genital prolapse N81.9     Past Surgical History:   Procedure Laterality Date    HX BREAST BIOPSY Left 2009    HX CATARACT REMOVAL Right     HX CATARACT REMOVAL Left 03/2021    HX COLONOSCOPY      HX CYST INCISION AND DRAINAGE Left     BREAST CYST    HX DILATION AND CURETTAGE      HX ENDOSCOPY      HX GYN      OVARIAN CYST REMOVED    HX HERNIA REPAIR      umbilical    HX HYSTERECTOMY Bilateral 10/05/2021    HX MENISCUS REPAIR  7/11/14    HX OTHER SURGICAL  05/09/2018    Bladder Botox     HX OVARIAN CYST REMOVAL  1973    HX UROLOGICAL       Social History     Socioeconomic History    Marital status:      Spouse name: Not on file    Number of children: Not on file    Years of education: Not on file    Highest education level: Not on file   Occupational History    Not on file Tobacco Use    Smoking status: Never Smoker    Smokeless tobacco: Never Used   Vaping Use    Vaping Use: Never used   Substance and Sexual Activity    Alcohol use: Yes     Alcohol/week: 0.0 standard drinks     Comment: very rarely     Drug use: No    Sexual activity: Not Currently   Other Topics Concern    Not on file   Social History Narrative    Not on file     Social Determinants of Health     Financial Resource Strain:     Difficulty of Paying Living Expenses: Not on file   Food Insecurity:     Worried About Running Out of Food in the Last Year: Not on file    Gomez of Food in the Last Year: Not on file   Transportation Needs:     Lack of Transportation (Medical): Not on file    Lack of Transportation (Non-Medical):  Not on file   Physical Activity:     Days of Exercise per Week: Not on file    Minutes of Exercise per Session: Not on file   Stress:     Feeling of Stress : Not on file   Social Connections:     Frequency of Communication with Friends and Family: Not on file    Frequency of Social Gatherings with Friends and Family: Not on file    Attends Buddhist Services: Not on file    Active Member of Clubs or Organizations: Not on file    Attends Club or Organization Meetings: Not on file    Marital Status: Not on file   Intimate Partner Violence:     Fear of Current or Ex-Partner: Not on file    Emotionally Abused: Not on file    Physically Abused: Not on file    Sexually Abused: Not on file   Housing Stability:     Unable to Pay for Housing in the Last Year: Not on file    Number of [place] in the Last Year: Not on file    Unstable Housing in the Last Year: Not on file     Family History   Problem Relation Age of Onset    Hypertension Mother     Thyroid Disease Mother     Stroke Mother         \"TIA\"    Heart Disease Father         MI    Thyroid Disease Sister     Hypertension Sister     Eczema Sister     Asthma Sister     Other Sister         [id number] ISSUES    Heart Attack Other     Arthritis Sister     Hypertension Sister     High Cholesterol Sister     Cancer Sister         SKIN    Arthritis Sister     Cancer Maternal Aunt         esophageal    Breast Cancer Maternal Aunt     Cancer Paternal Aunt         breast    Anesth Problems Neg Hx      Current Outpatient Medications   Medication Sig    Cetirizine (ZyrTEC) 10 mg cap Take  by mouth.  methylPREDNISolone (MEDROL DOSEPACK) 4 mg tablet Take as directed    cephALEXin (KEFLEX) 500 mg capsule Take 1 Capsule by mouth three (3) times daily.  amLODIPine (NORVASC) 5 mg tablet Take 1 Tablet by mouth nightly. TAKE 1 TABLET BY MOUTH EVERY DAY    lisinopriL (PRINIVIL, ZESTRIL) 40 mg tablet TAKE 1 TABLET DAILY    lancets (FreeStyle Lancets) 28 gauge misc TEST BLOOD SUGAR ONE TIME DAILY    metFORMIN ER (GLUCOPHAGE XR) 500 mg tablet TAKE 2 TABLETS DAILY WITH DINNER    cranberry fruit extract (CRANBERRY CONCENTRATE PO) Take  by mouth.  diphenoxylate-atropine (LomotiL) 2.5-0.025 mg per tablet Take  by mouth four (4) times daily as needed for Diarrhea.  fluticasone propionate (FLONASE) 50 mcg/actuation nasal spray 2 Sprays by Both Nostrils route daily. (Patient taking differently: 2 Sprays by Both Nostrils route nightly.)    simvastatin (ZOCOR) 20 mg tablet TAKE 1 TABLET BY MOUTH AT BEDTIME FOR CHOLESTEROL    estradioL (Estrace) 0.01 % (0.1 mg/gram) vaginal cream Estrace 0.01% (0.1 mg/gram) vaginal cream   place 1 g vaginally with applicator or finger nightly for 2 weeks and then 2-3 x weekly thereafter    metoprolol succinate (TOPROL-XL) 50 mg XL tablet Take 1 Tablet by mouth daily.  cholecalciferol, vitamin D3, (Vitamin D3) 50 mcg (2,000 unit) tab Take  by mouth.  glucose blood VI test strips (FreeStyle Test) strip TEST FASTING BLOOD SUGAR ONE TIME DAILY    montelukast (SINGULAIR) 10 mg tablet     esomeprazole (NEXIUM) 20 mg capsule nightly.     omega 3-DHA-EPA-fish oil (FISH OIL) 1,000 mg (120 mg-180 mg) capsule Take 4 Caps by mouth daily.  CALCIUM CARBONATE (TUMS PO) Take  by mouth daily as needed.  CALCIUM CARBONATE/VITAMIN D3 (CALCIUM + D PO) Take  by mouth two (2) times a day.  MULTI-VITAMIN PO Take 1 Tab by mouth daily.  aspirin delayed-release 81 mg tablet take 81 mg by mouth daily. No current facility-administered medications for this visit. Allergies   Allergen Reactions    Sulfa (Sulfonamide Antibiotics) Unknown (comments)     TOPICAL EYE / EYE MUCH WORSE     Immunization History   Administered Date(s) Administered    COVID-19, Daisy Ospina, Primary or Immunocompromised Series, MRNA, PF, 100mcg/0.5mL 02/20/2021, 03/16/2021, 11/04/2021    Influenza High Dose Vaccine PF 10/19/2015, 10/31/2016, 10/04/2017, 09/02/2018, 10/08/2020    Influenza Vaccine 09/20/2013, 10/06/2014    Influenza Vaccine (Tri) Adjuvanted (>65 Yrs FLUAD TRI 42562) 10/07/2019    Pneumococcal Conjugate (PCV-13) 09/07/2016    Pneumococcal Polysaccharide (PPSV-23) 10/06/2014    TD Vaccine 01/29/2009    Tdap 09/20/2013    Zoster Recombinant 01/28/2019, 04/22/2019, 04/22/2019    Zoster Vaccine, Live 06/14/2008, 01/28/2019       Review of Systems   Constitutional: Negative for chills and fever. Respiratory: Negative for cough and shortness of breath. Cardiovascular: Negative for chest pain. Musculoskeletal: Positive for joint swelling. Skin: Positive for color change. Negative for rash. Neurological: Negative for light-headedness and headaches. /60 (BP 1 Location: Left upper arm, BP Patient Position: Sitting, BP Cuff Size: Adult)   Pulse (!) 55   Temp 98.6 °F (37 °C) (Oral)   Resp 16   Ht 5' 0.5\" (1.537 m)   Wt 193 lb 6.4 oz (87.7 kg)   SpO2 96%   BMI 37.15 kg/m²   Physical Exam  Vitals and nursing note reviewed. Constitutional:       General: She is not in acute distress. Appearance: Normal appearance. HENT:      Head: Normocephalic and atraumatic. Cardiovascular:      Rate and Rhythm: Normal rate and regular rhythm. Pulmonary:      Effort: Pulmonary effort is normal.      Breath sounds: Normal breath sounds. Musculoskeletal:      Left foot: Decreased range of motion. Feet:    Feet:      Comments: Edema to left forefoot has decreased; now 1+ nonpitting. Erythema has decreased and mainly centered over 1st MTP joint  Neurological:      General: No focal deficit present. Mental Status: She is alert and oriented to person, place, and time. Psychiatric:         Mood and Affect: Mood normal.         Behavior: Behavior normal.           On this date 12/30/2021 I have spent 35 minutes reviewing previous notes, test results and face to face with the patient discussing the diagnosis and importance of compliance with the treatment plan as well as documenting on the day of the visit. An electronic signature was used to authenticate this note.   -- Ed Christian, NP

## 2021-12-31 RX ORDER — OLMESARTAN MEDOXOMIL 40 MG/1
40 TABLET ORAL DAILY
Qty: 30 TABLET | Refills: 3 | Status: SHIPPED | OUTPATIENT
Start: 2021-12-31 | End: 2022-03-30 | Stop reason: SDUPTHER

## 2022-01-02 RX ORDER — AMLODIPINE BESYLATE 2.5 MG/1
TABLET ORAL
Qty: 90 TABLET | Refills: 1 | Status: SHIPPED | OUTPATIENT
Start: 2022-01-02 | End: 2022-03-30 | Stop reason: SDUPTHER

## 2022-01-11 DIAGNOSIS — E11.9 TYPE 2 DIABETES MELLITUS WITH HEMOGLOBIN A1C GOAL OF LESS THAN 7.0% (HCC): ICD-10-CM

## 2022-01-11 RX ORDER — BLOOD SUGAR DIAGNOSTIC
STRIP MISCELLANEOUS
Qty: 100 STRIP | Refills: 11 | Status: SHIPPED | OUTPATIENT
Start: 2022-01-11

## 2022-01-17 ENCOUNTER — TELEPHONE (OUTPATIENT)
Dept: INTERNAL MEDICINE CLINIC | Age: 72
End: 2022-01-17

## 2022-01-17 ENCOUNTER — NURSE TRIAGE (OUTPATIENT)
Dept: OTHER | Facility: CLINIC | Age: 72
End: 2022-01-17

## 2022-01-17 NOTE — TELEPHONE ENCOUNTER
Patient is calling in to discuss her blood pressure and blood pressure medications. She states her blood pressure has been high at 173/103. It was at 142/82 this morning. Yesterday it was 155/90 and 168/95. On the 15th it was 170/102. She has had a few normal readings in between but the high readings have been going on since 1/12. Please call back and advise.

## 2022-01-17 NOTE — TELEPHONE ENCOUNTER
Received call from Kamryn at [facility] with Red Flag Complaint. Subjective: Caller states \"Hypertension\"     Current Symptoms: BP read 172/96. Concerned BP is elevated even after taking medication. Worse in the last week. Right lower back that radiates to right arm, may last about 5 seconds that started. Onset: 1 month ago; intermittent    Associated Symptoms: NA    Pain Severity: denies pain    Temperature: Denies fever    What has been tried: None    LMP: NA Pregnant: NA    Recommended disposition: See pcp within 3 days    Care advice provided, patient verbalizes understanding; denies any other questions or concerns; instructed to call back for any new or worsening symptoms. Writer provided warm transfer to [facility] at [facility] for appointment scheduling    Attention Provider: Thank you for allowing me to participate in the care of your patient. The patient was connected to triage in response to information provided to the [facility]. Please do not respond through this encounter as the response is not directed to a shared pool.       Reason for Disposition   Systolic BP  >= 004 OR Diastolic >= 555    Protocols used: BLOOD PRESSURE - HIGH-ADULT-

## 2022-01-18 ENCOUNTER — OFFICE VISIT (OUTPATIENT)
Dept: INTERNAL MEDICINE CLINIC | Age: 72
End: 2022-01-18
Payer: MEDICARE

## 2022-01-18 VITALS
SYSTOLIC BLOOD PRESSURE: 147 MMHG | DIASTOLIC BLOOD PRESSURE: 82 MMHG | WEIGHT: 192.2 LBS | OXYGEN SATURATION: 97 % | BODY MASS INDEX: 36.29 KG/M2 | HEIGHT: 61 IN | HEART RATE: 59 BPM | TEMPERATURE: 99 F | RESPIRATION RATE: 16 BRPM

## 2022-01-18 DIAGNOSIS — I10 ESSENTIAL HYPERTENSION WITH GOAL BLOOD PRESSURE LESS THAN 130/80: Primary | ICD-10-CM

## 2022-01-18 DIAGNOSIS — R35.0 URINARY FREQUENCY: ICD-10-CM

## 2022-01-18 DIAGNOSIS — N18.32 STAGE 3B CHRONIC KIDNEY DISEASE (HCC): ICD-10-CM

## 2022-01-18 LAB
BILIRUB UR QL STRIP: NEGATIVE
GLUCOSE UR-MCNC: NEGATIVE MG/DL
KETONES P FAST UR STRIP-MCNC: NEGATIVE MG/DL
PH UR STRIP: 5.5 [PH] (ref 4.6–8)
PROT UR QL STRIP: NEGATIVE
SP GR UR STRIP: 1.02 (ref 1–1.03)
UA UROBILINOGEN AMB POC: NORMAL (ref 0.2–1)
URINALYSIS CLARITY POC: CLEAR
URINALYSIS COLOR POC: YELLOW
URINE BLOOD POC: NEGATIVE
URINE LEUKOCYTES POC: NEGATIVE
URINE NITRITES POC: NEGATIVE

## 2022-01-18 PROCEDURE — 81001 URINALYSIS AUTO W/SCOPE: CPT | Performed by: NURSE PRACTITIONER

## 2022-01-18 PROCEDURE — 3017F COLORECTAL CA SCREEN DOC REV: CPT | Performed by: NURSE PRACTITIONER

## 2022-01-18 PROCEDURE — G8432 DEP SCR NOT DOC, RNG: HCPCS | Performed by: NURSE PRACTITIONER

## 2022-01-18 PROCEDURE — G9899 SCRN MAM PERF RSLTS DOC: HCPCS | Performed by: NURSE PRACTITIONER

## 2022-01-18 PROCEDURE — G8417 CALC BMI ABV UP PARAM F/U: HCPCS | Performed by: NURSE PRACTITIONER

## 2022-01-18 PROCEDURE — G8427 DOCREV CUR MEDS BY ELIG CLIN: HCPCS | Performed by: NURSE PRACTITIONER

## 2022-01-18 PROCEDURE — G8753 SYS BP > OR = 140: HCPCS | Performed by: NURSE PRACTITIONER

## 2022-01-18 PROCEDURE — 99214 OFFICE O/P EST MOD 30 MIN: CPT | Performed by: NURSE PRACTITIONER

## 2022-01-18 PROCEDURE — G8399 PT W/DXA RESULTS DOCUMENT: HCPCS | Performed by: NURSE PRACTITIONER

## 2022-01-18 PROCEDURE — 1101F PT FALLS ASSESS-DOCD LE1/YR: CPT | Performed by: NURSE PRACTITIONER

## 2022-01-18 PROCEDURE — G8536 NO DOC ELDER MAL SCRN: HCPCS | Performed by: NURSE PRACTITIONER

## 2022-01-18 PROCEDURE — 1090F PRES/ABSN URINE INCON ASSESS: CPT | Performed by: NURSE PRACTITIONER

## 2022-01-18 PROCEDURE — G8754 DIAS BP LESS 90: HCPCS | Performed by: NURSE PRACTITIONER

## 2022-01-18 PROCEDURE — G0463 HOSPITAL OUTPT CLINIC VISIT: HCPCS | Performed by: NURSE PRACTITIONER

## 2022-01-18 NOTE — PROGRESS NOTES
Gino Simms (: 1950) is a 66 y.o. female, established patient, here for evaluation of the following chief complaint(s):  Hypertension (BP was good for a week then went back up 186/102 last night)       ASSESSMENT/PLAN:  Below is the assessment and plan developed based on review of pertinent history, physical exam, labs, studies, and medications. 1. Essential hypertension with goal blood pressure less than 130/80 -- brings extensive records of all of her blood pressure readings since last visit and had been controlled in 120's-140's/70-80's until last week. Has been using wrist cuff for blood pressure checks and reading was 30 points higher systolic compared to reading with manual cuff; encouraged her to obtain arm cuff blood pressure machine and check readings in am and pm only. 2. Urinary frequency - urine appears normal.  No signs of blood or leukocytes. Follow up if not improving.  -     AMB POC URINALYSIS DIP STICK AUTO W/ MICRO    3. Stage 3b chronic kidney disease (HCC) -- will come back into office on  for follow up with Dr Armani Jones; renal function had been diminished but stable; decreased slightly with last labs; consider Nephrology referral.      SUBJECTIVE/OBJECTIVE:  HPI    Patient of Dr Armani Jones who presents with concerns about elevated blood pressure readings over the psat week. Her medications were changed to Olmesartan 40 mg daily and Amlodipine 2.5 mg daily at her last visit on 21 and had been doing well until this past week when she noticed that her readings had climbed to 150's-180's/70's-100's. Has not had any headaches, vision changes, chest pain/pressure, SOB. Recorded reading of 186/106 with her wrist cuff and her neighbor rechecked with arm cuff 155/74. She has had difficulty tightening arm cuff so she switched to wrist cuff to check her blood pressures. Has noted some urinary frequency for several days but denies discomfort or pelvic pain.   Denies fever, chills, flank pain. Has been increasing her water intake lately. Renal function has decreased slightly. Lab Results   Component Value Date/Time    Sodium 141 12/28/2021 04:19 PM    Potassium 4.2 12/28/2021 04:19 PM    Chloride 112 (H) 12/28/2021 04:19 PM    CO2 21 12/28/2021 04:19 PM    Anion gap 8 12/28/2021 04:19 PM    Glucose 174 (H) 12/28/2021 04:19 PM    BUN 26 (H) 12/28/2021 04:19 PM    Creatinine 1.39 (H) 12/28/2021 04:19 PM    BUN/Creatinine ratio 19 12/28/2021 04:19 PM    GFR est AA 45 (L) 12/28/2021 04:19 PM    GFR est non-AA 37 (L) 12/28/2021 04:19 PM    Calcium 9.7 12/28/2021 04:19 PM       Patient Active Problem List   Diagnosis Code    DM (diabetes mellitus) ([facility] [address] E11.9    Hyperlipidemia E78.5    Hepatitis B B19.10    GERD (gastroesophageal reflux disease) K21.9    Rosacea L71.9    AR (allergic rhinitis) J30.9    Intervertebral disk disease M51.9    Uterine prolapse N81.4    Incontinence R32    Essential hypertension with goal blood pressure less than 130/80 I10    Anemia D64.9    PAF (paroxysmal atrial fibrillation) ([facility]) I48.0    Peripheral neuropathy G62.9    Pre-ulcerative corn or callous L84    Advanced care planning/counseling discussion Z71.89    Type 2 diabetes mellitus with nephropathy ([facility]) E11.21    Stage 3 chronic kidney disease (HCC) N18.30    Severe obesity (BMI 35.0-39. 9) with comorbidity ([facility]) N95.38    Systolic murmur of aorta C39.6    History of arthritis Z87.39    Foot drop M21.379    Snoring R06.83    RODERICK (obstructive sleep apnea) G47.33    Primary insomnia F51.01    Insomnia with sleep apnea G47.00, G47.30    Type 2 diabetes mellitus with diabetic neuropathy ([facility]) E11.40    Incomplete emptying of bladder R33.9    Overactive bladder N32.81    Urge incontinence of urine N39.41    Vitamin D deficiency E55.9    Incomplete uterovaginal prolapse N81.2    Stress incontinence N39.3    Genital prolapse N81.9     Past Surgical History:   Procedure Laterality Date    HX BREAST BIOPSY Left 2009    HX CATARACT REMOVAL Right     HX CATARACT REMOVAL Left 03/2021    HX COLONOSCOPY      HX CYST INCISION AND DRAINAGE Left     BREAST CYST    HX DILATION AND CURETTAGE      HX ENDOSCOPY      HX GYN      OVARIAN CYST REMOVED    HX HERNIA REPAIR      umbilical    HX HYSTERECTOMY Bilateral 10/05/2021    HX MENISCUS REPAIR  7/11/14    HX OTHER SURGICAL  05/09/2018    Bladder Botox     HX OVARIAN CYST REMOVAL  1973    HX UROLOGICAL       Social History     Socioeconomic History    Marital status:      Spouse name: Not on file    Number of children: Not on file    Years of education: Not on file    Highest education level: Not on file   Occupational History    Not on file   Tobacco Use    Smoking status: Never Smoker    Smokeless tobacco: Never Used   Vaping Use    Vaping Use: Never used   Substance and Sexual Activity    Alcohol use: Yes     Alcohol/week: 0.0 standard drinks     Comment: very rarely     Drug use: No    Sexual activity: Not Currently   Other Topics Concern    Not on file   Social History Narrative    Not on file     Social Determinants of Health     Financial Resource Strain:     Difficulty of Paying Living Expenses: Not on file   Food Insecurity:     Worried About Running Out of Food in the Last Year: Not on file    Gomez of Food in the Last Year: Not on file   Transportation Needs:     Lack of Transportation (Medical): Not on file    Lack of Transportation (Non-Medical):  Not on file   Physical Activity:     Days of Exercise per Week: Not on file    Minutes of Exercise per Session: Not on file   Stress:     Feeling of Stress : Not on file   Social Connections:     Frequency of Communication with Friends and Family: Not on file    Frequency of Social Gatherings with Friends and Family: Not on file    Attends Congregation Services: Not on file    Active Member of Clubs or Organizations: Not on file    Attends [employer] or Organization Meetings: Not on file    Marital Status: Not on file   Intimate Partner Violence:     Fear of Current or Ex-Partner: Not on file    Emotionally Abused: Not on file    Physically Abused: Not on file    Sexually Abused: Not on file   Housing Stability:     Unable to Pay for Housing in the Last Year: Not on file    Number of Places Lived in the Last Year: Not on file    Unstable Housing in the Last Year: Not on file     Family History   Problem Relation Age of Onset    Hypertension Mother     Thyroid Disease Mother     Stroke Mother         \"TIA\"    Heart Disease Father         MI    Thyroid Disease Sister     Hypertension Sister     Eczema Sister     Asthma Sister     Other Sister         MENTAL HEALTH ISSUES    Heart Attack Other     Arthritis Sister     Hypertension Sister     High Cholesterol Sister     Cancer Sister         SKIN    Arthritis Sister     Cancer Maternal Aunt         esophageal    Breast Cancer Maternal Aunt     Cancer Paternal Aunt         breast    Anesth Problems Neg Hx      Current Outpatient Medications   Medication Sig    FreeStyle Test strip TEST FASTING BLOOD SUGAR ONE TIME DAILY    amLODIPine (NORVASC) 2.5 mg tablet TAKE 1 TABLET BY MOUTH EVERY DAY    olmesartan (BENICAR) 40 mg tablet Take 1 Tablet by mouth daily.  Cetirizine (ZyrTEC) 10 mg cap Take  by mouth.  methylPREDNISolone (MEDROL DOSEPACK) 4 mg tablet Take as directed    cephALEXin (KEFLEX) 500 mg capsule Take 1 Capsule by mouth three (3) times daily.  amLODIPine (NORVASC) 5 mg tablet Take 1 Tablet by mouth nightly. TAKE 1 TABLET BY MOUTH EVERY DAY    lancets (FreeStyle Lancets) 28 gauge misc TEST BLOOD SUGAR ONE TIME DAILY    metFORMIN ER (GLUCOPHAGE XR) 500 mg tablet TAKE 2 TABLETS DAILY WITH DINNER    cranberry fruit extract (CRANBERRY CONCENTRATE PO) Take  by mouth.     diphenoxylate-atropine (LomotiL) 2.5-0.025 mg per tablet Take  by mouth four (4) times daily as needed for Diarrhea.  fluticasone propionate (FLONASE) 50 mcg/actuation nasal spray 2 Sprays by Both Nostrils route daily. (Patient taking differently: 2 Sprays by Both Nostrils route nightly.)    simvastatin (ZOCOR) 20 mg tablet TAKE 1 TABLET BY MOUTH AT BEDTIME FOR CHOLESTEROL    estradioL (Estrace) 0.01 % (0.1 mg/gram) vaginal cream Estrace 0.01% (0.1 mg/gram) vaginal cream   place 1 g vaginally with applicator or finger nightly for 2 weeks and then 2-3 x weekly thereafter    metoprolol succinate (TOPROL-XL) 50 mg XL tablet Take 1 Tablet by mouth daily.  cholecalciferol, vitamin D3, (Vitamin D3) 50 mcg (2,000 unit) tab Take  by mouth.  montelukast (SINGULAIR) 10 mg tablet     esomeprazole (NEXIUM) 20 mg capsule nightly.  omega 3-DHA-EPA-fish oil (FISH OIL) 1,000 mg (120 mg-180 mg) capsule Take 4 Caps by mouth daily.  CALCIUM CARBONATE (TUMS PO) Take  by mouth daily as needed.  CALCIUM CARBONATE/VITAMIN D3 (CALCIUM + D PO) Take  by mouth two (2) times a day.  MULTI-VITAMIN PO Take 1 Tab by mouth daily.  aspirin delayed-release 81 mg tablet take 81 mg by mouth daily.  lisinopriL (PRINIVIL, ZESTRIL) 40 mg tablet TAKE 1 TABLET DAILY (Patient not taking: Reported on 1/18/2022)     No current facility-administered medications for this visit.      Allergies   Allergen Reactions    Sulfa (Sulfonamide Antibiotics) Unknown (comments)     TOPICAL EYE / EYE MUCH WORSE     Immunization History   Administered Date(s) Administered    Clarisse HARVEY, Primary or Immunocompromised Series, MRNA, PF, 100mcg/0.5mL 02/20/2021, 03/16/2021, 11/04/2021    Influenza High Dose Vaccine PF 10/19/2015, 10/31/2016, 10/04/2017, 09/02/2018, 10/08/2020    Influenza Vaccine 09/20/2013, 10/06/2014    Influenza Vaccine (Tri) Adjuvanted (>65 Yrs FLUAD TRI 08944) 10/07/2019    Pneumococcal Conjugate (PCV-13) 09/07/2016    Pneumococcal Polysaccharide (PPSV-23) 10/06/2014    TD Vaccine 01/29/2009    Tdap 09/20/2013    Zoster Recombinant 01/28/2019, 04/22/2019, 04/22/2019    Zoster Vaccine, Live 06/14/2008, 01/28/2019       Review of Systems   Constitutional: Negative for chills, fatigue and fever. Eyes: Negative for visual disturbance. Respiratory: Negative for cough and shortness of breath. Cardiovascular: Negative for chest pain, palpitations and leg swelling. Gastrointestinal: Negative for abdominal pain. Genitourinary: Positive for frequency. Negative for dysuria, pelvic pain and urgency. Musculoskeletal: Negative for back pain. Skin: Negative for rash. Neurological: Negative for dizziness, speech difficulty, weakness, light-headedness and headaches. Psychiatric/Behavioral: The patient is not nervous/anxious. BP (!) 147/82 (BP 1 Location: Left upper arm, BP Patient Position: Sitting, BP Cuff Size: Adult)   Pulse (!) 59   Temp 99 °F (37.2 °C) (Oral)   Resp 16   Ht 5' 0.5\" (1.537 m)   Wt 192 lb 3.2 oz (87.2 kg)   SpO2 97%   BMI 36.92 kg/m²    Wrist cuff in office 163/85; manual cuff 132/74    Physical Exam  Vitals and nursing note reviewed. Constitutional:       Appearance: Normal appearance. HENT:      Head: Normocephalic and atraumatic. Nose: Nose normal.      Mouth/Throat:      Mouth: Mucous membranes are moist.      Pharynx: Oropharynx is clear. Cardiovascular:      Rate and Rhythm: Normal rate and regular rhythm. Pulmonary:      Effort: Pulmonary effort is normal.      Breath sounds: Normal breath sounds. Abdominal:      General: Bowel sounds are normal.      Palpations: Abdomen is soft. Tenderness: There is no abdominal tenderness. Musculoskeletal:      Cervical back: Normal range of motion and neck supple. Skin:     General: Skin is warm and dry. Neurological:      General: No focal deficit present. Mental Status: She is alert and oriented to person, place, and time.    Psychiatric:         Mood and Affect: Mood normal.         Behavior: Behavior normal. On this date 01/18/2022 I have spent 35 minutes reviewing previous notes, test results and face to face with the patient discussing the diagnosis and importance of compliance with the treatment plan as well as documenting on the day of the visit. An electronic signature was used to authenticate this note.   -- Deborah Guajardo, SURINDER

## 2022-01-28 ENCOUNTER — OFFICE VISIT (OUTPATIENT)
Dept: INTERNAL MEDICINE CLINIC | Age: 72
End: 2022-01-28
Payer: MEDICARE

## 2022-01-28 VITALS
HEART RATE: 52 BPM | TEMPERATURE: 98.3 F | DIASTOLIC BLOOD PRESSURE: 74 MMHG | WEIGHT: 194 LBS | SYSTOLIC BLOOD PRESSURE: 124 MMHG | OXYGEN SATURATION: 99 % | BODY MASS INDEX: 36.63 KG/M2 | HEIGHT: 61 IN | RESPIRATION RATE: 16 BRPM

## 2022-01-28 DIAGNOSIS — E66.01 SEVERE OBESITY (BMI 35.0-35.9 WITH COMORBIDITY) (HCC): ICD-10-CM

## 2022-01-28 DIAGNOSIS — N18.32 STAGE 3B CHRONIC KIDNEY DISEASE (HCC): ICD-10-CM

## 2022-01-28 DIAGNOSIS — I10 ESSENTIAL HYPERTENSION WITH GOAL BLOOD PRESSURE LESS THAN 130/80: Primary | ICD-10-CM

## 2022-01-28 DIAGNOSIS — E11.21 TYPE 2 DIABETES MELLITUS WITH NEPHROPATHY (HCC): ICD-10-CM

## 2022-01-28 DIAGNOSIS — M10.372 ACUTE GOUT DUE TO RENAL IMPAIRMENT INVOLVING TOE OF LEFT FOOT: ICD-10-CM

## 2022-01-28 LAB
ALBUMIN SERPL-MCNC: 3.4 G/DL (ref 3.5–5)
ANION GAP SERPL CALC-SCNC: 8 MMOL/L (ref 5–15)
BUN SERPL-MCNC: 28 MG/DL (ref 6–20)
BUN/CREAT SERPL: 22 (ref 12–20)
CALCIUM SERPL-MCNC: 9.3 MG/DL (ref 8.5–10.1)
CHLORIDE SERPL-SCNC: 108 MMOL/L (ref 97–108)
CO2 SERPL-SCNC: 22 MMOL/L (ref 21–32)
CREAT SERPL-MCNC: 1.28 MG/DL (ref 0.55–1.02)
GLUCOSE SERPL-MCNC: 125 MG/DL (ref 65–100)
PHOSPHATE SERPL-MCNC: 3.9 MG/DL (ref 2.6–4.7)
POTASSIUM SERPL-SCNC: 4.9 MMOL/L (ref 3.5–5.1)
SODIUM SERPL-SCNC: 138 MMOL/L (ref 136–145)
URATE SERPL-MCNC: 8.3 MG/DL (ref 2.6–6)

## 2022-01-28 PROCEDURE — G9899 SCRN MAM PERF RSLTS DOC: HCPCS | Performed by: INTERNAL MEDICINE

## 2022-01-28 PROCEDURE — G8536 NO DOC ELDER MAL SCRN: HCPCS | Performed by: INTERNAL MEDICINE

## 2022-01-28 PROCEDURE — G8427 DOCREV CUR MEDS BY ELIG CLIN: HCPCS | Performed by: INTERNAL MEDICINE

## 2022-01-28 PROCEDURE — 3046F HEMOGLOBIN A1C LEVEL >9.0%: CPT | Performed by: INTERNAL MEDICINE

## 2022-01-28 PROCEDURE — G8399 PT W/DXA RESULTS DOCUMENT: HCPCS | Performed by: INTERNAL MEDICINE

## 2022-01-28 PROCEDURE — 99214 OFFICE O/P EST MOD 30 MIN: CPT | Performed by: INTERNAL MEDICINE

## 2022-01-28 PROCEDURE — G8754 DIAS BP LESS 90: HCPCS | Performed by: INTERNAL MEDICINE

## 2022-01-28 PROCEDURE — 3017F COLORECTAL CA SCREEN DOC REV: CPT | Performed by: INTERNAL MEDICINE

## 2022-01-28 PROCEDURE — G8417 CALC BMI ABV UP PARAM F/U: HCPCS | Performed by: INTERNAL MEDICINE

## 2022-01-28 PROCEDURE — 1101F PT FALLS ASSESS-DOCD LE1/YR: CPT | Performed by: INTERNAL MEDICINE

## 2022-01-28 PROCEDURE — 2022F DILAT RTA XM EVC RTNOPTHY: CPT | Performed by: INTERNAL MEDICINE

## 2022-01-28 PROCEDURE — G8510 SCR DEP NEG, NO PLAN REQD: HCPCS | Performed by: INTERNAL MEDICINE

## 2022-01-28 PROCEDURE — G8752 SYS BP LESS 140: HCPCS | Performed by: INTERNAL MEDICINE

## 2022-01-28 PROCEDURE — 1090F PRES/ABSN URINE INCON ASSESS: CPT | Performed by: INTERNAL MEDICINE

## 2022-01-28 RX ORDER — COLCHICINE 0.6 MG/1
TABLET ORAL
Qty: 30 TABLET | Refills: 1 | Status: SHIPPED | OUTPATIENT
Start: 2022-01-28

## 2022-01-28 NOTE — PROGRESS NOTES
Tang Howe is a 66 y.o. female who presents today for Follow-up (RM18//gout, hand tingling at night, discuss about colchicine)  . She has a history of   Patient Active Problem List   Diagnosis Code    DM (diabetes mellitus) ([facility] [address] E11.9    Hyperlipidemia E78.5    Hepatitis B B19.10    GERD (gastroesophageal reflux disease) K21.9    Rosacea L71.9    AR (allergic rhinitis) J30.9    Intervertebral disk disease M51.9    Uterine prolapse N81.4    Incontinence R32    Essential hypertension with goal blood pressure less than 130/80 I10    Anemia D64.9    Peripheral neuropathy G62.9    Pre-ulcerative corn or callous L84    Advanced care planning/counseling discussion Z71.89    Type 2 diabetes mellitus with nephropathy (HCC) E11.21    Stage 3 chronic kidney disease (HCC) N18.30    Severe obesity (BMI 35.0-39. 9) with comorbidity (HCC) A99.67    Systolic murmur of aorta Q98.7    History of arthritis Z87.39    Foot drop M21.379    Snoring R06.83    RODERICK (obstructive sleep apnea) G47.33    Primary insomnia F51.01    Insomnia with sleep apnea G47.00, G47.30    Type 2 diabetes mellitus with diabetic neuropathy (HCC) E11.40    Incomplete emptying of bladder R33.9    Overactive bladder N32.81    Urge incontinence of urine N39.41    Vitamin D deficiency E55.9    Incomplete uterovaginal prolapse N81.2    Stress incontinence N39.3    Genital prolapse N81.9   . Today patient is here for follow-up. GOUT: She has had what appears to be an acute episode of gout that occurred in late December. She was placed on steroids and antibiotics. Since she reports that her foot has improved. Does admit that she had eaten some shrimp the night before. Seems to have had a many flare the week before. We discussed repeating uric acid levels now that she is not in a flare    Hypertension-blood pressure has been elevated.   She contacted our office while I was out of town and she was instructed to increase her amlodipine to 5 mg. She did not, but BP have been stable. She continues to take olmesartan as well as metoprolol. Home blood pressure readings show very good control. Mainly in the 120s over 76s. Hypertension ROS: taking medications as instructed, no medication side effects noted, no TIA's, no chest pain on exertion, no dyspnea on exertion, no swelling of ankles     reports that she has never smoked. She has never used smokeless tobacco.    reports current alcohol use. BP Readings from Last 2 Encounters:   01/28/22 124/74   01/18/22 (!) 147/82       Chronic kidney disease: Patient's creatinine has floridalma slightly over time. Her microalbumin was borderline elevated in June. We discussed that overall her renal function has been relatively stable. We will repeat this today. If there is progression, may need to have her be seen by nephrologist.  Will be seeing urologist soon. Does have a history of stones. ROS  Review of Systems   Constitutional: Negative for chills, fever and weight loss. HENT: Negative for congestion and sore throat. Eyes: Negative for blurred vision, double vision and photophobia. Respiratory: Negative for cough and shortness of breath. Cardiovascular: Negative for chest pain, palpitations and leg swelling. Gastrointestinal: Negative for abdominal pain, constipation, diarrhea, heartburn, nausea and vomiting. Genitourinary: Positive for frequency. Negative for dysuria and urgency. Musculoskeletal: Negative for joint pain and myalgias. Has not had any recurrent gout flares   Skin: Negative for rash. Neurological: Negative. Negative for headaches. Endo/Heme/Allergies: Does not bruise/bleed easily. Psychiatric/Behavioral: Negative for memory loss and suicidal ideas.        Visit Vitals  /74   Pulse (!) 52   Temp 98.3 °F (36.8 °C) (Oral)   Resp 16   Ht 5' 0.5\" (1.537 m)   Wt 194 lb (88 kg)   SpO2 99%   BMI 37.26 kg/m²       Physical Exam  Constitutional:       General: She is not in acute distress. Appearance: She is well-developed. HENT:      Head: Normocephalic and atraumatic. Neck:      Thyroid: No thyromegaly. Cardiovascular:      Rate and Rhythm: Normal rate and regular rhythm. Heart sounds: No murmur heard. Pulmonary:      Effort: Pulmonary effort is normal.      Breath sounds: Normal breath sounds. No wheezing. Abdominal:      General: Bowel sounds are normal. There is no distension. Palpations: Abdomen is soft. Musculoskeletal:      Cervical back: Normal range of motion and neck supple. Comments: No swelling of foot. No warmth   Skin:     General: Skin is warm and dry. Neurological:      Mental Status: She is alert and oriented to person, place, and time. Cranial Nerves: No cranial nerve deficit. Psychiatric:         Behavior: Behavior normal.           Current Outpatient Medications   Medication Sig    colchicine 0.6 mg tablet Take two tablets at onset of symptoms. Then take one daily until flare resolve.  simvastatin (ZOCOR) 20 mg tablet TAKE 1 TABLET AT BEDTIME FOR CHOLESTEROL    FreeStyle Test strip TEST FASTING BLOOD SUGAR ONE TIME DAILY    amLODIPine (NORVASC) 2.5 mg tablet TAKE 1 TABLET BY MOUTH EVERY DAY    olmesartan (BENICAR) 40 mg tablet Take 1 Tablet by mouth daily.  Cetirizine (ZyrTEC) 10 mg cap Take  by mouth.  lancets (FreeStyle Lancets) 28 gauge misc TEST BLOOD SUGAR ONE TIME DAILY    metFORMIN ER (GLUCOPHAGE XR) 500 mg tablet TAKE 2 TABLETS DAILY WITH DINNER    cranberry fruit extract (CRANBERRY CONCENTRATE PO) Take  by mouth.  diphenoxylate-atropine (LomotiL) 2.5-0.025 mg per tablet Take  by mouth four (4) times daily as needed for Diarrhea.  fluticasone propionate (FLONASE) 50 mcg/actuation nasal spray 2 Sprays by Both Nostrils route daily.  (Patient taking differently: 2 Sprays by Both Nostrils route nightly.)    metoprolol succinate (TOPROL-XL) 50 mg XL tablet Take 1 Tablet by mouth daily.  cholecalciferol, vitamin D3, (Vitamin D3) 50 mcg (2,000 unit) tab Take  by mouth.  esomeprazole (NEXIUM) 20 mg capsule nightly.  omega 3-DHA-EPA-fish oil (FISH OIL) 1,000 mg (120 mg-180 mg) capsule Take 4 Caps by mouth daily.  CALCIUM CARBONATE (TUMS PO) Take  by mouth daily as needed.  CALCIUM CARBONATE/VITAMIN D3 (CALCIUM + D PO) Take  by mouth two (2) times a day.  MULTI-VITAMIN PO Take 1 Tab by mouth daily.  aspirin delayed-release 81 mg tablet take 81 mg by mouth daily. No current facility-administered medications for this visit.         Past Medical History:   Diagnosis Date    Adverse effect of anesthesia     WOKE UP IN RR ON VENTILATOR    Anemia     Arthritis     Calculus of kidney     Chronic kidney disease     STAGE 3    Cystocele     Diabetes ([address]     Diabetic neuropathy, painful (HCC)     GERD (gastroesophageal reflux disease)     Hepatitis B     Hypercholesterolemia     Hypertension     Microalbuminuria     Mild nonproliferative diabetic retinopathy (HCC)     PAF (paroxysmal atrial fibrillation) ([address]     [facility] follows    Rectocele     Retinopathy     Rosacea     Ruptured disc, cervical     Sleep apnea     Stenosis, cervical spine       Past Surgical History:   Procedure Laterality Date    HX BREAST BIOPSY Left 2009    HX CATARACT REMOVAL Right     HX CATARACT REMOVAL Left 03/2021    HX COLONOSCOPY      HX CYST INCISION AND DRAINAGE Left     BREAST CYST    HX DILATION AND CURETTAGE      HX ENDOSCOPY      HX GYN      OVARIAN CYST REMOVED    HX HERNIA REPAIR      umbilical    HX HYSTERECTOMY Bilateral 10/05/2021    HX MENISCUS REPAIR  7/11/14    HX OTHER SURGICAL  05/09/2018    Bladder Botox     HX OVARIAN CYST REMOVAL  1973    HX UROLOGICAL        Social History     Tobacco Use    Smoking status: Never Smoker    Smokeless tobacco: Never Used   Substance Use Topics    Alcohol use: Yes     Alcohol/week: 0.0 standard drinks     Comment: very rarely       Family History   Problem Relation Age of Onset    Hypertension Mother     Thyroid Disease Mother     Stroke Mother         \"TIA\"    Heart Disease Father         MI    Thyroid Disease Sister     Hypertension Sister     Eczema Sister     Asthma Sister     Other Sister         MENTAL HEALTH ISSUES    Heart Attack Other     Arthritis Sister     Hypertension Sister     High Cholesterol Sister     Cancer Sister         SKIN    Arthritis Sister     Cancer Maternal Aunt         esophageal    Breast Cancer Maternal Aunt     Cancer Paternal Aunt         breast    Anesth Problems Neg Hx         Allergies   Allergen Reactions    Sulfa (Sulfonamide Antibiotics) Unknown (comments)     TOPICAL EYE / EYE MUCH WORSE        Assessment/Plan  Diagnoses and all orders for this visit:    1. Essential hypertension with goal blood pressure less than 130/80-home readings very well controlled. Continue to monitor    2. Type 2 diabetes mellitus with nephropathy ([facility])-stable in December    3. Stage 3b chronic kidney disease ([facility])-some slight progression. Microalbumin slightly above normal 6 months ago. Repeat in summer  -     RENAL FUNCTION PANEL; Future    4. Acute gout due to renal impairment involving toe of left foot-may be dietary related. Repeat uric acid level not as she is no longer in a flare. Colchicine for as needed use in the future. We advised against overuse given her renal function. -     colchicine 0.6 mg tablet; Take two tablets at onset of symptoms. Then take one daily until flare resolve. -     URIC ACID; Future    5. Severe obesity (BMI 35.0-35.9 with comorbidity) ([facility])-counseled on diet and exercise            Feli Muñoz MD  1/28/2022    This note was created with the help of speech recognition software [employer]) and may contain some 'sound alike' errors.

## 2022-03-18 PROBLEM — Z87.39 HISTORY OF ARTHRITIS: Status: ACTIVE | Noted: 2019-03-21

## 2022-03-18 PROBLEM — I35.8 SYSTOLIC MURMUR OF AORTA: Status: ACTIVE | Noted: 2018-10-03

## 2022-03-19 PROBLEM — N39.3 STRESS INCONTINENCE: Status: ACTIVE | Noted: 2021-10-05

## 2022-03-19 PROBLEM — F51.01 PRIMARY INSOMNIA: Status: ACTIVE | Noted: 2019-03-21

## 2022-03-19 PROBLEM — R33.9 INCOMPLETE EMPTYING OF BLADDER: Status: ACTIVE | Noted: 2018-05-23

## 2022-03-19 PROBLEM — E11.21 TYPE 2 DIABETES MELLITUS WITH NEPHROPATHY (HCC): Status: ACTIVE | Noted: 2018-01-31

## 2022-03-19 PROBLEM — G47.00 INSOMNIA WITH SLEEP APNEA: Status: ACTIVE | Noted: 2019-03-21

## 2022-03-19 PROBLEM — E11.40 TYPE 2 DIABETES MELLITUS WITH DIABETIC NEUROPATHY (HCC): Status: ACTIVE | Noted: 2019-03-21

## 2022-03-19 PROBLEM — E55.9 VITAMIN D DEFICIENCY: Status: ACTIVE | Noted: 2020-06-12

## 2022-03-19 PROBLEM — G47.33 OSA (OBSTRUCTIVE SLEEP APNEA): Status: ACTIVE | Noted: 2019-03-21

## 2022-03-19 PROBLEM — G47.30 INSOMNIA WITH SLEEP APNEA: Status: ACTIVE | Noted: 2019-03-21

## 2022-03-19 PROBLEM — N81.9 GENITAL PROLAPSE: Status: ACTIVE | Noted: 2021-10-05

## 2022-03-20 PROBLEM — N18.30 STAGE 3 CHRONIC KIDNEY DISEASE (HCC): Status: ACTIVE | Noted: 2018-01-31

## 2022-03-20 PROBLEM — E66.01 SEVERE OBESITY (BMI 35.0-39.9) WITH COMORBIDITY (HCC): Status: ACTIVE | Noted: 2018-04-20

## 2022-03-20 PROBLEM — M21.379 FOOT DROP: Status: ACTIVE | Noted: 2019-03-21

## 2022-03-20 PROBLEM — N81.2 INCOMPLETE UTEROVAGINAL PROLAPSE: Status: ACTIVE | Noted: 2021-10-05

## 2022-03-20 PROBLEM — R06.83 SNORING: Status: ACTIVE | Noted: 2019-03-21

## 2022-03-30 ENCOUNTER — OFFICE VISIT (OUTPATIENT)
Dept: CARDIOLOGY CLINIC | Age: 72
End: 2022-03-30
Payer: MEDICARE

## 2022-03-30 ENCOUNTER — PATIENT MESSAGE (OUTPATIENT)
Dept: INTERNAL MEDICINE CLINIC | Age: 72
End: 2022-03-30

## 2022-03-30 VITALS
HEART RATE: 62 BPM | OXYGEN SATURATION: 97 % | BODY MASS INDEX: 36.93 KG/M2 | WEIGHT: 195.6 LBS | SYSTOLIC BLOOD PRESSURE: 136 MMHG | HEIGHT: 61 IN | DIASTOLIC BLOOD PRESSURE: 78 MMHG

## 2022-03-30 DIAGNOSIS — I10 ESSENTIAL HYPERTENSION: ICD-10-CM

## 2022-03-30 DIAGNOSIS — R06.02 SOB (SHORTNESS OF BREATH): ICD-10-CM

## 2022-03-30 DIAGNOSIS — E78.00 PURE HYPERCHOLESTEROLEMIA: ICD-10-CM

## 2022-03-30 DIAGNOSIS — I48.0 PAF (PAROXYSMAL ATRIAL FIBRILLATION) (HCC): Primary | ICD-10-CM

## 2022-03-30 DIAGNOSIS — Q24.8 LEFT VENTRICULAR OUTFLOW TRACT OBSTRUCTION: ICD-10-CM

## 2022-03-30 PROCEDURE — G8510 SCR DEP NEG, NO PLAN REQD: HCPCS | Performed by: SPECIALIST

## 2022-03-30 PROCEDURE — G0463 HOSPITAL OUTPT CLINIC VISIT: HCPCS | Performed by: SPECIALIST

## 2022-03-30 PROCEDURE — 3017F COLORECTAL CA SCREEN DOC REV: CPT | Performed by: SPECIALIST

## 2022-03-30 PROCEDURE — G8417 CALC BMI ABV UP PARAM F/U: HCPCS | Performed by: SPECIALIST

## 2022-03-30 PROCEDURE — 1090F PRES/ABSN URINE INCON ASSESS: CPT | Performed by: SPECIALIST

## 2022-03-30 PROCEDURE — G8399 PT W/DXA RESULTS DOCUMENT: HCPCS | Performed by: SPECIALIST

## 2022-03-30 PROCEDURE — G8752 SYS BP LESS 140: HCPCS | Performed by: SPECIALIST

## 2022-03-30 PROCEDURE — G8427 DOCREV CUR MEDS BY ELIG CLIN: HCPCS | Performed by: SPECIALIST

## 2022-03-30 PROCEDURE — G8536 NO DOC ELDER MAL SCRN: HCPCS | Performed by: SPECIALIST

## 2022-03-30 PROCEDURE — 99214 OFFICE O/P EST MOD 30 MIN: CPT | Performed by: SPECIALIST

## 2022-03-30 PROCEDURE — G8754 DIAS BP LESS 90: HCPCS | Performed by: SPECIALIST

## 2022-03-30 PROCEDURE — 1101F PT FALLS ASSESS-DOCD LE1/YR: CPT | Performed by: SPECIALIST

## 2022-03-30 PROCEDURE — G9899 SCRN MAM PERF RSLTS DOC: HCPCS | Performed by: SPECIALIST

## 2022-03-30 RX ORDER — OLMESARTAN MEDOXOMIL 40 MG/1
40 TABLET ORAL DAILY
Qty: 90 TABLET | Refills: 1 | Status: SHIPPED | OUTPATIENT
Start: 2022-03-30 | End: 2022-04-19

## 2022-03-30 RX ORDER — AMLODIPINE BESYLATE 2.5 MG/1
2.5 TABLET ORAL DAILY
Qty: 90 TABLET | Refills: 1 | Status: SHIPPED | OUTPATIENT
Start: 2022-03-30 | End: 2022-09-28

## 2022-03-30 NOTE — PROGRESS NOTES
Efrain Bhatt     1950       Jennifer Navarro MD, [facility] [place]  Date of Visit-3/30/2022   PCP is Tracee Quiles MD   [facility]  [facility]  HPI:  Efrain Bhatt is a 74 y.o. female   6 month f/u of   · PAF. · Echo 4/14/2021 EF 69% borderline hypertrophy LV outflow tract gradient 7.9 mm peak gradient 21 mm with provocation. Mild LAE. Aorta 3.7 cm with coronary sinus dilated suggesting persistent left superior vena cava. PASP 32 mmHg  · Echo in August 2018 was normal and loop showed no AF.   · [facility] scan 4/14/2021 normal EF 73%  · Hypertension systemic  · Dyslipidemia  · Chronic shortness of breath    Today. .. No new complaints, feels well now. She had gout in December with swollen knee. She says she has a \"PhD in inertia\". Her COSTA is unchanged. She is a blood pressure diary. Assessment/Plan:       No changes, fu on October 6 months  1. PAF (paroxysmal atrial fibrillation) (HCC)  Remains in sinus. Her fatigue and edema have improved. This seemed to be from Amlodipine. We had gotten an echo and stress test and they were generally favorable. It has been a  Long time since she has had Afib and have not started [facility] [place]  discussion with her. No changes  03/24/21    ECHO ADULT COMPLETE 04/18/2021 4/18/2021    Interpretation Summary  · LV: Calculated LVEF is 69%. Biplane method used to measure ejection fraction. Normal cavity size and systolic function (ejection fraction normal). Borderline hypertrophy. Inconclusive left ventricular diastolic function. · The left ventricular outflow tract mean gradient is 7.9 mmHg. The left ventricular outflow tract peak gradient is 21.3 mmHg. · LA: Mildly dilated left atrium. · AO: Mild ascending aorta dilatation. Ascending aorta diameter = 3.7 cm. · Coronary sinus dilated. 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 · PA: Pulmonary arterial systolic pressure is 32 mmHg.     Signed by: Alexsandra Adams MD on 4/18/2021  6:47 PM    04/14/21    NUCLEAR CARDIAC STRESS TEST 04/18/2021 4/20/2021    Interpretation Summary  · SPECT: Left ventricular function post-stress was normal. Calculated ejection fraction is 73%. There is no evidence of transient ischemic dilation (TID). · Baseline ECG: Normal sinus rhythm. · SPECT: Left ventricular perfusion is normal. Myocardial perfusion imaging supports a low risk stress test.    within normal limits    Signed by: Alexsandra Adams MD on 4/18/2021  5:22 PM     - AMB POC EKG ROUTINE W/ 12 LEADS, INTER & REP    2.  Left ventricular outflow tract gradient   goes from 7-21 with provocation systolic murmur of aorta  It is notably focal to just the RUSB. 3. Essential hypertension  Stable using CPAP    At goal , meds and possible side effects reviewed and patient denies  Key CAD CHF Meds             simvastatin (ZOCOR) 20 mg tablet (Taking) TAKE 1 TABLET AT BEDTIME FOR CHOLESTEROL    amLODIPine (NORVASC) 2.5 mg tablet (Taking) TAKE 1 TABLET BY MOUTH EVERY DAY    olmesartan (BENICAR) 40 mg tablet (Taking) Take 1 Tablet by mouth daily. metoprolol succinate (TOPROL-XL) 50 mg XL tablet (Taking) Take 1 Tablet by mouth daily. omega 3-DHA-EPA-fish oil (FISH OIL) 1,000 mg (120 mg-180 mg) capsule (Taking) Take 4 Caps by mouth daily. aspirin delayed-release 81 mg tablet (Taking) take 81 mg by mouth daily. BP Readings from Last 6 Encounters:   03/30/22 136/78   01/28/22 124/74   01/18/22 (!) 147/82   12/30/21 139/60   12/28/21 138/81   12/10/21 (!) 140/82        4. Pure hypercholesterolemia  Lipids on high potency statin as appropriate for secondary prevention. At goal , denies excess muscle aches or new liver issues  Key Antihyperlipidemia Meds             simvastatin (ZOCOR) 20 mg tablet (Taking) TAKE 1 TABLET AT BEDTIME FOR CHOLESTEROL    omega 3-DHA-EPA-fish oil (FISH OIL) 1,000 mg (120 mg-180 mg) capsule (Taking) Take 4 Caps by mouth daily. Lab Results   Component Value Date/Time    LDL, calculated 59 06/02/2021 12:23 PM         5. SOB (shortness of breath)  improved      F/u in 6 months     Impression:   1. PAF (paroxysmal atrial fibrillation) ([facility]    2. Left ventricular outflow tract obstruction    3. Essential hypertension    4. Pure hypercholesterolemia    5. SOB (shortness of breath)       Cardiac History: In clinical trial= REDUCE-IT Study- \"A Multi-Center, Prospective, Randomized, Double-Blind, Placebo-Controlled, Parallel-Group Study to Evaluate the Effect of [id number] on Cardiovascular Health and Mortality in Hypertriglyceridemic Patients with Cardiovascular Disease or at High Risk for Cardiovascular Disease: REDUCE-IT (Reduction of Cardiovascular Events with EPA- Intervention Trial)     PAF  Echo 2-5-13= normal  [facility] in 13,15,18 WNL   [facility] 11-6-18 6'50\" normal perfusion, EF 73%     Future Appointments   Date Time Provider Khalif Lundy   10/7/2022 11:20 AM Jennifer Bansal MD [facility] BS AMB        ROS-except as noted above. . A complete cardiac and respiratory are reviewed and negative except as above ; Resp-denies wheezing  or productive cough,.  Const- No unusual weight loss or fever; Neuro-no recent seizure or CVA ; GI- No BRBPR, abdom pain, bloating ; - no  hematuria   see supplement sheet, initialed and to be scanned by staff  Past Medical History:   Diagnosis Date    Adverse effect of anesthesia     WOKE UP IN RR ON VENTILATOR    Anemia     Arthritis     Calculus of kidney     Chronic kidney disease     STAGE 3    Cystocele     Diabetes ([facility]     Diabetic neuropathy, painful (HCC)     GERD (gastroesophageal reflux disease)     Hepatitis B     Hypercholesterolemia     Hypertension     Microalbuminuria     Mild nonproliferative diabetic retinopathy (HCC)     PAF (paroxysmal atrial fibrillation) ([facility]     [facility] 3-24-13 ER-[facility]olf follows    Rectocele     Retinopathy     Rosacea     Ruptured disc, cervical     Sleep apnea     Stenosis, cervical spine       Social Hx= reports that she has never smoked. She has never used smokeless tobacco. She reports current alcohol use. She reports that she does not use drugs. Exam and Labs:  /78 (BP 1 Location: Left upper arm, BP Patient Position: Sitting)   Pulse 62   Ht 5' 0.5\" (1.537 m)   Wt 195 lb 9.6 oz (88.7 kg)   SpO2 97%   BMI 37.57 kg/m² Constitutional:  NAD, comfortable  Head: NC,AT. Eyes: No scleral icterus. Neck:  Neck supple. No JVD present. Throat: moist mucous membranes. Chest: Effort normal & normal respiratory excursion . Neurological: alert, conversant and oriented . Skin: Skin is not cold. No obvious systemic rash noted. Not diaphoretic. No erythema. Psychiatric:  Grossly normal mood and affect. Behavior appears normal. Extremities:  no clubbing or cyanosis. Abdomen: non distended    Lungs:breath sounds normal. No stridor. distress, wheezes or  Rales. Heart:1/6 RUSB murmur normal rate, regular rhythm, normal S1, S2, no rubs, clicks or gallops , PMI non displaced. Edema: Edema is none.   Lab Results   Component Value Date/Time    Cholesterol, total 147 06/02/2021 12:23 PM    HDL Cholesterol 51 06/02/2021 12:23 PM    LDL, calculated 59 06/02/2021 12:23 PM    Triglyceride 185 (H) 06/02/2021 12:23 PM    CHOL/HDL Ratio 2.9 06/02/2021 12:23 PM     Lab Results   Component Value Date/Time    Sodium 138 01/28/2022 11:17 AM    Potassium 4.9 01/28/2022 11:17 AM    Chloride 108 01/28/2022 11:17 AM    CO2 22 01/28/2022 11:17 AM    Anion gap 8 01/28/2022 11:17 AM    Glucose 125 (H) 01/28/2022 11:17 AM    BUN 28 (H) 01/28/2022 11:17 AM    Creatinine 1.28 (H) 01/28/2022 11:17 AM    BUN/Creatinine ratio 22 (H) 01/28/2022 11:17 AM    GFR est AA 50 (L) 01/28/2022 11:17 AM    GFR est non-AA 41 (L) 01/28/2022 11:17 AM    Calcium 9.3 01/28/2022 11:17 AM      Wt Readings from Last 3 Encounters:   03/30/22 195 lb 9.6 oz (88.7 kg)   01/28/22 194 lb (88 kg)   01/18/22 192 lb 3.2 oz (87.2 kg)      BP Readings from Last 3 Encounters:   03/30/22 136/78   01/28/22 124/74   01/18/22 (!) 147/82      Current Outpatient Medications   Medication Sig    colchicine 0.6 mg tablet Take two tablets at onset of symptoms. Then take one daily until flare resolve.  simvastatin (ZOCOR) 20 mg tablet TAKE 1 TABLET AT BEDTIME FOR CHOLESTEROL    FreeStyle Test strip TEST FASTING BLOOD SUGAR ONE TIME DAILY    amLODIPine (NORVASC) 2.5 mg tablet TAKE 1 TABLET BY MOUTH EVERY DAY    olmesartan (BENICAR) 40 mg tablet Take 1 Tablet by mouth daily.  Cetirizine (ZyrTEC) 10 mg cap Take  by mouth.  lancets (FreeStyle Lancets) 28 gauge misc TEST BLOOD SUGAR ONE TIME DAILY    metFORMIN ER (GLUCOPHAGE XR) 500 mg tablet TAKE 2 TABLETS DAILY WITH DINNER    cranberry fruit extract (CRANBERRY CONCENTRATE PO) Take  by mouth.  diphenoxylate-atropine (LomotiL) 2.5-0.025 mg per tablet Take  by mouth four (4) times daily as needed for Diarrhea.  fluticasone propionate (FLONASE) 50 mcg/actuation nasal spray 2 Sprays by Both Nostrils route daily. (Patient taking differently: 2 Sprays by Both Nostrils route nightly.)    metoprolol succinate (TOPROL-XL) 50 mg XL tablet Take 1 Tablet by mouth daily.  cholecalciferol, vitamin D3, (Vitamin D3) 50 mcg (2,000 unit) tab Take  by mouth.  esomeprazole (NEXIUM) 20 mg capsule nightly.  omega 3-DHA-EPA-fish oil (FISH OIL) 1,000 mg (120 mg-180 mg) capsule Take 4 Caps by mouth daily.  CALCIUM CARBONATE (TUMS PO) Take  by mouth daily as needed.  CALCIUM CARBONATE/VITAMIN D3 (CALCIUM + D PO) Take  by mouth two (2) times a day.  MULTI-VITAMIN PO Take 1 Tab by mouth daily.  aspirin delayed-release 81 mg tablet take 81 mg by mouth daily. No current facility-administered medications for this visit. Impression see above.

## 2022-03-30 NOTE — PROGRESS NOTES
Priti Lewis is a 74 y.o. female    Chief Complaint   Patient presents with    Follow-up     6 month f/u        Chest pain No    SOB with walking     Dizziness No    Swelling some swelling in her feet     Refills No    Visit Vitals  /78 (BP 1 Location: Left upper arm, BP Patient Position: Sitting)   Pulse 62   Ht 5' 0.5\" (1.537 m)   Wt 195 lb 9.6 oz (88.7 kg)   SpO2 97%   BMI 37.57 kg/m²       1. Have you been to the ER, urgent care clinic since your last visit? Hospitalized since your last visit? No    2. Have you seen or consulted any other health care providers outside of the [address] [place] [address] since your last visit? Include any pap smears or colon screening.   No

## 2022-03-30 NOTE — Clinical Note
4/23/2022    Patient: Carole Sierra   YOB: 1950   Date of Visit: 3/30/2022     Nay Newton MD  [address]  [address]  [address] [facility]  [place]    Dear Nay Newton MD,      Thank you for referring Ms. Carissa Lim to [facility] for evaluation. My notes for this consultation are attached. If you have questions, please do not hesitate to call me. I look forward to following your patient along with you.       Sincerely,    Amber Pepe MD

## 2022-04-11 ENCOUNTER — TELEPHONE (OUTPATIENT)
Dept: INTERNAL MEDICINE CLINIC | Age: 72
End: 2022-04-11

## 2022-04-11 NOTE — TELEPHONE ENCOUNTER
Patient called to state she is having a gout attack and the medication she has taken is not working. She states the pain is getting worse and the area that hurts is getting bigger. Please call back and advise if medication can be adjusted or if patient needs to come in and be seen.

## 2022-04-11 NOTE — TELEPHONE ENCOUNTER
Spoke to pt gout flare up started yesterday pt did take 2 colchicine at 5:30 this morning, is feeling better. Pt will call back if pain reoccurs and swelling starts.

## 2022-04-19 RX ORDER — OLMESARTAN MEDOXOMIL 40 MG/1
TABLET ORAL
Qty: 30 TABLET | Refills: 3 | Status: SHIPPED | OUTPATIENT
Start: 2022-04-19 | End: 2022-10-05

## 2022-04-23 PROBLEM — Q24.8 LEFT VENTRICULAR OUTFLOW TRACT OBSTRUCTION: Status: ACTIVE | Noted: 2022-04-23

## 2022-05-02 DIAGNOSIS — I48.0 PAF (PAROXYSMAL ATRIAL FIBRILLATION) (HCC): ICD-10-CM

## 2022-05-05 RX ORDER — METOPROLOL SUCCINATE 50 MG/1
50 TABLET, EXTENDED RELEASE ORAL DAILY
Qty: 90 TABLET | Refills: 3 | Status: SHIPPED | OUTPATIENT
Start: 2022-05-05

## 2022-05-05 NOTE — TELEPHONE ENCOUNTER
Per VO by MD.    Future Appointments   Date Time Provider [facility] [place]   10/7/2022 11:20 AM Kristopher Mcdaniel MD [facility]

## 2022-05-23 ENCOUNTER — TELEPHONE (OUTPATIENT)
Dept: CARDIOLOGY CLINIC | Age: 72
End: 2022-05-23

## 2022-05-23 NOTE — TELEPHONE ENCOUNTER
Verified patient with two types of identifiers. Let patient know a year supply was sent on 5-5-22 and the pharmacy confirmed this on 5-5-22 at 1427. She reports they claim they do not have the Rx. Called [facility] and after 30 minutes on the phone and speaking with multiple people in multiple departments they took a new Rx verbal order. Attempted to reach patient by telephone. A detailed message was left for return call.

## 2022-07-03 NOTE — PROGRESS NOTES
Amrita Guerrero is a 74 y.o. female who presents today for Follow-up (RM19// pt presents today for routine 6 month chk up)  . She has a history of   Patient Active Problem List   Diagnosis Code    DM (diabetes mellitus) ([facility] E11.9    Hyperlipidemia E78.5    Hepatitis B B19.10    GERD (gastroesophageal reflux disease) K21.9    Rosacea L71.9    AR (allergic rhinitis) J30.9    Intervertebral disk disease M51.9    Uterine prolapse N81.4    Incontinence R32    Essential hypertension with goal blood pressure less than 130/80 I10    Anemia D64.9    Peripheral neuropathy G62.9    Pre-ulcerative corn or callous L84    Advanced care planning/counseling discussion Z71.89    Type 2 diabetes mellitus with nephropathy (HCC) E11.21    Stage 3 chronic kidney disease (HCC) N18.30    Severe obesity (BMI 35.0-39. 9) with comorbidity (HCC) D31.48    Systolic murmur of aorta G98.3    History of arthritis Z87.39    Foot drop M21.379    Snoring R06.83    RODERICK (obstructive sleep apnea) G47.33    Primary insomnia F51.01    Insomnia with sleep apnea G47.00, G47.30    Type 2 diabetes mellitus with diabetic neuropathy (HCC) E11.40    Incomplete emptying of bladder R33.9    Overactive bladder N32.81    Urge incontinence of urine N39.41    Vitamin D deficiency E55.9    Incomplete uterovaginal prolapse N81.2    Stress incontinence N39.3    Genital prolapse N81.9    Left ventricular outflow tract obstruction Q24.8   . Today patient is here for follow up. Reports potential gouty problems. Occasionally does take colchicine which seems to help. We discussed checking a uric acid level today as she is not having any symptoms. Has had some recent issues with bleeding hemorrhoids. Has seen Dr. Marie Perez in the past.  Has been slightly constipated. We discussed using Preparation H    Hypertension- LE edema tollerating low dose CCB.    Hypertension ROS: taking medications as instructed, no medication side effects noted, no TIA's, no chest pain on exertion, no dyspnea on exertion, no swelling of ankles     reports that she has never smoked. She has never used smokeless tobacco.    reports current alcohol use. BP Readings from Last 2 Encounters:   07/05/22 138/65   03/30/22 136/78     Diabetes Mellitus follow-up- fasting very well controlled. Last hemoglobin a1c   Lab Results   Component Value Date/Time    Hemoglobin A1c 6.4 (H) 12/28/2021 04:19 PM    Hemoglobin A1c (POC) 5.6 01/31/2018 11:30 AM    Hemoglobin A1c, External 5.6 04/19/2016 12:00 AM     No components found for: POCA1C, HBA1C POC  medication compliance: compliant all of the time. Diabetic diet compliance: compliant most of the time. Further diabetic ROS: no polyuria or polydipsia, no chest pain, dyspnea or TIA's, no numbness, tingling or pain in extremities. Microalbuminuria:   Lab Results   Component Value Date/Time    Microalbumin/Creat ratio (mg/g creat) 31 (H) 06/02/2021 12:25 PM    Microalbumin,urine random 0.76 06/02/2021 12:25 PM     Hyperlipidemia  ROS: taking medications as instructed, no medication side effects noted  No new myalgias, no joint pains, no weakness  No TIA's, no chest pain on exertion, no dyspnea on exertion, no swelling of ankles. Lab Results   Component Value Date/Time    Cholesterol, total 147 06/02/2021 12:23 PM    HDL Cholesterol 51 06/02/2021 12:23 PM    LDL, calculated 59 06/02/2021 12:23 PM    VLDL, calculated 37 06/02/2021 12:23 PM    Triglyceride 185 (H) 06/02/2021 12:23 PM    CHOL/HDL Ratio 2.9 06/02/2021 12:23 PM       ROS  Review of Systems   Constitutional: Negative for chills, fever and weight loss. HENT: Negative for congestion, ear pain, hearing loss, sore throat and tinnitus. Eyes: Negative for blurred vision, double vision and photophobia. Respiratory: Negative for cough and shortness of breath. Cardiovascular: Negative for chest pain, palpitations and leg swelling.    Gastrointestinal: Negative for abdominal pain, constipation, diarrhea, heartburn, nausea and vomiting. Genitourinary: Negative for dysuria, frequency, hematuria and urgency. Musculoskeletal: Positive for joint pain. Negative for myalgias. Occasional    Skin: Negative for rash. Neurological: Negative. Negative for dizziness and headaches. Neuropathy and puffy feeling of feet. Occasionally has some numbness to hands that occurs at night. Endo/Heme/Allergies: Does not bruise/bleed easily. Psychiatric/Behavioral: Negative for depression, memory loss, substance abuse and suicidal ideas. The patient is not nervous/anxious. Visit Vitals  /65 (BP 1 Location: Left upper arm, BP Patient Position: Sitting, BP Cuff Size: Large adult)   Pulse (!) 52   Temp 98.2 °F (36.8 °C) (Oral)   Resp 12   Ht 5' 0.5\" (1.537 m)   Wt 187 lb (84.8 kg)   SpO2 98%   BMI 35.92 kg/m²       Physical Exam  Constitutional:       General: She is not in acute distress. Appearance: She is well-developed. HENT:      Head: Normocephalic and atraumatic. Right Ear: Tympanic membrane normal.      Left Ear: Tympanic membrane normal.   Neck:      Thyroid: No thyromegaly. Cardiovascular:      Rate and Rhythm: Normal rate and regular rhythm. Heart sounds: No murmur heard. Pulmonary:      Effort: Pulmonary effort is normal.      Breath sounds: Normal breath sounds. No wheezing. Abdominal:      General: Bowel sounds are normal. There is no distension. Palpations: Abdomen is soft. Musculoskeletal:      Cervical back: Normal range of motion and neck supple. Comments: Foot exam  - skin intact, mild dryness w no fissures, no rashes, no significant ulcers or callous formation. Sensation intact by microfilament or light touch     Skin:     General: Skin is warm and dry. Neurological:      Mental Status: She is alert and oriented to person, place, and time. Cranial Nerves: No cranial nerve deficit. Psychiatric:         Behavior: Behavior normal.           Current Outpatient Medications   Medication Sig    metoprolol succinate (TOPROL-XL) 50 mg XL tablet Take 1 Tablet by mouth daily.  olmesartan (BENICAR) 40 mg tablet TAKE 1 TABLET BY MOUTH EVERY DAY    amLODIPine (NORVASC) 2.5 mg tablet Take 1 Tablet by mouth daily.  colchicine 0.6 mg tablet Take two tablets at onset of symptoms. Then take one daily until flare resolve.  simvastatin (ZOCOR) 20 mg tablet TAKE 1 TABLET AT BEDTIME FOR CHOLESTEROL    FreeStyle Test strip TEST FASTING BLOOD SUGAR ONE TIME DAILY    Cetirizine (ZyrTEC) 10 mg cap Take  by mouth.  lancets (FreeStyle Lancets) 28 gauge misc TEST BLOOD SUGAR ONE TIME DAILY    metFORMIN ER (GLUCOPHAGE XR) 500 mg tablet TAKE 2 TABLETS DAILY WITH DINNER    cranberry fruit extract (CRANBERRY CONCENTRATE PO) Take  by mouth.  diphenoxylate-atropine (LomotiL) 2.5-0.025 mg per tablet Take  by mouth every other day.  cholecalciferol, vitamin D3, (Vitamin D3) 50 mcg (2,000 unit) tab Take  by mouth.  esomeprazole (NEXIUM) 20 mg capsule nightly.  omega 3-DHA-EPA-fish oil (FISH OIL) 1,000 mg (120 mg-180 mg) capsule Take 4 Caps by mouth daily.  CALCIUM CARBONATE/VITAMIN D3 (CALCIUM + D PO) Take  by mouth two (2) times a day.  MULTI-VITAMIN PO Take 1 Tab by mouth daily.  aspirin delayed-release 81 mg tablet take 81 mg by mouth daily. No current facility-administered medications for this visit.         Past Medical History:   Diagnosis Date    Adverse effect of anesthesia     WOKE UP IN RR ON VENTILATOR    Anemia     Arthritis     Calculus of kidney     Chronic kidney disease     STAGE 3    Cystocele     Diabetes ([facility]     Diabetic neuropathy, painful (HCC)     GERD (gastroesophageal reflux disease)     Hepatitis B     Hypercholesterolemia     Hypertension     Microalbuminuria     Mild nonproliferative diabetic retinopathy (HCC)     [facility]     [facility] 3[facility] ER-[facility] follows    Rectocele     Retinopathy     Rosacea     Ruptured disc, cervical     Sleep apnea     Stenosis, cervical spine       Past Surgical History:   Procedure Laterality Date    HX BREAST BIOPSY Left 2009    HX CATARACT REMOVAL Right     HX CATARACT REMOVAL Left 03/2021    HX COLONOSCOPY      HX CYST INCISION AND DRAINAGE Left     BREAST CYST    HX DILATION AND CURETTAGE      HX ENDOSCOPY      HX GYN      OVARIAN CYST REMOVED    HX HERNIA REPAIR      umbilical    HX HYSTERECTOMY Bilateral 10/05/2021    HX MENISCUS REPAIR  7/11/14    HX OTHER SURGICAL  05/09/2018    Bladder Botox     HX OVARIAN CYST REMOVAL  1973    HX UROLOGICAL        Social History     Tobacco Use    Smoking status: Never Smoker    Smokeless tobacco: Never Used   Substance Use Topics    Alcohol use: Yes     Alcohol/week: 0.0 standard drinks     Comment: very rarely       Family History   Problem Relation Age of Onset    Hypertension Mother     Thyroid Disease Mother     Stroke Mother         \"TIA\"    Heart Disease Father         MI    Thyroid Disease Sister     Hypertension Sister     Eczema Sister     Asthma Sister     Other Sister         MENTAL HEALTH ISSUES    Heart Attack Other     Arthritis Sister     Hypertension Sister     High Cholesterol Sister     Cancer Sister         SKIN    Arthritis Sister     Cancer Maternal Aunt         esophageal    Breast Cancer Maternal Aunt     Cancer Paternal Aunt         breast    Anesth Problems Neg Hx         Allergies   Allergen Reactions    Sulfa (Sulfonamide Antibiotics) Unknown (comments)     TOPICAL EYE / EYE MUCH WORSE        Assessment/Plan  Diagnoses and all orders for this visit:    1. Type 2 diabetes mellitus with nephropathy (HCC)-Home blood sugar readings have been well controlled. We discussed potentially cutting back metformin if A1c is well controlled. -     HEMOGLOBIN A1C WITH EAG;  Future  - MICROALBUMIN, UR, RAND W/ MICROALB/CREAT RATIO; Future    2. Stage 3b chronic kidney disease (HCC)-repeat levels    3. Essential hypertension with goal blood pressure less than 130/80-well-controlled at home  -     METABOLIC PANEL, COMPREHENSIVE; Future  -     CBC WITH AUTOMATED DIFF; Future    4. Acute gout due to renal impairment involving toe of left foot-occasional foot pains. Check uric acid  -     URIC ACID; Future    5. Hemorrhoids, unspecified hemorrhoid type does-Has had some recent issues with bleeding hemorrhoids. Has seen Dr. Tianna Matt in the past.  Has been slightly constipated. We discussed using Preparation H    6. Pure hypercholesterolemia  -     LIPID PANEL; Future    7. Vitamin D deficiency  -     VITAMIN D, 25 HYDROXY; Future            Maged Salinas MD  7/5/2022    This note was created with the help of speech recognition software Meghna Yost) and may contain some 'sound alike' errors. - - -

## 2022-07-05 ENCOUNTER — OFFICE VISIT (OUTPATIENT)
Dept: INTERNAL MEDICINE CLINIC | Age: 72
End: 2022-07-05
Payer: MEDICARE

## 2022-07-05 VITALS
DIASTOLIC BLOOD PRESSURE: 65 MMHG | RESPIRATION RATE: 12 BRPM | HEART RATE: 52 BPM | SYSTOLIC BLOOD PRESSURE: 138 MMHG | WEIGHT: 187 LBS | BODY MASS INDEX: 35.3 KG/M2 | HEIGHT: 61 IN | TEMPERATURE: 98.2 F | OXYGEN SATURATION: 98 %

## 2022-07-05 DIAGNOSIS — E11.21 TYPE 2 DIABETES MELLITUS WITH NEPHROPATHY (HCC): Primary | ICD-10-CM

## 2022-07-05 DIAGNOSIS — K64.9 HEMORRHOIDS, UNSPECIFIED HEMORRHOID TYPE: ICD-10-CM

## 2022-07-05 DIAGNOSIS — M10.372 ACUTE GOUT DUE TO RENAL IMPAIRMENT INVOLVING TOE OF LEFT FOOT: ICD-10-CM

## 2022-07-05 DIAGNOSIS — I10 ESSENTIAL HYPERTENSION WITH GOAL BLOOD PRESSURE LESS THAN 130/80: ICD-10-CM

## 2022-07-05 DIAGNOSIS — N18.32 STAGE 3B CHRONIC KIDNEY DISEASE (HCC): ICD-10-CM

## 2022-07-05 DIAGNOSIS — E55.9 VITAMIN D DEFICIENCY: ICD-10-CM

## 2022-07-05 DIAGNOSIS — E78.00 PURE HYPERCHOLESTEROLEMIA: ICD-10-CM

## 2022-07-05 PROBLEM — N18.30 CHRONIC RENAL DISEASE, STAGE III (HCC): Status: ACTIVE | Noted: 2022-07-05

## 2022-07-05 PROCEDURE — 3017F COLORECTAL CA SCREEN DOC REV: CPT | Performed by: INTERNAL MEDICINE

## 2022-07-05 PROCEDURE — G8752 SYS BP LESS 140: HCPCS | Performed by: INTERNAL MEDICINE

## 2022-07-05 PROCEDURE — G0463 HOSPITAL OUTPT CLINIC VISIT: HCPCS | Performed by: INTERNAL MEDICINE

## 2022-07-05 PROCEDURE — 2022F DILAT RTA XM EVC RTNOPTHY: CPT | Performed by: INTERNAL MEDICINE

## 2022-07-05 PROCEDURE — G9899 SCRN MAM PERF RSLTS DOC: HCPCS | Performed by: INTERNAL MEDICINE

## 2022-07-05 PROCEDURE — G8417 CALC BMI ABV UP PARAM F/U: HCPCS | Performed by: INTERNAL MEDICINE

## 2022-07-05 PROCEDURE — 99214 OFFICE O/P EST MOD 30 MIN: CPT | Performed by: INTERNAL MEDICINE

## 2022-07-05 PROCEDURE — 1090F PRES/ABSN URINE INCON ASSESS: CPT | Performed by: INTERNAL MEDICINE

## 2022-07-05 PROCEDURE — G8399 PT W/DXA RESULTS DOCUMENT: HCPCS | Performed by: INTERNAL MEDICINE

## 2022-07-05 PROCEDURE — G8427 DOCREV CUR MEDS BY ELIG CLIN: HCPCS | Performed by: INTERNAL MEDICINE

## 2022-07-05 PROCEDURE — G8536 NO DOC ELDER MAL SCRN: HCPCS | Performed by: INTERNAL MEDICINE

## 2022-07-05 PROCEDURE — G8432 DEP SCR NOT DOC, RNG: HCPCS | Performed by: INTERNAL MEDICINE

## 2022-07-05 PROCEDURE — 1101F PT FALLS ASSESS-DOCD LE1/YR: CPT | Performed by: INTERNAL MEDICINE

## 2022-07-05 PROCEDURE — G8754 DIAS BP LESS 90: HCPCS | Performed by: INTERNAL MEDICINE

## 2022-07-05 PROCEDURE — 3046F HEMOGLOBIN A1C LEVEL >9.0%: CPT | Performed by: INTERNAL MEDICINE

## 2022-07-05 NOTE — PROGRESS NOTES
Fabrizio Hagen  Identified pt with two pt identifiers(name and ). Chief Complaint   Patient presents with    Follow-up     RM19// pt presents today for routine 6 month chk up       1. Have you been to the ER, urgent care clinic since your last visit? Hospitalized since your last visit? NO    2. Have you seen or consulted any other health care providers outside of the [address] [place] [address] since your last visit? Include any pap smears or colon screening. NO      Provider notified of reason for visit, vitals and flowsheets obtained on patients.      Patient received paperwork for advance directive during previous visit but has not completed at this time     Reviewed record In preparation for visit, huddled with provider and have obtained necessary documentation      Health Maintenance Due   Topic    Medicare Yearly Exam     Foot Exam Q1     MICROALBUMIN Q1     Lipid Screen        Wt Readings from Last 3 Encounters:   22 187 lb (84.8 kg)   22 195 lb 9.6 oz (88.7 kg)   22 194 lb (88 kg)     Temp Readings from Last 3 Encounters:   22 98.2 °F (36.8 °C) (Oral)   22 98.3 °F (36.8 °C) (Oral)   22 99 °F (37.2 °C) (Oral)     BP Readings from Last 3 Encounters:   22 138/65   22 136/78   22 124/74     Pulse Readings from Last 3 Encounters:   22 (!) 52   22 62   22 (!) 52     Vitals:    22 1046   BP: 138/65   Pulse: (!) 52   Resp: 12   Temp: 98.2 °F (36.8 °C)   TempSrc: Oral   SpO2: 98%   Weight: 187 lb (84.8 kg)   Height: 5' 0.5\" (1.537 m)   PainSc:   0 - No pain         Learning Assessment:  :     Learning Assessment 2015 2015 8/15/2014   PRIMARY LEARNER - Patient Patient   HIGHEST LEVEL OF EDUCATION - PRIMARY LEARNER  - - 4 YEARS OF COLLEGE   BARRIERS PRIMARY LEARNER - - NONE   CO-LEARNER CAREGIVER - - No   PRIMARY LANGUAGE ENGLISH ENGLISH ENGLISH   LEARNER PREFERENCE PRIMARY - LISTENING DEMONSTRATION   ANSWERED BY - PATIENT self   RELATIONSHIP - SELF SELF       Depression Screening:  :     3 most recent PHQ Screens 3/30/2022   Little interest or pleasure in doing things Not at all   Feeling down, depressed, irritable, or hopeless Not at all   Total Score PHQ 2 0       Fall Risk Assessment:  :     Fall Risk Assessment, last 12 mths 3/30/2022   Able to walk? Yes   Fall in past 12 months? 0   Do you feel unsteady? 0   Are you worried about falling 0       Abuse Screening:  :     Abuse Screening Questionnaire 12/15/2020 6/12/2020 2/19/2020 10/7/2019 9/17/2019 3/21/2019 11/27/2018   Do you ever feel afraid of your partner? N N N N N N N   Are you in a relationship with someone who physically or mentally threatens you? N N N N N N N   Is it safe for you to go home? Y Y Y Y Y Y Y       ADL Screening:  :     ADL Assessment 9/27/2017   Feeding yourself No Help Needed   Getting from bed to chair No Help Needed   Getting dressed No Help Needed   Bathing or showering No Help Needed   Walk across the room (includes cane/walker) No Help Needed   Using the telphone No Help Needed   Taking your medications No Help Needed   Preparing meals No Help Needed   Managing money (expenses/bills) No Help Needed   Moderately strenuous housework (laundry) No Help Needed   Shopping for personal items (toiletries/medicines) No Help Needed   Shopping for groceries No Help Needed   Driving No Help Needed   Climbing a flight of stairs No Help Needed   Getting to places beyond walking distances No Help Needed         Medication reconciliation up to date and corrected with patient at this time.

## 2022-07-06 LAB
25(OH)D3 SERPL-MCNC: 31.2 NG/ML (ref 30–100)
ALBUMIN SERPL-MCNC: 3.6 G/DL (ref 3.5–5)
ALBUMIN/GLOB SERPL: 1 {RATIO} (ref 1.1–2.2)
ALP SERPL-CCNC: 73 U/L (ref 45–117)
ALT SERPL-CCNC: 31 U/L (ref 12–78)
ANION GAP SERPL CALC-SCNC: 8 MMOL/L (ref 5–15)
AST SERPL-CCNC: 25 U/L (ref 15–37)
BASOPHILS # BLD: 0.1 K/UL (ref 0–0.1)
BASOPHILS NFR BLD: 1 % (ref 0–1)
BILIRUB SERPL-MCNC: 0.3 MG/DL (ref 0.2–1)
BUN SERPL-MCNC: 32 MG/DL (ref 6–20)
BUN/CREAT SERPL: 24 (ref 12–20)
CALCIUM SERPL-MCNC: 9.5 MG/DL (ref 8.5–10.1)
CHLORIDE SERPL-SCNC: 111 MMOL/L (ref 97–108)
CHOLEST SERPL-MCNC: 148 MG/DL
CO2 SERPL-SCNC: 20 MMOL/L (ref 21–32)
COMMENT, HOLDF: NORMAL
CREAT SERPL-MCNC: 1.36 MG/DL (ref 0.55–1.02)
CREAT UR-MCNC: 15.4 MG/DL
DIFFERENTIAL METHOD BLD: NORMAL
EOSINOPHIL # BLD: 0.4 K/UL (ref 0–0.4)
EOSINOPHIL NFR BLD: 5 % (ref 0–7)
ERYTHROCYTE [DISTWIDTH] IN BLOOD BY AUTOMATED COUNT: 13.7 % (ref 11.5–14.5)
EST. AVERAGE GLUCOSE BLD GHB EST-MCNC: 131 MG/DL
GLOBULIN SER CALC-MCNC: 3.7 G/DL (ref 2–4)
GLUCOSE SERPL-MCNC: 99 MG/DL (ref 65–100)
HBA1C MFR BLD: 6.2 % (ref 4–5.6)
HCT VFR BLD AUTO: 39.8 % (ref 35–47)
HDLC SERPL-MCNC: 44 MG/DL
HDLC SERPL: 3.4 {RATIO} (ref 0–5)
HGB BLD-MCNC: 12.5 G/DL (ref 11.5–16)
IMM GRANULOCYTES # BLD AUTO: 0 K/UL (ref 0–0.04)
IMM GRANULOCYTES NFR BLD AUTO: 0 % (ref 0–0.5)
LDLC SERPL CALC-MCNC: 56 MG/DL (ref 0–100)
LYMPHOCYTES # BLD: 1.6 K/UL (ref 0.8–3.5)
LYMPHOCYTES NFR BLD: 24 % (ref 12–49)
MCH RBC QN AUTO: 28.9 PG (ref 26–34)
MCHC RBC AUTO-ENTMCNC: 31.4 G/DL (ref 30–36.5)
MCV RBC AUTO: 91.9 FL (ref 80–99)
MICROALBUMIN UR-MCNC: 0.85 MG/DL
MICROALBUMIN/CREAT UR-RTO: 55 MG/G (ref 0–30)
MONOCYTES # BLD: 0.4 K/UL (ref 0–1)
MONOCYTES NFR BLD: 6 % (ref 5–13)
NEUTS SEG # BLD: 4.4 K/UL (ref 1.8–8)
NEUTS SEG NFR BLD: 64 % (ref 32–75)
NRBC # BLD: 0 K/UL (ref 0–0.01)
NRBC BLD-RTO: 0 PER 100 WBC
PLATELET # BLD AUTO: 226 K/UL (ref 150–400)
PMV BLD AUTO: 10.1 FL (ref 8.9–12.9)
POTASSIUM SERPL-SCNC: 5.1 MMOL/L (ref 3.5–5.1)
PROT SERPL-MCNC: 7.3 G/DL (ref 6.4–8.2)
RBC # BLD AUTO: 4.33 M/UL (ref 3.8–5.2)
SAMPLES BEING HELD,HOLD: NORMAL
SODIUM SERPL-SCNC: 139 MMOL/L (ref 136–145)
TRIGL SERPL-MCNC: 240 MG/DL (ref ?–150)
URATE SERPL-MCNC: 7.4 MG/DL (ref 2.6–6)
VLDLC SERPL CALC-MCNC: 48 MG/DL
WBC # BLD AUTO: 6.9 K/UL (ref 3.6–11)

## 2022-07-12 ENCOUNTER — OFFICE VISIT (OUTPATIENT)
Dept: OBGYN CLINIC | Age: 72
End: 2022-07-12

## 2022-07-12 VITALS
SYSTOLIC BLOOD PRESSURE: 145 MMHG | DIASTOLIC BLOOD PRESSURE: 82 MMHG | WEIGHT: 184.8 LBS | BODY MASS INDEX: 35.5 KG/M2 | HEART RATE: 76 BPM

## 2022-07-12 DIAGNOSIS — Z01.419 WELL WOMAN EXAM WITH ROUTINE GYNECOLOGICAL EXAM: Primary | ICD-10-CM

## 2022-07-12 PROCEDURE — G8417 CALC BMI ABV UP PARAM F/U: HCPCS | Performed by: OBSTETRICS & GYNECOLOGY

## 2022-07-12 PROCEDURE — G8754 DIAS BP LESS 90: HCPCS | Performed by: OBSTETRICS & GYNECOLOGY

## 2022-07-12 PROCEDURE — 3017F COLORECTAL CA SCREEN DOC REV: CPT | Performed by: OBSTETRICS & GYNECOLOGY

## 2022-07-12 PROCEDURE — G8753 SYS BP > OR = 140: HCPCS | Performed by: OBSTETRICS & GYNECOLOGY

## 2022-07-12 PROCEDURE — G0101 CA SCREEN;PELVIC/BREAST EXAM: HCPCS | Performed by: OBSTETRICS & GYNECOLOGY

## 2022-07-12 PROCEDURE — 1090F PRES/ABSN URINE INCON ASSESS: CPT | Performed by: OBSTETRICS & GYNECOLOGY

## 2022-07-12 PROCEDURE — G8432 DEP SCR NOT DOC, RNG: HCPCS | Performed by: OBSTETRICS & GYNECOLOGY

## 2022-07-12 PROCEDURE — 1101F PT FALLS ASSESS-DOCD LE1/YR: CPT | Performed by: OBSTETRICS & GYNECOLOGY

## 2022-07-12 NOTE — PROGRESS NOTES
Ruel Long is a ,  74 y.o. [place] [address] [facility] [place] [address] [place]  who presents for her annual checkup. She is menopausal and amenorrheic. She is having no significant problems. Hormone Status:    She is not having vasomotor symptoms. The patient is not using HRT. She has not had any vaginal bleeding. She reports no gynecologic symptoms. Sexual history:    She  reports previously being sexually active. Medical conditions:    Since her last annual GYN exam about one year ago, she has had the following changes in her health history: TVH, TVT vaginal suspension, posterior repair. Surgical history confirmed with patient. has a past surgical history that includes hx meniscus repair (14); hx ovarian cyst removal (); hx hernia repair; hx other surgical (2018); hx cataract removal (Right); hx colonoscopy; hx endoscopy; hx cataract removal (Left, 2021); hx breast biopsy (Left, ); hx cyst incision and drainage (Left); hx dilation and curettage; hx gyn; hx hysterectomy (Bilateral, 10/05/2021); and hx urological.    Pap and Mammogram History:    Her most recent Pap smear was normal obtained 6 year(s) ago. The patient had a mammogram today in our office. Breast Cancer History/Substance Abuse:     She does not have a family history of breast cancer. Osteoporosis History:    Family history does not include a first or second degree relative with osteopenia or osteoporosis. A bone density scan was obtained on 13 and revealed a normal scan. .   She is currently taking calcium and vit D.     Past Medical History:   Diagnosis Date    Adverse effect of anesthesia     WOKE UP IN RR ON VENTILATOR    Anemia     Arthritis     Calculus of kidney     Chronic kidney disease     STAGE 3    Cystocele     Diabetes ([facility] [address]     Diabetic neuropathy, painful (HCC)     GERD (gastroesophageal reflux disease)     Hepatitis B     Hypercholesterolemia     Hypertension     Microalbuminuria     Mild nonproliferative diabetic retinopathy (HCC)     PAF (paroxysmal atrial fibrillation) ([facility] [address]     Chip 3-24-13 ER-Rolf follows    Rectocele     Retinopathy     Rosacea     Ruptured disc, cervical     Sleep apnea     Stenosis, cervical spine      Past Surgical History:   Procedure Laterality Date    HX BREAST BIOPSY Left 2009    HX CATARACT REMOVAL Right     HX CATARACT REMOVAL Left 03/2021    HX COLONOSCOPY      HX CYST INCISION AND DRAINAGE Left     BREAST CYST    HX DILATION AND CURETTAGE      HX ENDOSCOPY      HX GYN      OVARIAN CYST REMOVED    HX HERNIA REPAIR      umbilical    HX HYSTERECTOMY Bilateral 10/05/2021    HX MENISCUS REPAIR  7/11/14    HX OTHER SURGICAL  05/09/2018    Bladder Botox     HX OVARIAN CYST REMOVAL  1973    HX UROLOGICAL       Current Outpatient Medications   Medication Sig Dispense Refill    metoprolol succinate (TOPROL-XL) 50 mg XL tablet Take 1 Tablet by mouth daily. 90 Tablet 3    olmesartan (BENICAR) 40 mg tablet TAKE 1 TABLET BY MOUTH EVERY DAY 30 Tablet 3    amLODIPine (NORVASC) 2.5 mg tablet Take 1 Tablet by mouth daily. 90 Tablet 1    colchicine 0.6 mg tablet Take two tablets at onset of symptoms. Then take one daily until flare resolve. 30 Tablet 1    simvastatin (ZOCOR) 20 mg tablet TAKE 1 TABLET AT BEDTIME FOR CHOLESTEROL 90 Tablet 1    FreeStyle Test strip TEST FASTING BLOOD SUGAR ONE TIME DAILY 100 Strip 11    Cetirizine (ZyrTEC) 10 mg cap Take  by mouth.  lancets (FreeStyle Lancets) 28 gauge misc TEST BLOOD SUGAR ONE TIME DAILY 100 Lancet 3    metFORMIN ER (GLUCOPHAGE XR) 500 mg tablet TAKE 2 TABLETS DAILY WITH DINNER 180 Tablet 3    cranberry fruit extract (CRANBERRY CONCENTRATE PO) Take  by mouth.  diphenoxylate-atropine (LomotiL) 2.5-0.025 mg per tablet Take  by mouth every other day.  cholecalciferol, vitamin D3, (Vitamin D3) 50 mcg (2,000 unit) tab Take  by mouth.       esomeprazole (NEXIUM) 20 mg capsule nightly. 6    omega 3-DHA-EPA-fish oil (FISH OIL) 1,000 mg (120 mg-180 mg) capsule Take 4 Caps by mouth daily.  CALCIUM CARBONATE/VITAMIN D3 (CALCIUM + D PO) Take  by mouth two (2) times a day.  MULTI-VITAMIN PO Take 1 Tab by mouth daily.  aspirin delayed-release 81 mg tablet take 81 mg by mouth daily. Allergies: Sulfa (sulfonamide antibiotics)   Social History     Socioeconomic History    Marital status:      Spouse name: Not on file    Number of children: Not on file    Years of education: Not on file    Highest education level: Not on file   Occupational History    Not on file   Tobacco Use    Smoking status: Never Smoker    Smokeless tobacco: Never Used   Vaping Use    Vaping Use: Never used   Substance and Sexual Activity    Alcohol use: Yes     Alcohol/week: 0.0 standard drinks     Comment: very rarely     Drug use: No    Sexual activity: Not Currently   Other Topics Concern    Not on file   Social History Narrative    Not on file     Social Determinants of Health     Financial Resource Strain:     Difficulty of Paying Living Expenses: Not on file   Food Insecurity:     Worried About Running Out of Food in the Last Year: Not on file    Gomez of Food in the Last Year: Not on file   Transportation Needs:     Lack of Transportation (Medical): Not on file    Lack of Transportation (Non-Medical):  Not on file   Physical Activity:     Days of Exercise per Week: Not on file    Minutes of Exercise per Session: Not on file   Stress:     Feeling of Stress : Not on file   Social Connections:     Frequency of Communication with Friends and Family: Not on file    Frequency of Social Gatherings with Friends and Family: Not on file    Attends Episcopal Services: Not on file    Active Member of Clubs or Organizations: Not on file    Attends Club or Organization Meetings: Not on file    Marital Status: Not on file   Intimate Partner Violence:     Fear of Current or Ex-Partner: Not on file    Emotionally Abused: Not on file    Physically Abused: Not on file    Sexually Abused: Not on file   Housing Stability:     Unable to Pay for Housing in the Last Year: Not on file    Number of Places Lived in the Last Year: Not on file    Unstable Housing in the Last Year: Not on file     Tobacco History:  reports that she has never smoked. She has never used smokeless tobacco.  Alcohol Abuse:  reports current alcohol use. Drug Abuse:  reports no history of drug use. Patient Active Problem List   Diagnosis Code    DM (diabetes mellitus) ([facility] [address] E11.9    Hyperlipidemia E78.5    Hepatitis B B19.10    GERD (gastroesophageal reflux disease) K21.9    Rosacea L71.9    AR (allergic rhinitis) J30.9    Intervertebral disk disease M51.9    Uterine prolapse N81.4    Incontinence R32    Essential hypertension with goal blood pressure less than 130/80 I10    Anemia D64.9    Peripheral neuropathy G62.9    Pre-ulcerative corn or callous L84    Advanced care planning/counseling discussion Z71.89    Type 2 diabetes mellitus with nephropathy (HCC) E11.21    Stage 3 chronic kidney disease (HCC) N18.30    Severe obesity (BMI 35.0-39. 9) with comorbidity (HCC) N61.75    Systolic murmur of aorta N19.4    History of arthritis Z87.39    Foot drop M21.379    Snoring R06.83    RODERCIK (obstructive sleep apnea) G47.33    Primary insomnia F51.01    Insomnia with sleep apnea G47.00, G47.30    Type 2 diabetes mellitus with diabetic neuropathy (HCC) E11.40    Incomplete emptying of bladder R33.9    Overactive bladder N32.81    Urge incontinence of urine N39.41    Vitamin D deficiency E55.9    Incomplete uterovaginal prolapse N81.2    Stress incontinence N39.3    Genital prolapse N81.9    Left ventricular outflow tract obstruction Q24.8    Chronic renal disease, stage III N18.30       Review of Systems - History obtained from the patient  Constitutional: negative for weight loss, fever, night sweats  HEENT: negative for hearing loss, earache, congestion, snoring, sorethroat  CV: negative for chest pain, palpitations, edema  Resp: negative for cough, shortness of breath, wheezing  GI: negative for change in bowel habits, abdominal pain, black or bloody stools  : negative for frequency, dysuria, hematuria, vaginal discharge  MSK: negative for back pain, joint pain, muscle pain  Breast: negative for breast lumps, nipple discharge, galactorrhea  Skin :negative for itching, rash, hives  Neuro: negative for dizziness, headache, confusion, weakness  Psych: negative for anxiety, depression, change in mood  Heme/lymph: negative for bleeding, bruising, pallor    Physical Exam    There were no vitals taken for this visit.   Constitutional  · Appearance: well-nourished, well developed, alert, in no acute distress    HENT  · Head and Face: appears normal    Neck  · Inspection/Palpation: normal appearance, no masses or tenderness  · Lymph Nodes: no lymphadenopathy present    Chest  · Respiratory Effort: breathing normal    Breasts  · Inspection of Breasts: breasts symmetrical, no skin changes, no discharge present, nipple appearance normal, no skin retraction present  · Palpation of Breasts and Axillae: no masses present on palpation, no breast tenderness  · Axillary Lymph Nodes: no lymphadenopathy present    Gastrointestinal  · Abdominal Examination: abdomen non-tender to palpation, normal bowel sounds, no masses present  · Liver and spleen: no hepatomegaly present, spleen not palpable  · Hernias: no hernias identified    Genitourinary  · External Genitalia: normal appearance for age with atrophy, no discharge present, no tenderness present, no inflammatory lesions present, no masses present  · Vagina:atrophic vaginal vault with pale epithelium and flattening of rugae, without central or paravaginal defects, no discharge present, no inflammatory lesions present, no masses present  · Bladder: non-tender to palpation  · Urethra: appears normal  · Cervix: absent   · Uterus: absent  · Adnexa: no adnexal tenderness present, no adnexal masses present  · Perineum: perineum within normal limits, no evidence of trauma, no rashes or skin lesions present  · Anus: anus within normal limits, no hemorrhoids present  · Inguinal Lymph Nodes: no lymphadenopathy present    Skin  · General Inspection: no rash, no lesions identified    Neurologic/Psychiatric  · Mental Status:  · Orientation: grossly oriented to person, place and time  · Mood and Affect: mood normal, affect appropriate    . Assessment:  Routine gynecologic examination  Her current medical status is satisfactory with no evidence of significant gynecologic issues.     Plan:  Counseled re: diet, exercise, healthy lifestyle  Return for yearly wellness visits  Rec annual mammogram

## 2022-07-18 RX ORDER — SIMVASTATIN 20 MG/1
TABLET, FILM COATED ORAL
Qty: 90 TABLET | Refills: 3 | Status: SHIPPED | OUTPATIENT
Start: 2022-07-18

## 2022-08-26 ENCOUNTER — OFFICE VISIT (OUTPATIENT)
Dept: INTERNAL MEDICINE CLINIC | Age: 72
End: 2022-08-26
Payer: MEDICARE

## 2022-08-26 VITALS
DIASTOLIC BLOOD PRESSURE: 62 MMHG | WEIGHT: 186 LBS | TEMPERATURE: 98.1 F | OXYGEN SATURATION: 96 % | HEIGHT: 61 IN | RESPIRATION RATE: 12 BRPM | HEART RATE: 50 BPM | SYSTOLIC BLOOD PRESSURE: 127 MMHG | BODY MASS INDEX: 35.12 KG/M2

## 2022-08-26 DIAGNOSIS — I10 ESSENTIAL HYPERTENSION WITH GOAL BLOOD PRESSURE LESS THAN 130/80: ICD-10-CM

## 2022-08-26 DIAGNOSIS — M79.602 PAIN IN INFERIOR LEFT UPPER EXTREMITY: ICD-10-CM

## 2022-08-26 DIAGNOSIS — M54.12 CERVICAL RADICULOPATHY: Primary | ICD-10-CM

## 2022-08-26 DIAGNOSIS — N18.32 STAGE 3B CHRONIC KIDNEY DISEASE (HCC): ICD-10-CM

## 2022-08-26 DIAGNOSIS — E11.21 TYPE 2 DIABETES MELLITUS WITH NEPHROPATHY (HCC): ICD-10-CM

## 2022-08-26 PROCEDURE — G8427 DOCREV CUR MEDS BY ELIG CLIN: HCPCS | Performed by: INTERNAL MEDICINE

## 2022-08-26 PROCEDURE — G8432 DEP SCR NOT DOC, RNG: HCPCS | Performed by: INTERNAL MEDICINE

## 2022-08-26 PROCEDURE — G8399 PT W/DXA RESULTS DOCUMENT: HCPCS | Performed by: INTERNAL MEDICINE

## 2022-08-26 PROCEDURE — 1101F PT FALLS ASSESS-DOCD LE1/YR: CPT | Performed by: INTERNAL MEDICINE

## 2022-08-26 PROCEDURE — 2022F DILAT RTA XM EVC RTNOPTHY: CPT | Performed by: INTERNAL MEDICINE

## 2022-08-26 PROCEDURE — G0463 HOSPITAL OUTPT CLINIC VISIT: HCPCS | Performed by: INTERNAL MEDICINE

## 2022-08-26 PROCEDURE — G9899 SCRN MAM PERF RSLTS DOC: HCPCS | Performed by: INTERNAL MEDICINE

## 2022-08-26 PROCEDURE — G8754 DIAS BP LESS 90: HCPCS | Performed by: INTERNAL MEDICINE

## 2022-08-26 PROCEDURE — 1090F PRES/ABSN URINE INCON ASSESS: CPT | Performed by: INTERNAL MEDICINE

## 2022-08-26 PROCEDURE — G8752 SYS BP LESS 140: HCPCS | Performed by: INTERNAL MEDICINE

## 2022-08-26 PROCEDURE — 3044F HG A1C LEVEL LT 7.0%: CPT | Performed by: INTERNAL MEDICINE

## 2022-08-26 PROCEDURE — 99214 OFFICE O/P EST MOD 30 MIN: CPT | Performed by: INTERNAL MEDICINE

## 2022-08-26 PROCEDURE — G8536 NO DOC ELDER MAL SCRN: HCPCS | Performed by: INTERNAL MEDICINE

## 2022-08-26 PROCEDURE — 3017F COLORECTAL CA SCREEN DOC REV: CPT | Performed by: INTERNAL MEDICINE

## 2022-08-26 PROCEDURE — G8417 CALC BMI ABV UP PARAM F/U: HCPCS | Performed by: INTERNAL MEDICINE

## 2022-08-26 RX ORDER — PREDNISONE 10 MG/1
10 TABLET ORAL SEE ADMIN INSTRUCTIONS
Qty: 21 TABLET | Refills: 0 | Status: SHIPPED | OUTPATIENT
Start: 2022-08-26 | End: 2022-10-06 | Stop reason: ALTCHOICE

## 2022-08-26 RX ORDER — PREDNISONE 10 MG/1
10 TABLET ORAL SEE ADMIN INSTRUCTIONS
Qty: 21 TABLET | Refills: 0 | Status: SHIPPED | OUTPATIENT
Start: 2022-08-26 | End: 2022-08-26 | Stop reason: SDUPTHER

## 2022-08-26 NOTE — PROGRESS NOTES
Shayla Parkinson is a 74 y.o. female who presents today for Pain (Chronic) (RM19// pt presenting today with (L) arm pain that has been occurring for weeks now, and toe pain on the (L) foot; started with (R) sided head pain this week that comes and goes, similar issue 15 yrs ago found to be related to cervical spine issues)  . She has a history of   Patient Active Problem List   Diagnosis Code    DM (diabetes mellitus) ([facility] [address] E11.9    Hyperlipidemia E78.5    Hepatitis B B19.10    GERD (gastroesophageal reflux disease) K21.9    Rosacea L71.9    AR (allergic rhinitis) J30.9    Intervertebral disk disease M51.9    Uterine prolapse N81.4    Incontinence R32    Essential hypertension with goal blood pressure less than 130/80 I10    Anemia D64.9    Peripheral neuropathy G62.9    Pre-ulcerative corn or callous L84    Advanced care planning/counseling discussion Z71.89    Type 2 diabetes mellitus with nephropathy (HCC) E11.21    Stage 3 chronic kidney disease (HCC) N18.30    Severe obesity (BMI 35.0-39. 9) with comorbidity (HCC) O05.71    Systolic murmur of aorta W48.5    History of arthritis Z87.39    Foot drop M21.379    Snoring R06.83    RODERICK (obstructive sleep apnea) G47.33    Primary insomnia F51.01    Insomnia with sleep apnea G47.00, G47.30    Type 2 diabetes mellitus with diabetic neuropathy (HCC) E11.40    Incomplete emptying of bladder R33.9    Overactive bladder N32.81    Urge incontinence of urine N39.41    Vitamin D deficiency E55.9    Incomplete uterovaginal prolapse N81.2    Stress incontinence N39.3    Genital prolapse N81.9    Left ventricular outflow tract obstruction Q24.8    Chronic renal disease, stage III N18.30   . Today patient is here for an acute visit. Pain: having more L arm pain and discomfort. Has a history of C-spine disease. Has been going on for about one month. Certain positions help. No injury. No CP or SOB. No new LE swelling. DM: sugars are staying low.  Has cut back metformin. Hypertension- stable. No LE edema. Hypertension ROS: taking medications as instructed, no medication side effects noted, no TIA's, no chest pain on exertion, no dyspnea on exertion, no swelling of ankles     reports that she has never smoked. She has never used smokeless tobacco.    reports that she does not currently use alcohol. BP Readings from Last 2 Encounters:   08/26/22 127/62   07/12/22 (!) 145/82         ROS  Review of Systems   Constitutional:  Negative for chills, fever and weight loss. HENT:  Negative for congestion and sore throat. Eyes:  Negative for blurred vision, double vision and photophobia. Respiratory:  Negative for cough and shortness of breath. Cardiovascular:  Negative for chest pain, palpitations and leg swelling. Gastrointestinal:  Negative for abdominal pain, constipation, diarrhea, heartburn, nausea and vomiting. Genitourinary:  Negative for dysuria, frequency and urgency. Musculoskeletal:  Positive for joint pain. Negative for myalgias. Skin:  Negative for rash. Neurological:  Positive for headaches. Endo/Heme/Allergies:  Does not bruise/bleed easily. Psychiatric/Behavioral:  Negative for memory loss and suicidal ideas. Visit Vitals  /62 (BP 1 Location: Left upper arm, BP Patient Position: Sitting, BP Cuff Size: Large adult)   Pulse (!) 50   Temp 98.1 °F (36.7 °C) (Oral)   Resp 12   Ht 5' 0.5\" (1.537 m)   Wt 186 lb (84.4 kg)   SpO2 96%   BMI 35.73 kg/m²       Physical Exam  Constitutional:       Appearance: She is well-developed. HENT:      Head: Normocephalic and atraumatic. Cardiovascular:      Rate and Rhythm: Normal rate and regular rhythm. Heart sounds: No murmur heard. Pulmonary:      Effort: Pulmonary effort is normal. No respiratory distress. Musculoskeletal:      Comments: Normal upper extremity strength testing. No spinal point tenderness over cervical spine. Normal deep tendon reflexes.    Skin:     General: Skin is warm and dry. Neurological:      Mental Status: She is alert and oriented to person, place, and time. Psychiatric:         Behavior: Behavior normal.         Current Outpatient Medications   Medication Sig    simvastatin (ZOCOR) 20 mg tablet TAKE 1 TABLET AT BEDTIME FOR CHOLESTEROL    metoprolol succinate (TOPROL-XL) 50 mg XL tablet Take 1 Tablet by mouth daily. olmesartan (BENICAR) 40 mg tablet TAKE 1 TABLET BY MOUTH EVERY DAY    amLODIPine (NORVASC) 2.5 mg tablet Take 1 Tablet by mouth daily. colchicine 0.6 mg tablet Take two tablets at onset of symptoms. Then take one daily until flare resolve. FreeStyle Test strip TEST FASTING BLOOD SUGAR ONE TIME DAILY    Cetirizine (ZyrTEC) 10 mg cap Take  by mouth.    lancets (FreeStyle Lancets) 28 gauge misc TEST BLOOD SUGAR ONE TIME DAILY    metFORMIN ER (GLUCOPHAGE XR) 500 mg tablet TAKE 2 TABLETS DAILY WITH DINNER (Patient taking differently: TAKE 1 TABLETS DAILY WITH DINNER)    cranberry fruit extract (CRANBERRY CONCENTRATE PO) Take  by mouth. diphenoxylate-atropine (LOMOTIL) 2.5-0.025 mg per tablet Take  by mouth every other day. cholecalciferol, vitamin D3, 50 mcg (2,000 unit) tab Take  by mouth.    esomeprazole (NEXIUM) 20 mg capsule nightly. omega 3-DHA-EPA-fish oil 1,000 mg (120 mg-180 mg) capsule Take 4 Caps by mouth daily. CALCIUM CARBONATE/VITAMIN D3 (CALCIUM + D PO) Take  by mouth two (2) times a day. MULTI-VITAMIN PO Take 1 Tab by mouth daily. aspirin delayed-release 81 mg tablet take 81 mg by mouth daily. No current facility-administered medications for this visit.         Past Medical History:   Diagnosis Date    Adverse effect of anesthesia     WOKE UP IN RR ON VENTILATOR    Anemia     Arthritis     Calculus of kidney     Chronic kidney disease     STAGE 3    Cystocele     Diabetes (HCC)     Diabetic neuropathy, painful (HCC)     GERD (gastroesophageal reflux disease)     Gout 2022    Hepatitis B Hypercholesterolemia     Hypertension     Microalbuminuria     Mild nonproliferative diabetic retinopathy (HCC)     PAF (paroxysmal atrial fibrillation) ([facility] [address]     Chip 3-24-13 ER-Rolf follows    Rectocele     Retinopathy     Rosacea     Ruptured disc, cervical     Sleep apnea     Stenosis, cervical spine       Past Surgical History:   Procedure Laterality Date    HX BREAST BIOPSY Left     HX CATARACT REMOVAL Right     HX CATARACT REMOVAL Left 2021    HX COLONOSCOPY      HX CYST INCISION AND DRAINAGE Left     BREAST CYST    HX DILATION AND CURETTAGE      HX ENDOSCOPY      HX GYN      OVARIAN CYST REMOVED    HX HERNIA REPAIR      umbilical    HX HYSTERECTOMY Bilateral 10/05/2021    HX MENISCUS REPAIR  2014    HX ORTHOPAEDIC  2014    Torn miniscus repair    HX OTHER SURGICAL  2018    Bladder Botox     HX OVARIAN CYST REMOVAL  1973    HX UROLOGICAL        Social History     Tobacco Use    Smoking status: Never    Smokeless tobacco: Never   Substance Use Topics    Alcohol use: Not Currently     Comment: Very rarely      Family History   Problem Relation Age of Onset    Hypertension Mother     Thyroid Disease Mother     Stroke Mother         TIA    Heart Disease Father          at age 58 after an MI    Thyroid Disease Sister     Hypertension Sister     Eczema Sister     Asthma Sister     Other Sister         MENTAL HEALTH ISSUES    Psychiatric Disorder Sister         Borderline Personality Disorder    Heart Attack Other     Arthritis Sister     OSTEOARTHRITIS Sister     Hypertension Sister     High Cholesterol Sister     Cancer Sister         Skin cancer    Arthritis Sister     OSTEOARTHRITIS Sister     Elevated Lipids Sister     Stroke Sister         Mild stroke    Cancer Maternal Aunt         Breast Cancer    Breast Cancer Maternal Aunt     Cancer Paternal Aunt         breast    OSTEOARTHRITIS Sister     Asthma Sister     Diabetes Sister         Prediabetes    Elevated Lipids Sister Hypertension Sister     Anesth Problems Neg Hx         Allergies   Allergen Reactions    Sulfa (Sulfonamide Antibiotics) Unknown (comments)     TOPICAL EYE / EYE MUCH WORSE        Assessment/Plan  Diagnoses and all orders for this visit:    1. Cervical radiculopathy-more than likely the reason for her left arm pain as well as her mild headache. Will place on a short course of steroids given underlying chronic kidney disease. Patient to monitor blood sugars, but discussed that I would expect them to be elevated while on the steroids. Patient to let us know if things do not improve by next week. Low threshold for further evaluation if this persists  -     predniSONE (STERAPRED DS) 10 mg dose pack; Take 1 Tablet by mouth See Admin Instructions. See administration instruction per 10mg dose pack    2. Pain in inferior left upper extremity  -     predniSONE (STERAPRED DS) 10 mg dose pack; Take 1 Tablet by mouth See Admin Instructions. See administration instruction per 10mg dose pack    3. Type 2 diabetes mellitus with nephropathy (HCC)-has been well controlled    4. Stage 3b chronic kidney disease ([facility] [address]    5. Essential hypertension with goal blood pressure less than 130/80-very well controlled          Jerry Campos MD  8/26/2022    This note was created with the help of speech recognition software Viki Gutiérrez) and may contain some 'sound alike' errors.

## 2022-08-26 NOTE — PROGRESS NOTES
Fabrizio Hagen  Identified pt with two pt identifiers(name and ). Chief Complaint   Patient presents with    Pain (Chronic)     RM19// pt presenting today with (L) arm pain that has been occurring for weeks now, and toe pain on the (L) foot; started with (R) sided head pain this week that comes and goes, similar issue 15 yrs ago found to be related to cervical spine issues       1. Have you been to the ER, urgent care clinic since your last visit? Hospitalized since your last visit? NO    2. Have you seen or consulted any other health care providers outside of the [address] [place] [address] since your last visit? Include any pap smears or colon screening. NO      Provider notified of reason for visit, vitals and flowsheets obtained on patients.      Patient received paperwork for advance directive during previous visit but has not completed at this time     Reviewed record In preparation for visit, huddled with provider and have obtained necessary documentation      Health Maintenance Due   Topic    Medicare Yearly Exam        Wt Readings from Last 3 Encounters:   22 186 lb (84.4 kg)   22 184 lb 12.8 oz (83.8 kg)   22 187 lb (84.8 kg)     Temp Readings from Last 3 Encounters:   22 98.1 °F (36.7 °C) (Oral)   22 98.2 °F (36.8 °C) (Oral)   22 98.3 °F (36.8 °C) (Oral)     BP Readings from Last 3 Encounters:   22 127/62   22 (!) 145/82   22 138/65     Pulse Readings from Last 3 Encounters:   22 (!) 50   22 76   22 (!) 52     Vitals:    22 1034   BP: 127/62   Pulse: (!) 50   Resp: 12   Temp: 98.1 °F (36.7 °C)   TempSrc: Oral   SpO2: 96%   Weight: 186 lb (84.4 kg)   Height: 5' 0.5\" (1.537 m)   PainSc:   2   PainLoc: Arm         Learning Assessment:  :     Learning Assessment 2015 2015 8/15/2014   PRIMARY LEARNER - Patient Patient   HIGHEST LEVEL OF EDUCATION - PRIMARY LEARNER  - - 4 YEARS OF COLLEGE   BARRIERS PRIMARY LEARNER - - NONE   CO-LEARNER CAREGIVER - - No   PRIMARY LANGUAGE ENGLISH ENGLISH ENGLISH   LEARNER PREFERENCE PRIMARY - LISTENING DEMONSTRATION   ANSWERED BY - PATIENT self   RELATIONSHIP - SELF SELF       Depression Screening:  :     3 most recent PHQ Screens 3/30/2022   Little interest or pleasure in doing things Not at all   Feeling down, depressed, irritable, or hopeless Not at all   Total Score PHQ 2 0       Fall Risk Assessment:  :     Fall Risk Assessment, last 12 mths 3/30/2022   Able to walk? Yes   Fall in past 12 months? 0   Do you feel unsteady? 0   Are you worried about falling 0       Abuse Screening:  :     Abuse Screening Questionnaire 12/15/2020 6/12/2020 2/19/2020 10/7/2019 9/17/2019 3/21/2019 11/27/2018   Do you ever feel afraid of your partner? N N N N N N N   Are you in a relationship with someone who physically or mentally threatens you? N N N N N N N   Is it safe for you to go home? Y Y Y Y Y Y Y       ADL Screening:  :     ADL Assessment 9/27/2017   Feeding yourself No Help Needed   Getting from bed to chair No Help Needed   Getting dressed No Help Needed   Bathing or showering No Help Needed   Walk across the room (includes cane/walker) No Help Needed   Using the telphone No Help Needed   Taking your medications No Help Needed   Preparing meals No Help Needed   Managing money (expenses/bills) No Help Needed   Moderately strenuous housework (laundry) No Help Needed   Shopping for personal items (toiletries/medicines) No Help Needed   Shopping for groceries No Help Needed   Driving No Help Needed   Climbing a flight of stairs No Help Needed   Getting to places beyond walking distances No Help Needed         Medication reconciliation up to date and corrected with patient at this time.

## 2022-09-28 RX ORDER — AMLODIPINE BESYLATE 2.5 MG/1
TABLET ORAL
Qty: 90 TABLET | Refills: 3 | Status: SHIPPED | OUTPATIENT
Start: 2022-09-28

## 2022-09-30 RX ORDER — HYDROGEN PEROXIDE 3 %
20 SOLUTION, NON-ORAL MISCELLANEOUS
Qty: 90 CAPSULE | Refills: 1 | Status: SHIPPED | OUTPATIENT
Start: 2022-09-30

## 2022-10-05 RX ORDER — OLMESARTAN MEDOXOMIL 40 MG/1
TABLET ORAL
Qty: 90 TABLET | Refills: 3 | Status: SHIPPED | OUTPATIENT
Start: 2022-10-05

## 2022-10-06 ENCOUNTER — OFFICE VISIT (OUTPATIENT)
Dept: INTERNAL MEDICINE CLINIC | Age: 72
End: 2022-10-06
Payer: MEDICARE

## 2022-10-06 VITALS
OXYGEN SATURATION: 98 % | SYSTOLIC BLOOD PRESSURE: 127 MMHG | TEMPERATURE: 97.9 F | BODY MASS INDEX: 35.45 KG/M2 | HEART RATE: 51 BPM | WEIGHT: 187.8 LBS | RESPIRATION RATE: 12 BRPM | HEIGHT: 61 IN | DIASTOLIC BLOOD PRESSURE: 60 MMHG

## 2022-10-06 DIAGNOSIS — M79.602 PAIN IN INFERIOR LEFT UPPER EXTREMITY: ICD-10-CM

## 2022-10-06 DIAGNOSIS — M50.30 DDD (DEGENERATIVE DISC DISEASE), CERVICAL: ICD-10-CM

## 2022-10-06 DIAGNOSIS — I10 ESSENTIAL HYPERTENSION WITH GOAL BLOOD PRESSURE LESS THAN 130/80: ICD-10-CM

## 2022-10-06 DIAGNOSIS — Z23 NEEDS FLU SHOT: ICD-10-CM

## 2022-10-06 DIAGNOSIS — M54.12 CERVICAL RADICULOPATHY: Primary | ICD-10-CM

## 2022-10-06 PROCEDURE — G8536 NO DOC ELDER MAL SCRN: HCPCS | Performed by: INTERNAL MEDICINE

## 2022-10-06 PROCEDURE — G8399 PT W/DXA RESULTS DOCUMENT: HCPCS | Performed by: INTERNAL MEDICINE

## 2022-10-06 PROCEDURE — G8754 DIAS BP LESS 90: HCPCS | Performed by: INTERNAL MEDICINE

## 2022-10-06 PROCEDURE — G8432 DEP SCR NOT DOC, RNG: HCPCS | Performed by: INTERNAL MEDICINE

## 2022-10-06 PROCEDURE — 99213 OFFICE O/P EST LOW 20 MIN: CPT | Performed by: INTERNAL MEDICINE

## 2022-10-06 PROCEDURE — 3017F COLORECTAL CA SCREEN DOC REV: CPT | Performed by: INTERNAL MEDICINE

## 2022-10-06 PROCEDURE — 1090F PRES/ABSN URINE INCON ASSESS: CPT | Performed by: INTERNAL MEDICINE

## 2022-10-06 PROCEDURE — 90694 VACC AIIV4 NO PRSRV 0.5ML IM: CPT | Performed by: INTERNAL MEDICINE

## 2022-10-06 PROCEDURE — G8752 SYS BP LESS 140: HCPCS | Performed by: INTERNAL MEDICINE

## 2022-10-06 PROCEDURE — G9899 SCRN MAM PERF RSLTS DOC: HCPCS | Performed by: INTERNAL MEDICINE

## 2022-10-06 PROCEDURE — G8427 DOCREV CUR MEDS BY ELIG CLIN: HCPCS | Performed by: INTERNAL MEDICINE

## 2022-10-06 PROCEDURE — G0463 HOSPITAL OUTPT CLINIC VISIT: HCPCS | Performed by: INTERNAL MEDICINE

## 2022-10-06 PROCEDURE — G8417 CALC BMI ABV UP PARAM F/U: HCPCS | Performed by: INTERNAL MEDICINE

## 2022-10-06 PROCEDURE — 1101F PT FALLS ASSESS-DOCD LE1/YR: CPT | Performed by: INTERNAL MEDICINE

## 2022-10-06 RX ORDER — PREDNISONE 10 MG/1
10 TABLET ORAL SEE ADMIN INSTRUCTIONS
Qty: 21 TABLET | Refills: 0 | Status: SHIPPED | OUTPATIENT
Start: 2022-10-06 | End: 2022-10-10

## 2022-10-06 NOTE — PROGRESS NOTES
Tasha Colvin is a 74 y.o. female who presents today for Arm Pain (RM19// pt presents today for (L) arm pain that has been on/off x 2-3 months lately had a really rough bout)  . She has a history of   Patient Active Problem List   Diagnosis Code    DM (diabetes mellitus) ([facility] [address] E11.9    Hyperlipidemia E78.5    Hepatitis B B19.10    GERD (gastroesophageal reflux disease) K21.9    Rosacea L71.9    AR (allergic rhinitis) J30.9    Intervertebral disk disease M51.9    Uterine prolapse N81.4    Incontinence R32    Essential hypertension with goal blood pressure less than 130/80 I10    Anemia D64.9    Peripheral neuropathy G62.9    Pre-ulcerative corn or callous L84    Advanced care planning/counseling discussion Z71.89    Type 2 diabetes mellitus with nephropathy (HCC) E11.21    Stage 3 chronic kidney disease (HCC) N18.30    Severe obesity (BMI 35.0-39. 9) with comorbidity (HCC) B59.86    Systolic murmur of aorta R44.9    History of arthritis Z87.39    Foot drop M21.379    Snoring R06.83    RODERICK (obstructive sleep apnea) G47.33    Primary insomnia F51.01    Insomnia with sleep apnea G47.00, G47.30    Type 2 diabetes mellitus with diabetic neuropathy (HCC) E11.40    Incomplete emptying of bladder R33.9    Overactive bladder N32.81    Urge incontinence of urine N39.41    Vitamin D deficiency E55.9    Incomplete uterovaginal prolapse N81.2    Stress incontinence N39.3    Genital prolapse N81.9    Left ventricular outflow tract obstruction Q24.8    Chronic renal disease, stage III N18.30   . Today patient is here for an acute visit. .     Cervial neck pain/arm pain: Patient was last seen by me in August that she was experiencing some cervical radiculopathy type pain. We placed her on a short prednisone taper given her renal function and this did improve her symptoms. Since she reports continued mild to moderate symptoms, but has been very painful since Monday. Reports continued radicular symptoms down left arm.   She has had an MRI but this was 15 years ago. Does have a history of degenerative disc disease in cervical spine. Hypertension- stalbe. Hypertension ROS: taking medications as instructed, no medication side effects noted, no TIA's, no chest pain on exertion, no dyspnea on exertion, no swelling of ankles     reports that she has never smoked. She has never used smokeless tobacco.    reports that she does not currently use alcohol. BP Readings from Last 2 Encounters:   10/06/22 127/60   08/26/22 127/62         ROS  Review of Systems   Constitutional:  Negative for chills, fever and weight loss. HENT:  Negative for congestion and sore throat. Eyes:  Negative for blurred vision, double vision and photophobia. Respiratory:  Negative for cough and shortness of breath. Cardiovascular:  Negative for chest pain, palpitations and leg swelling. Gastrointestinal:  Negative for abdominal pain, constipation, diarrhea, heartburn, nausea and vomiting. Genitourinary:  Negative for dysuria, frequency and urgency. Musculoskeletal:  Positive for neck pain. Negative for joint pain and myalgias. Skin:  Negative for rash. Neurological: Negative. Negative for headaches. Endo/Heme/Allergies:  Does not bruise/bleed easily. Psychiatric/Behavioral:  Negative for memory loss and suicidal ideas. Visit Vitals  /60 (BP 1 Location: Left upper arm, BP Patient Position: Sitting, BP Cuff Size: Large adult)   Pulse (!) 51   Temp 97.9 °F (36.6 °C) (Oral)   Resp 12   Ht 5' 0.5\" (1.537 m)   Wt 187 lb 12.8 oz (85.2 kg)   SpO2 98%   BMI 36.07 kg/m²       Physical Exam  Constitutional:       Appearance: She is well-developed. HENT:      Head: Normocephalic and atraumatic. Cardiovascular:      Rate and Rhythm: Normal rate and regular rhythm. Heart sounds: No murmur heard. Pulmonary:      Effort: Pulmonary effort is normal. No respiratory distress.    Musculoskeletal:      Comments: Noted to shoulder, but upper extremity and cervical spine exam normal.  No signs of muscle wasting. Normal deep tendon reflexes. Skin:     General: Skin is warm and dry. Neurological:      Mental Status: She is alert and oriented to person, place, and time. Psychiatric:         Behavior: Behavior normal.         Current Outpatient Medications   Medication Sig    predniSONE (STERAPRED DS) 10 mg dose pack Take 1 Tablet by mouth See Admin Instructions. See administration instruction per 10mg dose pack    olmesartan (BENICAR) 40 mg tablet TAKE 1 TABLET DAILY    esomeprazole (NEXIUM) 20 mg capsule Take 1 Capsule by mouth nightly. amLODIPine (NORVASC) 2.5 mg tablet TAKE 1 TABLET DAILY    simvastatin (ZOCOR) 20 mg tablet TAKE 1 TABLET AT BEDTIME FOR CHOLESTEROL    metoprolol succinate (TOPROL-XL) 50 mg XL tablet Take 1 Tablet by mouth daily. colchicine 0.6 mg tablet Take two tablets at onset of symptoms. Then take one daily until flare resolve. FreeStyle Test strip TEST FASTING BLOOD SUGAR ONE TIME DAILY    Cetirizine (ZyrTEC) 10 mg cap Take  by mouth.    lancets (FreeStyle Lancets) 28 gauge misc TEST BLOOD SUGAR ONE TIME DAILY    metFORMIN ER (GLUCOPHAGE XR) 500 mg tablet TAKE 2 TABLETS DAILY WITH DINNER (Patient taking differently: No sig reported)    cranberry fruit extract (CRANBERRY CONCENTRATE PO) Take  by mouth. diphenoxylate-atropine (LOMOTIL) 2.5-0.025 mg per tablet Take  by mouth every other day. cholecalciferol, vitamin D3, 50 mcg (2,000 unit) tab Take  by mouth. omega 3-DHA-EPA-fish oil 1,000 mg (120 mg-180 mg) capsule Take 4 Caps by mouth daily. CALCIUM CARBONATE/VITAMIN D3 (CALCIUM + D PO) Take  by mouth two (2) times a day. MULTI-VITAMIN PO Take 1 Tab by mouth daily. aspirin delayed-release 81 mg tablet take 81 mg by mouth daily. No current facility-administered medications for this visit.         Past Medical History:   Diagnosis Date    Adverse effect of anesthesia     WOKE UP IN RR ON VENTILATOR Anemia     Arthritis     Calculus of kidney     Chronic kidney disease     STAGE 3    Cystocele     Diabetes (HCC)     Diabetic neuropathy, painful (HCC)     GERD (gastroesophageal reflux disease)     Gout     Hepatitis B     Hypercholesterolemia     Hypertension     Microalbuminuria     Mild nonproliferative diabetic retinopathy (HCC)     PAF (paroxysmal atrial fibrillation) ([facility] [address]     [facility] 3-24[facility]13 ER-[facility] follows    Rectocele     Retinopathy     Rosacea     Ruptured disc, cervical     Sleep apnea     Stenosis, cervical spine       Past Surgical History:   Procedure Laterality Date    HX BREAST BIOPSY Left     HX CATARACT REMOVAL Right     HX CATARACT REMOVAL Left 2021    HX COLONOSCOPY      HX CYST INCISION AND DRAINAGE Left     BREAST CYST    HX DILATION AND CURETTAGE      HX ENDOSCOPY      HX GYN      OVARIAN CYST REMOVED    HX HERNIA REPAIR      umbilical    HX HYSTERECTOMY Bilateral 10/05/2021    HX MENISCUS REPAIR  2014    HX ORTHOPAEDIC  2014    Torn miniscus repair    HX OTHER SURGICAL  2018    Bladder Botox     HX OVARIAN CYST REMOVAL  1973    HX UROLOGICAL        Social History     Tobacco Use    Smoking status: Never    Smokeless tobacco: Never   Substance Use Topics    Alcohol use: Not Currently     Comment: Very rarely      Family History   Problem Relation Age of Onset    Hypertension Mother     Thyroid Disease Mother     Stroke Mother         TIA    Heart Disease Father          at age 58 after an MI    Thyroid Disease Sister     Hypertension Sister     Eczema Sister     Asthma Sister     Other Sister         MENTAL HEALTH ISSUES    Psychiatric Disorder Sister         Borderline Personality Disorder    Heart Attack Other     Arthritis Sister     OSTEOARTHRITIS Sister     Hypertension Sister     High Cholesterol Sister     Cancer Sister         Skin cancer    Arthritis Sister     OSTEOARTHRITIS Sister     Elevated Lipids Sister     Stroke Sister         Mild stroke Cancer Maternal Aunt         Breast Cancer    Breast Cancer Maternal Aunt     Cancer Paternal Aunt         breast    OSTEOARTHRITIS Sister     Asthma Sister     Diabetes Sister         Prediabetes    Elevated Lipids Sister     Hypertension Sister     Anesth Problems Neg Hx         Allergies   Allergen Reactions    Sulfa (Sulfonamide Antibiotics) Unknown (comments)     TOPICAL EYE / EYE MUCH WORSE        Assessment/Plan  Diagnoses and all orders for this visit:    1. Cervical radiculopathy-recurrence after steroid, and reports that this is never fully resolved. She does have a history of DDD of cervical spine and has had an MRI about 15 years ago. Given ongoing symptoms we will get MRI of cervical spine. Patient to repeat prednisone Dosepak if necessary.   -     MRI CERV SPINE WO CONT; Future  -     predniSONE (STERAPRED DS) 10 mg dose pack; Take 1 Tablet by mouth See Admin Instructions. See administration instruction per 10mg dose pack    2. Needs flu shot  -     INFLUENZA, FLUAD, (AGE 65 Y+), IM, PF, 0.5 ML  -     GA IMMUNIZ ADMIN,1 SINGLE/COMB VAC/TOXOID    3. Pain in inferior left upper extremity  -     MRI CERV SPINE WO CONT; Future  -     predniSONE (STERAPRED DS) 10 mg dose pack; Take 1 Tablet by mouth See Admin Instructions. See administration instruction per 10mg dose pack    4. Essential hypertension with goal blood pressure less than 130/80- stable. 5. DDD (degenerative disc disease), cervical          Michelle Dutton MD  10/6/2022    This note was created with the help of speech recognition software [employer]) and may contain some 'sound alike' errors.

## 2022-10-06 NOTE — PROGRESS NOTES
Kiera Toth  Identified pt with two pt identifiers(name and ). Chief Complaint   Patient presents with    Arm Pain     RM19// pt presents today for (L) arm pain that has been on/off x 2-3 months lately had a really rough bout       1. Have you been to the ER, urgent care clinic since your last visit? Hospitalized since your last visit? NO    2. Have you seen or consulted any other health care providers outside of the [address] [place] [address] since your last visit? Include any pap smears or colon screening. NO      Provider notified of reason for visit, vitals and flowsheets obtained on patients.      Patient received paperwork for advance directive during previous visit but has not completed at this time     Reviewed record In preparation for visit, huddled with provider and have obtained necessary documentation      Health Maintenance Due   Topic    Medicare Yearly Exam     Flu Vaccine (1)       Wt Readings from Last 3 Encounters:   10/06/22 187 lb 12.8 oz (85.2 kg)   22 186 lb (84.4 kg)   22 184 lb 12.8 oz (83.8 kg)     Temp Readings from Last 3 Encounters:   10/06/22 97.9 °F (36.6 °C) (Oral)   22 98.1 °F (36.7 °C) (Oral)   22 98.2 °F (36.8 °C) (Oral)     BP Readings from Last 3 Encounters:   10/06/22 127/60   22 127/62   22 (!) 145/82     Pulse Readings from Last 3 Encounters:   10/06/22 (!) 51   22 (!) 50   22 76     Vitals:    10/06/22 1211   BP: 127/60   Pulse: (!) 51   Resp: 12   Temp: 97.9 °F (36.6 °C)   TempSrc: Oral   SpO2: 98%   Weight: 187 lb 12.8 oz (85.2 kg)   Height: 5' 0.5\" (1.537 m)   PainSc:   2   PainLoc: Arm         Learning Assessment:  :     Learning Assessment 2015 2015 8/15/2014   PRIMARY LEARNER - Patient Patient   HIGHEST LEVEL OF EDUCATION - PRIMARY LEARNER  - - 4 [place] LEARNER - - NONE   CO-LEARNER CAREGIVER - - No   PRIMARY LANGUAGE ENGLISH ENGLISH ENGLISH   LEARNER PREFERENCE PRIMARY - LISTENING DEMONSTRATION   ANSWERED BY - PATIENT self   RELATIONSHIP - SELF SELF       Depression Screening:  :     3 most recent PHQ Screens 3/30/2022   Little interest or pleasure in doing things Not at all   Feeling down, depressed, irritable, or hopeless Not at all   Total Score PHQ 2 0       Fall Risk Assessment:  :     Fall Risk Assessment, last 12 mths 3/30/2022   Able to walk? Yes   Fall in past 12 months? 0   Do you feel unsteady? 0   Are you worried about falling 0       Abuse Screening:  :     Abuse Screening Questionnaire 12/15/2020 6/12/2020 2/19/2020 10/7/2019 9/17/2019 3/21/2019 11/27/2018   Do you ever feel afraid of your partner? N N N N N N N   Are you in a relationship with someone who physically or mentally threatens you? N N N N N N N   Is it safe for you to go home? Y Y Y Y Y Y Y       ADL Screening:  :     ADL Assessment 9/27/2017   Feeding yourself No Help Needed   Getting from bed to chair No Help Needed   Getting dressed No Help Needed   Bathing or showering No Help Needed   Walk across the room (includes cane/walker) No Help Needed   Using the telphone No Help Needed   Taking your medications No Help Needed   Preparing meals No Help Needed   Managing money (expenses/bills) No Help Needed   Moderately strenuous housework (laundry) No Help Needed   Shopping for personal items (toiletries/medicines) No Help Needed   Shopping for groceries No Help Needed   Driving No Help Needed   Climbing a flight of stairs No Help Needed   Getting to places beyond walking distances No Help Needed         Medication reconciliation up to date and corrected with patient at this time.

## 2022-10-10 ENCOUNTER — OFFICE VISIT (OUTPATIENT)
Dept: CARDIOLOGY CLINIC | Age: 72
End: 2022-10-10
Payer: MEDICARE

## 2022-10-10 VITALS
OXYGEN SATURATION: 99 % | SYSTOLIC BLOOD PRESSURE: 132 MMHG | HEIGHT: 61 IN | HEART RATE: 52 BPM | DIASTOLIC BLOOD PRESSURE: 82 MMHG | BODY MASS INDEX: 35.5 KG/M2 | WEIGHT: 188 LBS

## 2022-10-10 DIAGNOSIS — Q24.8 LEFT VENTRICULAR OUTFLOW TRACT OBSTRUCTION: ICD-10-CM

## 2022-10-10 DIAGNOSIS — I48.0 PAF (PAROXYSMAL ATRIAL FIBRILLATION) (HCC): Primary | ICD-10-CM

## 2022-10-10 DIAGNOSIS — I10 ESSENTIAL HYPERTENSION: ICD-10-CM

## 2022-10-10 DIAGNOSIS — E78.00 PURE HYPERCHOLESTEROLEMIA: ICD-10-CM

## 2022-10-10 PROCEDURE — G8752 SYS BP LESS 140: HCPCS | Performed by: NURSE PRACTITIONER

## 2022-10-10 PROCEDURE — G0463 HOSPITAL OUTPT CLINIC VISIT: HCPCS | Performed by: NURSE PRACTITIONER

## 2022-10-10 PROCEDURE — G8536 NO DOC ELDER MAL SCRN: HCPCS | Performed by: NURSE PRACTITIONER

## 2022-10-10 PROCEDURE — G8754 DIAS BP LESS 90: HCPCS | Performed by: NURSE PRACTITIONER

## 2022-10-10 PROCEDURE — 1123F ACP DISCUSS/DSCN MKR DOCD: CPT | Performed by: NURSE PRACTITIONER

## 2022-10-10 PROCEDURE — 3017F COLORECTAL CA SCREEN DOC REV: CPT | Performed by: NURSE PRACTITIONER

## 2022-10-10 PROCEDURE — 99214 OFFICE O/P EST MOD 30 MIN: CPT | Performed by: NURSE PRACTITIONER

## 2022-10-10 PROCEDURE — 93010 ELECTROCARDIOGRAM REPORT: CPT | Performed by: NURSE PRACTITIONER

## 2022-10-10 PROCEDURE — G8417 CALC BMI ABV UP PARAM F/U: HCPCS | Performed by: NURSE PRACTITIONER

## 2022-10-10 PROCEDURE — 93005 ELECTROCARDIOGRAM TRACING: CPT | Performed by: NURSE PRACTITIONER

## 2022-10-10 PROCEDURE — G9899 SCRN MAM PERF RSLTS DOC: HCPCS | Performed by: NURSE PRACTITIONER

## 2022-10-10 PROCEDURE — 1090F PRES/ABSN URINE INCON ASSESS: CPT | Performed by: NURSE PRACTITIONER

## 2022-10-10 PROCEDURE — 1101F PT FALLS ASSESS-DOCD LE1/YR: CPT | Performed by: NURSE PRACTITIONER

## 2022-10-10 PROCEDURE — G8399 PT W/DXA RESULTS DOCUMENT: HCPCS | Performed by: NURSE PRACTITIONER

## 2022-10-10 PROCEDURE — G8432 DEP SCR NOT DOC, RNG: HCPCS | Performed by: NURSE PRACTITIONER

## 2022-10-10 PROCEDURE — G8427 DOCREV CUR MEDS BY ELIG CLIN: HCPCS | Performed by: NURSE PRACTITIONER

## 2022-10-10 NOTE — PROGRESS NOTES
Room: [facility] on Friday     Visit Vitals  /82 (BP 1 Location: Left upper arm, BP Patient Position: Sitting, BP Cuff Size: Large adult)   Pulse (!) 52   Ht 5' 0.5\" (1.537 m)   Wt 188 lb (85.3 kg)   SpO2 99%   BMI 36.11 kg/m²         Chest pain: no  Shortness of breath: no  Dizziness: no  Palpitations/Racing Heart: no  Swelling: no    New diagnosis/Surgeries since your last visit: no    Hospitalizations since your last visit: no    Refills: no

## 2022-10-10 NOTE — PROGRESS NOTES
JAVIER Oakley  [address] [place]  [place], [address] [facility]    Office [phone]  Fax [phone]        Reagan Whaley is a 74 y.o. female. Patient is here today for follow-up     Assessment  Encounter Diagnoses   Name Primary? PAF (paroxysmal atrial fibrillation) ([address] Yes    Left ventricular outflow tract obstruction     Essential hypertension     Pure hypercholesterolemia      Recommendations:  PAF (paroxysmal atrial fibrillation) ([address]  - prev symptomatic with high HR- Episode in 2013.    - not on anticoag - aware of stroke risk - on asa only - will monitor for recurrence   - on Toprol XL    Cardiac Murmur / Left Ventricular Outflow Tract Obstruction   - The left ventricular outflow tract mean gradient is 7.9 mmHg. The left ventricular outflow tract peak gradient is 21.3 mmHg. - repeat echo next visit      Essential hypertension - normotensive on current meds    RODERICK - compliant with CPAP     Pure hypercholesterolemia  - LDL at goal on simvastatin  Lab Results   Component Value Date/Time    LDL, calculated 56 07/05/2022 11:55 AM     Fu in 6mo or PRN - repeat echo same day       Patient understands the plan. All questions were answered to the patient's satisfaction. Medication Side Effects and Warnings were discussed with patient: yes  Patient Labs were reviewed and or requested:  yes  Patient Past Records were reviewed and or requested: yes     I appreciate the opportunity to be involved in patient's care. Please do not hesitate to contact us with questions or concerns. Subjective:  Pt here for fu of afib. Prev symptomatic (dx yrs ago). No recent c/o or obvious recurrence. Patient denies any exertional chest pain, breathing changes / issues, palpitations, syncope, edema or paroxysmal nocturnal dyspnea. SOB if overexerting. Stable. Hx of ARISTEO on norvasc - doing much better on lower dose. Hm BP 120s/70s mostly.       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 Biplane method used to measure ejection fraction. Normal cavity size and systolic function (ejection fraction normal). Borderline hypertrophy. Inconclusive left ventricular diastolic function. · The left ventricular outflow tract mean gradient is 7.9 mmHg. The left ventricular outflow tract peak gradient is 21.3 mmHg. · LA: Mildly dilated left atrium. · AO: Mild ascending aorta dilatation. Ascending aorta diameter = 3.7 cm. · Coronary sinus dilated. 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 · PA: Pulmonary arterial systolic pressure is 32 mmHg. Signed by: Jyotsna Giraldo MD on 4/18/2021  6:47 PM      04/14/21    NUCLEAR CARDIAC STRESS TEST 04/18/2021 4/20/2021    Interpretation Summary  · SPECT: Left ventricular function post-stress was normal. Calculated ejection fraction is 73%. There is no evidence of transient ischemic dilation (TID). · Baseline ECG: Normal sinus rhythm.   · SPECT: Left ventricular perfusion is normal. Myocardial perfusion imaging supports a low risk stress test.    within normal limits    Signed by: Jyotsna Giraldo MD on 4/18/2021  5:22 PM    In clinical trial= REDUCE-IT Study- \"A Multi-Center, Prospective, Randomized, Double-Blind, Placebo-Controlled, Parallel-Group Study to Evaluate the Effect of DGF438 on Cardiovascular Health and Mortality in Hypertriglyceridemic Patients with Cardiovascular Disease or at [address] for Cardiovascular Disease: REDUCE-IT (Reduction of Cardiovascular Events with EPA- Intervention Trial)     [facility]  Echo 2-5-13= normal  [facility] in 13,15,18 [facility]   [facility] 11-6-18 6'50\" normal perfusion, EF 73%    Family History   Problem Relation Age of Onset    Hypertension Mother     Thyroid Disease Mother     Stroke Mother         TIA    Heart Disease Father  at age 58 after an MI    Thyroid Disease Sister     Hypertension Sister     Eczema Sister     Asthma Sister     Other Sister         MENTAL HEALTH ISSUES    Psychiatric Disorder Sister         Borderline Personality Disorder    Heart Attack Other     Arthritis Sister     OSTEOARTHRITIS Sister     Hypertension Sister     High Cholesterol Sister     Cancer Sister         Skin cancer    Arthritis Sister     OSTEOARTHRITIS Sister     Elevated Lipids Sister     Stroke Sister         Mild stroke    Cancer Maternal Aunt         Breast Cancer    Breast Cancer Maternal Aunt     Cancer Paternal Aunt         breast    OSTEOARTHRITIS Sister     Asthma Sister     Diabetes Sister         Prediabetes    Elevated Lipids Sister     Hypertension Sister     Anesth Problems Neg Hx         Past Medical History:   Diagnosis Date    Adverse effect of anesthesia     WOKE UP IN RR ON VENTILATOR    Anemia     Arthritis     Calculus of kidney     Chronic kidney disease     STAGE 3    Cystocele     Diabetes (HCC)     Diabetic neuropathy, painful (HCC)     GERD (gastroesophageal reflux disease)     Gout     Hepatitis B     Hypercholesterolemia     Hypertension     Microalbuminuria     Mild nonproliferative diabetic retinopathy (HCC)     PAF (paroxysmal atrial fibrillation) ([address]     [facility] 3-24-13 ER-Rolf follows    Rectocele     Retinopathy     Rosacea     Ruptured disc, cervical     Sleep apnea     Stenosis, cervical spine         Past Surgical History:   Procedure Laterality Date    HX BREAST BIOPSY Left     HX CATARACT REMOVAL Right     HX CATARACT REMOVAL Left 2021    HX COLONOSCOPY      HX CYST INCISION AND DRAINAGE Left     BREAST CYST    HX DILATION AND CURETTAGE      HX ENDOSCOPY      HX GYN      OVARIAN CYST REMOVED    HX HERNIA REPAIR      umbilical    HX HYSTERECTOMY Bilateral 10/05/2021    HX MENISCUS REPAIR  2014    HX ORTHOPAEDIC  2014    Torn miniscus repair    HX OTHER SURGICAL  2018    Bladder Botox     HX OVARIAN CYST REMOVAL  1973    HX UROLOGICAL          Current Outpatient Medications   Medication Sig Dispense Refill    olmesartan (BENICAR) 40 mg tablet TAKE 1 TABLET DAILY 90 Tablet 3    esomeprazole (NEXIUM) 20 mg capsule Take 1 Capsule by mouth nightly. 90 Capsule 1    amLODIPine (NORVASC) 2.5 mg tablet TAKE 1 TABLET DAILY 90 Tablet 3    simvastatin (ZOCOR) 20 mg tablet TAKE 1 TABLET AT BEDTIME FOR CHOLESTEROL 90 Tablet 3    metoprolol succinate (TOPROL-XL) 50 mg XL tablet Take 1 Tablet by mouth daily. 90 Tablet 3    colchicine 0.6 mg tablet Take two tablets at onset of symptoms. Then take one daily until flare resolve. 30 Tablet 1    FreeStyle Test strip TEST FASTING BLOOD SUGAR ONE TIME DAILY 100 Strip 11    Cetirizine (ZyrTEC) 10 mg cap Take  by mouth.      lancets (FreeStyle Lancets) 28 gauge misc TEST BLOOD SUGAR ONE TIME DAILY 100 Lancet 3    metFORMIN ER (GLUCOPHAGE XR) 500 mg tablet TAKE 2 TABLETS DAILY WITH DINNER (Patient taking differently: No sig reported) 180 Tablet 3    cranberry fruit extract (CRANBERRY CONCENTRATE PO) Take  by mouth. diphenoxylate-atropine (LOMOTIL) 2.5-0.025 mg per tablet Take  by mouth every other day. cholecalciferol, vitamin D3, 50 mcg (2,000 unit) tab Take  by mouth. omega 3-DHA-EPA-fish oil 1,000 mg (120 mg-180 mg) capsule Take 4 Caps by mouth daily. CALCIUM CARBONATE/VITAMIN D3 (CALCIUM + D PO) Take  by mouth two (2) times a day. MULTI-VITAMIN PO Take 1 Tab by mouth daily. aspirin delayed-release 81 mg tablet take 81 mg by mouth daily. predniSONE (STERAPRED DS) 10 mg dose pack Take 1 Tablet by mouth See Admin Instructions.  See administration instruction per 10mg dose pack (Patient not taking: Reported on 10/10/2022) 21 Tablet 0       Allergies   Allergen Reactions    Sulfa (Sulfonamide Antibiotics) Unknown (comments)     TOPICAL EYE / EYE MUCH WORSE          Review of Systems  (Positive findings above)  Constitutional: Negative for fever,   Respiratory: Negative for cough, hemoptysis, sputum production, and wheezing. Cardiovascular: Negative for chest pain, palpitations, orthopnea, leg swelling and PND. Gastrointestinal: Negative for  blood in stool and melena. Genitourinary: Negative for dysuria and flank pain. Neurological: Negative for focal weakness, seizures, loss of consciousness  Endo/Heme/Allergies: Negative for abnormal bleeding. Psychiatric/Behavioral: Negative for memory loss.          Physical Exam  Visit Vitals  /82 (BP 1 Location: Left upper arm, BP Patient Position: Sitting, BP Cuff Size: Large adult)   Pulse (!) 52   Ht 5' 0.5\" (1.537 m)   Wt 188 lb (85.3 kg)   SpO2 99%   BMI 36.11 kg/m²       Wt Readings from Last 3 Encounters:   10/10/22 188 lb (85.3 kg)   10/06/22 187 lb 12.8 oz (85.2 kg)   08/26/22 186 lb (84.4 kg)      General - well developed well nourished  Neck - neck supple, no JVD   Cardiac - normal S1, S2, RRR, 2/6 systolic murmur   Vascular - carotids without bruits, radials pulses equal bilateral  Lungs - clear to auscultation bilaterals, no rales, wheezing or rhonchi  Abd - soft nontender, non-distended, +BS  Extremities - no edema, warm,   Neuro - nonfocal  Psych - normal mood and affect      Labs  No results found for: CPK, RCK1, RCK2, RCK3, RCK4, CKMB, CKNDX, CKND1, TROPT, TROIQ, BNPP, BNP    Lab Results   Component Value Date/Time    WBC 6.9 07/05/2022 11:55 AM    HGB 12.5 07/05/2022 11:55 AM    HCT 39.8 07/05/2022 11:55 AM    PLATELET 169 14/65/1323 11:55 AM    MCV 91.9 07/05/2022 11:55 AM       Lab Results   Component Value Date/Time    Sodium 139 07/05/2022 11:55 AM    Potassium 5.1 07/05/2022 11:55 AM    Chloride 111 (H) 07/05/2022 11:55 AM    CO2 20 (L) 07/05/2022 11:55 AM    Anion gap 8 07/05/2022 11:55 AM    Glucose 99 07/05/2022 11:55 AM    BUN 32 (H) 07/05/2022 11:55 AM    Creatinine 1.36 (H) 07/05/2022 11:55 AM    BUN/Creatinine ratio 24 (H) 07/05/2022 11:55 AM    GFR est AA 46 (L) 07/05/2022 11:55 AM    GFR est non-AA 38 (L) 07/05/2022 11:55 AM    Calcium 9.5 07/05/2022 11:55 AM    Bilirubin, total 0.3 07/05/2022 11:55 AM    Alk. phosphatase 73 07/05/2022 11:55 AM    Protein, total 7.3 07/05/2022 11:55 AM    Albumin 3.6 07/05/2022 11:55 AM    Globulin 3.7 07/05/2022 11:55 AM    A-G Ratio 1.0 (L) 07/05/2022 11:55 AM    ALT (SGPT) 31 07/05/2022 11:55 AM    AST (SGOT) 25 07/05/2022 11:55 AM       Lab Results   Component Value Date/Time    Cholesterol, total 148 07/05/2022 11:55 AM    HDL Cholesterol 44 07/05/2022 11:55 AM    LDL, calculated 56 07/05/2022 11:55 AM    VLDL, calculated 48 07/05/2022 11:55 AM    Triglyceride 240 (H) 07/05/2022 11:55 AM    CHOL/HDL Ratio 3.4 07/05/2022 11:55 AM       Lab Results   Component Value Date/Time    TSH 2.880 08/01/2018 08:53 AM       Lab Results   Component Value Date/Time    Hemoglobin A1c 6.2 (H) 07/05/2022 11:55 AM    Hemoglobin A1c (POC) 5.6 01/31/2018 11:30 AM    Hemoglobin A1c, External 5.6 04/19/2016 12:00 AM     JAVIER Mims    ATTENTION:   This medical record was transcribed using an electronic medical records/speech recognition system. Although proofread, it may and can contain electronic, spelling and other errors. Corrections may be executed at a later time. Please feel free to contact us for any clarifications as needed.

## 2022-10-10 NOTE — LETTER
10/10/2022    Patient: Ferny Herring   YOB: 1950   Date of Visit: 10/10/2022     Rodney Che MD  [facility]  [address]  [place] [address]  [address]  [address] [facility]    Dear MD Onelia Arias MD,      Thank you for referring Ms. Chan Rogers to [facility] for evaluation. My notes for this consultation are attached. If you have questions, please do not hesitate to call me. I look forward to following your patient along with you.       Sincerely,    Santiago Landon NP

## 2022-10-14 ENCOUNTER — HOSPITAL ENCOUNTER (OUTPATIENT)
Dept: MRI IMAGING | Age: 72
Discharge: HOME | End: 2022-10-14
Attending: INTERNAL MEDICINE
Payer: MEDICARE

## 2022-10-14 DIAGNOSIS — M54.12 CERVICAL RADICULOPATHY: ICD-10-CM

## 2022-10-14 DIAGNOSIS — M79.602 PAIN IN INFERIOR LEFT UPPER EXTREMITY: ICD-10-CM

## 2022-10-14 PROCEDURE — 72141 MRI NECK SPINE W/O DYE: CPT

## 2022-11-03 RX ORDER — METFORMIN HYDROCHLORIDE 500 MG/1
TABLET, EXTENDED RELEASE ORAL
Qty: 180 TABLET | Refills: 3 | Status: SHIPPED | OUTPATIENT
Start: 2022-11-03

## 2022-12-12 ENCOUNTER — TELEPHONE (OUTPATIENT)
Dept: INTERNAL MEDICINE CLINIC | Age: 72
End: 2022-12-12

## 2022-12-12 RX ORDER — ALBUTEROL SULFATE 90 UG/1
1 AEROSOL, METERED RESPIRATORY (INHALATION)
Qty: 18 G | Refills: 1 | Status: SHIPPED | OUTPATIENT
Start: 2022-12-12

## 2022-12-12 NOTE — TELEPHONE ENCOUNTER
Patient called to state she was seen at [address] [place] [address] yesterday and would like to know if she could be prescribed an alternative to the budesonide-formoterol inhaler she was prescribed. Patient states that the inhaler is $350 out of pocket. Patient states she was flu and covid negative. Patient has fluid in lungs, coughing up \"green mucus\" and wheezing. Patient was also prescribed doxycycline. Please call patient back and advise any possible alternative inhaler she can be prescribed.

## 2022-12-12 NOTE — TELEPHONE ENCOUNTER
Patient notified about inhaler and taking antibiotic. Will call office by Thursday to fit in on Friday.

## 2022-12-16 ENCOUNTER — OFFICE VISIT (OUTPATIENT)
Dept: INTERNAL MEDICINE CLINIC | Age: 72
End: 2022-12-16
Payer: MEDICARE

## 2022-12-16 VITALS
DIASTOLIC BLOOD PRESSURE: 68 MMHG | HEIGHT: 61 IN | WEIGHT: 188 LBS | SYSTOLIC BLOOD PRESSURE: 119 MMHG | RESPIRATION RATE: 16 BRPM | BODY MASS INDEX: 35.5 KG/M2 | TEMPERATURE: 98.6 F | HEART RATE: 69 BPM

## 2022-12-16 DIAGNOSIS — J06.9 UPPER RESPIRATORY TRACT INFECTION, UNSPECIFIED TYPE: Primary | ICD-10-CM

## 2022-12-16 DIAGNOSIS — I10 ESSENTIAL HYPERTENSION WITH GOAL BLOOD PRESSURE LESS THAN 130/80: ICD-10-CM

## 2022-12-16 RX ORDER — DOXYCYCLINE 100 MG/1
CAPSULE ORAL
COMMUNITY
Start: 2022-12-11

## 2022-12-16 NOTE — PROGRESS NOTES
Alexis Gonzalez is a 74 y.o. female who presents today for Wheezing (RM18// pt presenting today experiencing a cough, chest congestion, wheezing, chills and fever has resolved, No body aches )  . She has a history of   Patient Active Problem List   Diagnosis Code    DM (diabetes mellitus) ([facility] E11.9    Hyperlipidemia E78.5    Hepatitis B B19.10    GERD (gastroesophageal reflux disease) K21.9    Rosacea L71.9    AR (allergic rhinitis) J30.9    Intervertebral disk disease M51.9    Uterine prolapse N81.4    Incontinence R32    Essential hypertension with goal blood pressure less than 130/80 I10    Anemia D64.9    Peripheral neuropathy G62.9    Pre-ulcerative corn or callous L84    Advanced care planning/counseling discussion Z71.89    Type 2 diabetes mellitus with nephropathy (HCC) E11.21    Stage 3 chronic kidney disease (HCC) N18.30    Severe obesity (BMI 35.0-39. 9) with comorbidity (HCC) Z31.37    Systolic murmur of aorta U73.6    History of arthritis Z87.39    Foot drop M21.379    Snoring R06.83    RODERICK (obstructive sleep apnea) G47.33    Primary insomnia F51.01    Insomnia with sleep apnea G47.00, G47.30    Type 2 diabetes mellitus with diabetic neuropathy (HCC) E11.40    Incomplete emptying of bladder R33.9    Overactive bladder N32.81    Urge incontinence of urine N39.41    Vitamin D deficiency E55.9    Incomplete uterovaginal prolapse N81.2    Stress incontinence N39.3    Genital prolapse N81.9    Left ventricular outflow tract obstruction Q24.8    Chronic renal disease, stage III N18.30   . Today patient is here for an acute visit. Hayley Gonzalez Upper respiratory illness:  Patient was having upper respiratory symptoms and was seen in urgent care on 11 December. There she tested negative for both COVID and flu. She was placed on doxycycline as well as a an ICS/LABA. Unfortunately the cost of ICS/LABA was prohibitive. Since has been taking the abx and reports continued cough and wheezing.  We did call her in albuterol. Did have a couple days of fevers. Symptoms are moderate and improving. Over-the-counter remedies including Mucinex. BP is stable. ROS  Review of Systems   Constitutional:  Negative for chills, fever and weight loss. Malaise/fatigue: feeling better. Erleen Brady HENT:  Positive for congestion. Negative for sore throat. Eyes:  Negative for blurred vision, double vision and photophobia. Respiratory:  Positive for cough and wheezing. Negative for shortness of breath. Cardiovascular:  Negative for chest pain, palpitations and leg swelling. Gastrointestinal:  Negative for abdominal pain, constipation, diarrhea, heartburn, nausea and vomiting. Genitourinary:  Negative for dysuria, frequency and urgency. Musculoskeletal:  Negative for joint pain and myalgias. Skin:  Negative for rash. Neurological: Negative. Negative for headaches. Endo/Heme/Allergies:  Does not bruise/bleed easily. Psychiatric/Behavioral:  Negative for memory loss and suicidal ideas. Visit Vitals  /68 (BP 1 Location: Left upper arm, BP Patient Position: Sitting, BP Cuff Size: Large adult)   Pulse 69   Temp 98.6 °F (37 °C) (Oral)   Resp 16   Ht 5' 0.5\" (1.537 m)   Wt 188 lb (85.3 kg)   BMI 36.11 kg/m²       Physical Exam  Constitutional:       Appearance: She is well-developed. HENT:      Head: Normocephalic and atraumatic. Right Ear: Tympanic membrane normal.      Left Ear: Tympanic membrane normal.   Cardiovascular:      Rate and Rhythm: Normal rate and regular rhythm. Heart sounds: No murmur heard. Pulmonary:      Effort: Pulmonary effort is normal. No respiratory distress. Breath sounds: Normal breath sounds. No wheezing. Skin:     General: Skin is warm and dry. Neurological:      Mental Status: She is alert and oriented to person, place, and time.    Psychiatric:         Behavior: Behavior normal.         Current Outpatient Medications   Medication Sig    doxycycline (MONODOX) 100 mg capsule TAKE 1 CAPSULE BY MOUTH TWICE DAILY FOR 10 DAYS    guaifenesin (MUCINEX PO) Take  by mouth. albuterol (PROVENTIL HFA, VENTOLIN HFA, PROAIR HFA) 90 mcg/actuation inhaler Take 1 Puff by inhalation every four (4) hours as needed for Wheezing. lancets (FreeStyle Lancets) 28 gauge misc TEST BLOOD SUGAR ONCE DAILY    metFORMIN ER (GLUCOPHAGE XR) 500 mg tablet TAKE 2 TABLETS DAILY WITH DINNER    olmesartan (BENICAR) 40 mg tablet TAKE 1 TABLET DAILY    esomeprazole (NEXIUM) 20 mg capsule Take 1 Capsule by mouth nightly. amLODIPine (NORVASC) 2.5 mg tablet TAKE 1 TABLET DAILY    simvastatin (ZOCOR) 20 mg tablet TAKE 1 TABLET AT BEDTIME FOR CHOLESTEROL    metoprolol succinate (TOPROL-XL) 50 mg XL tablet Take 1 Tablet by mouth daily. colchicine 0.6 mg tablet Take two tablets at onset of symptoms. Then take one daily until flare resolve. FreeStyle Test strip TEST FASTING BLOOD SUGAR ONE TIME DAILY    Cetirizine (ZyrTEC) 10 mg cap Take  by mouth.    cranberry fruit extract (CRANBERRY CONCENTRATE PO) Take  by mouth. diphenoxylate-atropine (LOMOTIL) 2.5-0.025 mg per tablet Take  by mouth every other day. cholecalciferol, vitamin D3, 50 mcg (2,000 unit) tab Take  by mouth. omega 3-DHA-EPA-fish oil 1,000 mg (120 mg-180 mg) capsule Take 4 Caps by mouth daily. CALCIUM CARBONATE/VITAMIN D3 (CALCIUM + D PO) Take  by mouth two (2) times a day. MULTI-VITAMIN PO Take 1 Tab by mouth daily. aspirin delayed-release 81 mg tablet take 81 mg by mouth daily. No current facility-administered medications for this visit.         Past Medical History:   Diagnosis Date    Adverse effect of anesthesia     WOKE UP IN RR ON VENTILATOR    Anemia     Arthritis     Calculus of kidney     Chronic kidney disease     STAGE 3    Cystocele     Diabetes (HCC)     Diabetic neuropathy, painful (HCC)     GERD (gastroesophageal reflux disease)     Gout 2022    Hepatitis B     Hypercholesterolemia     Hypertension Microalbuminuria     Mild nonproliferative diabetic retinopathy (HCC)     PAF (paroxysmal atrial fibrillation) ([address]     [facility] 3-24-13 ER-Rolf follows    Rectocele     Retinopathy     Rosacea     Ruptured disc, cervical     Sleep apnea     Stenosis, cervical spine       Past Surgical History:   Procedure Laterality Date    HX BREAST BIOPSY Left     HX CATARACT REMOVAL Right     HX CATARACT REMOVAL Left 2021    HX COLONOSCOPY      HX CYST INCISION AND DRAINAGE Left     BREAST CYST    HX DILATION AND CURETTAGE      HX ENDOSCOPY      HX GYN      OVARIAN CYST REMOVED    HX HERNIA REPAIR      umbilical    HX HYSTERECTOMY Bilateral 10/05/2021    HX MENISCUS REPAIR  2014    HX ORTHOPAEDIC  2014    Torn miniscus repair    HX OTHER SURGICAL  2018    Bladder Botox     HX OVARIAN CYST REMOVAL  1973    HX UROLOGICAL        Social History     Tobacco Use    Smoking status: Never    Smokeless tobacco: Never   Substance Use Topics    Alcohol use: Yes     Comment: Very rarely      Family History   Problem Relation Age of Onset    Hypertension Mother     Thyroid Disease Mother     Stroke Mother         TIA    Heart Disease Father          at age 58 after an MI    Thyroid Disease Sister     Hypertension Sister     Eczema Sister     Asthma Sister     Other Sister         MENTAL HEALTH ISSUES    Psychiatric Disorder Sister         Borderline Personality Disorder    Heart Attack Other     Arthritis Sister     OSTEOARTHRITIS Sister     Hypertension Sister     High Cholesterol Sister     Cancer Sister         Skin cancer    Arthritis Sister     OSTEOARTHRITIS Sister     Elevated Lipids Sister     Stroke Sister         Mild stroke    Cancer Maternal Aunt         Breast Cancer    Breast Cancer Maternal Aunt     Cancer Paternal Aunt         breast    OSTEOARTHRITIS Sister     Asthma Sister     Diabetes Sister         Prediabetes    Elevated Lipids Sister     Hypertension Sister     Anesth Problems Neg Hx Allergies   Allergen Reactions    Sulfa (Sulfonamide Antibiotics) Unknown (comments)     TOPICAL EYE / EYE MUCH WORSE        Assessment/Plan  Diagnoses and all orders for this visit:    1. Upper respiratory tract infection, unspecified type-seems to be moving in the right direction. No significant wheezing heard on exam.  I have provided her with a sample of Trelegy to take for respiratory symptoms. Patient to complete antibiotics. Can stop Mucinex at this point. 2. Essential hypertension with goal blood pressure less than 130/80- stable. Joseph Mercado MD  12/16/2022    This note was created with the help of speech recognition software Lynn Moore) and may contain some 'sound alike' errors.

## 2022-12-16 NOTE — PROGRESS NOTES
Melissa Robertson  Identified pt with two pt identifiers(name and ). Chief Complaint   Patient presents with    Wheezing     RM18// pt presenting today experiencing a cough, chest congestion, wheezing, chills and fever has resolved, No body aches        1. Have you been to the ER, urgent care clinic since your last visit? Hospitalized since your last visit? NO    2. Have you seen or consulted any other health care providers outside of the [address] [place] [address] since your last visit? Include any pap smears or colon screening. NO      Provider notified of reason for visit, vitals and flowsheets obtained on patients.      Patient received paperwork for advance directive during previous visit but has not completed at this time     Reviewed record In preparation for visit, huddled with provider and have obtained necessary documentation      Health Maintenance Due   Topic    Medicare Yearly Exam        Wt Readings from Last 3 Encounters:   22 188 lb (85.3 kg)   10/10/22 188 lb (85.3 kg)   10/06/22 187 lb 12.8 oz (85.2 kg)     Temp Readings from Last 3 Encounters:   22 98.6 °F (37 °C) (Oral)   10/06/22 97.9 °F (36.6 °C) (Oral)   22 98.1 °F (36.7 °C) (Oral)     BP Readings from Last 3 Encounters:   22 119/68   10/10/22 132/82   10/06/22 127/60     Pulse Readings from Last 3 Encounters:   22 69   10/10/22 (!) 52   10/06/22 (!) 51     Vitals:    22 0756   BP: 119/68   Pulse: 69   Resp: 16   Temp: 98.6 °F (37 °C)   TempSrc: Oral   Weight: 188 lb (85.3 kg)   Height: 5' 0.5\" (1.537 m)         Learning Assessment:  :     Learning Assessment 2015 2015 8/15/2014   PRIMARY LEARNER - Patient Patient   HIGHEST LEVEL OF EDUCATION - PRIMARY LEARNER  - - 4 Austen LEARNER - - NONE   CO-LEARNER CAREGIVER - - No   PRIMARY LANGUAGE ENGLISH ENGLISH ENGLISH   LEARNER PREFERENCE PRIMARY - LISTENING DEMONSTRATION   ANSWERED BY - PATIENT self   RELATIONSHIP - SELF SELF       Depression Screening:  :     3 most recent PHQ Screens 12/16/2022   Little interest or pleasure in doing things Not at all   Feeling down, depressed, irritable, or hopeless Not at all   Total Score PHQ 2 0       Fall Risk Assessment:  :     Fall Risk Assessment, last 12 mths 10/10/2022   Able to walk? Yes   Fall in past 12 months? 0   Do you feel unsteady? -   Are you worried about falling -       Abuse Screening:  :     Abuse Screening Questionnaire 12/15/2020 6/12/2020 2/19/2020 10/7/2019 9/17/2019 3/21/2019 11/27/2018   Do you ever feel afraid of your partner? N N N N N N N   Are you in a relationship with someone who physically or mentally threatens you? N N N N N N N   Is it safe for you to go home? Y Y Y Y Y Y Y       ADL Screening:  :     ADL Assessment 9/27/2017   Feeding yourself No Help Needed   Getting from bed to chair No Help Needed   Getting dressed No Help Needed   Bathing or showering No Help Needed   Walk across the room (includes cane/walker) No Help Needed   Using the telphone No Help Needed   Taking your medications No Help Needed   Preparing meals No Help Needed   Managing money (expenses/bills) No Help Needed   Moderately strenuous housework (laundry) No Help Needed   Shopping for personal items (toiletries/medicines) No Help Needed   Shopping for groceries No Help Needed   Driving No Help Needed   Climbing a flight of stairs No Help Needed   Getting to places beyond walking distances No Help Needed         Medication reconciliation up to date and corrected with patient at this time.

## 2022-12-24 RX ORDER — AMOXICILLIN AND CLAVULANATE POTASSIUM 875; 125 MG/1; MG/1
1 TABLET, FILM COATED ORAL EVERY 12 HOURS
Qty: 20 TABLET | Refills: 0 | Status: SHIPPED | OUTPATIENT
Start: 2022-12-24

## 2023-01-23 ENCOUNTER — OFFICE VISIT (OUTPATIENT)
Dept: INTERNAL MEDICINE CLINIC | Age: 73
End: 2023-01-23
Payer: MEDICARE

## 2023-01-23 VITALS
DIASTOLIC BLOOD PRESSURE: 80 MMHG | HEIGHT: 61 IN | OXYGEN SATURATION: 100 % | RESPIRATION RATE: 14 BRPM | BODY MASS INDEX: 36.29 KG/M2 | TEMPERATURE: 98 F | WEIGHT: 192.2 LBS | SYSTOLIC BLOOD PRESSURE: 134 MMHG

## 2023-01-23 DIAGNOSIS — N18.30 STAGE 3 CHRONIC KIDNEY DISEASE, UNSPECIFIED WHETHER STAGE 3A OR 3B CKD (HCC): ICD-10-CM

## 2023-01-23 DIAGNOSIS — R00.2 PALPITATIONS: ICD-10-CM

## 2023-01-23 DIAGNOSIS — I48.0 PAF (PAROXYSMAL ATRIAL FIBRILLATION) (HCC): ICD-10-CM

## 2023-01-23 DIAGNOSIS — E11.9 TYPE 2 DIABETES MELLITUS WITH HEMOGLOBIN A1C GOAL OF LESS THAN 7.0% (HCC): ICD-10-CM

## 2023-01-23 DIAGNOSIS — E11.21 TYPE 2 DIABETES MELLITUS WITH NEPHROPATHY (HCC): ICD-10-CM

## 2023-01-23 DIAGNOSIS — E78.00 PURE HYPERCHOLESTEROLEMIA: ICD-10-CM

## 2023-01-23 DIAGNOSIS — I10 ESSENTIAL HYPERTENSION WITH GOAL BLOOD PRESSURE LESS THAN 130/80: Primary | ICD-10-CM

## 2023-01-23 PROCEDURE — G8536 NO DOC ELDER MAL SCRN: HCPCS | Performed by: INTERNAL MEDICINE

## 2023-01-23 PROCEDURE — 99214 OFFICE O/P EST MOD 30 MIN: CPT | Performed by: INTERNAL MEDICINE

## 2023-01-23 PROCEDURE — G8427 DOCREV CUR MEDS BY ELIG CLIN: HCPCS | Performed by: INTERNAL MEDICINE

## 2023-01-23 PROCEDURE — G8432 DEP SCR NOT DOC, RNG: HCPCS | Performed by: INTERNAL MEDICINE

## 2023-01-23 PROCEDURE — 2022F DILAT RTA XM EVC RTNOPTHY: CPT | Performed by: INTERNAL MEDICINE

## 2023-01-23 PROCEDURE — 1101F PT FALLS ASSESS-DOCD LE1/YR: CPT | Performed by: INTERNAL MEDICINE

## 2023-01-23 PROCEDURE — G8399 PT W/DXA RESULTS DOCUMENT: HCPCS | Performed by: INTERNAL MEDICINE

## 2023-01-23 PROCEDURE — G9899 SCRN MAM PERF RSLTS DOC: HCPCS | Performed by: INTERNAL MEDICINE

## 2023-01-23 PROCEDURE — 3017F COLORECTAL CA SCREEN DOC REV: CPT | Performed by: INTERNAL MEDICINE

## 2023-01-23 PROCEDURE — 3046F HEMOGLOBIN A1C LEVEL >9.0%: CPT | Performed by: INTERNAL MEDICINE

## 2023-01-23 PROCEDURE — G8417 CALC BMI ABV UP PARAM F/U: HCPCS | Performed by: INTERNAL MEDICINE

## 2023-01-23 PROCEDURE — 1090F PRES/ABSN URINE INCON ASSESS: CPT | Performed by: INTERNAL MEDICINE

## 2023-01-23 PROCEDURE — G0463 HOSPITAL OUTPT CLINIC VISIT: HCPCS | Performed by: INTERNAL MEDICINE

## 2023-01-23 RX ORDER — BLOOD SUGAR DIAGNOSTIC
STRIP MISCELLANEOUS
Qty: 100 STRIP | Refills: 11 | Status: SHIPPED | OUTPATIENT
Start: 2023-01-23

## 2023-01-23 NOTE — PROGRESS NOTES
Jaz Martinez is a 74 y.o. female who presents today for Follow-up (RM21// pt presenting today for routine 6 months check up; had an episode last Sunday at [facility] of irregular heart beat and chest discomfort)  . She has a history of   Patient Active Problem List   Diagnosis Code    DM [facility]di[facility] mellitus) [facility] E11.9    Hyperlipidemia E78.5    Hepatitis B B19.10    GERD (gastroesophageal reflux disease) K21.9    Rosacea L71.9    AR (allergic rhinitis) J30.9    Intervertebral disk disease M51.9    Uterine prolapse N81.4    Incontinence R32    Essential hypertension with goal blood pressure less than 130/80 I10    Anemia D64.9    Peripheral neuropathy G62.9    Pre-ulcerative corn or callous L84    Advanced care planning/counseling discussion Z71.89    Type 2 diabetes mellitus with nephropathy ([facility]) E11.21    Severe obesity (BMI 35.0-39. 9) with comorbidity ([facility]) D16.14    Systolic murmur of aorta W18.4    History of arthritis Z87.39    Foot drop M21.379    Snoring R06.83    RODERICK (obstructive sleep apnea) G47.33    Primary insomnia F51.01    Insomnia with sleep apnea G47.00, G47.30    Type 2 diabetes mellitus with diabetic neuropathy ([facility]) E11.40    Incomplete emptying of bladder R33.9    Overactive bladder N32.81    Urge incontinence of urine N39.41    Vitamin D deficiency E55.9    Incomplete uterovaginal prolapse N81.2    Stress incontinence N39.3    Genital prolapse N81.9    Left ventricular outflow tract obstruction Q24.8    PAF (paroxysmal atrial fibrillation) ([facility]) I48.0    Chronic renal disease, stage III N18.30   . Today patient is here for follow-up. Patient reports that she did have an episode of mild chest discomfort and irregular heartbeat while at [facility] last week. Patient does have a questionable history of A. fib. Last visit with cardiologist was in Oct. BP was a bit high. This has not happened since. She has purchased herself a [employer] mobile device.      Hypertension-better on repeat. Hypertension ROS: taking medications as instructed, no medication side effects noted, no TIA's, no chest pain on exertion, since palpitations,  no dyspnea on exertion, no swelling of ankles     reports that she has never smoked. She has never used smokeless tobacco.    reports current alcohol use. BP Readings from Last 2 Encounters:   01/23/23 134/80   12/16/22 119/68     DM: on metformin. Repeat A1C today. AM numbers are 100-130    ROS  Review of Systems   Constitutional:  Negative for chills, fever and weight loss. HENT:  Negative for congestion and sore throat. Eyes:  Negative for blurred vision, double vision and photophobia. Respiratory:  Negative for cough and shortness of breath. Cardiovascular:  Positive for chest pain (resolved since sunday 8 days ago.) and palpitations. Negative for leg swelling. Gastrointestinal:  Negative for abdominal pain, constipation, diarrhea, heartburn, nausea and vomiting. Genitourinary:  Negative for dysuria, frequency and urgency. Musculoskeletal:  Negative for joint pain and myalgias. Skin:  Negative for rash. Neurological: Negative. Negative for headaches. Endo/Heme/Allergies:  Does not bruise/bleed easily. Psychiatric/Behavioral:  Negative for depression, memory loss and suicidal ideas. The patient is not nervous/anxious. Visit Vitals  /80   Temp 98 °F (36.7 °C) (Oral)   Resp 14   Ht 5' 0.5\" (1.537 m)   Wt 192 lb 3.2 oz (87.2 kg)   SpO2 100%   BMI 36.92 kg/m²       Physical Exam  Constitutional:       General: She is not in acute distress. Appearance: She is well-developed. HENT:      Head: Normocephalic and atraumatic. Neck:      Thyroid: No thyromegaly. Cardiovascular:      Rate and Rhythm: Normal rate and regular rhythm. Heart sounds: No murmur heard. Pulmonary:      Effort: Pulmonary effort is normal.      Breath sounds: Normal breath sounds. No wheezing.    Abdominal:      General: Bowel sounds are normal. There is no distension. Palpations: Abdomen is soft. Musculoskeletal:      Cervical back: Normal range of motion and neck supple. Skin:     General: Skin is warm and dry. Neurological:      Mental Status: She is alert and oriented to person, place, and time. Cranial Nerves: No cranial nerve deficit. Psychiatric:         Behavior: Behavior normal.         Current Outpatient Medications   Medication Sig    loratadine 10 mg cap     glucose blood VI test strips (FreeStyle Test) strip TEST FASTING BLOOD SUGAR ONE TIME DAILY    lancets (FreeStyle Lancets) 28 gauge misc TEST BLOOD SUGAR ONCE DAILY    metFORMIN ER (GLUCOPHAGE XR) 500 mg tablet TAKE 2 TABLETS DAILY WITH DINNER    olmesartan (BENICAR) 40 mg tablet TAKE 1 TABLET DAILY    esomeprazole (NEXIUM) 20 mg capsule Take 1 Capsule by mouth nightly. amLODIPine (NORVASC) 2.5 mg tablet TAKE 1 TABLET DAILY    simvastatin (ZOCOR) 20 mg tablet TAKE 1 TABLET AT BEDTIME FOR CHOLESTEROL    metoprolol succinate (TOPROL-XL) 50 mg XL tablet Take 1 Tablet by mouth daily. colchicine 0.6 mg tablet Take two tablets at onset of symptoms. Then take one daily until flare resolve. cranberry fruit extract (CRANBERRY CONCENTRATE PO) Take  by mouth. diphenoxylate-atropine (LOMOTIL) 2.5-0.025 mg per tablet Take  by mouth every other day. cholecalciferol, vitamin D3, 50 mcg (2,000 unit) tab Take  by mouth. omega 3-DHA-EPA-fish oil 1,000 mg (120 mg-180 mg) capsule Take 4 Caps by mouth daily. CALCIUM CARBONATE/VITAMIN D3 (CALCIUM + D PO) Take  by mouth two (2) times a day. MULTI-VITAMIN PO Take 1 Tab by mouth daily. aspirin delayed-release 81 mg tablet take 81 mg by mouth daily. No current facility-administered medications for this visit.         Past Medical History:   Diagnosis Date    Adverse effect of anesthesia     WOKE UP IN RR ON VENTILATOR    Anemia     Arthritis     Calculus of kidney     Chronic kidney disease     STAGE 3    Cystocele Diabetes ([facility]     Diabetic neuropathy, painful (HCC)     GERD (gastroesophageal reflux disease)     Gout     Hepatitis B     Hypercholesterolemia     Hypertension     Microalbuminuria     Mild nonproliferative diabetic retinopathy (HCC)     PAF (paroxysmal atrial fibrillation) ([facility]     [facility] follows    Rectocele     Retinopathy     Rosacea     Ruptured disc, cervical     Sleep apnea     Stenosis, cervical spine       Past Surgical History:   Procedure Laterality Date    HX BREAST BIOPSY Left     HX CATARACT REMOVAL Right     HX CATARACT REMOVAL Left 2021    HX COLONOSCOPY      HX CYST INCISION AND DRAINAGE Left     BREAST CYST    HX DILATION AND CURETTAGE      HX ENDOSCOPY      HX GYN      OVARIAN CYST REMOVED    HX HERNIA REPAIR      umbilical    HX HYSTERECTOMY Bilateral 10/05/2021    HX MENISCUS REPAIR  2014    HX ORTHOPAEDIC  2014    Torn miniscus repair    HX OTHER SURGICAL  2018    Bladder Botox     HX OVARIAN CYST REMOVAL  1973    HX UROLOGICAL        Social History     Tobacco Use    Smoking status: Never    Smokeless tobacco: Never   Substance Use Topics    Alcohol use: Yes     Comment: Very rarely      Family History   Problem Relation Age of Onset    Hypertension Mother     Thyroid Disease Mother     Stroke Mother         TIA    Heart Disease Father          at age 58 after an MI    Thyroid Disease Sister     Hypertension Sister     Eczema Sister     Asthma Sister     Other Sister         MENTAL HEALTH ISSUES    Psychiatric Disorder Sister         Borderline Personality Disorder    Heart Attack Other     Arthritis Sister     OSTEOARTHRITIS Sister     Hypertension Sister     High Cholesterol Sister     Cancer Sister         Skin cancer    Arthritis Sister     OSTEOARTHRITIS Sister     Elevated Lipids Sister     Stroke Sister         Mild stroke    Cancer Maternal Aunt         Breast Cancer    Breast Cancer Maternal Aunt     Cancer Paternal Aunt breast    OSTEOARTHRITIS Sister     Asthma Sister     Diabetes Sister         Prediabetes    Elevated Lipids Sister     Hypertension Sister     Anesth Problems Neg Hx         Allergies   Allergen Reactions    Sulfa (Sulfonamide Antibiotics) Unknown (comments)     TOPICAL EYE / EYE MUCH WORSE        Assessment/Plan  Diagnoses and all orders for this visit:    1. Essential hypertension with goal blood pressure less than 130/80-blood pressure is relatively well controlled. No side effects with medications. -     CBC WITH AUTOMATED DIFF; Future  -     HEMOGLOBIN A1C WITH EAG; Future  -     METABOLIC PANEL, BASIC; Future    2. Type 2 diabetes mellitus with nephropathy (HCC)-fasting sugars at goal  -     CBC WITH AUTOMATED DIFF; Future  -     HEMOGLOBIN A1C WITH EAG; Future  -     METABOLIC PANEL, BASIC; Future    3. Pure hypercholesterolemia-continue current therapy    4. Palpitations-did have an episode of mild chest pain and palpitations at rest.  This has not happened since. Patient's last stress test was almost 2 years ago. Given questionable history of paroxysmal A. fib in the past, I believe it is reasonable for her to follow-up with cardiology. She does have a [employer] mobile jordon at home and has not shown any abnormalities. 5. PAF (paroxysmal atrial fibrillation) ([facility])    7. Stage 3 chronic kidney disease, unspecified whether stage 3a or 3b CKD ([facility])-renal function has been stable          Domenico Kong MD  1/23/2023    This note was created with the help of speech recognition software Laila Escalera) and may contain some 'sound alike' errors.

## 2023-01-23 NOTE — PROGRESS NOTES
Erika Saini  Identified pt with two pt identifiers(name and ). Chief Complaint   Patient presents with    Follow-up     RM21// pt presenting today for routine 6 months check up; had an episode last  at [facility] of irregular heart beat and chest discomfort       1. Have you been to the ER, urgent care clinic since your last visit? Hospitalized since your last visit? NO    2. Have you seen or consulted any other health care providers outside of the [address] [place] [address] since your last visit? Include any pap smears or colon screening. NO      Provider notified of reason for visit, vitals and flowsheets obtained on patients.      Patient received paperwork for advance directive during previous visit but has not completed at this time     Reviewed record In preparation for visit, huddled with provider and have obtained necessary documentation      Health Maintenance Due   Topic    Medicare Yearly Exam     Eye Exam Retinal or Dilated        Wt Readings from Last 3 Encounters:   23 192 lb 3.2 oz (87.2 kg)   22 188 lb (85.3 kg)   10/10/22 188 lb (85.3 kg)     Temp Readings from Last 3 Encounters:   23 98 °F (36.7 °C) (Oral)   22 98.6 °F (37 °C) (Oral)   10/06/22 97.9 °F (36.6 °C) (Oral)     BP Readings from Last 3 Encounters:   22 119/68   10/10/22 132/82   10/06/22 127/60     Pulse Readings from Last 3 Encounters:   22 69   10/10/22 (!) 52   10/06/22 (!) 51     Vitals:    23 1036   Resp: 14   Temp: 98 °F (36.7 °C)   TempSrc: Oral   SpO2: 100%   Weight: 192 lb 3.2 oz (87.2 kg)   Height: 5' 0.5\" (1.537 m)         Learning Assessment:  :     Learning Assessment 2015 2015 8/15/2014   PRIMARY LEARNER - Patient Patient   HIGHEST LEVEL OF EDUCATION - PRIMARY LEARNER  - - Maritza Boyce LEARNER - - [address] CAREGIVER - - No   PRIMARY LANGUAGE ENGLISH ENGLISH ENGLISH   LEARNER PREFERENCE PRIMARY - LISTENING DEMONSTRATION ANSWERED BY - PATIENT self   RELATIONSHIP - SELF SELF       Depression Screening:  :     3 most recent PHQ Screens 12/16/2022   Little interest or pleasure in doing things Not at all   Feeling down, depressed, irritable, or hopeless Not at all   Total Score PHQ 2 0       Fall Risk Assessment:  :     Fall Risk Assessment, last 12 mths 10/10/2022   Able to walk? Yes   Fall in past 12 months? 0   Do you feel unsteady? -   Are you worried about falling -       Abuse Screening:  :     Abuse Screening Questionnaire 12/15/2020 6/12/2020 2/19/2020 10/7/2019 9/17/2019 3/21/2019 11/27/2018   Do you ever feel afraid of your partner? N N N N N N N   Are you in a relationship with someone who physically or mentally threatens you? N N N N N N N   Is it safe for you to go home? Y Y Y Y Y Y Y       ADL Screening:  :     ADL Assessment 9/27/2017   Feeding yourself No Help Needed   Getting from bed to chair No Help Needed   Getting dressed No Help Needed   Bathing or showering No Help Needed   Walk across the room (includes cane/walker) No Help Needed   Using the telphone No Help Needed   Taking your medications No Help Needed   Preparing meals No Help Needed   Managing money (expenses/bills) No Help Needed   Moderately strenuous housework (laundry) No Help Needed   Shopping for personal items (toiletries/medicines) No Help Needed   Shopping for groceries No Help Needed   Driving No Help Needed   Climbing a flight of stairs No Help Needed   Getting to places beyond walking distances No Help Needed         Medication reconciliation up to date and corrected with patient at this time.

## 2023-03-03 ENCOUNTER — OFFICE VISIT (OUTPATIENT)
Dept: CARDIOLOGY CLINIC | Age: 73
End: 2023-03-03

## 2023-03-03 VITALS
BODY MASS INDEX: 35.87 KG/M2 | OXYGEN SATURATION: 98 % | RESPIRATION RATE: 16 BRPM | SYSTOLIC BLOOD PRESSURE: 120 MMHG | DIASTOLIC BLOOD PRESSURE: 80 MMHG | HEART RATE: 51 BPM | HEIGHT: 61 IN | WEIGHT: 190 LBS

## 2023-03-03 DIAGNOSIS — R06.02 SOB (SHORTNESS OF BREATH): ICD-10-CM

## 2023-03-03 DIAGNOSIS — Q24.8 LEFT VENTRICULAR OUTFLOW TRACT OBSTRUCTION: ICD-10-CM

## 2023-03-03 DIAGNOSIS — I48.0 PAF (PAROXYSMAL ATRIAL FIBRILLATION) (HCC): Primary | ICD-10-CM

## 2023-03-03 DIAGNOSIS — I10 ESSENTIAL HYPERTENSION: ICD-10-CM

## 2023-03-03 DIAGNOSIS — E78.00 PURE HYPERCHOLESTEROLEMIA: ICD-10-CM

## 2023-03-03 NOTE — LETTER
3/3/2023    Patient: Salinas Still   YOB: 1950   Date of Visit: 3/3/2023     Nader Serra MD  [address] [facility]  [address]  [address] [facility]  [place]    Dear Nader Serra MD,      Thank you for referring Ms. Leonel Brown to [address] [place] [address] for evaluation. My notes for this consultation are attached. If you have questions, please do not hesitate to call me. I look forward to following your patient along with you.       Sincerely,    Anson Morris MD

## 2023-03-03 NOTE — PROGRESS NOTES
Lisa Soria     1950       Jennifer Webb MD, [facility] [place]  Date of Visit-3/3/2023   PCP is Petros Genao, [address] [facility] [place]  [facility]  HPI:  Lisa Soria is a 74 y.o. female   11 month f/u of   PAF. Echo 4/14/2021 EF 69% borderline hypertrophy LV outflow tract gradient 7.9 mm peak gradient 21 mm with provocation. Mild LAE. Aorta 3.7 cm with coronary sinus dilated suggesting persistent left superior vena cava. PASP 32 mmHg--   Echo in August 2018 was normal and loop showed no AF. [facility] scan 4/14/2021 normal EF 73%  Hypertension systemic  Dyslipidemia  Chronic mild shortness of breath, deconditioning      Today. .. Pt had an episode 2 months ago where she was experiencing palpitations and minor chest discomfort which lasted for a few hours. Pt took her BP which was high and had an arrhythmia. Pt notes she hasn't experienced any episodes since. Pt is feeling overall well today. Pt denies chest pain, edema, syncope, or shortness of breath at rest. Has no tachycardia, palpitations, or sense of arrhythmia. Saw PCP recently  EKG: Sinus bradycardia at 51  QTc 409 QRS 90    Assessment/Plan:     Patient Instructions   We will see you in May. Has plans for follow-up echo and visit already. 1. PAF (paroxysmal atrial fibrillation) (HCC)  Remains in sinus. Her fatigue and edema have improved. Had an episode of tachycardia about 2 months ago. If it recurs get a Holter monitor. Continue current metoprolol and aspirin. 2.  Left ventricular outflow tract gradient   goes from 7-21 with provocation systolic murmur of aorta  It is notably focal to just the RUSB. Echo in May    3.  Essential hypertension  Stable using CPAP    At goal , meds and possible side effects reviewed and patient denies  Key CAD CHF Meds               olmesartan (BENICAR) 40 mg tablet (Taking) TAKE 1 TABLET DAILY    amLODIPine (NORVASC) 2.5 mg tablet (Taking) TAKE 1 TABLET DAILY    simvastatin (ZOCOR) 20 mg tablet (Taking) TAKE 1 TABLET AT BEDTIME FOR CHOLESTEROL    metoprolol succinate (TOPROL-XL) 50 mg XL tablet (Taking) Take 1 Tablet by mouth daily. omega 3-DHA-EPA-fish oil 1,000 mg (120 mg-180 mg) capsule (Taking) Take 4 Caps by mouth daily. aspirin delayed-release 81 mg tablet (Taking) take 81 mg by mouth daily. BP Readings from Last 6 Encounters:   03/03/23 120/80   01/23/23 134/80   12/16/22 119/68   10/10/22 132/82   10/06/22 127/60   08/26/22 127/62        4. Pure hypercholesterolemia  Lipids on high potency statin as appropriate for secondary prevention. At goal , denies excess muscle aches or new liver issues  Key Antihyperlipidemia Meds               simvastatin (ZOCOR) 20 mg tablet (Taking) TAKE 1 TABLET AT BEDTIME FOR CHOLESTEROL    omega 3-DHA-EPA-fish oil 1,000 mg (120 mg-180 mg) capsule (Taking) Take 4 Caps by mouth daily. Lab Results   Component Value Date/Time    LDL, calculated 56 07/05/2022 11:55 AM         5. SOB (shortness of breath)  improved     Impression:   1. PAF (paroxysmal atrial fibrillation) ([address]    2. Left ventricular outflow tract obstruction    3. Essential hypertension    4. Pure hypercholesterolemia    5. SOB (shortness of breath)         Cardiac History:    In clinical trial= REDUCE-IT Study- \"A Multi-Center, Prospective, Randomized, Double-Blind, Placebo-Controlled, Parallel-Group Study to Evaluate the Effect of EAU820 on Cardiovascular Health and Mortality in Hypertriglyceridemic Patients with Cardiovascular Disease or at High Risk for Cardiovascular Disease: REDUCE-IT (Reduction of Cardiovascular Events with EPA- Intervention Trial)     PAF  Echo 2-5-13= normal  [facility] in 13,15,18 WNL   [facility] 11-6-18 6'50\" normal perfusion, EF 73%     Future Appointments   Date Time Provider Khalif Lundy   5/26/2023 11:00 AM SHAYY SHAHF BS AMB   5/26/2023 11:40 AM Jennifer Bansla MD [facility] BS AMB 7/24/2023 10:45 AM Kathy Trevino MD [facility] BS AMB        ROS-except as noted above. . A complete cardiac and respiratory are reviewed and negative except as above ; Resp-denies wheezing  or productive cough,. Const- No unusual weight loss or fever; Neuro-no recent seizure or CVA ; GI- No BRBPR, abdom pain, bloating ; - no  hematuria   see supplement sheet, initialed and to be scanned by staff  Past Medical History:   Diagnosis Date    Adverse effect of anesthesia     WOKE UP IN RR ON VENTILATOR    Anemia     Arthritis     Calculus of kidney     Chronic kidney disease     STAGE 3    Cystocele     Diabetes ([facility]     Diabetic neuropathy, painful (HCC)     GERD (gastroesophageal reflux disease)     Gout 2022    Hepatitis B     Hypercholesterolemia     Hypertension     Microalbuminuria     Mild nonproliferative diabetic retinopathy (HCC)     PAF (paroxysmal atrial fibrillation) ([facility] [address]     [facility] 3-24-13 ER-Rolf follows    Rectocele     Retinopathy     Rosacea     Ruptured disc, cervical     Sleep apnea     Stenosis, cervical spine       Social Hx= reports that she has never smoked. She has never used smokeless tobacco. She reports current alcohol use. She reports that she does not use drugs. Exam and Labs:  /80   Pulse (!) 51   Resp 16   Ht 5' 0.5\" (1.537 m)   Wt 190 lb (86.2 kg)   SpO2 98%   BMI 36.50 kg/m² Constitutional:  NAD, comfortable  Head: NC,AT. Eyes: No scleral icterus. Neck:  Neck supple. No JVD present. Throat: moist mucous membranes. Chest: Effort normal & normal respiratory excursion . Neurological: alert, conversant and oriented . Skin: Skin is not cold. No obvious systemic rash noted. Not diaphoretic. No erythema. Psychiatric:  Grossly normal mood and affect. Behavior appears normal. Extremities:  no clubbing or cyanosis. Abdomen: non distended    Lungs:breath sounds normal. No stridor. distress, wheezes or  Rales.   Heart:1/6 RUSB murmur normal rate, regular rhythm, normal S1, S2, no rubs, clicks or gallops , PMI non displaced. Edema: Edema is none. Lab Results   Component Value Date/Time    Cholesterol, total 148 07/05/2022 11:55 AM    HDL Cholesterol 44 07/05/2022 11:55 AM    LDL, calculated 56 07/05/2022 11:55 AM    Triglyceride 240 (H) 07/05/2022 11:55 AM    CHOL/HDL Ratio 3.4 07/05/2022 11:55 AM     Lab Results   Component Value Date/Time    Sodium 140 01/23/2023 11:48 AM    Potassium 5.5 (H) 01/23/2023 11:48 AM    Chloride 112 (H) 01/23/2023 11:48 AM    CO2 24 01/23/2023 11:48 AM    Anion gap 4 (L) 01/23/2023 11:48 AM    Glucose 117 (H) 01/23/2023 11:48 AM    BUN 28 (H) 01/23/2023 11:48 AM    Creatinine 1.28 (H) 01/23/2023 11:48 AM    BUN/Creatinine ratio 22 (H) 01/23/2023 11:48 AM    GFR est AA 46 (L) 07/05/2022 11:55 AM    GFR est non-AA 38 (L) 07/05/2022 11:55 AM    Calcium 9.9 01/23/2023 11:48 AM      Wt Readings from Last 3 Encounters:   03/03/23 190 lb (86.2 kg)   01/23/23 192 lb 3.2 oz (87.2 kg)   12/16/22 188 lb (85.3 kg)      BP Readings from Last 3 Encounters:   03/03/23 120/80   01/23/23 134/80   12/16/22 119/68      Current Outpatient Medications   Medication Sig    loratadine 10 mg cap     glucose blood VI test strips (FreeStyle Test) strip TEST FASTING BLOOD SUGAR ONE TIME DAILY    lancets (FreeStyle Lancets) 28 gauge misc TEST BLOOD SUGAR ONCE DAILY    metFORMIN ER (GLUCOPHAGE XR) 500 mg tablet TAKE 2 TABLETS DAILY WITH DINNER    olmesartan (BENICAR) 40 mg tablet TAKE 1 TABLET DAILY    esomeprazole (NEXIUM) 20 mg capsule Take 1 Capsule by mouth nightly. amLODIPine (NORVASC) 2.5 mg tablet TAKE 1 TABLET DAILY    simvastatin (ZOCOR) 20 mg tablet TAKE 1 TABLET AT BEDTIME FOR CHOLESTEROL    metoprolol succinate (TOPROL-XL) 50 mg XL tablet Take 1 Tablet by mouth daily. colchicine 0.6 mg tablet Take two tablets at onset of symptoms. Then take one daily until flare resolve. cranberry fruit extract (CRANBERRY CONCENTRATE PO) Take  by mouth. diphenoxylate-atropine (LOMOTIL) 2.5-0.025 mg per tablet Take  by mouth every other day. cholecalciferol, vitamin D3, 50 mcg (2,000 unit) tab Take  by mouth. omega 3-DHA-EPA-fish oil 1,000 mg (120 mg-180 mg) capsule Take 4 Caps by mouth daily. CALCIUM CARBONATE/VITAMIN D3 (CALCIUM + D PO) Take  by mouth two (2) times a day. MULTI-VITAMIN PO Take 1 Tab by mouth daily. aspirin delayed-release 81 mg tablet take 81 mg by mouth daily. No current facility-administered medications for this visit. Impression see above.      Written by Davide Bruno, as dictated by Beverley Alba MD.

## 2023-03-13 RX ORDER — HYDROGEN PEROXIDE 3 %
SOLUTION, NON-ORAL MISCELLANEOUS
Qty: 90 CAPSULE | Refills: 3 | Status: SHIPPED | OUTPATIENT
Start: 2023-03-13

## 2023-03-17 DIAGNOSIS — E11.9 TYPE 2 DIABETES MELLITUS WITH HEMOGLOBIN A1C GOAL OF LESS THAN 7.0% (HCC): ICD-10-CM

## 2023-03-17 RX ORDER — BLOOD SUGAR DIAGNOSTIC
STRIP MISCELLANEOUS
Qty: 100 STRIP | Refills: 11 | Status: SHIPPED | OUTPATIENT
Start: 2023-03-17

## 2023-04-03 PROBLEM — E11.9 TYPE 2 DIABETES MELLITUS WITH HEMOGLOBIN A1C GOAL OF LESS THAN 7.0% (HCC): Status: RESOLVED | Noted: 2017-09-27 | Resolved: 2021-06-02

## 2023-05-02 DIAGNOSIS — I48.0 PAF (PAROXYSMAL ATRIAL FIBRILLATION) (HCC): ICD-10-CM

## 2023-05-02 RX ORDER — METOPROLOL SUCCINATE 50 MG/1
TABLET, EXTENDED RELEASE ORAL
Qty: 90 TABLET | Refills: 3 | Status: SHIPPED | OUTPATIENT
Start: 2023-05-02

## 2023-05-22 ENCOUNTER — TELEPHONE (OUTPATIENT)
Age: 73
End: 2023-05-22

## 2023-05-22 DIAGNOSIS — I48.0 PAROXYSMAL ATRIAL FIBRILLATION (HCC): ICD-10-CM

## 2023-05-22 DIAGNOSIS — I48.0 PAROXYSMAL ATRIAL FIBRILLATION (HCC): Primary | ICD-10-CM

## 2023-05-23 LAB
ANION GAP SERPL CALC-SCNC: 5 MMOL/L (ref 5–15)
BUN SERPL-MCNC: 32 MG/DL (ref 6–20)
BUN/CREAT SERPL: 21 (ref 12–20)
CALCIUM SERPL-MCNC: 9.5 MG/DL (ref 8.5–10.1)
CHLORIDE SERPL-SCNC: 120 MMOL/L (ref 97–108)
CO2 SERPL-SCNC: 16 MMOL/L (ref 21–32)
CREAT SERPL-MCNC: 1.5 MG/DL (ref 0.55–1.02)
GLUCOSE SERPL-MCNC: 130 MG/DL (ref 65–100)
MAGNESIUM SERPL-MCNC: 1.8 MG/DL (ref 1.6–2.4)
POTASSIUM SERPL-SCNC: 5.2 MMOL/L (ref 3.5–5.1)
SODIUM SERPL-SCNC: 141 MMOL/L (ref 136–145)

## 2023-05-26 ENCOUNTER — TELEPHONE (OUTPATIENT)
Age: 73
End: 2023-05-26

## 2023-05-26 NOTE — TELEPHONE ENCOUNTER
----- Message from USHA Vazquez - NP sent at 5/26/2023  9:38 AM EDT -----  K sl up but stable, make sure not taking K supplement  Kidney fxn also up but also stable,  avoid NSAID and stay well hydrated. Magnesium level WNL. Spoke with patient. 2 patient identifiers used. Reviewed above result note with patient. Patient verbalized understanding and was appreciative of call.

## 2023-05-26 NOTE — TELEPHONE ENCOUNTER
05/26/2023--I let patient know this information via TextMaster since she just called the office to make the appointment after calling this message in.

## 2023-05-26 NOTE — TELEPHONE ENCOUNTER
----- Message from Belen Tang sent at 5/26/2023 11:17 AM EDT -----  Subject: Referral Request    Reason for referral request? Pt discussed renal issues with FEROZ Tom   over am[facility]Z on 5/25/23. PCP asked Pt to have her GFR level repeated in   about a week. Pt wants to know if PCP wants to also check her phosphorous   level, metabolic panel since CO2 was off in last blood draw, or any other   renal labs. Please advise Pt on scheduling labs. Provider patient wants to be referred to(if known): Jim Lincoln    Provider Phone Number(if known):     Additional Information for Provider?   ---------------------------------------------------------------------------  --------------  CALL BACK INFO    [phone]; OK to respond with electronic message via amSTATZ portal   (only for patients who have registered [employer] account)  ---------------------------------------------------------------------------  --------------

## 2023-05-31 SDOH — ECONOMIC STABILITY: FOOD INSECURITY: WITHIN THE PAST 12 MONTHS, YOU WORRIED THAT YOUR FOOD WOULD RUN OUT BEFORE YOU GOT MONEY TO BUY MORE.: NEVER TRUE

## 2023-05-31 SDOH — ECONOMIC STABILITY: INCOME INSECURITY: HOW HARD IS IT FOR YOU TO PAY FOR THE VERY BASICS LIKE FOOD, HOUSING, MEDICAL CARE, AND HEATING?: NOT HARD AT ALL

## 2023-05-31 SDOH — ECONOMIC STABILITY: FOOD INSECURITY: WITHIN THE PAST 12 MONTHS, THE FOOD YOU BOUGHT JUST DIDN'T LAST AND YOU DIDN'T HAVE MONEY TO GET MORE.: NEVER TRUE

## 2023-05-31 SDOH — ECONOMIC STABILITY: HOUSING INSECURITY
IN THE LAST 12 MONTHS, WAS THERE A TIME WHEN YOU DID NOT HAVE A STEADY PLACE TO SLEEP OR SLEPT IN A SHELTER (INCLUDING NOW)?: NO

## 2023-05-31 SDOH — ECONOMIC STABILITY: TRANSPORTATION INSECURITY
IN THE PAST 12 MONTHS, HAS LACK OF TRANSPORTATION KEPT YOU FROM MEETINGS, WORK, OR FROM GETTING THINGS NEEDED FOR DAILY LIVING?: NO

## 2023-06-02 ENCOUNTER — OFFICE VISIT (OUTPATIENT)
Age: 73
End: 2023-06-02
Payer: COMMERCIAL

## 2023-06-02 VITALS
SYSTOLIC BLOOD PRESSURE: 130 MMHG | TEMPERATURE: 98 F | HEART RATE: 52 BPM | BODY MASS INDEX: 36.85 KG/M2 | DIASTOLIC BLOOD PRESSURE: 65 MMHG | WEIGHT: 195.2 LBS | OXYGEN SATURATION: 99 % | HEIGHT: 61 IN

## 2023-06-02 DIAGNOSIS — N18.30 STAGE 3 CHRONIC KIDNEY DISEASE, UNSPECIFIED WHETHER STAGE 3A OR 3B CKD (HCC): ICD-10-CM

## 2023-06-02 DIAGNOSIS — E11.21 TYPE 2 DIABETES MELLITUS WITH DIABETIC NEPHROPATHY, WITHOUT LONG-TERM CURRENT USE OF INSULIN (HCC): ICD-10-CM

## 2023-06-02 DIAGNOSIS — I48.0 PAROXYSMAL ATRIAL FIBRILLATION (HCC): ICD-10-CM

## 2023-06-02 DIAGNOSIS — E78.00 PURE HYPERCHOLESTEROLEMIA, UNSPECIFIED: ICD-10-CM

## 2023-06-02 DIAGNOSIS — I10 ESSENTIAL (PRIMARY) HYPERTENSION: ICD-10-CM

## 2023-06-02 DIAGNOSIS — I48.0 PAROXYSMAL ATRIAL FIBRILLATION (HCC): Primary | ICD-10-CM

## 2023-06-02 LAB
ANION GAP SERPL CALC-SCNC: 6 MMOL/L (ref 5–15)
BUN SERPL-MCNC: 26 MG/DL (ref 6–20)
BUN/CREAT SERPL: 19 (ref 12–20)
CALCIUM SERPL-MCNC: 9.4 MG/DL (ref 8.5–10.1)
CHLORIDE SERPL-SCNC: 114 MMOL/L (ref 97–108)
CHOLEST SERPL-MCNC: 159 MG/DL
CO2 SERPL-SCNC: 22 MMOL/L (ref 21–32)
CREAT SERPL-MCNC: 1.38 MG/DL (ref 0.55–1.02)
CREAT UR-MCNC: 46.6 MG/DL
EST. AVERAGE GLUCOSE BLD GHB EST-MCNC: 131 MG/DL
GLUCOSE SERPL-MCNC: 119 MG/DL (ref 65–100)
HBA1C MFR BLD: 6.2 % (ref 4–5.6)
HDLC SERPL-MCNC: 51 MG/DL
HDLC SERPL: 3.1 (ref 0–5)
LDLC SERPL CALC-MCNC: 69.8 MG/DL (ref 0–100)
MICROALBUMIN UR-MCNC: <0.5 MG/DL
MICROALBUMIN/CREAT UR-RTO: <11 MG/G (ref 0–30)
POTASSIUM SERPL-SCNC: 4.9 MMOL/L (ref 3.5–5.1)
SODIUM SERPL-SCNC: 142 MMOL/L (ref 136–145)
TRIGL SERPL-MCNC: 191 MG/DL
TSH SERPL DL<=0.05 MIU/L-ACNC: 1.6 UIU/ML (ref 0.36–3.74)
VLDLC SERPL CALC-MCNC: 38.2 MG/DL

## 2023-06-02 PROCEDURE — 99214 OFFICE O/P EST MOD 30 MIN: CPT | Performed by: INTERNAL MEDICINE

## 2023-06-02 PROCEDURE — 3078F DIAST BP <80 MM HG: CPT | Performed by: INTERNAL MEDICINE

## 2023-06-02 PROCEDURE — 3044F HG A1C LEVEL LT 7.0%: CPT | Performed by: INTERNAL MEDICINE

## 2023-06-02 PROCEDURE — 3075F SYST BP GE 130 - 139MM HG: CPT | Performed by: INTERNAL MEDICINE

## 2023-06-02 PROCEDURE — 1123F ACP DISCUSS/DSCN MKR DOCD: CPT | Performed by: INTERNAL MEDICINE

## 2023-06-02 RX ORDER — PYRIDOXINE HCL (VITAMIN B6) 100 MG
TABLET ORAL
COMMUNITY

## 2023-06-02 ASSESSMENT — ENCOUNTER SYMPTOMS
CONSTIPATION: 0
SHORTNESS OF BREATH: 0
ABDOMINAL PAIN: 0
BACK PAIN: 0
DIARRHEA: 0
CHEST TIGHTNESS: 0

## 2023-06-02 NOTE — PROGRESS NOTES
Stew Forward  Identified pt with two pt identifiers(name and ). Chief Complaint   Patient presents with    Follow-up     RM18// Pt presenting today with concerns of GFR going down. Would like to have labs. 1. Have you been to the ER, urgent care clinic since your last visit? Hospitalized since your last visit? NO    2. Have you seen or consulted any other health care providers outside of the [address] [place] [address] since your last visit? Include any pap smears or colon screening. NO      Provider notified of reason for visit, vitals and flowsheets obtained on patients. Patient received paperwork for advance directive during previous visit but has not completed at this time     Reviewed record In preparation for visit, huddled with provider and have obtained necessary documentation      Health Maintenance Due   Topic    Annual Wellness Visit (AWV)        Wt Readings from Last 3 Encounters:   23 195 lb 3.2 oz (88.5 kg)   23 190 lb (86.2 kg)   23 192 lb 3.2 oz (87.2 kg)     Temp Readings from Last 3 Encounters:   23 98 °F (36.7 °C) (Oral)     BP Readings from Last 3 Encounters:   23 130/65   23 120/80   23 134/80     Pulse Readings from Last 3 Encounters:   23 52   23 51   22 69     [unfilled]      Learning Assessment:  :     No flowsheet data found. Depression Screening:  :     No flowsheet data found. Fall Risk Assessment:  :     No flowsheet data found. Abuse Screening:  :     No flowsheet data found. ADL Screening:  :     No flowsheet data found. Medication reconciliation up to date and corrected with patient at this time.
Xuan Ye  Identified pt with two pt identifiers(name and ). Chief Complaint   Patient presents with    Follow-up     RM18// Pt presenting today with concerns of GRF going down. Would like to have labs. 1. Have you been to the ER, urgent care clinic since your last visit? Hospitalized since your last visit? NO    2. Have you seen or consulted any other health care providers outside of the [address] [place] [address] since your last visit? Include any pap smears or colon screening. NO      Provider notified of reason for visit, vitals and flowsheets obtained on patients. Patient received paperwork for advance directive during previous visit but has not completed at this time     Reviewed record In preparation for visit, huddled with provider and have obtained necessary documentation      Health Maintenance Due   Topic    Annual Wellness Visit (AWV)        Wt Readings from Last 3 Encounters:   23 195 lb 3.2 oz (88.5 kg)   23 190 lb (86.2 kg)   23 192 lb 3.2 oz (87.2 kg)     Temp Readings from Last 3 Encounters:   23 98 °F (36.7 °C) (Oral)     BP Readings from Last 3 Encounters:   23 130/65   23 120/80   23 134/80     Pulse Readings from Last 3 Encounters:   23 52   23 51   22 69     [unfilled]      Learning Assessment:  :     No flowsheet data found. Depression Screening:  :     No flowsheet data found. Fall Risk Assessment:  :     No flowsheet data found. Abuse Screening:  :     No flowsheet data found. ADL Screening:  :     No flowsheet data found. Medication reconciliation up to date and corrected with patient at this time.
Breast Cancer Maternal Aunt     Cancer Maternal Aunt         Breast Cancer    Stroke Sister         Mild stroke    Elevated Lipids Sister     Psychiatric Disorder Sister         Borderline Personality Disorder    Asthma Sister     Osteoarthritis Sister     Heart Attack Other     Arthritis Sister     Osteoarthritis Sister     Hypertension Sister     High Cholesterol Sister     Arthritis Sister     Thyroid Disease Mother         Allergies   Allergen Reactions    Sulfa Antibiotics      Other reaction(s): Unknown (comments)  TOPICAL EYE / EYE MUCH WORSE        Assessment/Plan  Zenia Mcburney was seen today for follow-up. Diagnoses and all orders for this visit:    Paroxysmal atrial fibrillation (HCC)-out of day of what appears to be breakthrough A-fib. Unclear of trigger. Has not had any recurrence of this. Does wear an iWatch. Check blood work today. Creatinine was mildly elevated at last check. We did discuss SGLT2's and if renal dysfunction progresses could consider change to this class. Otherwise check A1c today. Patient does have follow-up with cardiology later this month. Keep her follow-up with me in July. -     TSH; Future    Type 2 diabetes mellitus with diabetic nephropathy, without long-term current use of insulin (HCC)  -     Hemoglobin A1C; Future  -     Microalbumin / Creatinine Urine Ratio; Future    Pure hypercholesterolemia, unspecified  -     Lipid Panel; Future    Essential (primary) hypertension    Stage 3 chronic kidney disease, unspecified whether stage 3a or 3b CKD ([facility] [address]  -     Basic Metabolic Panel; Future  -     Microalbumin / Creatinine Urine Ratio; Future        No follow-up provider specified. Jeanne Luong MD  6/2/2023    This note was created with the help of speech recognition software [employer] Mon) and may contain some 'sound alike' errors.

## 2023-06-07 NOTE — PROGRESS NOTES
Wendi Singh is a 68 y.o. female who presents today for Annual Wellness Visit; Diabetes; Hypertension; and Chronic Kidney Disease  . She has a history of   Patient Active Problem List   Diagnosis Code    DM (diabetes mellitus) ([facility] [address] E11.9    Hyperlipidemia E78.5    Hepatitis B B19.10    GERD (gastroesophageal reflux disease) K21.9    Rosacea L71.9    AR (allergic rhinitis) J30.9    Intervertebral disk disease M51.9    Uterine prolapse N81.4    Incontinence R32    HTN (hypertension) I10    Anemia D64.9    PAF (paroxysmal atrial fibrillation) ([facility]) I48.0    Peripheral neuropathy G62.9    Pre-ulcerative corn or callous L84    Advanced care planning/counseling discussion Z71.89    Type 2 diabetes mellitus with hemoglobin A1c goal of less than 7.0% ([facility]) E11.9    Type 2 diabetes mellitus with nephropathy ([facility]) E11.21    Stage 3 chronic kidney disease ([facility]) N18.3    Severe obesity (BMI 35.0-39. 9) with comorbidity ([facility]) S36.53    Systolic murmur of aorta M41.1    History of arthritis Z87.39    Foot drop M21.379    Snoring R06.83    Mild obstructive sleep apnea G47.33    Primary insomnia F51.01    Insomnia with sleep apnea G47.00, G47.30    Type 2 diabetes mellitus with diabetic neuropathy ([facility]) E11.40    Incomplete emptying of bladder R33.9    Overactive bladder N32.81    Urge incontinence of urine N39.41   . Today patient is here for Medicare wellness. PAF: on ASA and following with cards. Review of loop recorder did not find any A. Fib. Recurrent UTI: seeing Kalina Alcantar for uro/gyn. Still having some urinary incontinence, but much better. They are offering her surgery and she is holding off on this for the time being. She is currently on antibiotics for UTI. Hypertension -on Norvasc and low-dose lisinopril and BB. Repeat blood pressure readings are better.   Hypertension ROS: taking medications as instructed, no medication side effects noted, no TIA's, no chest pain on exertion, no dyspnea on exertion, no swelling of ankles     reports that she has never smoked. She has never used smokeless tobacco.    reports that she does not drink alcohol. BP Readings from Last 2 Encounters:   08/02/19 134/78   07/03/19 137/74     Diabetes Mellitus follow-up  Last hemoglobin a1c   Lab Results   Component Value Date/Time    Hemoglobin A1c 5.8 (H) 03/21/2019 04:00 PM    Hemoglobin A1c (POC) 5.6 01/31/2018 11:30 AM    Hemoglobin A1c, External 5.6 04/19/2016     No components found for: POCA1C, HBA1C POC  medication compliance: compliant all of the time. Diabetic diet compliance: compliant most of the time. Home glucose monitoring: fasting values range 100-115. Further diabetic ROS: no polyuria or polydipsia, no chest pain, dyspnea or TIA's, no numbness, tingling or pain in extremities. Microalbuminuria:   Lab Results   Component Value Date/Time    Microalbumin/Creat ratio (mg/g creat) 14 12/16/2009 10:00 AM    Microalb/Creat ratio (ug/mg creat.) 4.7 09/06/2018 12:00 AM    Microalbumin,urine random 1.77 12/16/2009 10:00 AM     Hyperlipidemia  Taking Zocor  ROS: taking medications as instructed, no medication side effects noted  No new myalgias, no joint pains, no weakness  No TIA's, no chest pain on exertion, no dyspnea on exertion, no swelling of ankles. Lab Results   Component Value Date/Time    Cholesterol, total 151 08/01/2018 08:53 AM    HDL Cholesterol 53 08/01/2018 08:53 AM    LDL, calculated 55 08/01/2018 08:53 AM    VLDL, calculated 43 (H) 08/01/2018 08:53 AM    Triglyceride 216 (H) 08/01/2018 08:53 AM    CHOL/HDL Ratio 3.3 08/17/2010 03:47 AM       Health maintenance hx includes:  Exercise: moderately active. Form of exercise: fit fusion   Diet: generally follows a low fat low cholesterol diet, nonsmoker, alcohol intake none  Social: Home alone. Retired clinical dietician. Two children and 6 grandchildren.      Screening:    Colon cancer screening:  Last Colonoscopy: 2015 and   was normal   Breast cancer screening: last mammogram 2018 and   was normal   Cervical cancer screening: last PAP/Pelvic exam: N/A   Osteoporosis screening:  Last BMD:  2013 and revealed    - Normal      Immunizations:     Immunization History   Administered Date(s) Administered    Influenza High Dose Vaccine PF 10/19/2015, 10/31/2016, 10/04/2017, 09/02/2018    Influenza Vaccine 09/20/2013, 10/06/2014    Pneumococcal Conjugate (PCV-13) 09/07/2016    Pneumococcal Polysaccharide (PPSV-23) 10/06/2014    TD Vaccine 01/29/2009    Tdap 09/20/2013    Zoster Recombinant 01/28/2019, 04/22/2019, 04/22/2019    Zoster Vaccine, Live 06/14/2008, 01/28/2019      Immunization status: up to date and documented. ROS  Review of Systems   Constitutional: Negative for chills, fever and weight loss. HENT: Negative for congestion and sore throat. Eyes: Negative for blurred vision, double vision and photophobia. Respiratory: Negative for cough and shortness of breath. Cardiovascular: Negative for chest pain, palpitations and leg swelling. Gastrointestinal: Negative for abdominal pain, constipation, diarrhea, heartburn, nausea and vomiting. Fecal incontinence improved   Genitourinary: Negative for dysuria, frequency and urgency. Recurrent UTI. Musculoskeletal: Negative for joint pain and myalgias. Skin: Negative for rash. Neurological: Negative. Negative for headaches. Endo/Heme/Allergies: Does not bruise/bleed easily. Psychiatric/Behavioral: Negative for memory loss and suicidal ideas. Visit Vitals  /78   Pulse (!) 58   Temp 98.1 °F (36.7 °C) (Oral)   Resp 16   Ht 5' (1.524 m)   Wt 185 lb (83.9 kg)   SpO2 98%   BMI 36.13 kg/m²       Physical Exam   Constitutional: She is oriented to person, place, and time. She appears well-developed and well-nourished. No distress. HENT:   Head: Normocephalic and atraumatic. Neck: Normal range of motion. Neck supple.  No thyromegaly present. Cardiovascular: Normal rate and regular rhythm. No murmur heard. Pulmonary/Chest: Effort normal and breath sounds normal. She has no wheezes. Abdominal: Soft. Bowel sounds are normal. She exhibits no distension. Musculoskeletal: She exhibits no edema. Neurological: She is alert and oriented to person, place, and time. No cranial nerve deficit. Skin: Skin is warm and dry. Psychiatric: She has a normal mood and affect. Her behavior is normal.         Current Outpatient Medications   Medication Sig    lisinopril (PRINIVIL, ZESTRIL) 10 mg tablet TAKE ONE TABLET BY MOUTH EVERY DAY    metFORMIN ER (GLUCOPHAGE XR) 500 mg tablet Take 2 Tabs by mouth daily (with dinner).  amLODIPine (NORVASC) 5 mg tablet Take 1 Tab by mouth daily.  simvastatin (ZOCOR) 20 mg tablet TAKE 1 TABLET DAILY AT BEDTIME FOR CHOLESTEROL    lancets (FREESTYLE LANCETS) 28 gauge misc FOR USE IN LANCET DEVICE    esomeprazole (NEXIUM) 20 mg capsule TAKE 1 CAPSULE BY MOUTH EVERY DAY    glucose blood VI test strips (FREESTYLE TEST) strip For fasting BS testing once daily. ICD-10: E11.9    gabapentin (NEURONTIN) 100 mg capsule TAKE ONE CAPSULE BY MOUTH THREE TIMES A DAY AS NEEDED    omega 3-DHA-EPA-fish oil (FISH OIL) 1,000 mg (120 mg-180 mg) capsule Take 4 Caps by mouth daily.  metoprolol succinate (TOPROL-XL) 50 mg XL tablet TAKE ONE (1) TABLET(S) ONCE DAILY    fluticasone (FLONASE) 50 mcg/actuation nasal spray     loratadine (CLARITIN) 10 mg tablet Take 10 mg by mouth daily.  CALCIUM CARBONATE (TUMS PO) Take  by mouth daily as needed.  prednisoLONE acetate (PRED FORTE) 1 % ophthalmic suspension Administer 1 Drop to both eyes daily.  oxybutynin chloride XL (DITROPAN XL) 10 mg CR tablet Take 10 mg by mouth daily.  CALCIUM CARBONATE/VITAMIN D3 (CALCIUM + D PO) Take  by mouth two (2) times a day.  MULTI-VITAMIN PO Take 1 Tab by mouth daily.     aspirin delayed-release 81 mg tablet take 81 mg by mouth daily. No current facility-administered medications for this visit. Past Medical History:   Diagnosis Date    Anemia     Cystocele     Diabetes ([facility] [address]     Diabetic neuropathy, painful (HCC)     Hepatitis B     Hypercholesterolemia     Hypertension     Microalbuminuria     Mild nonproliferative diabetic retinopathy (HCC)     PAF (paroxysmal atrial fibrillation) ([facility] [address]     [facility] 3-24-13 ER-[facility] follows    Rectocele     Retinopathy     Rosacea     Ruptured disc, cervical     Sleep apnea     Stenosis, cervical spine       Past Surgical History:   Procedure Laterality Date    HX BREAST BIOPSY Left 2009    HX HERNIA REPAIR      umbilical    HX MENISCUS REPAIR  7/11/14    HX OTHER SURGICAL  05/09/2018    Bladder Botox     HX OVARIAN CYST REMOVAL  1973      Social History     Tobacco Use    Smoking status: Never Smoker    Smokeless tobacco: Never Used   Substance Use Topics    Alcohol use: No     Alcohol/week: 0.0 standard drinks     Comment: very rarely       Family History   Problem Relation Age of Onset    Hypertension Mother     Thyroid Disease Mother     Heart Failure Father     Heart Disease Father     Thyroid Disease Sister     Thyroid Disease Sister     Heart Attack Other     Cancer Maternal Aunt         esophageal    Cancer Paternal Aunt         breast        Allergies   Allergen Reactions    Sulfa (Sulfonamide Antibiotics) Unknown (comments)        Assessment/Plan  Diagnoses and all orders for this visit:    1. Medicare annual wellness visit, jeferson Perez was counseled on age-appropriate/ guideline-based risk prevention behaviors and screening for a 68y.o. year old   female . We also discussed adjustments in screening based on family history if necessary. Printed instructions for preventative screening guidelines and healthy behaviors given to patient with after visit summary.       2. Stage 3 chronic kidney disease (HCC) -repeat levels  - METABOLIC PANEL, BASIC    3. Pure hypercholesterolemia -continue simvastatin  -     LIPID PANEL    4. Severe obesity (BMI 35.0-39. 9) with comorbidity ([address] -counseled on weight loss. 5. Type 2 diabetes mellitus without complication, without long-term current use of insulin ([address] -now on lower dose metformin. We will repeat her blood sugars  -     HEMOGLOBIN A1C WITH EAG  -     MICROALBUMIN, UR, RAND W/ MICROALB/CREAT RATIO    6. Hypertension, unspecified type -better on repeat. Patient to monitor at home    7. PAF (paroxysmal atrial fibrillation) (HCC) -no recent palpitations    8. Vitamin D deficiency  -     VITAMIN D, 25 HYDROXY    9. Advanced directives, counseling/discussion -consult placed to nurse navigator    10. Screening for depression  -     DEPRESSION SCREEN ANNUAL    11. Recurrent UTI -following with uro-GYN. 12. Neuropathy -given long-term use of metformin will check her B12 levels. -     VITAMIN B12            Sree Taylor MD  8/2/2019    This note was created with the help of speech recognition software Luke Price) and may contain some 'sound alike' errors. This is the Subsequent Medicare Annual Wellness Exam, performed 12 months or more after the Initial AWV or the last Subsequent AWV    I have reviewed the patient's medical history in detail and updated the computerized patient record.      History     Past Medical History:   Diagnosis Date    Anemia     Cystocele     Diabetes ([address]     Diabetic neuropathy, painful ([address]     Hepatitis B     Hypercholesterolemia     Hypertension     Microalbuminuria     Mild nonproliferative diabetic retinopathy (HCC)     PAF (paroxysmal atrial fibrillation) ([address]     [facility] 3-24-13 ER-[facility] follows    Rectocele     Retinopathy     Rosacea     Ruptured disc, cervical     Sleep apnea     Stenosis, cervical spine       Past Surgical History:   Procedure Laterality Date    HX BREAST BIOPSY Left 2009    HX HERNIA REPAIR      umbilical  HX MENISCUS REPAIR  7/11/14    HX OTHER SURGICAL  05/09/2018    Bladder Botox     HX OVARIAN CYST REMOVAL  1973     Current Outpatient Medications   Medication Sig Dispense Refill    lisinopril (PRINIVIL, ZESTRIL) 10 mg tablet TAKE ONE TABLET BY MOUTH EVERY DAY 90 Tab 1    metFORMIN ER (GLUCOPHAGE XR) 500 mg tablet Take 2 Tabs by mouth daily (with dinner). 180 Tab 0    amLODIPine (NORVASC) 5 mg tablet Take 1 Tab by mouth daily. 90 Tab 3    simvastatin (ZOCOR) 20 mg tablet TAKE 1 TABLET DAILY AT BEDTIME FOR CHOLESTEROL 90 Tab 1    lancets (FREESTYLE LANCETS) 28 gauge misc FOR USE IN LANCET DEVICE 100 Lancet 3    esomeprazole (NEXIUM) 20 mg capsule TAKE 1 CAPSULE BY MOUTH EVERY DAY  6    glucose blood VI test strips (FREESTYLE TEST) strip For fasting BS testing once daily. ICD-10: E11.9 100 Strip 11    gabapentin (NEURONTIN) 100 mg capsule TAKE ONE CAPSULE BY MOUTH THREE TIMES A DAY AS NEEDED 30 Cap 5    omega 3-DHA-EPA-fish oil (FISH OIL) 1,000 mg (120 mg-180 mg) capsule Take 4 Caps by mouth daily.  metoprolol succinate (TOPROL-XL) 50 mg XL tablet TAKE ONE (1) TABLET(S) ONCE DAILY 90 Tab 2    fluticasone (FLONASE) 50 mcg/actuation nasal spray       loratadine (CLARITIN) 10 mg tablet Take 10 mg by mouth daily.  CALCIUM CARBONATE (TUMS PO) Take  by mouth daily as needed.  prednisoLONE acetate (PRED FORTE) 1 % ophthalmic suspension Administer 1 Drop to both eyes daily. 5    oxybutynin chloride XL (DITROPAN XL) 10 mg CR tablet Take 10 mg by mouth daily.  CALCIUM CARBONATE/VITAMIN D3 (CALCIUM + D PO) Take  by mouth two (2) times a day.  MULTI-VITAMIN PO Take 1 Tab by mouth daily.  aspirin delayed-release 81 mg tablet take 81 mg by mouth daily.        Allergies   Allergen Reactions    Sulfa (Sulfonamide Antibiotics) Unknown (comments)     Family History   Problem Relation Age of Onset    Hypertension Mother     Thyroid Disease Mother     Heart Failure Father     Heart Disease Father     Thyroid Disease Sister     Thyroid Disease Sister     Heart Attack Other     Cancer Maternal Aunt         esophageal    Cancer Paternal Aunt         breast     Social History     Tobacco Use    Smoking status: Never Smoker    Smokeless tobacco: Never Used   Substance Use Topics    Alcohol use: No     Alcohol/week: 0.0 standard drinks     Comment: very rarely      Patient Active Problem List   Diagnosis Code    DM (diabetes mellitus) ([facility] [address] E11.9    Hyperlipidemia E78.5    Hepatitis B B19.10    GERD (gastroesophageal reflux disease) K21.9    Rosacea L71.9    AR (allergic rhinitis) J30.9    Intervertebral disk disease M51.9    Uterine prolapse N81.4    Incontinence R32    HTN (hypertension) I10    Anemia D64.9    PAF (paroxysmal atrial fibrillation) ([facility]) I48.0    Peripheral neuropathy G62.9    Pre-ulcerative corn or callous L84    Advanced care planning/counseling discussion Z71.89    Type 2 diabetes mellitus with hemoglobin A1c goal of less than 7.0% ([facility]) E11.9    Type 2 diabetes mellitus with nephropathy ([facility]) E11.21    Stage 3 chronic kidney disease ([facility]) N18.3    Severe obesity (BMI 35.0-39. 9) with comorbidity ([facility]) C07.91    Systolic murmur of aorta M01.5    History of arthritis Z87.39    Foot drop M21.379    Snoring R06.83    Mild obstructive sleep apnea G47.33    Primary insomnia F51.01    Insomnia with sleep apnea G47.00, G47.30    Type 2 diabetes mellitus with diabetic neuropathy ([facility]) E11.40    Incomplete emptying of bladder R33.9    Overactive bladder N32.81    Urge incontinence of urine N39.41       Depression Risk Factor Screening:     3 most recent PHQ Screens 3/21/2019   Little interest or pleasure in doing things Not at all   Feeling down, depressed, irritable, or hopeless Not at all   Total Score PHQ 2 0     Alcohol Risk Factor Screening: You do not drink alcohol or very rarely.     Functional Ability and Level of Safety:   Hearing Loss  Hearing is good. Activities of Daily Living  The home contains: no safety equipment. Patient does total self care    Fall Risk  Fall Risk Assessment, last 12 mths 3/21/2019   Able to walk? Yes   Fall in past 12 months? No       Abuse Screen  Patient is not abused    Cognitive Screening   Evaluation of Cognitive Function:  Has your family/caregiver stated any concerns about your memory: no  Normal    Patient Care Team   Patient Care Team:  Mac Benito MD as PCP - General (Internal Medicine)  Luz Marina Goel MD as Physician (Obstetrics & Gynecology)  Kasie Slater MD as Physician (Gynecology)  Melodye Cogan, MD as Physician (Cardiology)    Assessment/Plan   Education and counseling provided:  Are appropriate based on today's review and evaluation  End-of-Life planning (with patient's consent)  Pneumococcal Vaccine  Colorectal cancer screening tests    Diagnoses and all orders for this visit:    1. Medicare annual wellness visit, subsequent    2. Stage 3 chronic kidney disease (HCC)  -     METABOLIC PANEL, BASIC    3. Pure hypercholesterolemia  -     LIPID PANEL    4. Severe obesity (BMI 35.0-39. 9) with comorbidity ([facility] [address]    5. Type 2 diabetes mellitus without complication, without long-term current use of insulin (HCC)  -     HEMOGLOBIN A1C WITH EAG  -     MICROALBUMIN, UR, RAND W/ MICROALB/CREAT RATIO    6. Hypertension, unspecified type    7. PAF (paroxysmal atrial fibrillation) ([facility] [address]    8. Vitamin D deficiency  -     VITAMIN D, 25 HYDROXY    9. Advanced directives, counseling/discussion    10. Screening for depression  -     DEPRESSION SCREEN ANNUAL    11.  Recurrent UTI        Health Maintenance Due   Topic Date Due    MEDICARE YEARLY EXAM  09/28/2018    LIPID PANEL Q1  08/01/2019    Influenza Age 5 to Adult  08/01/2019 Griseofulvin Pregnancy And Lactation Text: This medication is Pregnancy Category X and is known to cause serious birth defects. It is unknown if this medication is excreted in breast milk but breast feeding should be avoided.

## 2023-06-15 ENCOUNTER — ANCILLARY PROCEDURE (OUTPATIENT)
Age: 73
End: 2023-06-15
Payer: MEDICARE

## 2023-06-15 VITALS
WEIGHT: 195 LBS | SYSTOLIC BLOOD PRESSURE: 134 MMHG | BODY MASS INDEX: 36.82 KG/M2 | HEART RATE: 56 BPM | DIASTOLIC BLOOD PRESSURE: 70 MMHG | HEIGHT: 61 IN

## 2023-06-15 DIAGNOSIS — R01.1 SYSTOLIC MURMUR: ICD-10-CM

## 2023-06-15 DIAGNOSIS — I48.0 PAF (PAROXYSMAL ATRIAL FIBRILLATION) (HCC): ICD-10-CM

## 2023-06-15 DIAGNOSIS — I10 ESSENTIAL HYPERTENSION: ICD-10-CM

## 2023-06-15 DIAGNOSIS — Q24.8 LEFT VENTRICULAR OUTFLOW TRACT OBSTRUCTION: ICD-10-CM

## 2023-06-15 PROCEDURE — 93306 TTE W/DOPPLER COMPLETE: CPT

## 2023-06-25 LAB
ECHO AO ASC DIAM: 3.7 CM
ECHO AO ASCENDING AORTA INDEX: 1.99 CM/M2
ECHO AO ROOT DIAM: 2.9 CM
ECHO AO ROOT INDEX: 1.56 CM/M2
ECHO BSA: 1.94 M2
ECHO EST RA PRESSURE: 3 MMHG
ECHO LA DIAMETER INDEX: 2.1 CM/M2
ECHO LA DIAMETER: 3.9 CM
ECHO LA TO AORTIC ROOT RATIO: 1.34
ECHO LA VOL 2C: 51 ML (ref 22–52)
ECHO LA VOL 4C: 41 ML (ref 22–52)
ECHO LA VOLUME AREA LENGTH: 52 ML
ECHO LA VOLUME INDEX A2C: 27 ML/M2 (ref 16–34)
ECHO LA VOLUME INDEX A4C: 22 ML/M2 (ref 16–34)
ECHO LA VOLUME INDEX AREA LENGTH: 28 ML/M2 (ref 16–34)
ECHO LV E' LATERAL VELOCITY: 9 CM/S
ECHO LV E' SEPTAL VELOCITY: 8 CM/S
ECHO LV EDV A2C: 71 ML
ECHO LV EDV A4C: 73 ML
ECHO LV EDV BP: 73 ML (ref 56–104)
ECHO LV EDV INDEX A4C: 39 ML/M2
ECHO LV EDV INDEX BP: 39 ML/M2
ECHO LV EDV NDEX A2C: 38 ML/M2
ECHO LV EJECTION FRACTION A2C: 74 %
ECHO LV EJECTION FRACTION A4C: 70 %
ECHO LV EJECTION FRACTION BIPLANE: 72 % (ref 55–100)
ECHO LV ESV A2C: 18 ML
ECHO LV ESV A4C: 21 ML
ECHO LV ESV BP: 20 ML (ref 19–49)
ECHO LV ESV INDEX A2C: 10 ML/M2
ECHO LV ESV INDEX A4C: 11 ML/M2
ECHO LV ESV INDEX BP: 11 ML/M2
ECHO LV FRACTIONAL SHORTENING: 42 % (ref 28–44)
ECHO LV INTERNAL DIMENSION DIASTOLE INDEX: 2.04 CM/M2
ECHO LV INTERNAL DIMENSION DIASTOLIC: 3.8 CM (ref 3.9–5.3)
ECHO LV INTERNAL DIMENSION SYSTOLIC INDEX: 1.18 CM/M2
ECHO LV INTERNAL DIMENSION SYSTOLIC: 2.2 CM
ECHO LV IVSD: 1 CM (ref 0.6–0.9)
ECHO LV MASS 2D: 125.8 G (ref 67–162)
ECHO LV MASS INDEX 2D: 67.6 G/M2 (ref 43–95)
ECHO LV POSTERIOR WALL DIASTOLIC: 1.1 CM (ref 0.6–0.9)
ECHO LV RELATIVE WALL THICKNESS RATIO: 0.58
ECHO LVOT AREA: 2.8 CM2
ECHO LVOT DIAM: 1.9 CM
ECHO MV A VELOCITY: 0.72 M/S
ECHO MV AREA PHT: 3.9 CM2
ECHO MV E DECELERATION TIME (DT): 195.9 MS
ECHO MV E VELOCITY: 0.8 M/S
ECHO MV E/A RATIO: 1.11
ECHO MV E/E' LATERAL: 8.89
ECHO MV E/E' RATIO (AVERAGED): 9.44
ECHO MV E/E' SEPTAL: 10
ECHO MV MAX VELOCITY: 0.9 M/S
ECHO MV MEAN GRADIENT: 1 MMHG
ECHO MV MEAN VELOCITY: 0.4 M/S
ECHO MV PEAK GRADIENT: 3 MMHG
ECHO MV PRESSURE HALF TIME (PHT): 56.8 MS
ECHO MV VTI: 31.5 CM
ECHO RA AREA 4C: 18.2 CM2
ECHO RA END SYSTOLIC VOLUME APICAL 4 CHAMBER INDEX BSA: 30 ML/M2
ECHO RA VOLUME: 55 ML
ECHO RIGHT VENTRICULAR SYSTOLIC PRESSURE (RVSP): 28 MMHG
ECHO RV INTERNAL DIMENSION: 4.1 CM
ECHO RV TAPSE: 2.2 CM (ref 1.7–?)
ECHO TV REGURGITANT MAX VELOCITY: 2.49 M/S
ECHO TV REGURGITANT PEAK GRADIENT: 25 MMHG

## 2023-07-05 ENCOUNTER — OFFICE VISIT (OUTPATIENT)
Age: 73
End: 2023-07-05
Payer: MEDICARE

## 2023-07-05 VITALS
HEART RATE: 62 BPM | SYSTOLIC BLOOD PRESSURE: 120 MMHG | OXYGEN SATURATION: 97 % | WEIGHT: 192 LBS | HEIGHT: 61 IN | BODY MASS INDEX: 36.25 KG/M2 | RESPIRATION RATE: 16 BRPM | DIASTOLIC BLOOD PRESSURE: 70 MMHG

## 2023-07-05 DIAGNOSIS — I48.0 PAF (PAROXYSMAL ATRIAL FIBRILLATION) (HCC): Primary | ICD-10-CM

## 2023-07-05 DIAGNOSIS — I10 ESSENTIAL (PRIMARY) HYPERTENSION: ICD-10-CM

## 2023-07-05 DIAGNOSIS — R06.02 SHORTNESS OF BREATH: ICD-10-CM

## 2023-07-05 DIAGNOSIS — Q24.8 LEFT VENTRICULAR OUTFLOW TRACT OBSTRUCTION: ICD-10-CM

## 2023-07-05 DIAGNOSIS — E78.00 PURE HYPERCHOLESTEROLEMIA, UNSPECIFIED: ICD-10-CM

## 2023-07-05 PROCEDURE — 3078F DIAST BP <80 MM HG: CPT | Performed by: SPECIALIST

## 2023-07-05 PROCEDURE — 3074F SYST BP LT 130 MM HG: CPT | Performed by: SPECIALIST

## 2023-07-05 PROCEDURE — G8417 CALC BMI ABV UP PARAM F/U: HCPCS | Performed by: SPECIALIST

## 2023-07-05 PROCEDURE — G8399 PT W/DXA RESULTS DOCUMENT: HCPCS | Performed by: SPECIALIST

## 2023-07-05 PROCEDURE — 99214 OFFICE O/P EST MOD 30 MIN: CPT | Performed by: SPECIALIST

## 2023-07-05 PROCEDURE — 1036F TOBACCO NON-USER: CPT | Performed by: SPECIALIST

## 2023-07-05 PROCEDURE — 1123F ACP DISCUSS/DSCN MKR DOCD: CPT | Performed by: SPECIALIST

## 2023-07-05 PROCEDURE — G8427 DOCREV CUR MEDS BY ELIG CLIN: HCPCS | Performed by: SPECIALIST

## 2023-07-05 PROCEDURE — 1090F PRES/ABSN URINE INCON ASSESS: CPT | Performed by: SPECIALIST

## 2023-07-05 PROCEDURE — 3017F COLORECTAL CA SCREEN DOC REV: CPT | Performed by: SPECIALIST

## 2023-07-05 ASSESSMENT — PATIENT HEALTH QUESTIONNAIRE - PHQ9
1. LITTLE INTEREST OR PLEASURE IN DOING THINGS: 0
SUM OF ALL RESPONSES TO PHQ9 QUESTIONS 1 & 2: 0
SUM OF ALL RESPONSES TO PHQ QUESTIONS 1-9: 0
SUM OF ALL RESPONSES TO PHQ QUESTIONS 1-9: 0
2. FEELING DOWN, DEPRESSED OR HOPELESS: 0
SUM OF ALL RESPONSES TO PHQ QUESTIONS 1-9: 0
SUM OF ALL RESPONSES TO PHQ QUESTIONS 1-9: 0

## 2023-07-05 NOTE — PROGRESS NOTES
Sonya Velez     1950       Radha Harris MD, [facility]  Date of Visit-7/5/2023   PCP is Dimas Martell MD   [facility] [place]  [facility]  HPI:  Sonya Velez is a 74 y.o. female   PAF. Echo 4/14/2021 EF 69% borderline hypertrophy LV outflow tract gradient 7.9 mm peak gradient 21 mm with provocation. Mild LAE. Aorta 3.7 cm with coronary sinus dilated suggesting persistent left superior vena cava. PASP 32 mmHg--   Echo in August 2018 was normal and loop showed no AF. [facility] scan 4/14/2021 normal EF 73%  Hypertension systemic  Dyslipidemia  Chronic mild shortness of breath, deconditioning      Today. .  Seen in March has some tachy 2 months prior , then recurrent in May -see calls-labs then and in June , stable BMP TSH etc reviewed   Saw PCP noting palps and getting renal labs in June   Echo 6/15/23- EF 72%  mild LVH DANISH , mild aortic sclerosis, PASP 28  Denies chest pain, edema, syncope or shortness of breath at rest   Has no tachycardia , palpitations or sense of arrythmia      Assessment/Plan:   Main part of our discussion today was working on dietary and exercise program building up core strength and try and reduce her morning sugars and her likely sleep apnea that drives some of the A-fib. But overall she stable both in murmur and rhythm. Patient Instructions   We will see you in 6 months with Dr. Harjeet Harris.      1. PAF (paroxysmal atrial fibrillation) ([address])  Remains in sinus. Her fatigue and edema have improved. Stable PAF she is in a work on diet and exercise    2. Left ventricular outflow tract gradient   goes from 7-21 with provocation systolic murmur of aorta  It is notably focal to just the RUSB.     3. Essential hypertension  Stable using CPAP    At goal , meds and possible side effects reviewed and patient [email or web address]   BP Readings from Last 6 Encounters:   07/05/23 120/70   06/15/23 134/70   06/02/23 130/65   03/03/23

## 2023-07-13 DIAGNOSIS — E78.00 PURE HYPERCHOLESTEROLEMIA, UNSPECIFIED: Primary | ICD-10-CM

## 2023-07-13 RX ORDER — SIMVASTATIN 20 MG
TABLET ORAL
Qty: 90 TABLET | Refills: 1 | Status: SHIPPED | OUTPATIENT
Start: 2023-07-13

## 2023-07-23 SDOH — HEALTH STABILITY: PHYSICAL HEALTH: ON AVERAGE, HOW MANY MINUTES DO YOU ENGAGE IN EXERCISE AT THIS LEVEL?: 20 MIN

## 2023-07-23 SDOH — HEALTH STABILITY: PHYSICAL HEALTH: ON AVERAGE, HOW MANY DAYS PER WEEK DO YOU ENGAGE IN MODERATE TO STRENUOUS EXERCISE (LIKE A BRISK WALK)?: 3 DAYS

## 2023-07-23 ASSESSMENT — LIFESTYLE VARIABLES
HOW OFTEN DO YOU HAVE A DRINK CONTAINING ALCOHOL: 2
HOW OFTEN DO YOU HAVE SIX OR MORE DRINKS ON ONE OCCASION: 1
HOW OFTEN DO YOU HAVE A DRINK CONTAINING ALCOHOL: MONTHLY OR LESS
HOW MANY STANDARD DRINKS CONTAINING ALCOHOL DO YOU HAVE ON A TYPICAL DAY: 1
HOW MANY STANDARD DRINKS CONTAINING ALCOHOL DO YOU HAVE ON A TYPICAL DAY: 1 OR 2

## 2023-07-23 ASSESSMENT — PATIENT HEALTH QUESTIONNAIRE - PHQ9
SUM OF ALL RESPONSES TO PHQ QUESTIONS 1-9: 0
SUM OF ALL RESPONSES TO PHQ9 QUESTIONS 1 & 2: 0
SUM OF ALL RESPONSES TO PHQ QUESTIONS 1-9: 0
1. LITTLE INTEREST OR PLEASURE IN DOING THINGS: 0
2. FEELING DOWN, DEPRESSED OR HOPELESS: 0

## 2023-07-24 ENCOUNTER — OFFICE VISIT (OUTPATIENT)
Age: 73
End: 2023-07-24
Payer: MEDICARE

## 2023-07-24 VITALS
HEART RATE: 50 BPM | DIASTOLIC BLOOD PRESSURE: 79 MMHG | SYSTOLIC BLOOD PRESSURE: 131 MMHG | RESPIRATION RATE: 15 BRPM | TEMPERATURE: 98 F | OXYGEN SATURATION: 100 % | HEIGHT: 61 IN | BODY MASS INDEX: 36.47 KG/M2 | WEIGHT: 193.2 LBS

## 2023-07-24 DIAGNOSIS — N18.30 STAGE 3 CHRONIC KIDNEY DISEASE, UNSPECIFIED WHETHER STAGE 3A OR 3B CKD (HCC): ICD-10-CM

## 2023-07-24 DIAGNOSIS — E78.00 PURE HYPERCHOLESTEROLEMIA, UNSPECIFIED: ICD-10-CM

## 2023-07-24 DIAGNOSIS — I10 ESSENTIAL (PRIMARY) HYPERTENSION: ICD-10-CM

## 2023-07-24 DIAGNOSIS — E11.21 TYPE 2 DIABETES MELLITUS WITH DIABETIC NEPHROPATHY, WITHOUT LONG-TERM CURRENT USE OF INSULIN (HCC): ICD-10-CM

## 2023-07-24 DIAGNOSIS — Z12.31 BREAST CANCER SCREENING BY MAMMOGRAM: ICD-10-CM

## 2023-07-24 DIAGNOSIS — E66.01 SEVERE OBESITY (BMI 35.0-39.9) WITH COMORBIDITY (HCC): ICD-10-CM

## 2023-07-24 DIAGNOSIS — I48.0 PAROXYSMAL ATRIAL FIBRILLATION (HCC): ICD-10-CM

## 2023-07-24 DIAGNOSIS — Z00.00 MEDICARE ANNUAL WELLNESS VISIT, SUBSEQUENT: Primary | ICD-10-CM

## 2023-07-24 PROCEDURE — 3017F COLORECTAL CA SCREEN DOC REV: CPT | Performed by: INTERNAL MEDICINE

## 2023-07-24 PROCEDURE — G0439 PPPS, SUBSEQ VISIT: HCPCS | Performed by: INTERNAL MEDICINE

## 2023-07-24 PROCEDURE — 1123F ACP DISCUSS/DSCN MKR DOCD: CPT | Performed by: INTERNAL MEDICINE

## 2023-07-24 PROCEDURE — 3044F HG A1C LEVEL LT 7.0%: CPT | Performed by: INTERNAL MEDICINE

## 2023-07-24 PROCEDURE — 3078F DIAST BP <80 MM HG: CPT | Performed by: INTERNAL MEDICINE

## 2023-07-24 PROCEDURE — 3075F SYST BP GE 130 - 139MM HG: CPT | Performed by: INTERNAL MEDICINE

## 2023-07-24 SDOH — ECONOMIC STABILITY: FOOD INSECURITY: WITHIN THE PAST 12 MONTHS, YOU WORRIED THAT YOUR FOOD WOULD RUN OUT BEFORE YOU GOT MONEY TO BUY MORE.: NEVER TRUE

## 2023-07-24 SDOH — ECONOMIC STABILITY: FOOD INSECURITY: WITHIN THE PAST 12 MONTHS, THE FOOD YOU BOUGHT JUST DIDN'T LAST AND YOU DIDN'T HAVE MONEY TO GET MORE.: NEVER TRUE

## 2023-07-24 SDOH — ECONOMIC STABILITY: INCOME INSECURITY: HOW HARD IS IT FOR YOU TO PAY FOR THE VERY BASICS LIKE FOOD, HOUSING, MEDICAL CARE, AND HEATING?: NOT HARD AT ALL

## 2023-07-24 ASSESSMENT — ENCOUNTER SYMPTOMS
SHORTNESS OF BREATH: 0
ABDOMINAL PAIN: 0
CONSTIPATION: 0
CHEST TIGHTNESS: 0
BACK PAIN: 1
DIARRHEA: 0

## 2023-08-18 RX ORDER — COLCHICINE 0.6 MG/1
TABLET ORAL
Qty: 30 TABLET | Refills: 1 | Status: SHIPPED | OUTPATIENT
Start: 2023-08-18

## 2023-08-30 NOTE — PROGRESS NOTES
Sai Elliott is a 74 y.o. female returns for an annual exam     Chief Complaint   Patient presents with    Annual Exam       No LMP recorded. Patient has had a hysterectomy. Her periods are absent. Problems: for the past several months, she will get a \"fleeting\" sharp/burning pain of the right breast that come and quickly go. She also had a green discoloration in her bra from left breast, had milk duct removed several years ago by Sonido Tomlinson. Birth Control: status post hysterectomy. Last Pap: normal obtained 6 year(s) ago on 10/13/16. She does not have a history of CHRISTINA 2, 3 or cervical cancer. Last Mammogram: had her mammogram today in our office. Last Bone Density: normal obtained 10/2013. Last colonoscopy: normal obtained 5-6 year(s) ago. 1. Have you been to the ER, urgent care clinic, or hospitalized since your last visit? No    2. Have you seen or consulted any other health care providers outside of the [address] [place] [address] since your last visit?  Yes    Examination chaperoned by Malissa Roe MA.

## 2023-08-31 ENCOUNTER — OFFICE VISIT (OUTPATIENT)
Age: 73
End: 2023-08-31
Payer: MEDICARE

## 2023-08-31 VITALS
SYSTOLIC BLOOD PRESSURE: 135 MMHG | WEIGHT: 192 LBS | BODY MASS INDEX: 36.88 KG/M2 | HEART RATE: 57 BPM | DIASTOLIC BLOOD PRESSURE: 60 MMHG

## 2023-08-31 DIAGNOSIS — Z01.419 ENCOUNTER FOR GYNECOLOGICAL EXAMINATION (GENERAL) (ROUTINE) WITHOUT ABNORMAL FINDINGS: Primary | ICD-10-CM

## 2023-08-31 DIAGNOSIS — E28.39 ESTROGEN DEFICIENCY: ICD-10-CM

## 2023-08-31 DIAGNOSIS — N32.81 OAB (OVERACTIVE BLADDER): ICD-10-CM

## 2023-08-31 PROBLEM — G47.30 INSOMNIA WITH SLEEP APNEA: Status: RESOLVED | Noted: 2019-03-21 | Resolved: 2023-08-31

## 2023-08-31 PROBLEM — G47.00 INSOMNIA WITH SLEEP APNEA: Status: RESOLVED | Noted: 2019-03-21 | Resolved: 2023-08-31

## 2023-08-31 PROBLEM — R06.83 SNORING: Status: RESOLVED | Noted: 2019-03-21 | Resolved: 2023-08-31

## 2023-08-31 PROBLEM — F51.01 PRIMARY INSOMNIA: Status: RESOLVED | Noted: 2019-03-21 | Resolved: 2023-08-31

## 2023-08-31 PROBLEM — R15.9 INCONTINENCE OF FECES: Status: ACTIVE | Noted: 2018-04-25

## 2023-08-31 PROCEDURE — 3078F DIAST BP <80 MM HG: CPT | Performed by: OBSTETRICS & GYNECOLOGY

## 2023-08-31 PROCEDURE — 3075F SYST BP GE 130 - 139MM HG: CPT | Performed by: OBSTETRICS & GYNECOLOGY

## 2023-08-31 PROCEDURE — G0101 CA SCREEN;PELVIC/BREAST EXAM: HCPCS | Performed by: OBSTETRICS & GYNECOLOGY

## 2023-08-31 NOTE — PROGRESS NOTES
ANNUAL EXAM 65+    History:  Angela Anthony is a 74y.o. year old  White (non-) female   No LMP recorded. Patient has had a hysterectomy. She presents for her annual checkup. No LMP recorded. Patient has had a hysterectomy. No hot flashes or night sweats. Her periods are absent. Problems: for the past several months, she will get a \"fleeting\" sharp/burning pain of the right breast that come and quickly go. She also had a green discoloration in her bra, had milk duct removed several years ago by Pam Hughes. Birth Control: status post hysterectomy. Last Pap: normal obtained 6 year(s) ago on 10/13/16. She does not have a history of CHRISTINA 2, 3 or cervical cancer. Last Mammogram: had her mammogram today in our office. It was read as normal  Last Bone Density: normal obtained 10/2013. Last colonoscopy: normal obtained 5-6 year(s) ago. Medical History:  Problem List:  Patient Active Problem List    Diagnosis Date Noted    Pure hypercholesterolemia, unspecified 2023    Shortness of breath 2023    Chronic renal disease, stage III ([address]) 2023    PAF (paroxysmal atrial fibrillation) ([address]) 2023    Left ventricular outflow tract obstruction 2022    Genital prolapse 10/05/2021    Incomplete uterovaginal prolapse 10/05/2021    Vitamin D deficiency 2020    History of arthritis 2019     Overview Note:     Phreesia 10/06/2017          QUAN (obstructive sleep apnea) 2019     Overview Note:     Severe supine and in REM sleep          Type 2 diabetes mellitus with diabetic neuropathy ([address]) 2019    Foot drop     Systolic murmur of aorta     Incomplete emptying of bladder 2018    Incontinence of feces 2018     Overview Note:     Entered By: Betzy Bautista MDSigned By: Betzy Bautista MD Description: Fecal incontinence code: 787.60        Severe obesity (BMI 35.0-39. 9) with comorbidity ([address]) 2018    Type

## 2023-08-31 NOTE — PATIENT INSTRUCTIONS
Patient Education        Well Visit, Over 72: Care Instructions  Well visits can help you stay healthy. Your doctor has checked your overall health and may have suggested ways to take good care of yourself. Your doctor also may have recommended tests. You can help prevent illness with healthy eating, good sleep, vaccinations, regular exercise, and other steps. Get the tests that you and your doctor decide on. Depending on your age and risks, examples might include hearing tests as well as screening for colon, breast, and lung cancer. Screening helps find diseases before any symptoms appear. Eat healthy foods. Choose fruits, vegetables, whole grains, lean protein, and low-fat dairy foods. Limit saturated fat, and reduce salt. Limit alcohol. Men should have no more than 2 drinks a day. Women should have no more than 1. For some people, no alcohol is the best choice. Exercise. It can help prevent falls. Get at least 30 minutes of exercise on most days of the week. Walking, yoga, and vandana chi can be good choices. Reach and stay at your healthy weight. This will lower your risk for many health problems. Take care of your mental health. Try to stay connected with friends, family, and community, and find ways to manage stress. If you're feeling depressed or hopeless, talk to someone. A counselor can help. If you don't have a counselor, talk to your doctor. Talk to your doctor if you think you may have a problem with alcohol or drug use. This includes prescription medicines and illegal drugs. Avoid tobacco and nicotine: Don't smoke, vape, or chew. If you need help quitting, talk to your doctor. Practice safer sex. Getting tested, using condoms or dental dams, and limiting sex partners can help prevent STIs. Make an advance directive. This is a legal way to tell your family and doctor what you want to happen at the end of your life or when you can't speak for yourself.    Prevent problems where

## 2023-09-13 NOTE — PROGRESS NOTES
HISTORY OF PRESENT ILLNESS  Yumiko Burton is a 68 y.o. female. HPI  Seen with concerns that she might have a UTI. Two days ago, she had a day of fatigue which has since resolved; however, she notes in the past, when she was fatigued, she was told she might have a UTI. She denies dysuria, hematuria, flank pain, fevers, chills, nausea or vomiting. We reviewed her previous urine cultures together and, dating back to December, she has had 3 cultures which showed only mixed urogenital nancy or very low colony counts, really nothing consistent with true UTI. Discussed this in detail. Discussed dangers of overuse of antibiotics and the importance of protective biofilm in bladder. Discussed with the current symptoms, I would not recommend antibiotics. Discussed hydration and cranberry juice. She expresses understanding. Review of Systems   Constitutional: Negative for chills, fever and malaise/fatigue. Gastrointestinal: Negative for abdominal pain, diarrhea, nausea and vomiting. Genitourinary: Negative for dysuria, flank pain, frequency, hematuria and urgency. Physical Exam   Constitutional: She is oriented to person, place, and time. She appears well-developed and well-nourished. HENT:   Head: Normocephalic and atraumatic. Neck: Normal range of motion. Neck supple. Carotid bruit is not present. No thyromegaly present. Cardiovascular: Normal rate, regular rhythm, S1 normal, S2 normal, normal heart sounds and intact distal pulses. No murmur heard. Pulmonary/Chest: Effort normal and breath sounds normal. No respiratory distress. She has no wheezes. She has no rales. Abdominal: Soft. She exhibits no distension and no mass. There is no tenderness. No cvat   Musculoskeletal: She exhibits no edema. Neurological: She is alert and oriented to person, place, and time. Psychiatric: She has a normal mood and affect. Her behavior is normal.   Nursing note and vitals reviewed.       ASSESSMENT and PLAN  Diagnoses and all orders for this visit:    1. Urinary tract infection without hematuria, site unspecified  -     AMB POC URINALYSIS DIP STICK AUTO W/O MICRO  -     CULTURE, URINE    2. Other fatigue  Now better  3.  Incontinence of feces, unspecified fecal incontinence type    Discussed at risk for uti but would only treat for classic sxs but not for episodic fatigue  Discussed dangers of over use of abx  Inc fluids negative

## 2023-09-24 ENCOUNTER — TELEPHONE (OUTPATIENT)
Age: 73
End: 2023-09-24

## 2023-09-24 NOTE — PROGRESS NOTES
Called with likely af at 111  Asked her to take extra 25 toprol  Wait few hours and if no improvements go to er  Discussed likely need for doac  Patient to follow this week with Dr Paul May

## 2023-09-25 ENCOUNTER — TELEPHONE (OUTPATIENT)
Age: 73
End: 2023-09-25

## 2023-09-25 RX ORDER — AMLODIPINE BESYLATE 2.5 MG/1
TABLET ORAL
Qty: 90 TABLET | Refills: 3 | Status: SHIPPED | OUTPATIENT
Start: 2023-09-25

## 2023-09-28 ENCOUNTER — TELEPHONE (OUTPATIENT)
Age: 73
End: 2023-09-28

## 2023-09-28 ENCOUNTER — OFFICE VISIT (OUTPATIENT)
Age: 73
End: 2023-09-28
Payer: MEDICARE

## 2023-09-28 VITALS
OXYGEN SATURATION: 97 % | DIASTOLIC BLOOD PRESSURE: 70 MMHG | BODY MASS INDEX: 36.44 KG/M2 | SYSTOLIC BLOOD PRESSURE: 122 MMHG | RESPIRATION RATE: 16 BRPM | HEIGHT: 61 IN | HEART RATE: 60 BPM | WEIGHT: 193 LBS | TEMPERATURE: 97.5 F

## 2023-09-28 DIAGNOSIS — K21.00 GASTROESOPHAGEAL REFLUX DISEASE WITH ESOPHAGITIS WITHOUT HEMORRHAGE: Primary | ICD-10-CM

## 2023-09-28 DIAGNOSIS — K21.00 GASTROESOPHAGEAL REFLUX DISEASE WITH ESOPHAGITIS WITHOUT HEMORRHAGE: ICD-10-CM

## 2023-09-28 DIAGNOSIS — I10 ESSENTIAL (PRIMARY) HYPERTENSION: ICD-10-CM

## 2023-09-28 PROCEDURE — 99214 OFFICE O/P EST MOD 30 MIN: CPT | Performed by: NURSE PRACTITIONER

## 2023-09-28 PROCEDURE — 3078F DIAST BP <80 MM HG: CPT | Performed by: NURSE PRACTITIONER

## 2023-09-28 PROCEDURE — 3074F SYST BP LT 130 MM HG: CPT | Performed by: NURSE PRACTITIONER

## 2023-09-28 PROCEDURE — 1123F ACP DISCUSS/DSCN MKR DOCD: CPT | Performed by: NURSE PRACTITIONER

## 2023-09-28 ASSESSMENT — ENCOUNTER SYMPTOMS
EYE PAIN: 0
RESPIRATORY NEGATIVE: 1
RECTAL PAIN: 0
CHEST TIGHTNESS: 0
EYE REDNESS: 0
COUGH: 0
ABDOMINAL PAIN: 0
EYES NEGATIVE: 1
SINUS PRESSURE: 0
RHINORRHEA: 0
ABDOMINAL DISTENTION: 0
SHORTNESS OF BREATH: 0
ANAL BLEEDING: 0
CONSTIPATION: 0
DIARRHEA: 0
BACK PAIN: 0
BLOOD IN STOOL: 0
VOMITING: 0
NAUSEA: 1
SINUS PAIN: 0

## 2023-09-28 NOTE — PROGRESS NOTES
Assessment and Plan     1. Gastroesophageal reflux disease with esophagitis without hemorrhage: Will re-start Nexium. Nausea is minimal and sporadic, does not desire medications. Will recheck pancreatic and liver enzymes. Return instructions given. Pt verbalized understanding.   -     Lipase; Future  -     Comprehensive Metabolic Panel; Future  2. Essential (primary) hypertension: At goal. Continue with Olmesartan and metoprolol. Low sodium diet recommended. Benefits, risks, possible drug interactions, and side effects of all new medications were reviewed with the patient. Pt verbalized understanding. An electronic signature was used to authenticate this note. USHA Sin CNP  9/28/2023    Follow-up and Dispositions    Return if symptoms worsen or fail to improve. History of Present Illness   Chief Complaint     Kay Juarez is a 74 y.o. female here for had concerns including Abdominal Pain (Epigastric pain and nausea ). Pt presents today with reports of epigastric discomfort and nausea, onset a year ago. Positive for acid reflux. Worsening in the recent months after she stopped taking Nexium. She stopped taking Nexium due to loose stools, on average 2 BM daily. PMH of GERD. Pt reports loose stools is chronic, and believes it is due to Metformin. Has taken Imodium and Metamucil for stool as recommended by GI. Last seen GI about 4 years ago, last colonoscopy 5 years ago. Denies abdominal pain, vomiting, blood in the stools, weight changes. Hypertension ROS: taking medications as instructed, no medication side effects noted, does not take blood pressure at home, no TIA's, no chest pain on exertion, no dyspnea on exertion, no swelling of ankles. Review of Systems  Review of Systems   Constitutional: Negative. Negative for chills, fatigue, fever and unexpected weight change. HENT: Negative. Negative for congestion, rhinorrhea, sinus pressure and sinus pain.     Eyes:

## 2023-09-28 NOTE — TELEPHONE ENCOUNTER
Identifiers x 2. Informed of need for NP appt to evaluate any episodes of a-fib. States currently in NSR per [employer]. Agreeable to appt.      Future Appointments   Date Time Provider [address]   9/29/2023  3:20 PM Carmelo Goodell Longest, APRN - NP [facility] BS AMB   10/3/2023 10:30 AM [facility] DEXA 1 [facility]   1/12/2024 10:40 AM Ezio Harley MD [facility] BS AMB   1/24/2024 10:00 AM Rosita Maria MD [facility] BS AMB

## 2023-09-28 NOTE — TELEPHONE ENCOUNTER
Future Appointments   Date Time Provider [address]   9/29/2023  3:20 PM USHA Dyson - TOMMY [facility] BS AMB   10/3/2023 10:30 AM [facility] DEXA 1 [facility]   1/12/2024 10:40 AM Jeffery Martínez MD [facility] BS AMB   1/24/2024 10:00 AM Dank Bernal MD [facility] BS AMB

## 2023-09-29 ENCOUNTER — OFFICE VISIT (OUTPATIENT)
Age: 73
End: 2023-09-29
Payer: MEDICARE

## 2023-09-29 VITALS
OXYGEN SATURATION: 98 % | HEART RATE: 54 BPM | WEIGHT: 192 LBS | HEIGHT: 61 IN | BODY MASS INDEX: 36.25 KG/M2 | SYSTOLIC BLOOD PRESSURE: 118 MMHG | DIASTOLIC BLOOD PRESSURE: 74 MMHG

## 2023-09-29 DIAGNOSIS — I10 ESSENTIAL (PRIMARY) HYPERTENSION: ICD-10-CM

## 2023-09-29 DIAGNOSIS — E78.00 PURE HYPERCHOLESTEROLEMIA, UNSPECIFIED: ICD-10-CM

## 2023-09-29 DIAGNOSIS — R06.02 SHORTNESS OF BREATH: ICD-10-CM

## 2023-09-29 DIAGNOSIS — Q24.8 LEFT VENTRICULAR OUTFLOW TRACT OBSTRUCTION: ICD-10-CM

## 2023-09-29 DIAGNOSIS — I48.0 PAF (PAROXYSMAL ATRIAL FIBRILLATION) (HCC): Primary | ICD-10-CM

## 2023-09-29 LAB
ALBUMIN SERPL-MCNC: 3.5 G/DL (ref 3.5–5)
ALBUMIN/GLOB SERPL: 1 (ref 1.1–2.2)
ALP SERPL-CCNC: 64 U/L (ref 45–117)
ALT SERPL-CCNC: 30 U/L (ref 12–78)
ANION GAP SERPL CALC-SCNC: 8 MMOL/L (ref 5–15)
AST SERPL-CCNC: 18 U/L (ref 15–37)
BILIRUB SERPL-MCNC: 0.4 MG/DL (ref 0.2–1)
BUN SERPL-MCNC: 37 MG/DL (ref 6–20)
BUN/CREAT SERPL: 26 (ref 12–20)
CALCIUM SERPL-MCNC: 9.6 MG/DL (ref 8.5–10.1)
CHLORIDE SERPL-SCNC: 113 MMOL/L (ref 97–108)
CO2 SERPL-SCNC: 21 MMOL/L (ref 21–32)
CREAT SERPL-MCNC: 1.44 MG/DL (ref 0.55–1.02)
GLOBULIN SER CALC-MCNC: 3.5 G/DL (ref 2–4)
GLUCOSE SERPL-MCNC: 151 MG/DL (ref 65–100)
LIPASE SERPL-CCNC: 700 U/L (ref 73–393)
POTASSIUM SERPL-SCNC: 4.7 MMOL/L (ref 3.5–5.1)
PROT SERPL-MCNC: 7 G/DL (ref 6.4–8.2)
SODIUM SERPL-SCNC: 142 MMOL/L (ref 136–145)

## 2023-09-29 PROCEDURE — G8427 DOCREV CUR MEDS BY ELIG CLIN: HCPCS

## 2023-09-29 PROCEDURE — 1036F TOBACCO NON-USER: CPT

## 2023-09-29 PROCEDURE — 99215 OFFICE O/P EST HI 40 MIN: CPT

## 2023-09-29 PROCEDURE — G8399 PT W/DXA RESULTS DOCUMENT: HCPCS

## 2023-09-29 PROCEDURE — 1123F ACP DISCUSS/DSCN MKR DOCD: CPT

## 2023-09-29 PROCEDURE — 1090F PRES/ABSN URINE INCON ASSESS: CPT

## 2023-09-29 PROCEDURE — 3078F DIAST BP <80 MM HG: CPT

## 2023-09-29 PROCEDURE — 3017F COLORECTAL CA SCREEN DOC REV: CPT

## 2023-09-29 PROCEDURE — 93010 ELECTROCARDIOGRAM REPORT: CPT

## 2023-09-29 PROCEDURE — 3074F SYST BP LT 130 MM HG: CPT

## 2023-09-29 PROCEDURE — 93005 ELECTROCARDIOGRAM TRACING: CPT

## 2023-09-29 PROCEDURE — G8417 CALC BMI ABV UP PARAM F/U: HCPCS

## 2023-10-02 ENCOUNTER — TELEPHONE (OUTPATIENT)
Age: 73
End: 2023-10-02

## 2023-10-02 DIAGNOSIS — R74.8 ELEVATED PANCREATIC ENZYME: Primary | ICD-10-CM

## 2023-10-02 RX ORDER — OLMESARTAN MEDOXOMIL 40 MG/1
TABLET ORAL
Qty: 90 TABLET | Refills: 3 | Status: SHIPPED | OUTPATIENT
Start: 2023-10-02

## 2023-10-02 NOTE — TELEPHONE ENCOUNTER
Pt requested to speak with the nurse, stated she was in afib and wanted to know if she should go to the ER, pt stated while on hold she did take her medication and her heart rated was going down and she was back in sinus rhythm.       Pt# [phone]

## 2023-10-03 ENCOUNTER — TELEPHONE (OUTPATIENT)
Age: 73
End: 2023-10-03

## 2023-10-03 ENCOUNTER — HOSPITAL ENCOUNTER (OUTPATIENT)
Facility: HOSPITAL | Age: 73
Discharge: HOME | End: 2023-10-06
Attending: OBSTETRICS & GYNECOLOGY
Payer: MEDICARE

## 2023-10-03 DIAGNOSIS — R74.8 ELEVATED PANCREATIC ENZYME: ICD-10-CM

## 2023-10-03 DIAGNOSIS — E28.39 ESTROGEN DEFICIENCY: ICD-10-CM

## 2023-10-03 PROCEDURE — 76700 US EXAM ABDOM COMPLETE: CPT

## 2023-10-03 PROCEDURE — 77080 DXA BONE DENSITY AXIAL: CPT

## 2023-10-03 NOTE — TELEPHONE ENCOUNTER
----- Message from USHA Thrasher NP sent at 10/3/2023  8:14 AM EDT -----  7 day monitor for AF burden.  Sab to read

## 2023-10-03 NOTE — TELEPHONE ENCOUNTER
Called patient to discuss recent AF episode on Sunday that lasted 6 hours. She has been monitoring her rhythm via Apple Watch. Episode work her up with a rate of 106-122bpm. She is not symptomatic during this time, just feels her heart beating irregularly. Discussed ED precautions, HR >120 for more than 1hr +/- symptomatic. On Eliquis, no missed does. Discussed moving her 50mg Toprol to pm as most of her episodes have happened at night. Baseline HR 54, she has seen 45bpm on her watch. Will not add additional am 25mg Toprol at this time. Will mail her a 7 day monitor to check AF burden. Pt with hx of bleeding hemorrhoids, she is concerned about her Eliquis causing worsening symptoms. She will discuss options with her GI MD. Discussed Watchman procedure and possible EP referral pending monitor. Pt verbalizes understanding.

## 2023-10-05 ENCOUNTER — TELEPHONE (OUTPATIENT)
Age: 73
End: 2023-10-05

## 2023-10-05 NOTE — TELEPHONE ENCOUNTER
Specialists office faxed over a written request for patient's medical records.  Medical Records faxed to specialists office~thank you

## 2023-10-05 NOTE — TELEPHONE ENCOUNTER
Patient is requesting her labs, latest office notes, and ultrasound be faxed to her GI Dr Komal Bingham.      [address] [facility]  X:[phone]    Last seen with [facility]

## 2023-10-30 RX ORDER — METFORMIN HYDROCHLORIDE 500 MG/1
TABLET, EXTENDED RELEASE ORAL
Qty: 180 TABLET | Refills: 3 | Status: SHIPPED | OUTPATIENT
Start: 2023-10-30

## 2023-11-07 ENCOUNTER — HOSPITAL ENCOUNTER (OUTPATIENT)
Facility: HOSPITAL | Age: 73
Discharge: HOME | End: 2023-11-10
Payer: MEDICARE

## 2023-11-07 VITALS — WEIGHT: 192 LBS | BODY MASS INDEX: 36.88 KG/M2

## 2023-11-07 DIAGNOSIS — R10.13 EPIGASTRIC DISCOMFORT: ICD-10-CM

## 2023-11-07 DIAGNOSIS — R73.01 ELEVATED FASTING GLUCOSE: ICD-10-CM

## 2023-11-07 DIAGNOSIS — R15.9 INCONTINENCE OF FECES, UNSPECIFIED FECAL INCONTINENCE TYPE: ICD-10-CM

## 2023-11-07 DIAGNOSIS — R74.8 ELEVATED LIPASE: ICD-10-CM

## 2023-11-07 LAB — CREAT BLD-MCNC: 1.5 MG/DL (ref 0.6–1.3)

## 2023-11-07 PROCEDURE — A9579 GAD-BASE MR CONTRAST NOS,1ML: HCPCS | Performed by: RADIOLOGY

## 2023-11-07 PROCEDURE — 74183 MRI ABD W/O CNTR FLWD CNTR: CPT

## 2023-11-07 PROCEDURE — 82565 ASSAY OF CREATININE: CPT

## 2023-11-07 PROCEDURE — 6360000004 HC RX CONTRAST MEDICATION: Performed by: RADIOLOGY

## 2023-11-07 RX ADMIN — GADOTERIDOL 17 ML: 279.3 INJECTION, SOLUTION INTRAVENOUS at 20:36

## 2023-11-17 ENCOUNTER — OFFICE VISIT (OUTPATIENT)
Age: 73
End: 2023-11-17
Payer: COMMERCIAL

## 2023-11-17 VITALS
OXYGEN SATURATION: 96 % | DIASTOLIC BLOOD PRESSURE: 82 MMHG | BODY MASS INDEX: 38.48 KG/M2 | TEMPERATURE: 97.7 F | WEIGHT: 196 LBS | HEART RATE: 56 BPM | RESPIRATION RATE: 16 BRPM | SYSTOLIC BLOOD PRESSURE: 134 MMHG | HEIGHT: 60 IN

## 2023-11-17 DIAGNOSIS — I10 ESSENTIAL (PRIMARY) HYPERTENSION: ICD-10-CM

## 2023-11-17 DIAGNOSIS — R74.8 ELEVATED LIPASE: ICD-10-CM

## 2023-11-17 DIAGNOSIS — E11.40 TYPE 2 DIABETES MELLITUS WITH DIABETIC NEUROPATHY, UNSPECIFIED WHETHER LONG TERM INSULIN USE (HCC): ICD-10-CM

## 2023-11-17 DIAGNOSIS — D49.0 IPMN (INTRADUCTAL PAPILLARY MUCINOUS NEOPLASM): ICD-10-CM

## 2023-11-17 DIAGNOSIS — R10.9 ABDOMINAL PAIN, UNSPECIFIED ABDOMINAL LOCATION: Primary | ICD-10-CM

## 2023-11-17 DIAGNOSIS — K86.81 EXOCRINE PANCREATIC INSUFFICIENCY: ICD-10-CM

## 2023-11-17 PROCEDURE — 1123F ACP DISCUSS/DSCN MKR DOCD: CPT | Performed by: INTERNAL MEDICINE

## 2023-11-17 PROCEDURE — 3044F HG A1C LEVEL LT 7.0%: CPT | Performed by: INTERNAL MEDICINE

## 2023-11-17 PROCEDURE — G8428 CUR MEDS NOT DOCUMENT: HCPCS | Performed by: INTERNAL MEDICINE

## 2023-11-17 PROCEDURE — G8399 PT W/DXA RESULTS DOCUMENT: HCPCS | Performed by: INTERNAL MEDICINE

## 2023-11-17 PROCEDURE — G8417 CALC BMI ABV UP PARAM F/U: HCPCS | Performed by: INTERNAL MEDICINE

## 2023-11-17 PROCEDURE — 3078F DIAST BP <80 MM HG: CPT | Performed by: INTERNAL MEDICINE

## 2023-11-17 PROCEDURE — 1036F TOBACCO NON-USER: CPT | Performed by: INTERNAL MEDICINE

## 2023-11-17 PROCEDURE — 2022F DILAT RTA XM EVC RTNOPTHY: CPT | Performed by: INTERNAL MEDICINE

## 2023-11-17 PROCEDURE — 3075F SYST BP GE 130 - 139MM HG: CPT | Performed by: INTERNAL MEDICINE

## 2023-11-17 PROCEDURE — G8484 FLU IMMUNIZE NO ADMIN: HCPCS | Performed by: INTERNAL MEDICINE

## 2023-11-17 PROCEDURE — 1090F PRES/ABSN URINE INCON ASSESS: CPT | Performed by: INTERNAL MEDICINE

## 2023-11-17 PROCEDURE — 99214 OFFICE O/P EST MOD 30 MIN: CPT | Performed by: INTERNAL MEDICINE

## 2023-11-17 PROCEDURE — 3017F COLORECTAL CA SCREEN DOC REV: CPT | Performed by: INTERNAL MEDICINE

## 2023-11-17 ASSESSMENT — ENCOUNTER SYMPTOMS
CHEST TIGHTNESS: 0
ABDOMINAL PAIN: 1
CONSTIPATION: 0
BACK PAIN: 0
SHORTNESS OF BREATH: 0
DIARRHEA: 0

## 2023-11-17 NOTE — PROGRESS NOTES
in the past.    DM: on metformin. Repeat today. Hypertension-better on repeat  Hypertension ROS: taking medications as instructed, no medication side effects noted, no TIA's, no chest pain on exertion, no swelling of ankles     reports that she has never smoked. She has never used smokeless tobacco.    reports that she does not currently use alcohol. BP Readings from Last 2 Encounters:   11/17/23 134/82   09/29/23 118/74     Has started Eliquis due to age and history of A-fib. ROS  Review of Systems   Constitutional:  Negative for fatigue and fever. HENT:  Negative for congestion and ear pain. Respiratory:  Negative for chest tightness and shortness of breath. Cardiovascular:  Negative for chest pain and leg swelling. Gastrointestinal:  Positive for abdominal pain (resolved. ). Negative for constipation and diarrhea. Endocrine: Negative for polydipsia. Genitourinary:  Negative for difficulty urinating and dysuria. Musculoskeletal:  Negative for arthralgias and back pain. Skin:  Negative for rash. Neurological:  Negative for dizziness and headaches. Psychiatric/Behavioral:  Negative for agitation. The patient is not nervous/anxious. Vitals:    11/17/23 1408   BP: 134/82   Pulse:    Resp:    Temp:    SpO2:        Physical Exam  Constitutional:       Appearance: Normal appearance. HENT:      Head: Normocephalic. Right Ear: Tympanic membrane normal.      Left Ear: Tympanic membrane normal.   Cardiovascular:      Rate and Rhythm: Normal rate and regular rhythm. Heart sounds: No murmur heard. Pulmonary:      Effort: Pulmonary effort is normal.      Breath sounds: Normal breath sounds. No wheezing. Abdominal:      General: There is no distension. Palpations: Abdomen is soft. Musculoskeletal:         General: No swelling. Skin:     General: Skin is warm and dry. Neurological:      General: No focal deficit present. Mental Status: She is alert.

## 2023-11-18 LAB
ALBUMIN SERPL-MCNC: 3.3 G/DL (ref 3.5–5)
ALBUMIN/GLOB SERPL: 0.8 (ref 1.1–2.2)
ALP SERPL-CCNC: 65 U/L (ref 45–117)
ALT SERPL-CCNC: 28 U/L (ref 12–78)
ANION GAP SERPL CALC-SCNC: 5 MMOL/L (ref 5–15)
AST SERPL-CCNC: 18 U/L (ref 15–37)
BILIRUB SERPL-MCNC: 0.3 MG/DL (ref 0.2–1)
BUN SERPL-MCNC: 32 MG/DL (ref 6–20)
BUN/CREAT SERPL: 23 (ref 12–20)
CALCIUM SERPL-MCNC: 9.1 MG/DL (ref 8.5–10.1)
CHLORIDE SERPL-SCNC: 116 MMOL/L (ref 97–108)
CO2 SERPL-SCNC: 21 MMOL/L (ref 21–32)
CREAT SERPL-MCNC: 1.42 MG/DL (ref 0.55–1.02)
EST. AVERAGE GLUCOSE BLD GHB EST-MCNC: 126 MG/DL
GLOBULIN SER CALC-MCNC: 3.9 G/DL (ref 2–4)
GLUCOSE SERPL-MCNC: 106 MG/DL (ref 65–100)
HBA1C MFR BLD: 6 % (ref 4–5.6)
LIPASE SERPL-CCNC: 60 U/L (ref 13–75)
POTASSIUM SERPL-SCNC: 4.8 MMOL/L (ref 3.5–5.1)
PROT SERPL-MCNC: 7.2 G/DL (ref 6.4–8.2)
SODIUM SERPL-SCNC: 142 MMOL/L (ref 136–145)

## 2023-11-21 ENCOUNTER — TELEPHONE (OUTPATIENT)
Age: 73
End: 2023-11-21

## 2023-11-21 NOTE — TELEPHONE ENCOUNTER
[address] is calling because the patient is having botox in her bladder on the 11/29/23.  needs to know how long she can be off of  Eliquis before the botox procedure.       Rolly Gutierres [phone] cell  [phone] fax

## 2023-11-22 ENCOUNTER — TELEPHONE (OUTPATIENT)
Age: 73
End: 2023-11-22

## 2023-11-22 NOTE — TELEPHONE ENCOUNTER
Pt is calling to find out when she needs to hold her Eliquis for her upcoming procedure.Please see previous message from 11/21/23    [phone]

## 2023-11-24 NOTE — TELEPHONE ENCOUNTER
Per Dr. Vidya Dye:      She should miss 4 doses or 2 days of Eliquis prior to the procedure.        [employer] message sent to patient by Carly Smith NP.

## 2023-11-27 NOTE — TELEPHONE ENCOUNTER
Called and spoke to patient -ID X2   Advised pt to hold OAC for 2 days prior which she has done for wed procedure, Soledad had spoken to her Friday  Pt doing fine

## 2023-12-01 RX ORDER — LANCETS 28 GAUGE
EACH MISCELLANEOUS
Qty: 100 EACH | Refills: 3 | Status: SHIPPED | OUTPATIENT
Start: 2023-12-01

## 2024-01-09 DIAGNOSIS — E78.00 PURE HYPERCHOLESTEROLEMIA, UNSPECIFIED: ICD-10-CM

## 2024-01-09 RX ORDER — SIMVASTATIN 20 MG
TABLET ORAL
Qty: 90 TABLET | Refills: 3 | Status: SHIPPED | OUTPATIENT
Start: 2024-01-09

## 2024-01-24 ENCOUNTER — OFFICE VISIT (OUTPATIENT)
Age: 74
End: 2024-01-24
Payer: MEDICARE

## 2024-01-24 VITALS
SYSTOLIC BLOOD PRESSURE: 134 MMHG | WEIGHT: 198 LBS | RESPIRATION RATE: 16 BRPM | HEIGHT: 60 IN | BODY MASS INDEX: 38.87 KG/M2 | TEMPERATURE: 97.6 F | DIASTOLIC BLOOD PRESSURE: 78 MMHG | OXYGEN SATURATION: 99 % | HEART RATE: 56 BPM

## 2024-01-24 DIAGNOSIS — M54.12 CERVICAL RADICULITIS: ICD-10-CM

## 2024-01-24 DIAGNOSIS — E66.01 SEVERE OBESITY (BMI 35.0-39.9) WITH COMORBIDITY (HCC): ICD-10-CM

## 2024-01-24 DIAGNOSIS — I10 ESSENTIAL (PRIMARY) HYPERTENSION: Primary | ICD-10-CM

## 2024-01-24 DIAGNOSIS — I48.0 PAF (PAROXYSMAL ATRIAL FIBRILLATION) (HCC): ICD-10-CM

## 2024-01-24 DIAGNOSIS — N18.30 STAGE 3 CHRONIC KIDNEY DISEASE, UNSPECIFIED WHETHER STAGE 3A OR 3B CKD (HCC): ICD-10-CM

## 2024-01-24 DIAGNOSIS — R35.0 URINARY FREQUENCY: ICD-10-CM

## 2024-01-24 DIAGNOSIS — E11.40 TYPE 2 DIABETES MELLITUS WITH DIABETIC NEUROPATHY, UNSPECIFIED WHETHER LONG TERM INSULIN USE (HCC): ICD-10-CM

## 2024-01-24 DIAGNOSIS — K86.81 EXOCRINE PANCREATIC INSUFFICIENCY: ICD-10-CM

## 2024-01-24 DIAGNOSIS — E55.9 VITAMIN D DEFICIENCY: ICD-10-CM

## 2024-01-24 DIAGNOSIS — N32.81 OAB (OVERACTIVE BLADDER): ICD-10-CM

## 2024-01-24 PROCEDURE — 3075F SYST BP GE 130 - 139MM HG: CPT | Performed by: INTERNAL MEDICINE

## 2024-01-24 PROCEDURE — G8484 FLU IMMUNIZE NO ADMIN: HCPCS | Performed by: INTERNAL MEDICINE

## 2024-01-24 PROCEDURE — 1036F TOBACCO NON-USER: CPT | Performed by: INTERNAL MEDICINE

## 2024-01-24 PROCEDURE — G8399 PT W/DXA RESULTS DOCUMENT: HCPCS | Performed by: INTERNAL MEDICINE

## 2024-01-24 PROCEDURE — 3078F DIAST BP <80 MM HG: CPT | Performed by: INTERNAL MEDICINE

## 2024-01-24 PROCEDURE — 99215 OFFICE O/P EST HI 40 MIN: CPT | Performed by: INTERNAL MEDICINE

## 2024-01-24 PROCEDURE — G8427 DOCREV CUR MEDS BY ELIG CLIN: HCPCS | Performed by: INTERNAL MEDICINE

## 2024-01-24 PROCEDURE — G8417 CALC BMI ABV UP PARAM F/U: HCPCS | Performed by: INTERNAL MEDICINE

## 2024-01-24 PROCEDURE — 3017F COLORECTAL CA SCREEN DOC REV: CPT | Performed by: INTERNAL MEDICINE

## 2024-01-24 PROCEDURE — 1123F ACP DISCUSS/DSCN MKR DOCD: CPT | Performed by: INTERNAL MEDICINE

## 2024-01-24 PROCEDURE — 3046F HEMOGLOBIN A1C LEVEL >9.0%: CPT | Performed by: INTERNAL MEDICINE

## 2024-01-24 PROCEDURE — 1090F PRES/ABSN URINE INCON ASSESS: CPT | Performed by: INTERNAL MEDICINE

## 2024-01-24 PROCEDURE — 2022F DILAT RTA XM EVC RTNOPTHY: CPT | Performed by: INTERNAL MEDICINE

## 2024-01-24 RX ORDER — PHENAZOPYRIDINE HYDROCHLORIDE 100 MG/1
100 TABLET, FILM COATED ORAL 3 TIMES DAILY PRN
Qty: 9 TABLET | Refills: 0 | Status: SHIPPED | OUTPATIENT
Start: 2024-01-24 | End: 2024-02-02

## 2024-01-24 RX ORDER — CETIRIZINE HYDROCHLORIDE 10 MG/1
10 TABLET ORAL DAILY
COMMUNITY

## 2024-01-24 ASSESSMENT — PATIENT HEALTH QUESTIONNAIRE - PHQ9
1. LITTLE INTEREST OR PLEASURE IN DOING THINGS: 0
SUM OF ALL RESPONSES TO PHQ QUESTIONS 1-9: 0
SUM OF ALL RESPONSES TO PHQ9 QUESTIONS 1 & 2: 0
SUM OF ALL RESPONSES TO PHQ QUESTIONS 1-9: 0
2. FEELING DOWN, DEPRESSED OR HOPELESS: 0

## 2024-01-24 ASSESSMENT — ENCOUNTER SYMPTOMS
CONSTIPATION: 0
ABDOMINAL PAIN: 0
CHEST TIGHTNESS: 0
BACK PAIN: 0
DIARRHEA: 1
SHORTNESS OF BREATH: 0

## 2024-01-24 NOTE — PROGRESS NOTES
Readings from Last 2 Encounters:   01/24/24 134/78   11/17/23 134/82     OAB: Now receiving Botox injections overall she has benefited from this. Just had UTI, still some frequency.  Will prescribe her antispasmodic.  Low threshold for reculturing if symptoms persist.    Diabetes Mellitus follow-up  Last hemoglobin a1c   Hemoglobin A1C   Date Value Ref Range Status   11/17/2023 6.0 (H) 4.0 - 5.6 % Final     Comment:     (NOTE)  HbA1C Interpretive Ranges  <5.7              Normal  5.7 - 6.4         Consider Prediabetes  >6.5              Consider Diabetes       No components found for: \"POCA1C\", \"HBA1C POC\"  Microalbuminuria: No results found for: \"MCA2\", \"MCAU\", \"MCAU2\"    Remains on beta-blocker and oral anticoagulation for her history of A-fib.  Denies any significant palpitations.    Still on Creon. Cost is very high.  Still with loose bowels.  Currently not taking Metamucil.    ROS  Review of Systems   Constitutional:  Negative for fatigue and fever.   HENT:  Negative for congestion and ear pain.    Respiratory:  Negative for chest tightness and shortness of breath.    Cardiovascular:  Negative for chest pain and leg swelling.   Gastrointestinal:  Positive for diarrhea. Negative for abdominal pain and constipation.   Endocrine: Negative for polydipsia.   Genitourinary:  Positive for frequency. Negative for difficulty urinating and dysuria.   Musculoskeletal:  Positive for neck pain. Negative for arthralgias and back pain.        Nocturnal foot cramps.    Skin:  Negative for rash.   Neurological:  Negative for dizziness and headaches.   Psychiatric/Behavioral:  Negative for agitation. The patient is not nervous/anxious.          Vitals:    01/24/24 1019   BP: 134/78   Pulse:    Resp:    Temp:    SpO2:        Physical Exam  Constitutional:       Appearance: Normal appearance.   HENT:      Head: Normocephalic.      Right Ear: Tympanic membrane normal.      Left Ear: Tympanic membrane normal.   Cardiovascular:

## 2024-01-25 LAB
ALBUMIN SERPL-MCNC: 3.4 G/DL (ref 3.5–5)
ALBUMIN/GLOB SERPL: 0.9 (ref 1.1–2.2)
ALP SERPL-CCNC: 68 U/L (ref 45–117)
ALT SERPL-CCNC: 29 U/L (ref 12–78)
ANION GAP SERPL CALC-SCNC: 8 MMOL/L (ref 5–15)
AST SERPL-CCNC: 21 U/L (ref 15–37)
BASOPHILS # BLD: 0.1 K/UL (ref 0–0.1)
BASOPHILS NFR BLD: 1 % (ref 0–1)
BILIRUB SERPL-MCNC: 0.2 MG/DL (ref 0.2–1)
BUN SERPL-MCNC: 38 MG/DL (ref 6–20)
BUN/CREAT SERPL: 27 (ref 12–20)
CALCIUM SERPL-MCNC: 8.7 MG/DL (ref 8.5–10.1)
CHLORIDE SERPL-SCNC: 116 MMOL/L (ref 97–108)
CO2 SERPL-SCNC: 16 MMOL/L (ref 21–32)
CREAT SERPL-MCNC: 1.41 MG/DL (ref 0.55–1.02)
DIFFERENTIAL METHOD BLD: ABNORMAL
EOSINOPHIL # BLD: 0.3 K/UL (ref 0–0.4)
EOSINOPHIL NFR BLD: 4 % (ref 0–7)
ERYTHROCYTE [DISTWIDTH] IN BLOOD BY AUTOMATED COUNT: 13.4 % (ref 11.5–14.5)
EST. AVERAGE GLUCOSE BLD GHB EST-MCNC: 128 MG/DL
GLOBULIN SER CALC-MCNC: 3.6 G/DL (ref 2–4)
GLUCOSE SERPL-MCNC: 147 MG/DL (ref 65–100)
HBA1C MFR BLD: 6.1 % (ref 4–5.6)
HCT VFR BLD AUTO: 36.9 % (ref 35–47)
HGB BLD-MCNC: 11.2 G/DL (ref 11.5–16)
IMM GRANULOCYTES # BLD AUTO: 0 K/UL (ref 0–0.04)
IMM GRANULOCYTES NFR BLD AUTO: 0 % (ref 0–0.5)
LYMPHOCYTES # BLD: 1.3 K/UL (ref 0.8–3.5)
LYMPHOCYTES NFR BLD: 18 % (ref 12–49)
MCH RBC QN AUTO: 28.6 PG (ref 26–34)
MCHC RBC AUTO-ENTMCNC: 30.4 G/DL (ref 30–36.5)
MCV RBC AUTO: 94.1 FL (ref 80–99)
MONOCYTES # BLD: 0.5 K/UL (ref 0–1)
MONOCYTES NFR BLD: 7 % (ref 5–13)
NEUTS SEG # BLD: 5 K/UL (ref 1.8–8)
NEUTS SEG NFR BLD: 70 % (ref 32–75)
NRBC # BLD: 0 K/UL (ref 0–0.01)
NRBC BLD-RTO: 0 PER 100 WBC
PLATELET # BLD AUTO: 224 K/UL (ref 150–400)
PMV BLD AUTO: 9.8 FL (ref 8.9–12.9)
POTASSIUM SERPL-SCNC: 5.6 MMOL/L (ref 3.5–5.1)
PROT SERPL-MCNC: 7 G/DL (ref 6.4–8.2)
RBC # BLD AUTO: 3.92 M/UL (ref 3.8–5.2)
SODIUM SERPL-SCNC: 140 MMOL/L (ref 136–145)
WBC # BLD AUTO: 7.2 K/UL (ref 3.6–11)

## 2024-02-14 ENCOUNTER — TELEPHONE (OUTPATIENT)
Age: 74
End: 2024-02-14

## 2024-02-14 NOTE — TELEPHONE ENCOUNTER
[facility] is calling to see if we have received a fax for medication clearance on the patient.Pt is having a procedure 2/29/24.Pt is having a left shoulder injection.Teodoro is refax a medication clearance form to [phone] office  [phone] fax

## 2024-02-15 ENCOUNTER — TELEPHONE (OUTPATIENT)
Age: 74
End: 2024-02-15

## 2024-02-15 RX ORDER — BLOOD SUGAR DIAGNOSTIC
1 STRIP MISCELLANEOUS DAILY
COMMUNITY
End: 2024-02-15 | Stop reason: SDUPTHER

## 2024-02-15 RX ORDER — BLOOD SUGAR DIAGNOSTIC
1 STRIP MISCELLANEOUS 2 TIMES DAILY
Qty: 200 EACH | Refills: 2 | Status: SHIPPED | OUTPATIENT
Start: 2024-02-15

## 2024-02-15 NOTE — TELEPHONE ENCOUNTER
The patient called an stated that she is Out of refill on test strips. She will like to refill and get it sent to the pharmacy below:      [id number] [place], [place] - [address] [facility] [phone] - F [phone]

## 2024-02-15 NOTE — TELEPHONE ENCOUNTER
Radha Evans MD  You13 hours ago (9:06 PM)       I have no objection to the planned  surgery. Patient seems to be at a low risk for charleen-operative severe adverse cardiac events.  Can hold Eliquis for 3 days PT surgery then resume 1-2 days after as directed by performing physician      Current Outpatient Medications:  ·  cetirizine (ZYRTEC) 10 MG tablet, Take 1 tablet by mouth daily, Disp: , Rfl:  ·  simvastatin (ZOCOR) 20 MG tablet, TAKE 1 TABLET AT BEDTIME FOR CHOLESTEROL, Disp: 90 tablet, Rfl: 3  ·  FreeStyle Lancets MISC, TEST BLOOD SUGAR ONCE DAILY, Disp: 100 each, Rfl: 3  ·  Pancrelipase, Lip-Prot-Amyl, (ZENPEP PO), Take by mouth daily, Disp: , Rfl:  ·  metFORMIN (GLUCOPHAGE-XR) 500 MG extended release tablet, TAKE 2 TABLETS DAILY WITH DINNER, Disp: 180 tablet, Rfl: 3  ·  olmesartan (BENICAR) 40 MG tablet, TAKE 1 TABLET DAILY, Disp: 90 tablet, Rfl: 3  ·  apixaban (ELIQUIS) 5 MG TABS tablet, Take 1 tablet by mouth 2 times daily, Disp: 60 tablet, Rfl: 11  ·  amLODIPine (NORVASC) 2.5 MG tablet, TAKE 1 TABLET DAILY, Disp: 90 tablet, Rfl: 3  ·  Omega-3 Fatty Acids (FISH OIL PO), Take by mouth, Disp: , Rfl:  ·  colchicine (COLCRYS) 0.6 MG tablet, TAKE 2 TABLETS BY MOUTH AT ONSET OF SYMPTOMS. THEN TAKE 1 TABLET DAILY UNTIL FLARE RESOLVES, Disp: 30 tablet, Rfl: 1  ·  Cranberry 500 MG CAPS, Take by mouth, Disp: , Rfl:  ·  Multiple Vitamin (MULTI-VITAMIN PO), Take 1 tablet by mouth daily, Disp: , Rfl:  ·  Cholecalciferol 50 MCG (2000 UT) TABS, Take by mouth, Disp: , Rfl:  ·  esomeprazole (NEXIUM) 20 MG delayed release capsule, , Disp: , Rfl:  ·  metoprolol succinate (TOPROL XL) 50 MG extended release tablet, Take 1 tablet by mouth daily, Disp: , Rfl:       Faxed three times today but fax has been busy. Will continue to attempt.

## 2024-02-16 ENCOUNTER — OFFICE VISIT (OUTPATIENT)
Age: 74
End: 2024-02-16

## 2024-02-16 VITALS
SYSTOLIC BLOOD PRESSURE: 118 MMHG | DIASTOLIC BLOOD PRESSURE: 60 MMHG | HEART RATE: 55 BPM | OXYGEN SATURATION: 99 % | BODY MASS INDEX: 37.77 KG/M2 | HEIGHT: 60 IN | RESPIRATION RATE: 12 BRPM | WEIGHT: 192.4 LBS

## 2024-02-16 DIAGNOSIS — I10 ESSENTIAL (PRIMARY) HYPERTENSION: ICD-10-CM

## 2024-02-16 DIAGNOSIS — I48.0 PAF (PAROXYSMAL ATRIAL FIBRILLATION) (HCC): Primary | ICD-10-CM

## 2024-02-16 DIAGNOSIS — E78.00 PURE HYPERCHOLESTEROLEMIA, UNSPECIFIED: ICD-10-CM

## 2024-02-16 DIAGNOSIS — Q24.8 LEFT VENTRICULAR OUTFLOW TRACT OBSTRUCTION: ICD-10-CM

## 2024-02-16 DIAGNOSIS — I48.0 PAF (PAROXYSMAL ATRIAL FIBRILLATION) (HCC): ICD-10-CM

## 2024-02-16 DIAGNOSIS — R06.02 SHORTNESS OF BREATH: ICD-10-CM

## 2024-02-16 LAB
ANION GAP SERPL CALC-SCNC: 6 MMOL/L (ref 5–15)
BUN SERPL-MCNC: 26 MG/DL (ref 6–20)
BUN/CREAT SERPL: 19 (ref 12–20)
CALCIUM SERPL-MCNC: 9.9 MG/DL (ref 8.5–10.1)
CHLORIDE SERPL-SCNC: 112 MMOL/L (ref 97–108)
CO2 SERPL-SCNC: 23 MMOL/L (ref 21–32)
CREAT SERPL-MCNC: 1.4 MG/DL (ref 0.55–1.02)
GLUCOSE SERPL-MCNC: 130 MG/DL (ref 65–100)
POTASSIUM SERPL-SCNC: 5 MMOL/L (ref 3.5–5.1)
SODIUM SERPL-SCNC: 141 MMOL/L (ref 136–145)

## 2024-02-16 NOTE — PROGRESS NOTES
had concerns including Follow-up (6 mo).    Vitals:    02/16/24 1121   BP: 118/60   Site: Left Upper Arm   Position: Sitting   Pulse: 55   Resp: 12   SpO2: 99%   Weight: 87.3 kg (192 lb 6.4 oz)   Height: 1.524 m (5')        Chest pain No    Refills No        1. Have you been to the ER, urgent care clinic since your last visit? No       Hospitalized since your last visit? No       Where?        Date?

## 2024-02-17 NOTE — RESULT ENCOUNTER NOTE
Surely your blood work has come back normal.  The potassium and bicarb are now back to normal.  Possibly this was related to your GI illness if you are having diarrhea or constipation.  Right now things look pretty good with a stable kidney function also.  Future Appointments  8/23/2024  11:00 AM   Radha Evans MD       [facility]               BS AMB

## 2024-04-26 DIAGNOSIS — I10 ESSENTIAL (PRIMARY) HYPERTENSION: ICD-10-CM

## 2024-04-26 DIAGNOSIS — I48.0 PAF (PAROXYSMAL ATRIAL FIBRILLATION) (HCC): Primary | ICD-10-CM

## 2024-04-29 ENCOUNTER — TELEPHONE (OUTPATIENT)
Age: 74
End: 2024-04-29

## 2024-04-29 ENCOUNTER — HOSPITAL ENCOUNTER (OUTPATIENT)
Facility: HOSPITAL | Age: 74
Discharge: HOME | End: 2024-05-02
Attending: INTERNAL MEDICINE
Payer: MEDICARE

## 2024-04-29 DIAGNOSIS — R74.8 ELEVATED LIPASE: ICD-10-CM

## 2024-04-29 DIAGNOSIS — R10.13 EPIGASTRIC DISCOMFORT: ICD-10-CM

## 2024-04-29 DIAGNOSIS — R19.7 DIARRHEA, UNSPECIFIED TYPE: ICD-10-CM

## 2024-04-29 DIAGNOSIS — D49.0 IPMN (INTRADUCTAL PAPILLARY MUCINOUS NEOPLASM): ICD-10-CM

## 2024-04-29 PROCEDURE — 3430000000 HC RX DIAGNOSTIC RADIOPHARMACEUTICAL: Performed by: INTERNAL MEDICINE

## 2024-04-29 PROCEDURE — A9541 TC99M SULFUR COLLOID: HCPCS | Performed by: INTERNAL MEDICINE

## 2024-04-29 PROCEDURE — 78264 GASTRIC EMPTYING IMG STUDY: CPT

## 2024-04-29 RX ORDER — METOPROLOL SUCCINATE 50 MG/1
50 TABLET, EXTENDED RELEASE ORAL DAILY
Qty: 90 TABLET | Refills: 3 | Status: SHIPPED | OUTPATIENT
Start: 2024-04-29 | End: 2024-05-02 | Stop reason: SDUPTHER

## 2024-04-29 RX ADMIN — TECHNETIUM TC 99M SULFUR COLLOID 1 MILLICURIE: KIT at 12:20

## 2024-04-29 NOTE — TELEPHONE ENCOUNTER
Per VO by MD.    Future Appointments   Date Time Provider Department Center   4/29/2024  3:00 PM [facility] [place]  2 [facility]   4/29/2024  4:00 PM [facility] 2 [facility]   8/23/2024 11:00 AM Radha Evans MD [facility] BS AMB

## 2024-05-02 DIAGNOSIS — I10 ESSENTIAL (PRIMARY) HYPERTENSION: ICD-10-CM

## 2024-05-02 DIAGNOSIS — I48.0 PAF (PAROXYSMAL ATRIAL FIBRILLATION) (HCC): ICD-10-CM

## 2024-05-02 RX ORDER — METOPROLOL SUCCINATE 50 MG/1
25 TABLET, EXTENDED RELEASE ORAL DAILY
Qty: 45 TABLET | Refills: 1
Start: 2024-05-02

## 2024-05-03 ENCOUNTER — TELEPHONE (OUTPATIENT)
Age: 74
End: 2024-05-03

## 2024-05-03 NOTE — TELEPHONE ENCOUNTER
Patient has scheduled an appointment but is requesting to speak with the nurse about heart monitors preparation and has some other questions that weren't mentioned. Patient would like a call back to discuss further.       Patient Phone # [phone]

## 2024-05-06 ENCOUNTER — OFFICE VISIT (OUTPATIENT)
Age: 74
End: 2024-05-06
Payer: MEDICARE

## 2024-05-06 VITALS
HEART RATE: 67 BPM | HEIGHT: 60 IN | BODY MASS INDEX: 38.01 KG/M2 | RESPIRATION RATE: 14 BRPM | SYSTOLIC BLOOD PRESSURE: 131 MMHG | OXYGEN SATURATION: 98 % | DIASTOLIC BLOOD PRESSURE: 69 MMHG | WEIGHT: 193.6 LBS

## 2024-05-06 DIAGNOSIS — M25.571 ACUTE RIGHT ANKLE PAIN: Primary | ICD-10-CM

## 2024-05-06 DIAGNOSIS — E11.21 TYPE 2 DIABETES MELLITUS WITH NEPHROPATHY (HCC): ICD-10-CM

## 2024-05-06 PROCEDURE — 3044F HG A1C LEVEL LT 7.0%: CPT | Performed by: INTERNAL MEDICINE

## 2024-05-06 PROCEDURE — G8417 CALC BMI ABV UP PARAM F/U: HCPCS | Performed by: INTERNAL MEDICINE

## 2024-05-06 PROCEDURE — G8399 PT W/DXA RESULTS DOCUMENT: HCPCS | Performed by: INTERNAL MEDICINE

## 2024-05-06 PROCEDURE — 99214 OFFICE O/P EST MOD 30 MIN: CPT | Performed by: INTERNAL MEDICINE

## 2024-05-06 PROCEDURE — 3078F DIAST BP <80 MM HG: CPT | Performed by: INTERNAL MEDICINE

## 2024-05-06 PROCEDURE — 1123F ACP DISCUSS/DSCN MKR DOCD: CPT | Performed by: INTERNAL MEDICINE

## 2024-05-06 PROCEDURE — G8427 DOCREV CUR MEDS BY ELIG CLIN: HCPCS | Performed by: INTERNAL MEDICINE

## 2024-05-06 PROCEDURE — 1090F PRES/ABSN URINE INCON ASSESS: CPT | Performed by: INTERNAL MEDICINE

## 2024-05-06 PROCEDURE — 1036F TOBACCO NON-USER: CPT | Performed by: INTERNAL MEDICINE

## 2024-05-06 PROCEDURE — 3075F SYST BP GE 130 - 139MM HG: CPT | Performed by: INTERNAL MEDICINE

## 2024-05-06 RX ORDER — PREDNISONE 10 MG/1
TABLET ORAL
Qty: 30 TABLET | Refills: 0 | Status: SHIPPED | OUTPATIENT
Start: 2024-05-06 | End: 2024-05-17

## 2024-05-06 ASSESSMENT — PATIENT HEALTH QUESTIONNAIRE - PHQ9
1. LITTLE INTEREST OR PLEASURE IN DOING THINGS: NOT AT ALL
SUM OF ALL RESPONSES TO PHQ QUESTIONS 1-9: 0
SUM OF ALL RESPONSES TO PHQ QUESTIONS 1-9: 0
2. FEELING DOWN, DEPRESSED OR HOPELESS: NOT AT ALL
SUM OF ALL RESPONSES TO PHQ9 QUESTIONS 1 & 2: 0
SUM OF ALL RESPONSES TO PHQ QUESTIONS 1-9: 0
SUM OF ALL RESPONSES TO PHQ QUESTIONS 1-9: 0

## 2024-05-06 NOTE — PROGRESS NOTES
(ZENPEP PO), Take by mouth Taken with meals, Disp: , Rfl:     olmesartan (BENICAR) 40 MG tablet, TAKE 1 TABLET DAILY, Disp: 90 tablet, Rfl: 3    apixaban (ELIQUIS) 5 MG TABS tablet, Take 1 tablet by mouth 2 times daily, Disp: 60 tablet, Rfl: 11    amLODIPine (NORVASC) 2.5 MG tablet, TAKE 1 TABLET DAILY, Disp: 90 tablet, Rfl: 3    Omega-3 Fatty Acids (FISH OIL PO), Take by mouth, Disp: , Rfl:     colchicine (COLCRYS) 0.6 MG tablet, TAKE 2 TABLETS BY MOUTH AT ONSET OF SYMPTOMS. THEN TAKE 1 TABLET DAILY UNTIL FLARE RESOLVES, Disp: 30 tablet, Rfl: 1    Cranberry 500 MG CAPS, Take by mouth, Disp: , Rfl:     Multiple Vitamin (MULTI-VITAMIN PO), Take 1 tablet by mouth daily, Disp: , Rfl:     Cholecalciferol 50 MCG (2000 UT) TABS, Take by mouth, Disp: , Rfl:     metFORMIN (GLUCOPHAGE-XR) 500 MG extended release tablet, TAKE 2 TABLETS DAILY WITH DINNER (Patient not taking: Reported on 2/16/2024), Disp: 180 tablet, Rfl: 3       Allergies   Allergen Reactions    Sulfa Antibiotics      Other reaction(s): Unknown (comments)  TOPICAL EYE / EYE MUCH WORSE          /69 (Site: Left Upper Arm, Position: Sitting, Cuff Size: Small Adult)   Pulse 67   Resp 14   Ht 1.524 m (5')   Wt 87.8 kg (193 lb 9.6 oz)   SpO2 98%   BMI 37.81 kg/m²      General: well nourished, well appearing, in no distress  CV: distal pulses are 2+ in lower extremities, trace pitting peripheral edema noted in both lower legs  MS: +lateral ankle tenderness on R, some pain with passive ROM, no calf tenderness  Skin: mild erythema of R ankle, no increased warmth        Lab Results   Component Value Date/Time     02/16/2024 01:12 PM    K 5.0 02/16/2024 01:12 PM     02/16/2024 01:12 PM    CO2 23 02/16/2024 01:12 PM    BUN 26 02/16/2024 01:12 PM    CREATININE 1.40 02/16/2024 01:12 PM    GLUCOSE 130 02/16/2024 01:12 PM    CALCIUM 9.9 02/16/2024 01:12 PM    LABGLOM 39 02/16/2024 01:12 PM    LABGLOM 44 01/23/2023 11:48 AM         Hemoglobin A1C   Date

## 2024-05-11 ENCOUNTER — CLINICAL DOCUMENTATION (OUTPATIENT)
Age: 74
End: 2024-05-11

## 2024-05-14 ENCOUNTER — TELEPHONE (OUTPATIENT)
Age: 74
End: 2024-05-14

## 2024-05-14 NOTE — TELEPHONE ENCOUNTER
Attempted to reach patient by telephone. A message was left for return call.     ----- Message from Marcos Ortez MD sent at 5/11/2024 10:23 PM EDT -----  Asymptomatic AF 8 hrs   V rate 115 bpm  Takes eliquis and toprol  She will see if it terminates spontaneously  Steve perez call her Monday to see if she is out of AF  thanks

## 2024-05-15 ENCOUNTER — ANESTHESIA (OUTPATIENT)
Facility: HOSPITAL | Age: 74
End: 2024-05-15
Payer: MEDICARE

## 2024-05-15 ENCOUNTER — ANESTHESIA EVENT (OUTPATIENT)
Facility: HOSPITAL | Age: 74
End: 2024-05-15
Payer: MEDICARE

## 2024-05-15 ENCOUNTER — HOSPITAL ENCOUNTER (OUTPATIENT)
Facility: HOSPITAL | Age: 74
Setting detail: OUTPATIENT SURGERY
Discharge: HOME | End: 2024-05-15
Attending: INTERNAL MEDICINE | Admitting: INTERNAL MEDICINE
Payer: MEDICARE

## 2024-05-15 VITALS
WEIGHT: 185.4 LBS | OXYGEN SATURATION: 100 % | HEIGHT: 60 IN | DIASTOLIC BLOOD PRESSURE: 62 MMHG | SYSTOLIC BLOOD PRESSURE: 114 MMHG | BODY MASS INDEX: 36.4 KG/M2 | HEART RATE: 68 BPM | RESPIRATION RATE: 13 BRPM

## 2024-05-15 PROCEDURE — 6360000002 HC RX W HCPCS: Performed by: NURSE ANESTHETIST, CERTIFIED REGISTERED

## 2024-05-15 PROCEDURE — 3700000000 HC ANESTHESIA ATTENDED CARE: Performed by: INTERNAL MEDICINE

## 2024-05-15 PROCEDURE — 7100000011 HC PHASE II RECOVERY - ADDTL 15 MIN: Performed by: INTERNAL MEDICINE

## 2024-05-15 PROCEDURE — 3700000001 HC ADD 15 MINUTES (ANESTHESIA): Performed by: INTERNAL MEDICINE

## 2024-05-15 PROCEDURE — 3600007512: Performed by: INTERNAL MEDICINE

## 2024-05-15 PROCEDURE — 2580000003 HC RX 258: Performed by: INTERNAL MEDICINE

## 2024-05-15 PROCEDURE — 2709999900 HC NON-CHARGEABLE SUPPLY: Performed by: INTERNAL MEDICINE

## 2024-05-15 PROCEDURE — 88305 TISSUE EXAM BY PATHOLOGIST: CPT

## 2024-05-15 PROCEDURE — 2500000003 HC RX 250 WO HCPCS: Performed by: NURSE ANESTHETIST, CERTIFIED REGISTERED

## 2024-05-15 PROCEDURE — 7100000010 HC PHASE II RECOVERY - FIRST 15 MIN: Performed by: INTERNAL MEDICINE

## 2024-05-15 PROCEDURE — 3600007502: Performed by: INTERNAL MEDICINE

## 2024-05-15 RX ORDER — LIDOCAINE HYDROCHLORIDE 20 MG/ML
INJECTION, SOLUTION EPIDURAL; INFILTRATION; INTRACAUDAL; PERINEURAL PRN
Status: DISCONTINUED | OUTPATIENT
Start: 2024-05-15 | End: 2024-05-15 | Stop reason: SDUPTHER

## 2024-05-15 RX ORDER — SODIUM CHLORIDE 9 MG/ML
INJECTION, SOLUTION INTRAVENOUS CONTINUOUS
Status: DISCONTINUED | OUTPATIENT
Start: 2024-05-15 | End: 2024-05-15 | Stop reason: HOSPADM

## 2024-05-15 RX ORDER — SODIUM CHLORIDE 9 MG/ML
25 INJECTION, SOLUTION INTRAVENOUS PRN
Status: DISCONTINUED | OUTPATIENT
Start: 2024-05-15 | End: 2024-05-15 | Stop reason: HOSPADM

## 2024-05-15 RX ORDER — SODIUM CHLORIDE 0.9 % (FLUSH) 0.9 %
5-40 SYRINGE (ML) INJECTION EVERY 12 HOURS SCHEDULED
Status: DISCONTINUED | OUTPATIENT
Start: 2024-05-15 | End: 2024-05-15 | Stop reason: HOSPADM

## 2024-05-15 RX ORDER — FAMOTIDINE 40 MG/1
40 TABLET, FILM COATED ORAL DAILY
COMMUNITY

## 2024-05-15 RX ORDER — SODIUM CHLORIDE 0.9 % (FLUSH) 0.9 %
5-40 SYRINGE (ML) INJECTION PRN
Status: DISCONTINUED | OUTPATIENT
Start: 2024-05-15 | End: 2024-05-15 | Stop reason: HOSPADM

## 2024-05-15 RX ADMIN — PROPOFOL 50 MG: 10 INJECTION, EMULSION INTRAVENOUS at 14:11

## 2024-05-15 RX ADMIN — LIDOCAINE HYDROCHLORIDE 50 MG: 20 INJECTION, SOLUTION EPIDURAL; INFILTRATION; INTRACAUDAL; PERINEURAL at 14:10

## 2024-05-15 RX ADMIN — PROPOFOL 25 MG: 10 INJECTION, EMULSION INTRAVENOUS at 14:18

## 2024-05-15 RX ADMIN — PROPOFOL 50 MG: 10 INJECTION, EMULSION INTRAVENOUS at 14:10

## 2024-05-15 RX ADMIN — PROPOFOL 25 MG: 10 INJECTION, EMULSION INTRAVENOUS at 14:13

## 2024-05-15 RX ADMIN — SODIUM CHLORIDE: 9 INJECTION, SOLUTION INTRAVENOUS at 13:57

## 2024-05-15 RX ADMIN — PROPOFOL 25 MG: 10 INJECTION, EMULSION INTRAVENOUS at 14:15

## 2024-05-15 ASSESSMENT — PAIN - FUNCTIONAL ASSESSMENT
PAIN_FUNCTIONAL_ASSESSMENT: NONE - DENIES PAIN

## 2024-05-15 NOTE — OP NOTE
[facility]  [address]  [place] [address] [phone]                              Colonoscopy Procedure Note      Indications:    Diarrhea     :  Cary Hartman MD    Surgical Assistant: Rupertoulator: Joycelyn Tam RN  Endoscopy Technician: Sajan Noriega    Implants: none    Referring Provider: Janes Tom MD    Sedation:  MAC anesthesia    Procedure Details:  After informed consent was obtained with all risks and benefits of procedure explained and preoperative exam completed, the patient was taken to the endoscopy suite and placed in the left lateral decubitus position.  Upon sequential sedation as per above, a digital rectal exam was performed  And was normal.  The Olympus videocolonoscope  was inserted in the rectum and carefully advanced to the cecum, which was identified by the ileocecal valve and appendiceal orifice, terminal ileum.  The quality of preparation was good.  The colonoscope was slowly withdrawn with careful evaluation between folds. Retroflexion in the rectum was performed and was normal..     Findings:   Rectum: no mucosal lesion appreciated  Grade 2 internal and external hemorrhoid(s);  Sigmoid: no mucosal lesion appreciated      -Diverticulosis  Descending Colon: no mucosal lesion appreciated  Transverse Colon: no mucosal lesion appreciated  Ascending Colon: no mucosal lesion appreciated  Cecum: no mucosal lesion appreciated  Terminal Ileum: no mucosal lesion appreciated    Interventions:  biopsy of colon      Specimen Removed:    ID Type Source Tests Collected by Time Destination   1 : random biopsy Tissue Colon SURGICAL PATHOLOGY Cary Hartman MD 5/15/2024 2401        Complications: None.     EBL:  None.    Recommendations:   -Await pathology.  -Low fat diet.     -Resume normal medication(s).  -Begin Eliquis on 5/17/24     Discharge Disposition:  Home in the company of a  when able to ambulate.    Cary Hartman MD  5/15/2024

## 2024-05-15 NOTE — ANESTHESIA POSTPROCEDURE EVALUATION
Department of Anesthesiology  Postprocedure Note    Patient: Jasmin Stone  MRN: [id number]  YOB: 1950  Date of evaluation: 5/15/2024    Procedure Summary       Date: 05/15/24 Room / Location: [facility] 06 / [facility] ENDOSCOPY    Anesthesia Start: 1407 Anesthesia Stop: 1423    Procedure: COLONOSCOPY DIAGNOSTIC (Lower GI Region) Diagnosis:       Diarrhea, unspecified type      (Diarrhea, unspecified type [R19.7])    Surgeons: Cary Hartman MD Responsible Provider: Bryn Hook MD    Anesthesia Type: MAC ASA Status: 3            Anesthesia Type: MAC    Hieu Phase I: Hieu Score: 10    Hieu Phase II: Hieu Score: 10    Anesthesia Post Evaluation    Patient location during evaluation: PACU  Patient participation: complete - patient participated  Level of consciousness: awake  Pain score: 0  Airway patency: patent  Nausea & Vomiting: no nausea  Cardiovascular status: blood pressure returned to baseline and hemodynamically stable  Respiratory status: acceptable  Hydration status: stable  Pain management: adequate and satisfactory to patient    No notable events documented.   Negative

## 2024-05-15 NOTE — H&P
[facility]  [address]  [place] [address] [phone]                                History and Physical     NAME: Jasmin Stone   :  1950   MRN:  [id number]     HPI:  The patient was seen and examined.    Past Surgical History:   Procedure Laterality Date    BLADDER SUSPENSION  10/05/2021    TVT  Dr. Tia Robledo    BREAST BIOPSY Left     CATARACT REMOVAL Right     CATARACT REMOVAL Left 2021    COLONOSCOPY      COLPORRHAPHY  10/05/2021    Posterior    CYST INCISION AND DRAINAGE Left     BREAST CYST    DILATION AND CURETTAGE OF UTERUS      HERNIA REPAIR      umbilical    KNEE ARTHROPLASTY  2014    Torn miniscus repair    KNEE SURGERY  2014    OTHER SURGICAL HISTORY  2018    Bladder Botox     PELVIC LAPAROSCOPY      OVARIAN CYST REMOVED    TOTAL VAGINAL HYSTERECTOMY Bilateral 10/05/2021    Vaginal hysterectomy, uterosacral ligament suspension, posterior repair, cystoscopy, TVT Exact midurethral sling    UPPER GASTROINTESTINAL ENDOSCOPY      UROLOGICAL SURGERY       Past Medical History:   Diagnosis Date    Adverse effect of anesthesia     WOKE UP IN RR ON VENTILATOR    Anemia     Arthritis     Atrial fibrillation (HCC) 2023    Calculus of kidney     Chronic kidney disease     STAGE 3    Cystocele     Diabetes (HCC)     Diabetic neuropathy, painful (HCC)     GERD (gastroesophageal reflux disease)     Gout     Hepatitis B     Hypercholesterolemia     Hypertension     Microalbuminuria     Mild nonproliferative diabetic retinopathy (HCC)     PAF (paroxysmal atrial fibrillation) ([facility]ly [facility])     [facility]3] 3-24-13 -[facility] follows    Rectocele     Retinopathy     Rosacea     Ruptured disc, cervical     Sleep apnea     Stenosis, cervical spine     Visit for review of DEXA scan 10/04/2023    T+4.7 spine, T+1.0 radius T-0.8 hip.  DOES NOT NEED REPEAT     Social History     Tobacco Use    Smoking status: Never    Smokeless tobacco: Never   Vaping Use

## 2024-05-15 NOTE — DISCHARGE INSTRUCTIONS
[facility]  [address]  [place], [place] [id number] [phone]                                  Jasmin Stone  [id number]  1950    COLON DISCHARGE INSTRUCTIONS    DISCOMFORT:  Redness at IV site- apply warm compress to area; if redness or soreness persist- contact your physician  There may be a slight amount of blood passed from the rectum  Gaseous discomfort- walking, belching will help relieve any discomfort      DIET:   Low fat diet.   - however -  remember your colon is empty and a heavy meal will produce gas.   Avoid these foods:  vegetables, fried / greasy foods, carbonated drinks for today  You may not  drink alcoholic beverages for at least 12 hours     ACTIVITY:  It is recommended that you spend the remainder of the day resting -  avoid any strenuous activity.  You may not operate a vehicle for 12 hours  You may not  engage in an occupation involving machinery or appliances for rest of today    Avoid making any critical decisions for at least 24 hour    CALL M.D.  ANY SIGN OF:   Increasing pain, nausea, vomiting  Abdominal distension (swelling)  New increased bleeding (oral or rectal)  Fever (chills)  Pain in chest area  Bloody discharge from nose or mouth  Shortness of breath    You may not  take any Advil, Aspirin, Ibuprofen, Motrin, Aleve, or Goody’s for 3 days, ONLY  Tylenol as needed for pain.    Post procedure diagnosis:   Diverticulosis  Hemorrhoids    Post-procedure recommendations:  -Await pathology.  -Low fat diet.     -Resume normal medication(s).  -Begin Eliquis on 5/17/24     Follow-up Instructions:    Call Dr. Hartman for any questions or problems.     If we took a biopsy please call the office within 2 weeks to discuss your  pathology results. Telephone # [phone]          Hemorrhoids: Care Instructions  Overview     Hemorrhoids are swollen veins that develop in the anal canal. Bleeding during bowel movements, itching, and rectal pain are the most

## 2024-05-15 NOTE — ANESTHESIA PRE PROCEDURE
Department of Anesthesiology  Preprocedure Note       Name:  Jasmin Stone   Age:  74 y.o.  :  1950                                          MRN:  [id number]         Date:  5/15/2024      Surgeon: Surgeon(s):  Cary Hartman MD    Procedure: Procedure(s):  COLONOSCOPY DIAGNOSTIC    Medications prior to admission:   Prior to Admission medications    Medication Sig Start Date End Date Taking? Authorizing Provider   predniSONE (DELTASONE) 10 MG tablet Take 4 tablets by mouth daily for 3 days, THEN 3 tablets daily for 3 days, THEN 2 tablets daily for 3 days, THEN 1 tablet daily for 3 days. 24  Bethanie Kolb MD   metoprolol succinate (TOPROL XL) 50 MG extended release tablet Take 0.5 tablets by mouth daily 24   Angela Breaux APRN - NP   esomeprazole (NEXIUM) 20 MG delayed release capsule TAKE 1 CAPSULE NIGHTLY 3/5/24   Janes Tom MD   blood glucose test strips (FREESTYLE TEST STRIPS) strip 1 each by In Vitro route 2 times daily CHECK BLOOD SUGARS TWICE A DAY. DX CODE: E11.9. 2/15/24   Janes Tom MD   cetirizine (ZYRTEC) 10 MG tablet Take 1 tablet by mouth daily    ProviderNicolas MD   simvastatin (ZOCOR) 20 MG tablet TAKE 1 TABLET AT BEDTIME FOR CHOLESTEROL 24   Janes Tom MD   FreeStyle Lancets MISC TEST BLOOD SUGAR ONCE DAILY 23   Janes Tom MD   Pancrelipase, Lip-Prot-Amyl, (ZENPEP PO) Take by mouth Taken with meals    ProviderNicolas MD   metFORMIN (GLUCOPHAGE-XR) 500 MG extended release tablet TAKE 2 TABLETS DAILY WITH DINNER  Patient not taking: Reported on 2024 10/30/23   Janes Tom MD   olmesartan (BENICAR) 40 MG tablet TAKE 1 TABLET DAILY 10/2/23   Janes Tom MD   apixaban (ELIQUIS) 5 MG TABS tablet Take 1 tablet by mouth 2 times daily 23   Soledad Funez APRN - NP   amLODIPine (NORVASC) 2.5 MG tablet TAKE 1 TABLET DAILY 23   Janes Tom MD   Omega-3 Fatty Acids (FISH OIL PO)

## 2024-05-15 NOTE — PROGRESS NOTES

## 2024-05-16 ENCOUNTER — ANCILLARY PROCEDURE (OUTPATIENT)
Age: 74
End: 2024-05-16
Payer: MEDICARE

## 2024-05-16 DIAGNOSIS — R00.1 BRADYCARDIA: ICD-10-CM

## 2024-05-16 DIAGNOSIS — I48.91 ATRIAL FIBRILLATION (HCC): ICD-10-CM

## 2024-05-16 PROCEDURE — 93225 XTRNL ECG REC<48 HRS REC: CPT | Performed by: SPECIALIST

## 2024-05-23 ENCOUNTER — OFFICE VISIT (OUTPATIENT)
Age: 74
End: 2024-05-23
Payer: MEDICARE

## 2024-05-23 VITALS
OXYGEN SATURATION: 97 % | HEIGHT: 60 IN | HEART RATE: 65 BPM | SYSTOLIC BLOOD PRESSURE: 110 MMHG | DIASTOLIC BLOOD PRESSURE: 60 MMHG | WEIGHT: 191.2 LBS | BODY MASS INDEX: 37.54 KG/M2

## 2024-05-23 DIAGNOSIS — E78.00 PURE HYPERCHOLESTEROLEMIA, UNSPECIFIED: ICD-10-CM

## 2024-05-23 DIAGNOSIS — R06.02 SHORTNESS OF BREATH: ICD-10-CM

## 2024-05-23 DIAGNOSIS — Q24.8 LEFT VENTRICULAR OUTFLOW TRACT OBSTRUCTION: ICD-10-CM

## 2024-05-23 DIAGNOSIS — I48.0 PAF (PAROXYSMAL ATRIAL FIBRILLATION) (HCC): ICD-10-CM

## 2024-05-23 DIAGNOSIS — I10 ESSENTIAL (PRIMARY) HYPERTENSION: Primary | ICD-10-CM

## 2024-05-23 PROCEDURE — G8427 DOCREV CUR MEDS BY ELIG CLIN: HCPCS

## 2024-05-23 PROCEDURE — 1036F TOBACCO NON-USER: CPT

## 2024-05-23 PROCEDURE — 1090F PRES/ABSN URINE INCON ASSESS: CPT

## 2024-05-23 PROCEDURE — 99214 OFFICE O/P EST MOD 30 MIN: CPT

## 2024-05-23 PROCEDURE — G8399 PT W/DXA RESULTS DOCUMENT: HCPCS

## 2024-05-23 PROCEDURE — 3078F DIAST BP <80 MM HG: CPT

## 2024-05-23 PROCEDURE — G8417 CALC BMI ABV UP PARAM F/U: HCPCS

## 2024-05-23 PROCEDURE — 1123F ACP DISCUSS/DSCN MKR DOCD: CPT

## 2024-05-23 PROCEDURE — 3074F SYST BP LT 130 MM HG: CPT

## 2024-05-23 RX ORDER — METOPROLOL SUCCINATE 25 MG/1
25 TABLET, EXTENDED RELEASE ORAL DAILY
Qty: 135 TABLET | Refills: 3 | Status: SHIPPED
Start: 2024-05-23

## 2024-05-23 NOTE — PATIENT INSTRUCTIONS
You are on a blood thinner so if/when you go into atrial fibrillation you are covered for stroke risk reduction.    Continue the Metoprolol 25mg nightly, if you flip into Afib with a heart rate higher than 100 for 20min please take another 25mg    You should call or come to the hospital if your HR is >120 for more than 1 hour and you have taken you extra dose. Also if you have chest pain/tightness/increase in shortness of breathe with the higher HR

## 2024-05-23 NOTE — PROGRESS NOTES
Chief Complaint   Patient presents with    Other     PAF    Shortness of Breath    Hypertension    Cholesterol Problem     Vitals:    05/23/24 1519   BP: 110/60   Site: Left Upper Arm   Position: Sitting   Pulse: 65   SpO2: 97%   Weight: 86.7 kg (191 lb 3.2 oz)   Height: 1.524 m (5')     Chest pain: DENIED     Recent hospital stays: DENIED     Refills: DENIED   
present.Throat: moist mucous membranes.  Chest: Effort normal & normal respiratory excursion .Neurological: alert, conversant and oriented . Skin: Skin is not cold. No obvious systemic rash noted. Not diaphoretic. No erythema.   Psychiatric:  Grossly normal mood and affect.  Behavior appears normal.   Extremities:  no clubbing or cyanosis. Abdomen: non distended    Lungs:breath sounds normal. No stridor. distress, wheezes or  Rales.  Heart:    normal rate, regular rhythm, normal S1, S2, no , rubs, clicks or gallops , PMI non displaced.  1/6 systolic murmur in aortic position.  Edema: Edema is none.      Lab Results   Component Value Date/Time    CHOL 159 06/02/2023 11:11 AM    HDL 51 06/02/2023 11:11 AM    LDL 69.8 06/02/2023 11:11 AM     Lab Results   Component Value Date/Time     02/16/2024 01:12 PM    K 5.0 02/16/2024 01:12 PM     02/16/2024 01:12 PM    CO2 23 02/16/2024 01:12 PM    BUN 26 02/16/2024 01:12 PM    CREATININE 1.40 02/16/2024 01:12 PM    GLUCOSE 130 02/16/2024 01:12 PM    CALCIUM 9.9 02/16/2024 01:12 PM    LABGLOM 39 02/16/2024 01:12 PM    LABGLOM 44 01/23/2023 11:48 AM       Wt Readings from Last 3 Encounters:   05/23/24 86.7 kg (191 lb 3.2 oz)   05/15/24 84.1 kg (185 lb 6.4 oz)   05/06/24 87.8 kg (193 lb 9.6 oz)      BP Readings from Last 3 Encounters:   05/23/24 110/60   05/15/24 114/62   05/06/24 131/69        Current Outpatient Medications:     metoprolol succinate (TOPROL XL) 25 MG extended release tablet, Take 1 tablet by mouth daily May take additional dose for HR >100 for 20min, Disp: 135 tablet, Rfl: 3    famotidine (PEPCID) 40 MG tablet, Take 1 tablet by mouth daily, Disp: , Rfl:     cetirizine (ZYRTEC) 10 MG tablet, Take 1 tablet by mouth daily, Disp: , Rfl:     simvastatin (ZOCOR) 20 MG tablet, TAKE 1 TABLET AT BEDTIME FOR CHOLESTEROL, Disp: 90 tablet, Rfl: 3    Pancrelipase, Lip-Prot-Amyl, (ZENPEP PO), Take by mouth Taken with meals, Disp: , Rfl:     olmesartan (BENICAR) 40

## 2024-05-27 PROCEDURE — 93227 XTRNL ECG REC<48 HR R&I: CPT | Performed by: SPECIALIST

## 2024-05-28 ENCOUNTER — OFFICE VISIT (OUTPATIENT)
Age: 74
End: 2024-05-28
Payer: MEDICARE

## 2024-05-28 VITALS
TEMPERATURE: 97.6 F | RESPIRATION RATE: 16 BRPM | DIASTOLIC BLOOD PRESSURE: 78 MMHG | WEIGHT: 192 LBS | OXYGEN SATURATION: 97 % | BODY MASS INDEX: 37.69 KG/M2 | HEART RATE: 53 BPM | SYSTOLIC BLOOD PRESSURE: 136 MMHG | HEIGHT: 60 IN

## 2024-05-28 DIAGNOSIS — I10 ESSENTIAL (PRIMARY) HYPERTENSION: ICD-10-CM

## 2024-05-28 DIAGNOSIS — E78.00 PURE HYPERCHOLESTEROLEMIA, UNSPECIFIED: ICD-10-CM

## 2024-05-28 DIAGNOSIS — M10.9 GOUT, UNSPECIFIED CAUSE, UNSPECIFIED CHRONICITY, UNSPECIFIED SITE: ICD-10-CM

## 2024-05-28 DIAGNOSIS — K21.00 GASTROESOPHAGEAL REFLUX DISEASE WITH ESOPHAGITIS WITHOUT HEMORRHAGE: ICD-10-CM

## 2024-05-28 DIAGNOSIS — E11.21 TYPE 2 DIABETES MELLITUS WITH NEPHROPATHY (HCC): ICD-10-CM

## 2024-05-28 DIAGNOSIS — E11.21 TYPE 2 DIABETES MELLITUS WITH NEPHROPATHY (HCC): Primary | ICD-10-CM

## 2024-05-28 DIAGNOSIS — I48.0 PAF (PAROXYSMAL ATRIAL FIBRILLATION) (HCC): ICD-10-CM

## 2024-05-28 DIAGNOSIS — R19.5 LOOSE BOWEL MOVEMENT: ICD-10-CM

## 2024-05-28 LAB
ALBUMIN SERPL-MCNC: 3.5 G/DL (ref 3.5–5)
ALBUMIN/GLOB SERPL: 0.9 (ref 1.1–2.2)
ALP SERPL-CCNC: 61 U/L (ref 45–117)
ALT SERPL-CCNC: 28 U/L (ref 12–78)
ANION GAP SERPL CALC-SCNC: 6 MMOL/L (ref 5–15)
AST SERPL-CCNC: 17 U/L (ref 15–37)
BASOPHILS # BLD: 0.1 K/UL (ref 0–0.1)
BASOPHILS NFR BLD: 1 % (ref 0–1)
BILIRUB SERPL-MCNC: 0.5 MG/DL (ref 0.2–1)
BUN SERPL-MCNC: 34 MG/DL (ref 6–20)
BUN/CREAT SERPL: 23 (ref 12–20)
CALCIUM SERPL-MCNC: 10.8 MG/DL (ref 8.5–10.1)
CHLORIDE SERPL-SCNC: 110 MMOL/L (ref 97–108)
CHOLEST SERPL-MCNC: 172 MG/DL
CO2 SERPL-SCNC: 23 MMOL/L (ref 21–32)
CREAT SERPL-MCNC: 1.5 MG/DL (ref 0.55–1.02)
CREAT UR-MCNC: 94.8 MG/DL
DIFFERENTIAL METHOD BLD: NORMAL
EOSINOPHIL # BLD: 0.3 K/UL (ref 0–0.4)
EOSINOPHIL NFR BLD: 4 % (ref 0–7)
ERYTHROCYTE [DISTWIDTH] IN BLOOD BY AUTOMATED COUNT: 13.4 % (ref 11.5–14.5)
EST. AVERAGE GLUCOSE BLD GHB EST-MCNC: 160 MG/DL
GLOBULIN SER CALC-MCNC: 4.1 G/DL (ref 2–4)
GLUCOSE SERPL-MCNC: 147 MG/DL (ref 65–100)
HBA1C MFR BLD: 7.2 % (ref 4–5.6)
HCT VFR BLD AUTO: 38.8 % (ref 35–47)
HDLC SERPL-MCNC: 54 MG/DL
HDLC SERPL: 3.2 (ref 0–5)
HGB BLD-MCNC: 12.3 G/DL (ref 11.5–16)
IMM GRANULOCYTES # BLD AUTO: 0 K/UL (ref 0–0.04)
IMM GRANULOCYTES NFR BLD AUTO: 0 % (ref 0–0.5)
LDLC SERPL CALC-MCNC: 72.2 MG/DL (ref 0–100)
LYMPHOCYTES # BLD: 1.5 K/UL (ref 0.8–3.5)
LYMPHOCYTES NFR BLD: 19 % (ref 12–49)
MCH RBC QN AUTO: 28.5 PG (ref 26–34)
MCHC RBC AUTO-ENTMCNC: 31.7 G/DL (ref 30–36.5)
MCV RBC AUTO: 90 FL (ref 80–99)
MICROALBUMIN UR-MCNC: 1.21 MG/DL
MICROALBUMIN/CREAT UR-RTO: 13 MG/G (ref 0–30)
MONOCYTES # BLD: 0.5 K/UL (ref 0–1)
MONOCYTES NFR BLD: 6 % (ref 5–13)
NEUTS SEG # BLD: 5.6 K/UL (ref 1.8–8)
NEUTS SEG NFR BLD: 70 % (ref 32–75)
NRBC # BLD: 0 K/UL (ref 0–0.01)
NRBC BLD-RTO: 0 PER 100 WBC
PLATELET # BLD AUTO: 197 K/UL (ref 150–400)
PMV BLD AUTO: 10 FL (ref 8.9–12.9)
POTASSIUM SERPL-SCNC: 4.7 MMOL/L (ref 3.5–5.1)
PROT SERPL-MCNC: 7.6 G/DL (ref 6.4–8.2)
RBC # BLD AUTO: 4.31 M/UL (ref 3.8–5.2)
SODIUM SERPL-SCNC: 139 MMOL/L (ref 136–145)
SPECIMEN HOLD: NORMAL
TRIGL SERPL-MCNC: 229 MG/DL
VLDLC SERPL CALC-MCNC: 45.8 MG/DL
WBC # BLD AUTO: 8 K/UL (ref 3.6–11)

## 2024-05-28 PROCEDURE — G8399 PT W/DXA RESULTS DOCUMENT: HCPCS | Performed by: INTERNAL MEDICINE

## 2024-05-28 PROCEDURE — 99214 OFFICE O/P EST MOD 30 MIN: CPT | Performed by: INTERNAL MEDICINE

## 2024-05-28 PROCEDURE — 1090F PRES/ABSN URINE INCON ASSESS: CPT | Performed by: INTERNAL MEDICINE

## 2024-05-28 PROCEDURE — G8417 CALC BMI ABV UP PARAM F/U: HCPCS | Performed by: INTERNAL MEDICINE

## 2024-05-28 PROCEDURE — 1036F TOBACCO NON-USER: CPT | Performed by: INTERNAL MEDICINE

## 2024-05-28 PROCEDURE — 1123F ACP DISCUSS/DSCN MKR DOCD: CPT | Performed by: INTERNAL MEDICINE

## 2024-05-28 PROCEDURE — G8427 DOCREV CUR MEDS BY ELIG CLIN: HCPCS | Performed by: INTERNAL MEDICINE

## 2024-05-28 PROCEDURE — 3078F DIAST BP <80 MM HG: CPT | Performed by: INTERNAL MEDICINE

## 2024-05-28 PROCEDURE — 3075F SYST BP GE 130 - 139MM HG: CPT | Performed by: INTERNAL MEDICINE

## 2024-05-28 PROCEDURE — 3044F HG A1C LEVEL LT 7.0%: CPT | Performed by: INTERNAL MEDICINE

## 2024-05-28 RX ORDER — LANCETS 30 GAUGE
1 EACH MISCELLANEOUS DAILY
Qty: 100 EACH | Refills: 5 | Status: SHIPPED | OUTPATIENT
Start: 2024-05-28

## 2024-05-28 RX ORDER — GLUCOSAMINE HCL/CHONDROITIN SU 500-400 MG
CAPSULE ORAL
Qty: 100 STRIP | Refills: 2 | Status: SHIPPED | OUTPATIENT
Start: 2024-05-28

## 2024-05-28 ASSESSMENT — ENCOUNTER SYMPTOMS
BACK PAIN: 0
ABDOMINAL PAIN: 0
CHEST TIGHTNESS: 0
SHORTNESS OF BREATH: 0
EYE ITCHING: 0
DIARRHEA: 0
CONSTIPATION: 0
EYE DISCHARGE: 0

## 2024-05-28 NOTE — PROGRESS NOTES
at age 60 after an MI    Thyroid Disease Sister     Hypertension Sister     Eczema Sister     Anesth Problems Neg Hx     Other Sister         MENTAL HEALTH ISSUES    Elevated Lipids Sister     Diabetes Sister         Prediabetes    Asthma Sister     Osteoarthritis Sister     Cancer Paternal Aunt         breast    Breast Cancer Maternal Aunt     Cancer Maternal Aunt         Breast Cancer    Stroke Sister         Mild stroke    Elevated Lipids Sister     Psychiatric Disorder Sister         Borderline Personality Disorder    Asthma Sister     Osteoarthritis Sister     Heart Attack Other     Arthritis Sister     Osteoarthritis Sister     Hypertension Sister     High Cholesterol Sister     Arthritis Sister     Thyroid Disease Mother         Allergies   Allergen Reactions    Sulfa Antibiotics      Other reaction(s): Unknown (comments)  TOPICAL EYE / EYE MUCH WORSE        Assessment/Plan  Jasmin was seen today for diabetes.    Diagnoses and all orders for this visit:    Type 2 diabetes mellitus with nephropathy (HCC)-will stay off metformin as long as A1c is below 7  -     Hemoglobin A1C; Future  -     CBC with Auto Differential; Future  -     Comprehensive Metabolic Panel; Future  -     Microalbumin / Creatinine Urine Ratio; Future  -     blood glucose monitor strips; Test 1 times a day & as needed for symptoms of irregular blood glucose. Dispense sufficient amount for indicated testing frequency plus additional to accommodate PRN testing needs.  -     Lancets MISC; 1 each by Does not apply route daily    Essential (primary) hypertension  -     Hemoglobin A1C; Future  -     CBC with Auto Differential; Future  -     Comprehensive Metabolic Panel; Future  -     Microalbumin / Creatinine Urine Ratio; Future    Pure hypercholesterolemia, unspecified-repeat lipids  -     Lipid Panel; Future    PAF (paroxysmal atrial fibrillation) (HCC)-has not had many breakthrough episodes of A-fib.    Gastroesophageal reflux disease

## 2024-06-13 ENCOUNTER — OFFICE VISIT (OUTPATIENT)
Age: 74
End: 2024-06-13
Payer: MEDICARE

## 2024-06-13 VITALS
OXYGEN SATURATION: 100 % | WEIGHT: 193 LBS | HEART RATE: 54 BPM | BODY MASS INDEX: 37.89 KG/M2 | HEIGHT: 60 IN | SYSTOLIC BLOOD PRESSURE: 122 MMHG | TEMPERATURE: 97.4 F | RESPIRATION RATE: 16 BRPM | DIASTOLIC BLOOD PRESSURE: 85 MMHG

## 2024-06-13 DIAGNOSIS — I10 ESSENTIAL (PRIMARY) HYPERTENSION: ICD-10-CM

## 2024-06-13 DIAGNOSIS — R33.9 URINARY RETENTION: ICD-10-CM

## 2024-06-13 DIAGNOSIS — K86.81 EXOCRINE PANCREATIC INSUFFICIENCY: ICD-10-CM

## 2024-06-13 DIAGNOSIS — Z97.8 FOLEY CATHETER IN PLACE: ICD-10-CM

## 2024-06-13 DIAGNOSIS — R19.5 LOOSE BOWEL MOVEMENT: ICD-10-CM

## 2024-06-13 DIAGNOSIS — E11.21 TYPE 2 DIABETES MELLITUS WITH NEPHROPATHY (HCC): Primary | ICD-10-CM

## 2024-06-13 PROCEDURE — 99214 OFFICE O/P EST MOD 30 MIN: CPT | Performed by: INTERNAL MEDICINE

## 2024-06-13 PROCEDURE — G8417 CALC BMI ABV UP PARAM F/U: HCPCS | Performed by: INTERNAL MEDICINE

## 2024-06-13 PROCEDURE — 3079F DIAST BP 80-89 MM HG: CPT | Performed by: INTERNAL MEDICINE

## 2024-06-13 PROCEDURE — 3051F HG A1C>EQUAL 7.0%<8.0%: CPT | Performed by: INTERNAL MEDICINE

## 2024-06-13 PROCEDURE — G8399 PT W/DXA RESULTS DOCUMENT: HCPCS | Performed by: INTERNAL MEDICINE

## 2024-06-13 PROCEDURE — 1090F PRES/ABSN URINE INCON ASSESS: CPT | Performed by: INTERNAL MEDICINE

## 2024-06-13 PROCEDURE — G8428 CUR MEDS NOT DOCUMENT: HCPCS | Performed by: INTERNAL MEDICINE

## 2024-06-13 PROCEDURE — 1123F ACP DISCUSS/DSCN MKR DOCD: CPT | Performed by: INTERNAL MEDICINE

## 2024-06-13 PROCEDURE — 1036F TOBACCO NON-USER: CPT | Performed by: INTERNAL MEDICINE

## 2024-06-13 PROCEDURE — 3074F SYST BP LT 130 MM HG: CPT | Performed by: INTERNAL MEDICINE

## 2024-06-13 ASSESSMENT — ENCOUNTER SYMPTOMS
DIARRHEA: 0
ABDOMINAL PAIN: 0
BACK PAIN: 0
CONSTIPATION: 0
SHORTNESS OF BREATH: 0
CHEST TIGHTNESS: 0

## 2024-06-13 NOTE — PROGRESS NOTES
have moderately elevated.  Averaging now in the 140's. This is up from when she stopped the metformin.   Given questionable history of pancreatitis in the past, gastroenterologist urges caution with a DPP 4.    Hypertension- stable.   Hypertension ROS: taking medications as instructed, no medication side effects noted, no TIA's, no chest pain on exertion, no dyspnea on exertion, no swelling of ankles     reports that she has never smoked. She has never used smokeless tobacco.    reports that she does not currently use alcohol.   BP Readings from Last 2 Encounters:   06/13/24 122/85   05/28/24 136/78       ROS  Review of Systems   Constitutional:  Negative for fatigue and fever.   HENT:  Negative for congestion and ear pain.    Respiratory:  Negative for chest tightness and shortness of breath.    Cardiovascular:  Negative for chest pain and leg swelling.   Gastrointestinal:  Negative for abdominal pain, constipation and diarrhea.   Endocrine: Negative for polydipsia.   Genitourinary:         Burton in place   Musculoskeletal:  Negative for arthralgias and back pain.   Skin:  Negative for rash.   Neurological:  Negative for dizziness and headaches.   Psychiatric/Behavioral:  Negative for agitation. The patient is not nervous/anxious.          Vitals:    06/13/24 1134   BP: 122/85   Pulse: 54   Resp: 16   Temp: 97.4 °F (36.3 °C)   SpO2: 100%       Physical Exam  Constitutional:       Appearance: Normal appearance.   HENT:      Head: Normocephalic.      Right Ear: Tympanic membrane normal.      Left Ear: Tympanic membrane normal.   Cardiovascular:      Rate and Rhythm: Normal rate and regular rhythm.      Heart sounds: No murmur heard.  Pulmonary:      Effort: Pulmonary effort is normal.      Breath sounds: Normal breath sounds. No wheezing.   Abdominal:      General: There is no distension.      Palpations: Abdomen is soft.   Musculoskeletal:         General: No swelling.   Skin:     General: Skin is warm and dry.

## 2024-06-14 ENCOUNTER — TELEPHONE (OUTPATIENT)
Age: 74
End: 2024-06-14

## 2024-06-14 NOTE — TELEPHONE ENCOUNTER
Patient called and would like some advise on the protocols she should take if she has Covid, she is going to [facility] to get tested     Jasmin [phone]

## 2024-06-14 NOTE — TELEPHONE ENCOUNTER
06/14/2024--This user called and spoke with the patient and she stated that she was in the office yesterday but forgot to mention to  that her throat was just slightly sore, and then last night patient had a fever of 101 (No fevers this morning), has some congestion, no cough. Patient wanted to know about getting tested for Covid, as she has a kit at home. I let her know that if she does test at home or somewhere else that she should wait a couple days since her symptoms just started. Patient verbalized understanding and is aware to let us know if she is not feeling well still next week. Also stated that she will probably hold off on taking the Jardiance, as her BS this morning was in the 120's. All question's answered at that time.

## 2024-06-15 ENCOUNTER — OFFICE VISIT (OUTPATIENT)
Age: 74
End: 2024-06-15

## 2024-06-15 VITALS
DIASTOLIC BLOOD PRESSURE: 83 MMHG | SYSTOLIC BLOOD PRESSURE: 131 MMHG | WEIGHT: 190 LBS | TEMPERATURE: 98.8 F | OXYGEN SATURATION: 98 % | HEART RATE: 77 BPM | HEIGHT: 68 IN | BODY MASS INDEX: 28.79 KG/M2 | RESPIRATION RATE: 16 BRPM

## 2024-06-15 DIAGNOSIS — U07.1 COVID-19: Primary | ICD-10-CM

## 2024-06-15 DIAGNOSIS — R05.1 ACUTE COUGH: ICD-10-CM

## 2024-06-15 DIAGNOSIS — J02.9 SORE THROAT: ICD-10-CM

## 2024-06-15 DIAGNOSIS — R39.9 UTI SYMPTOMS: ICD-10-CM

## 2024-06-15 LAB
BILIRUBIN, URINE, POC: NEGATIVE
BLOOD URINE, POC: ABNORMAL
GLUCOSE URINE, POC: NEGATIVE
KETONES, URINE, POC: NEGATIVE
LEUKOCYTE ESTERASE, URINE, POC: ABNORMAL
Lab: ABNORMAL
NITRITE, URINE, POC: NEGATIVE
PERFORMING INSTRUMENT: ABNORMAL
PH, URINE, POC: 5.5 (ref 4.6–8)
PROTEIN,URINE, POC: ABNORMAL
QC PASS/FAIL: ABNORMAL
SARS-COV-2, POC: DETECTED
SPECIFIC GRAVITY, URINE, POC: 1 (ref 1–1.03)
STREP PYOGENES DNA, POC: NORMAL
URINALYSIS CLARITY, POC: ABNORMAL
URINALYSIS COLOR, POC: YELLOW
UROBILINOGEN, POC: ABNORMAL
VALID INTERNAL CONTROL, POC: NORMAL

## 2024-06-15 RX ORDER — GUAIFENESIN 200 MG/10ML
200 LIQUID ORAL 3 TIMES DAILY PRN
Qty: 210 ML | Refills: 0 | Status: SHIPPED | OUTPATIENT
Start: 2024-06-15 | End: 2024-06-22

## 2024-06-15 NOTE — PROGRESS NOTES
Conjunctivae normal.      Pupils: Pupils are equal, round, and reactive to light.   Cardiovascular:      Rate and Rhythm: Normal rate and regular rhythm.   Pulmonary:      Effort: Pulmonary effort is normal. No respiratory distress.      Breath sounds: Normal breath sounds.   Abdominal:      General: Abdomen is flat. Bowel sounds are normal.      Palpations: Abdomen is soft.   Musculoskeletal:         General: Normal range of motion.      Cervical back: Normal range of motion.   Lymphadenopathy:      Cervical: No cervical adenopathy.   Skin:     General: Skin is warm.      Findings: No rash.   Neurological:      General: No focal deficit present.      Mental Status: She is alert and oriented to person, place, and time. Mental status is at baseline.   Psychiatric:         Mood and Affect: Mood normal.         Behavior: Behavior normal.         Thought Content: Thought content normal.         Judgment: Judgment normal.         We discussed when to utilize emergency services. Patient verbalized understanding.    An electronic signature was used to authenticate this note.      USHA Araujo - CNP

## 2024-06-15 NOTE — PATIENT INSTRUCTIONS
Please isolate for 5 days per the CDC.  After the 5 days, you will need to wear a mask in public or around people for 5 more days.  Return to  or go to the nearest ED for any worsening of symptoms.

## 2024-06-16 LAB
BACTERIA SPEC CULT: NORMAL
SERVICE CMNT-IMP: NORMAL

## 2024-06-19 ENCOUNTER — TELEPHONE (OUTPATIENT)
Age: 74
End: 2024-06-19

## 2024-06-19 RX ORDER — COLCHICINE 0.6 MG/1
TABLET ORAL
Qty: 30 TABLET | Refills: 1
Start: 2024-06-19

## 2024-06-20 ENCOUNTER — TELEPHONE (OUTPATIENT)
Age: 74
End: 2024-06-20

## 2024-06-20 NOTE — TELEPHONE ENCOUNTER
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  Woke up w gout flare of toe.   Colchicine 2 this am helped.  Now worsening and severe.    Prescription for colchicine says take 2 pills at onset of flare and then take 1 daily.  Last gout of ankle 1 mo ago  No rx allopurinol needed just yet  Took prednisone 5/6 for gout flare.  Naturally, this increased her glucose at the time  Not taking Jardiance for DM now since rx 6/13.  A1c 7.2% 5/28.  Has urinary cath for 3 week due to retention s/p botox 3 weeks ago  Glucose 210 this AM, but low 100s  Recovering from COVID and improving.    For current gout flare, recommend taking 2 more colchicine pills right now and then repeat 1 pill 10 to 12 hours from now and then continue 1 pill twice daily for the next 48 hours, assuming that flare significantly improves during that time.  He also use acetaminophen 650 to 1000 mg every 6 hours as needed.  Avoid NSAIDs because of Eliquis use.  Will try to avoid prednisone for now since it will certainly cause increased glucoses and increase her risk of urinary tract infection with her current catheter in place.  Agree with remaining off of Jardiance right now as it will increase glucosuria and increase the risk of UTI, but would be a great choice in the future, especially if her Burton catheter comes out soon.  Contact office in the morning if symptoms not improving or further questions.

## 2024-06-20 NOTE — TELEPHONE ENCOUNTER
06/20/2024--I returned call to patient and she stated that she was instructed by  last night to take 2 more colchicine for the Gout. She stated that she felt like it did help last night, but as the day has gone on today, she stated she feels like it is getting worse. Patient stated that she took only 1 colchicine this morning, as that is what her bottle says. So patient wants to know if she should be taking 1 per day or 2 per day from now on. I let her know that I would check with  and give her a call back. She verbalized understanding and had no further question's at that time.

## 2024-06-20 NOTE — TELEPHONE ENCOUNTER
06/20/2024--I called and spoke with the patient and let her know our recommendation's. She verbalized understanding and all question's answered at that time.

## 2024-06-24 ENCOUNTER — OFFICE VISIT (OUTPATIENT)
Age: 74
End: 2024-06-24
Payer: MEDICARE

## 2024-06-24 VITALS
HEART RATE: 76 BPM | BODY MASS INDEX: 28.49 KG/M2 | DIASTOLIC BLOOD PRESSURE: 83 MMHG | TEMPERATURE: 98.1 F | SYSTOLIC BLOOD PRESSURE: 131 MMHG | OXYGEN SATURATION: 99 % | RESPIRATION RATE: 16 BRPM | WEIGHT: 188 LBS | HEIGHT: 68 IN

## 2024-06-24 DIAGNOSIS — U07.1 COVID: ICD-10-CM

## 2024-06-24 DIAGNOSIS — M10.9 GOUT INVOLVING TOE, UNSPECIFIED CAUSE, UNSPECIFIED CHRONICITY, UNSPECIFIED LATERALITY: Primary | ICD-10-CM

## 2024-06-24 DIAGNOSIS — E11.21 TYPE 2 DIABETES MELLITUS WITH NEPHROPATHY (HCC): ICD-10-CM

## 2024-06-24 PROCEDURE — 3051F HG A1C>EQUAL 7.0%<8.0%: CPT | Performed by: INTERNAL MEDICINE

## 2024-06-24 PROCEDURE — 99213 OFFICE O/P EST LOW 20 MIN: CPT | Performed by: INTERNAL MEDICINE

## 2024-06-24 PROCEDURE — G8428 CUR MEDS NOT DOCUMENT: HCPCS | Performed by: INTERNAL MEDICINE

## 2024-06-24 PROCEDURE — 1123F ACP DISCUSS/DSCN MKR DOCD: CPT | Performed by: INTERNAL MEDICINE

## 2024-06-24 PROCEDURE — 3079F DIAST BP 80-89 MM HG: CPT | Performed by: INTERNAL MEDICINE

## 2024-06-24 PROCEDURE — G8399 PT W/DXA RESULTS DOCUMENT: HCPCS | Performed by: INTERNAL MEDICINE

## 2024-06-24 PROCEDURE — G8417 CALC BMI ABV UP PARAM F/U: HCPCS | Performed by: INTERNAL MEDICINE

## 2024-06-24 PROCEDURE — 1036F TOBACCO NON-USER: CPT | Performed by: INTERNAL MEDICINE

## 2024-06-24 PROCEDURE — 1090F PRES/ABSN URINE INCON ASSESS: CPT | Performed by: INTERNAL MEDICINE

## 2024-06-24 PROCEDURE — 3075F SYST BP GE 130 - 139MM HG: CPT | Performed by: INTERNAL MEDICINE

## 2024-06-24 RX ORDER — PREDNISONE 20 MG/1
40 TABLET ORAL DAILY
Qty: 10 TABLET | Refills: 0 | Status: SHIPPED | OUTPATIENT
Start: 2024-06-24 | End: 2024-06-29

## 2024-06-24 ASSESSMENT — ENCOUNTER SYMPTOMS
BACK PAIN: 0
CONSTIPATION: 0
CHEST TIGHTNESS: 0
ABDOMINAL PAIN: 0
SHORTNESS OF BREATH: 0
SINUS PAIN: 1
DIARRHEA: 0

## 2024-06-24 NOTE — PROGRESS NOTES
accommodate PRN testing needs.    Lancets MISC 1 each by Does not apply route daily    metoprolol succinate (TOPROL XL) 25 MG extended release tablet Take 1 tablet by mouth daily May take additional dose for HR >100 for 20min    famotidine (PEPCID) 40 MG tablet Take 1 tablet by mouth daily    cetirizine (ZYRTEC) 10 MG tablet Take 1 tablet by mouth daily    simvastatin (ZOCOR) 20 MG tablet TAKE 1 TABLET AT BEDTIME FOR CHOLESTEROL    olmesartan (BENICAR) 40 MG tablet TAKE 1 TABLET DAILY    apixaban (ELIQUIS) 5 MG TABS tablet Take 1 tablet by mouth 2 times daily    amLODIPine (NORVASC) 2.5 MG tablet TAKE 1 TABLET DAILY    Omega-3 Fatty Acids (FISH OIL PO) Take by mouth    Cranberry 500 MG CAPS Take by mouth    Multiple Vitamin (MULTI-VITAMIN PO) Take 1 tablet by mouth daily    Cholecalciferol 50 MCG (2000 UT) TABS Take by mouth    empagliflozin (JARDIANCE) 10 MG tablet Take 1 tablet by mouth daily (Patient not taking: Reported on 6/24/2024)     No current facility-administered medications for this visit.        Past Medical History:   Diagnosis Date    Adverse effect of anesthesia     WOKE UP IN RR ON VENTILATOR    Anemia     Arthritis     Atrial fibrillation (HCC) 09/23/2023    Calculus of kidney     Chronic kidney disease     STAGE 3    Cystocele     Diabetes (HCC)     Diabetic neuropathy, painful (HCC)     GERD (gastroesophageal reflux disease)     Gout 2022    Hepatitis B     Hypercholesterolemia     Hypertension     Microalbuminuria     Mild nonproliferative diabetic retinopathy (HCC)     PAF (paroxysmal atrial fibrillation) (HCC)     [facility] 3-24-13 -[facility] follows    Rectocele     Retinopathy     Rosacea     Ruptured disc, cervical     Sleep apnea     Stenosis, cervical spine     Visit for review of DEXA scan 10/04/2023    T+4.7 spine, T+1.0 radius T-0.8 hip.  DOES NOT NEED REPEAT      Past Surgical History:   Procedure Laterality Date    BLADDER SUSPENSION  10/05/2021    TVT  Dr. Tia Robledo    BREAST BIOPSY

## 2024-07-09 DIAGNOSIS — M79.672 LEFT FOOT PAIN: Primary | ICD-10-CM

## 2024-07-10 ENCOUNTER — TELEPHONE (OUTPATIENT)
Age: 74
End: 2024-07-10

## 2024-07-10 ENCOUNTER — HOSPITAL ENCOUNTER (OUTPATIENT)
Facility: HOSPITAL | Age: 74
Discharge: HOME | End: 2024-07-13
Attending: INTERNAL MEDICINE
Payer: MEDICARE

## 2024-07-10 DIAGNOSIS — I48.0 PAF (PAROXYSMAL ATRIAL FIBRILLATION) (HCC): ICD-10-CM

## 2024-07-10 DIAGNOSIS — M79.672 LEFT FOOT PAIN: ICD-10-CM

## 2024-07-10 DIAGNOSIS — I10 ESSENTIAL (PRIMARY) HYPERTENSION: ICD-10-CM

## 2024-07-10 PROCEDURE — 73630 X-RAY EXAM OF FOOT: CPT

## 2024-07-10 RX ORDER — METOPROLOL SUCCINATE 25 MG/1
25 TABLET, EXTENDED RELEASE ORAL DAILY
Qty: 90 TABLET | Refills: 3 | Status: SHIPPED | OUTPATIENT
Start: 2024-07-10

## 2024-07-10 NOTE — TELEPHONE ENCOUNTER
Telephone call made to patient. Two patient identifiers verified. Spoke to Ms. Stone this morning; she stated that she developed a slight dull ache feeling in her chest that comes and goes intermittently. This started a couple of days ago. She does not endorse any shortness of breath, arm/jaw pain, or any other symptoms. Patient reports that she did get diagnosed with COVID 2-3 weeks ago; her symptoms have included: body aches, fatigue, fever, severe cough, and head congestion. She states that she has been coughing consistently and pretty hard. Her most recent Echo and EKG were overall normal; her recent holter monitor report was overall unremarkable as well per Dr. Limon. I let the patient know this chest discomfort is most likely attributed to her cough and COVID and likely non-cardiac related based on her recent imaging,testing, etc. The patient states she feels well overall besides this. I advised the patient that in the case she develops any sort of persistent, crushing chest pain, to of course call 911. I advised her to call us if she develops any worsening symptoms or shortness of breath. The patient verbalized understanding and was appreciative and agreeable. She also asked for a refill on Metoprolol as well.       Per VO of MD    LOV: 7/5/2023     Future Appointments   Date Time Provider Department Center   8/23/2024 11:00 AM Austin Limon MD [facility] AM[facility]       Requested Prescriptions     Signed Prescriptions Disp Refills    metoprolol succinate (TOPROL XL) 25 MG extended release tablet 90 tablet 3     Sig: Take 1 tablet by mouth daily May take additional dose for HR >100 for 20min     Authorizing Provider: AUSTIN LIMON     Ordering User: SON RAND

## 2024-07-19 ENCOUNTER — TELEPHONE (OUTPATIENT)
Age: 74
End: 2024-07-19

## 2024-07-19 NOTE — TELEPHONE ENCOUNTER
07/19/2024 at 11:15 am--This user attempted to call the patient but she did not answer, so I left a message for her to call the office back.

## 2024-08-06 ENCOUNTER — TELEPHONE (OUTPATIENT)
Age: 74
End: 2024-08-06

## 2024-08-06 DIAGNOSIS — I48.0 PAF (PAROXYSMAL ATRIAL FIBRILLATION) (HCC): Primary | ICD-10-CM

## 2024-08-06 NOTE — TELEPHONE ENCOUNTER
Patient states she would like her (eliquis 5 MG) to go to [facility] instead of [facility] and she only has a 2 week supply left.    Phone [phone]

## 2024-08-06 NOTE — TELEPHONE ENCOUNTER
Per VO by MD.    Future Appointments   Date Time Provider Department Center   9/24/2024 11:40 AM Radha Evans MD [facility]

## 2024-08-13 ENCOUNTER — TRANSCRIBE ORDERS (OUTPATIENT)
Facility: HOSPITAL | Age: 74
End: 2024-08-13

## 2024-08-13 DIAGNOSIS — Z12.31 SCREENING MAMMOGRAM FOR HIGH-RISK PATIENT: Primary | ICD-10-CM

## 2024-08-23 ENCOUNTER — OFFICE VISIT (OUTPATIENT)
Age: 74
End: 2024-08-23

## 2024-08-23 VITALS
TEMPERATURE: 97.6 F | HEIGHT: 61 IN | HEART RATE: 62 BPM | BODY MASS INDEX: 37.34 KG/M2 | WEIGHT: 197.8 LBS | SYSTOLIC BLOOD PRESSURE: 126 MMHG | RESPIRATION RATE: 16 BRPM | OXYGEN SATURATION: 99 % | DIASTOLIC BLOOD PRESSURE: 68 MMHG

## 2024-08-23 DIAGNOSIS — H10.531 CONTACT BLEPHAROCONJUNCTIVITIS OF RIGHT EYE: Primary | ICD-10-CM

## 2024-08-23 RX ORDER — ERYTHROMYCIN 5 MG/G
OINTMENT OPHTHALMIC
Qty: 1 G | Refills: 0 | Status: SHIPPED | OUTPATIENT
Start: 2024-08-23 | End: 2024-09-02

## 2024-08-23 ASSESSMENT — VISUAL ACUITY
OD_CC: 20/70
OS_CC: 20/25

## 2024-08-23 NOTE — PROGRESS NOTES
Jasmin Stone (:  1950) is a 74 y.o. female,Established patient, here for evaluation of the following chief complaint(s):  Eye Problem (Pt c/o right eye has been itchy x3 days, just a little bit of discharge this am and it seems a little more irritated,)       ASSESSMENT/PLAN:  1. Contact blepharoconjunctivitis of right eye  -     erythromycin (ROMYCIN) 5 MG/GM ophthalmic ointment; Use topical ointment on the upper, lower eyelid and in the inner canthus of the right eye up to four times a day., Disp-1 g, R-0, Normal      Use ointment on the upper eyelid, lower eyelid and inner canthus for blepharoconjunctivitis four times a day for seven days.  Please follow up with your PCP on Monday as planned.  If you have worsening vision, please report to the nearest emergency room.  Patient comfortable with plan         SUBJECTIVE/OBJECTIVE:    History provided by:  Patient   used: No          74 y.o. female with PMH of DM2, posterior capsular opacification non visually significant of both eyes as of 2023 presents with symptoms of right eye discharge, and itching.          Vitals:    24 1859   BP: 126/68   Site: Left Upper Arm   Position: Sitting   Cuff Size: Large Adult   Pulse: 62   Resp: 16   Temp: 97.6 °F (36.4 °C)   TempSrc: Oral   SpO2: 99%   Weight: 89.7 kg (197 lb 12.8 oz)   Height: 1.549 m (5' 1\")     Physical Exam  Constitutional:       General: She is not in acute distress.     Appearance: Normal appearance. She is not ill-appearing, toxic-appearing or diaphoretic.   HENT:      Head: Normocephalic and atraumatic.   Eyes:      Extraocular Movements: Extraocular movements intact.      Pupils: Pupils are equal, round, and reactive to light.      Comments: Edema, erythema upper and lower eyelid and inner canthus of right eye   Cardiovascular:      Rate and Rhythm: Normal rate.      Pulses: Normal pulses.   Pulmonary:      Effort: Pulmonary effort is normal.      Breath

## 2024-08-23 NOTE — PATIENT INSTRUCTIONS
Use ointment on the upper eyelid, lower eyelid and inner canthus for blepharoconjuncitivitis four times a day for seven days.  Please follow up with your PCP on Monday as planned.  If you have worsening vision, please report to the nearest emergency room.

## 2024-08-26 ENCOUNTER — OFFICE VISIT (OUTPATIENT)
Age: 74
End: 2024-08-26
Payer: MEDICARE

## 2024-08-26 VITALS
OXYGEN SATURATION: 99 % | HEART RATE: 54 BPM | DIASTOLIC BLOOD PRESSURE: 74 MMHG | WEIGHT: 196 LBS | BODY MASS INDEX: 38.48 KG/M2 | TEMPERATURE: 97.8 F | RESPIRATION RATE: 16 BRPM | HEIGHT: 60 IN | SYSTOLIC BLOOD PRESSURE: 118 MMHG

## 2024-08-26 DIAGNOSIS — M71.22 SYNOVIAL CYST OF LEFT POPLITEAL SPACE: ICD-10-CM

## 2024-08-26 DIAGNOSIS — I48.0 PAF (PAROXYSMAL ATRIAL FIBRILLATION) (HCC): ICD-10-CM

## 2024-08-26 DIAGNOSIS — E11.40 TYPE 2 DIABETES MELLITUS WITH DIABETIC NEUROPATHY, WITHOUT LONG-TERM CURRENT USE OF INSULIN (HCC): ICD-10-CM

## 2024-08-26 DIAGNOSIS — I10 ESSENTIAL (PRIMARY) HYPERTENSION: Primary | ICD-10-CM

## 2024-08-26 DIAGNOSIS — H57.11 DISCOMFORT OF RIGHT EYE: ICD-10-CM

## 2024-08-26 LAB
ANION GAP SERPL CALC-SCNC: 7 MMOL/L (ref 5–15)
BUN SERPL-MCNC: 43 MG/DL (ref 6–20)
BUN/CREAT SERPL: 26 (ref 12–20)
CALCIUM SERPL-MCNC: 9.2 MG/DL (ref 8.5–10.1)
CHLORIDE SERPL-SCNC: 113 MMOL/L (ref 97–108)
CO2 SERPL-SCNC: 20 MMOL/L (ref 21–32)
CREAT SERPL-MCNC: 1.67 MG/DL (ref 0.55–1.02)
EST. AVERAGE GLUCOSE BLD GHB EST-MCNC: 146 MG/DL
GLUCOSE SERPL-MCNC: 167 MG/DL (ref 65–100)
HBA1C MFR BLD: 6.7 % (ref 4–5.6)
POTASSIUM SERPL-SCNC: 5.1 MMOL/L (ref 3.5–5.1)
SODIUM SERPL-SCNC: 140 MMOL/L (ref 136–145)

## 2024-08-26 PROCEDURE — 1123F ACP DISCUSS/DSCN MKR DOCD: CPT | Performed by: INTERNAL MEDICINE

## 2024-08-26 PROCEDURE — 99214 OFFICE O/P EST MOD 30 MIN: CPT | Performed by: INTERNAL MEDICINE

## 2024-08-26 PROCEDURE — G8417 CALC BMI ABV UP PARAM F/U: HCPCS | Performed by: INTERNAL MEDICINE

## 2024-08-26 PROCEDURE — 3074F SYST BP LT 130 MM HG: CPT | Performed by: INTERNAL MEDICINE

## 2024-08-26 PROCEDURE — 1036F TOBACCO NON-USER: CPT | Performed by: INTERNAL MEDICINE

## 2024-08-26 PROCEDURE — G8399 PT W/DXA RESULTS DOCUMENT: HCPCS | Performed by: INTERNAL MEDICINE

## 2024-08-26 PROCEDURE — 3051F HG A1C>EQUAL 7.0%<8.0%: CPT | Performed by: INTERNAL MEDICINE

## 2024-08-26 PROCEDURE — 1090F PRES/ABSN URINE INCON ASSESS: CPT | Performed by: INTERNAL MEDICINE

## 2024-08-26 PROCEDURE — G8428 CUR MEDS NOT DOCUMENT: HCPCS | Performed by: INTERNAL MEDICINE

## 2024-08-26 PROCEDURE — 3078F DIAST BP <80 MM HG: CPT | Performed by: INTERNAL MEDICINE

## 2024-08-26 RX ORDER — LANCETS 30 GAUGE
1 EACH MISCELLANEOUS 3 TIMES DAILY
Qty: 100 EACH | Refills: 5 | Status: SHIPPED | OUTPATIENT
Start: 2024-08-26

## 2024-08-26 SDOH — ECONOMIC STABILITY: FOOD INSECURITY: WITHIN THE PAST 12 MONTHS, THE FOOD YOU BOUGHT JUST DIDN'T LAST AND YOU DIDN'T HAVE MONEY TO GET MORE.: NEVER TRUE

## 2024-08-26 SDOH — ECONOMIC STABILITY: FOOD INSECURITY: WITHIN THE PAST 12 MONTHS, YOU WORRIED THAT YOUR FOOD WOULD RUN OUT BEFORE YOU GOT MONEY TO BUY MORE.: NEVER TRUE

## 2024-08-26 SDOH — ECONOMIC STABILITY: INCOME INSECURITY: HOW HARD IS IT FOR YOU TO PAY FOR THE VERY BASICS LIKE FOOD, HOUSING, MEDICAL CARE, AND HEATING?: NOT HARD AT ALL

## 2024-08-26 ASSESSMENT — ENCOUNTER SYMPTOMS
CHEST TIGHTNESS: 0
EYE DISCHARGE: 0
CONSTIPATION: 0
EYE PAIN: 1
PHOTOPHOBIA: 0
ABDOMINAL PAIN: 0
SHORTNESS OF BREATH: 0
EYE REDNESS: 0
DIARRHEA: 0
BACK PAIN: 0
EYE ITCHING: 0

## 2024-08-26 NOTE — PROGRESS NOTES
Jasmin Stone is a 74 y.o. female who presents today for Knee Pain (Pain in back of left knee; pt states she will noticed pain when laying in bed; off and on for about 6 months ) and Follow-up (Pt was in urgent care 8/24 due to right eye itching; started 8/21)  .      She has a history of   Patient Active Problem List   Diagnosis    Peripheral neuropathy    History of arthritis    Intervertebral disk disease    Systolic murmur of aorta    Essential (primary) hypertension    Pre-ulcerative corn or callous    Incontinence    Hyperlipidemia    Stress incontinence    GERD (gastroesophageal reflux disease)    Incomplete emptying of bladder    QUAN (obstructive sleep apnea)    Rosacea    Genital prolapse    Advanced care planning/counseling discussion    Anemia    Vitamin D deficiency    Hepatitis B    Type 2 diabetes mellitus with diabetic neuropathy (HCC)    OAB (overactive bladder)    Type 2 diabetes mellitus with nephropathy ([facility])    Urge incontinence of urine    Severe obesity (BMI 35.0-39.9) with comorbidity (HCC)    DM (diabetes mellitus) ([facility])    AR (allergic rhinitis)    Uterine prolapse    Foot drop    Incomplete uterovaginal prolapse    Left ventricular outflow tract obstruction    Chronic renal disease, stage III ([facility])    PAF (paroxysmal atrial fibrillation) ([facility])    Pure hypercholesterolemia, unspecified    Shortness of breath    Dysuria    Incontinence of feces   .    Today patient is here for an acute visit..     Did see urgent care due to a right sided eye itching and mild discharge.  Prescribed erythromycin but has not yet started this.  Reports mild discomfort to globe.  Denies any injury or exposure to any chemicals.  Left eye is fine    Knee pain: Patient has noticed that she has had fullness in the back of the left knee for about 6 months.  She reports that recently this has been more noticeable.   Factors that make it more uncomfortable include walking, but reports that she has been less active  software (Dr[employer]) and may contain some 'sound alike' errors.

## 2024-08-29 ENCOUNTER — HOSPITAL ENCOUNTER (OUTPATIENT)
Facility: HOSPITAL | Age: 74
Discharge: HOME | End: 2024-08-29
Attending: INTERNAL MEDICINE
Payer: MEDICARE

## 2024-08-29 VITALS — BODY MASS INDEX: 38.28 KG/M2 | WEIGHT: 196 LBS

## 2024-08-29 DIAGNOSIS — D49.0 IPMN (INTRADUCTAL PAPILLARY MUCINOUS NEOPLASM): ICD-10-CM

## 2024-08-29 PROCEDURE — 6360000004 HC RX CONTRAST MEDICATION: Performed by: RADIOLOGY

## 2024-08-29 PROCEDURE — 74183 MRI ABD W/O CNTR FLWD CNTR: CPT

## 2024-08-29 PROCEDURE — A9579 GAD-BASE MR CONTRAST NOS,1ML: HCPCS | Performed by: RADIOLOGY

## 2024-08-29 RX ADMIN — GADOTERIDOL 17 ML: 279.3 INJECTION, SOLUTION INTRAVENOUS at 15:12

## 2024-09-04 ENCOUNTER — HOSPITAL ENCOUNTER (OUTPATIENT)
Facility: HOSPITAL | Age: 74
Discharge: HOME | End: 2024-09-07
Attending: OBSTETRICS & GYNECOLOGY
Payer: MEDICARE

## 2024-09-04 VITALS — WEIGHT: 196 LBS | HEIGHT: 60 IN | BODY MASS INDEX: 38.48 KG/M2

## 2024-09-04 DIAGNOSIS — Z12.31 SCREENING MAMMOGRAM FOR HIGH-RISK PATIENT: ICD-10-CM

## 2024-09-04 PROCEDURE — 77063 BREAST TOMOSYNTHESIS BI: CPT

## 2024-09-17 RX ORDER — AMLODIPINE BESYLATE 2.5 MG/1
TABLET ORAL
Qty: 90 TABLET | Refills: 3 | Status: SHIPPED | OUTPATIENT
Start: 2024-09-17

## 2024-09-20 ENCOUNTER — OFFICE VISIT (OUTPATIENT)
Age: 74
End: 2024-09-20
Payer: MEDICARE

## 2024-09-20 VITALS
HEART RATE: 54 BPM | HEIGHT: 60 IN | SYSTOLIC BLOOD PRESSURE: 132 MMHG | DIASTOLIC BLOOD PRESSURE: 76 MMHG | TEMPERATURE: 97.8 F | WEIGHT: 191.3 LBS | OXYGEN SATURATION: 100 % | BODY MASS INDEX: 37.56 KG/M2 | RESPIRATION RATE: 16 BRPM

## 2024-09-20 DIAGNOSIS — M54.50 BILATERAL LOW BACK PAIN, UNSPECIFIED CHRONICITY, UNSPECIFIED WHETHER SCIATICA PRESENT: ICD-10-CM

## 2024-09-20 DIAGNOSIS — I10 ESSENTIAL (PRIMARY) HYPERTENSION: ICD-10-CM

## 2024-09-20 DIAGNOSIS — R31.9 HEMATURIA, UNSPECIFIED TYPE: Primary | ICD-10-CM

## 2024-09-20 LAB
BILIRUBIN, URINE, POC: NEGATIVE
BLOOD URINE, POC: NORMAL
GLUCOSE URINE, POC: NEGATIVE
KETONES, URINE, POC: NEGATIVE
LEUKOCYTE ESTERASE, URINE, POC: NEGATIVE
NITRITE, URINE, POC: NEGATIVE
PH, URINE, POC: 5.5 (ref 4.6–8)
PROTEIN,URINE, POC: NEGATIVE
SPECIFIC GRAVITY, URINE, POC: 1.02 (ref 1–1.03)
URINALYSIS CLARITY, POC: CLEAR
URINALYSIS COLOR, POC: NORMAL
UROBILINOGEN, POC: NORMAL

## 2024-09-20 PROCEDURE — 1090F PRES/ABSN URINE INCON ASSESS: CPT | Performed by: NURSE PRACTITIONER

## 2024-09-20 PROCEDURE — 81002 URINALYSIS NONAUTO W/O SCOPE: CPT | Performed by: NURSE PRACTITIONER

## 2024-09-20 PROCEDURE — 1036F TOBACCO NON-USER: CPT | Performed by: NURSE PRACTITIONER

## 2024-09-20 PROCEDURE — PBSHW AMB POC URINALYSIS DIP STICK MANUAL W/O MICRO: Performed by: NURSE PRACTITIONER

## 2024-09-20 PROCEDURE — G8417 CALC BMI ABV UP PARAM F/U: HCPCS | Performed by: NURSE PRACTITIONER

## 2024-09-20 PROCEDURE — 1123F ACP DISCUSS/DSCN MKR DOCD: CPT | Performed by: NURSE PRACTITIONER

## 2024-09-20 PROCEDURE — G8427 DOCREV CUR MEDS BY ELIG CLIN: HCPCS | Performed by: NURSE PRACTITIONER

## 2024-09-20 PROCEDURE — G8399 PT W/DXA RESULTS DOCUMENT: HCPCS | Performed by: NURSE PRACTITIONER

## 2024-09-20 PROCEDURE — 3078F DIAST BP <80 MM HG: CPT | Performed by: NURSE PRACTITIONER

## 2024-09-20 PROCEDURE — 99214 OFFICE O/P EST MOD 30 MIN: CPT | Performed by: NURSE PRACTITIONER

## 2024-09-20 PROCEDURE — 3075F SYST BP GE 130 - 139MM HG: CPT | Performed by: NURSE PRACTITIONER

## 2024-09-20 RX ORDER — TAMSULOSIN HYDROCHLORIDE 0.4 MG/1
0.4 CAPSULE ORAL DAILY
Qty: 90 CAPSULE | Refills: 1 | Status: SHIPPED | OUTPATIENT
Start: 2024-09-20

## 2024-09-20 ASSESSMENT — ENCOUNTER SYMPTOMS
CHEST TIGHTNESS: 0
EYE PAIN: 0
CONSTIPATION: 0
ABDOMINAL PAIN: 1
SINUS PAIN: 0
SINUS PRESSURE: 0
EYES NEGATIVE: 1
EYE REDNESS: 0
RESPIRATORY NEGATIVE: 1
BLOOD IN STOOL: 0
RHINORRHEA: 0
BACK PAIN: 1
DIARRHEA: 0
SHORTNESS OF BREATH: 0
VOMITING: 0
NAUSEA: 0
COUGH: 0

## 2024-09-21 ENCOUNTER — HOSPITAL ENCOUNTER (OUTPATIENT)
Facility: HOSPITAL | Age: 74
Discharge: HOME | End: 2024-09-24
Payer: MEDICARE

## 2024-09-21 DIAGNOSIS — M54.50 BILATERAL LOW BACK PAIN, UNSPECIFIED CHRONICITY, UNSPECIFIED WHETHER SCIATICA PRESENT: ICD-10-CM

## 2024-09-21 DIAGNOSIS — R31.9 HEMATURIA, UNSPECIFIED TYPE: ICD-10-CM

## 2024-09-21 PROCEDURE — 76775 US EXAM ABDO BACK WALL LIM: CPT

## 2024-09-23 ENCOUNTER — TELEPHONE (OUTPATIENT)
Age: 74
End: 2024-09-23

## 2024-09-23 DIAGNOSIS — M54.50 BILATERAL LOW BACK PAIN, UNSPECIFIED CHRONICITY, UNSPECIFIED WHETHER SCIATICA PRESENT: Primary | ICD-10-CM

## 2024-09-23 RX ORDER — TIZANIDINE 2 MG/1
2 TABLET ORAL NIGHTLY PRN
Qty: 10 TABLET | Refills: 0 | Status: SHIPPED | OUTPATIENT
Start: 2024-09-23

## 2024-09-24 ENCOUNTER — TELEPHONE (OUTPATIENT)
Age: 74
End: 2024-09-24

## 2024-09-24 RX ORDER — OLMESARTAN MEDOXOMIL 40 MG/1
TABLET ORAL
Qty: 90 TABLET | Refills: 3 | Status: SHIPPED | OUTPATIENT
Start: 2024-09-24

## 2024-10-02 ENCOUNTER — OFFICE VISIT (OUTPATIENT)
Age: 74
End: 2024-10-02
Payer: MEDICARE

## 2024-10-02 VITALS
HEIGHT: 60 IN | HEART RATE: 52 BPM | WEIGHT: 191 LBS | BODY MASS INDEX: 37.5 KG/M2 | OXYGEN SATURATION: 99 % | SYSTOLIC BLOOD PRESSURE: 120 MMHG | DIASTOLIC BLOOD PRESSURE: 66 MMHG

## 2024-10-02 DIAGNOSIS — E78.00 HYPERCHOLESTEREMIA: ICD-10-CM

## 2024-10-02 DIAGNOSIS — R06.02 SHORTNESS OF BREATH: ICD-10-CM

## 2024-10-02 DIAGNOSIS — I48.0 PAF (PAROXYSMAL ATRIAL FIBRILLATION) (HCC): Primary | ICD-10-CM

## 2024-10-02 DIAGNOSIS — I10 HYPERTENSION, ESSENTIAL: ICD-10-CM

## 2024-10-02 DIAGNOSIS — N18.30 STAGE 3 CHRONIC KIDNEY DISEASE, UNSPECIFIED WHETHER STAGE 3A OR 3B CKD (HCC): ICD-10-CM

## 2024-10-02 DIAGNOSIS — Q24.8 LEFT VENTRICULAR OUTFLOW TRACT OBSTRUCTION: ICD-10-CM

## 2024-10-02 PROCEDURE — 99214 OFFICE O/P EST MOD 30 MIN: CPT | Performed by: SPECIALIST

## 2024-10-02 ASSESSMENT — PATIENT HEALTH QUESTIONNAIRE - PHQ9
2. FEELING DOWN, DEPRESSED OR HOPELESS: NOT AT ALL
SUM OF ALL RESPONSES TO PHQ9 QUESTIONS 1 & 2: 0
1. LITTLE INTEREST OR PLEASURE IN DOING THINGS: NOT AT ALL
SUM OF ALL RESPONSES TO PHQ QUESTIONS 1-9: 0

## 2024-10-02 NOTE — PROGRESS NOTES
Jasmin Stone     1950       Radha Evans MD, [facility]    PCP is Janes Tom MD   Date of visit: 10/2/2024   [facility] [place]  [facility]  HPI:  Jasmin Stone is a 74 y.o. female   4 month follow up visit  PAF,LVOT, HTN    Today  Had echo 6/2023   Saw Soledad 5/23/24 with metoprolol at 25 mg   7/10 called with chest tightness , had Covid 2-3 week prior  9/23 called with feeling Afib,  on Apple Watch, instructed to increase BB to SR, BP 95 then 100  Has MSK back pain to shoulder    She returns today  and now she got some more than mild complaints.    She says the A-fib last about 2 to 3 hours it woke her up.    She felt like pressure at that morning but then that resolved but the tachycardia persisted she has not had any further episodes on her watch noted.    Since then, she was taken off of her diabetes meds in part because of worsening CKD and her hemoglobin A1c went from 6.2-6.7.    She had an MRI and had questions about findings there but we noticed that there is a trace pericardial effusion noted which we told was not significant.    She has had no chest pain.    She has had a tachycardia and palpitations as mentioned.    She is a chronic sense of mild shortness of breath which she describes as when she goes to [employer] and tries to sing she has to climb 6 short steps when she gets upset she feels a little short of breath but she can sing and do her normal activities.    She certainly feels better at the grocery store with a cart rather than walking on her own.    She is not exercising much not been going the gym as often she should she admits.    She has no lower extremity edema of significance.    She has not had fever syncope claudication.  She has close follow-up for a myriad of issues with her PCP Janes Tom MD           PAF.   Echo 4/14/2021 EF 69% borderline hypertrophy LV outflow tract gradient 7.9 mm peak gradient 21 mm

## 2024-10-02 NOTE — PATIENT INSTRUCTIONS
You have been scheduled for an echo.    We will send results on UserApp and see you back with Soledad in 6 months and Dr. Kaur in a year.

## 2024-10-31 ENCOUNTER — OFFICE VISIT (OUTPATIENT)
Age: 74
End: 2024-10-31
Payer: MEDICARE

## 2024-10-31 VITALS
TEMPERATURE: 98 F | BODY MASS INDEX: 38.28 KG/M2 | HEART RATE: 56 BPM | DIASTOLIC BLOOD PRESSURE: 79 MMHG | HEIGHT: 60 IN | WEIGHT: 195 LBS | OXYGEN SATURATION: 98 % | RESPIRATION RATE: 16 BRPM | SYSTOLIC BLOOD PRESSURE: 127 MMHG

## 2024-10-31 DIAGNOSIS — E11.21 TYPE 2 DIABETES MELLITUS WITH NEPHROPATHY (HCC): Primary | ICD-10-CM

## 2024-10-31 DIAGNOSIS — R31.9 HEMATURIA, UNSPECIFIED TYPE: ICD-10-CM

## 2024-10-31 DIAGNOSIS — E11.21 TYPE 2 DIABETES MELLITUS WITH NEPHROPATHY (HCC): ICD-10-CM

## 2024-10-31 DIAGNOSIS — N18.30 STAGE 3 CHRONIC KIDNEY DISEASE, UNSPECIFIED WHETHER STAGE 3A OR 3B CKD (HCC): ICD-10-CM

## 2024-10-31 DIAGNOSIS — I10 ESSENTIAL (PRIMARY) HYPERTENSION: ICD-10-CM

## 2024-10-31 DIAGNOSIS — I48.0 PAF (PAROXYSMAL ATRIAL FIBRILLATION) (HCC): ICD-10-CM

## 2024-10-31 DIAGNOSIS — K64.9 HEMORRHOIDS, UNSPECIFIED HEMORRHOID TYPE: ICD-10-CM

## 2024-10-31 DIAGNOSIS — M79.672 LEFT FOOT PAIN: ICD-10-CM

## 2024-10-31 DIAGNOSIS — H91.90 HEARING LOSS, UNSPECIFIED HEARING LOSS TYPE, UNSPECIFIED LATERALITY: ICD-10-CM

## 2024-10-31 LAB
BILIRUBIN, URINE, POC: NEGATIVE
BLOOD URINE, POC: NORMAL
GLUCOSE URINE, POC: NEGATIVE
KETONES, URINE, POC: NEGATIVE
LEUKOCYTE ESTERASE, URINE, POC: NEGATIVE
NITRITE, URINE, POC: NEGATIVE
PH, URINE, POC: 5 (ref 4.6–8)
PROTEIN,URINE, POC: NEGATIVE
SPECIFIC GRAVITY, URINE, POC: 1.02 (ref 1–1.03)
URINALYSIS CLARITY, POC: CLEAR
URINALYSIS COLOR, POC: YELLOW
UROBILINOGEN, POC: NORMAL MG/DL

## 2024-10-31 PROCEDURE — 81002 URINALYSIS NONAUTO W/O SCOPE: CPT | Performed by: INTERNAL MEDICINE

## 2024-10-31 PROCEDURE — 99214 OFFICE O/P EST MOD 30 MIN: CPT | Performed by: INTERNAL MEDICINE

## 2024-10-31 ASSESSMENT — ENCOUNTER SYMPTOMS
ABDOMINAL PAIN: 0
BLOOD IN STOOL: 0
CONSTIPATION: 0
SHORTNESS OF BREATH: 0
BACK PAIN: 1
DIARRHEA: 0
CHEST TIGHTNESS: 0
ANAL BLEEDING: 1
ABDOMINAL DISTENTION: 0

## 2024-10-31 NOTE — PROGRESS NOTES
Jasmin Stone is a 74 y.o. female who presents today for Bleeding hemorrhoids (Off and on bleeding once or twice a week; hx of hemorrhoids; pt denies pain; pt believes it could be from the blood thinner med; ) and Referral  (Pt would like to discuss a referral to a nephrologist )  .      She has a history of   Patient Active Problem List   Diagnosis    Peripheral neuropathy    History of arthritis    Intervertebral disk disease    Systolic murmur of aorta    Essential (primary) hypertension    Pre-ulcerative corn or callous    Incontinence    Hyperlipidemia    Stress incontinence    GERD (gastroesophageal reflux disease)    Incomplete emptying of bladder    QUAN (obstructive sleep apnea)    Rosacea    Genital prolapse    Advanced care planning/counseling discussion    Anemia    Vitamin D deficiency    Hepatitis B    Type 2 diabetes mellitus with diabetic neuropathy (HCC)    OAB (overactive bladder)    Type 2 diabetes mellitus with nephropathy ([facility])    Urge incontinence of urine    Severe obesity (BMI 35.0-39.9) with comorbidity    DM (diabetes mellitus) ([facility])    AR (allergic rhinitis)    Uterine prolapse    Foot drop    Incomplete uterovaginal prolapse    Left ventricular outflow tract obstruction    Chronic renal disease, stage III ([facility])    PAF (paroxysmal atrial fibrillation) ([facility])    Pure hypercholesterolemia, unspecified    Shortness of breath    Dysuria    Incontinence of feces   .    Today patient is here for follow up.     Patient has had some issues with likely kidney stone.  Did see our nurse practitioner due to back pain.  Since she reports that back has been better.  Still not back to gym. UA today with 1+ blood. Will send for micro.     DM: improved recently    CKD: Patient has had relatively stable creatinine over time.  No signs of measurable proteinuria.  Ultra done recently did show medical renal disease. Taking an ARB, BP is controlled.  Hesitant to start an SGLT2 due to history of elevated lipase

## 2024-11-01 ENCOUNTER — TELEPHONE (OUTPATIENT)
Age: 74
End: 2024-11-01

## 2024-11-01 LAB
APPEARANCE UR: ABNORMAL
BACTERIA URNS QL MICRO: ABNORMAL /HPF
BILIRUB UR QL: NEGATIVE
COLOR UR: ABNORMAL
EPITH CASTS URNS QL MICRO: ABNORMAL /LPF
ERYTHROCYTE [DISTWIDTH] IN BLOOD BY AUTOMATED COUNT: 12.6 % (ref 11.5–14.5)
GLUCOSE UR STRIP.AUTO-MCNC: NEGATIVE MG/DL
HCT VFR BLD AUTO: 37 % (ref 35–47)
HGB BLD-MCNC: 11.6 G/DL (ref 11.5–16)
HGB UR QL STRIP: NEGATIVE
KETONES UR QL STRIP.AUTO: NEGATIVE MG/DL
LEUKOCYTE ESTERASE UR QL STRIP.AUTO: NEGATIVE
MCH RBC QN AUTO: 28.6 PG (ref 26–34)
MCHC RBC AUTO-ENTMCNC: 31.4 G/DL (ref 30–36.5)
MCV RBC AUTO: 91.4 FL (ref 80–99)
NITRITE UR QL STRIP.AUTO: NEGATIVE
NRBC # BLD: 0 K/UL (ref 0–0.01)
NRBC BLD-RTO: 0 PER 100 WBC
PH UR STRIP: 5 (ref 5–8)
PLATELET # BLD AUTO: 253 K/UL (ref 150–400)
PMV BLD AUTO: 9.8 FL (ref 8.9–12.9)
PROT UR STRIP-MCNC: NEGATIVE MG/DL
RBC # BLD AUTO: 4.05 M/UL (ref 3.8–5.2)
RBC #/AREA URNS HPF: ABNORMAL /HPF (ref 0–5)
SP GR UR REFRACTOMETRY: 1.01 (ref 1–1.03)
UROBILINOGEN UR QL STRIP.AUTO: 0.2 EU/DL (ref 0.2–1)
WBC # BLD AUTO: 8.1 K/UL (ref 3.6–11)
WBC URNS QL MICRO: ABNORMAL /HPF (ref 0–4)

## 2024-11-01 NOTE — TELEPHONE ENCOUNTER
Patient needs records faxed to [facility].   Jose Alvarenga MD  Phone [phone]  Fax [phone]

## 2024-11-14 ENCOUNTER — ANCILLARY PROCEDURE (OUTPATIENT)
Age: 74
End: 2024-11-14
Payer: MEDICARE

## 2024-11-14 VITALS
BODY MASS INDEX: 38.28 KG/M2 | SYSTOLIC BLOOD PRESSURE: 122 MMHG | WEIGHT: 195 LBS | HEART RATE: 58 BPM | HEIGHT: 60 IN | DIASTOLIC BLOOD PRESSURE: 80 MMHG

## 2024-11-14 DIAGNOSIS — Q24.8 LEFT VENTRICULAR OUTFLOW TRACT OBSTRUCTION: ICD-10-CM

## 2024-11-14 DIAGNOSIS — I48.0 PAF (PAROXYSMAL ATRIAL FIBRILLATION) (HCC): ICD-10-CM

## 2024-11-14 PROCEDURE — 93306 TTE W/DOPPLER COMPLETE: CPT | Performed by: SPECIALIST

## 2024-11-18 LAB
ECHO AO ROOT DIAM: 2.8 CM
ECHO AO ROOT INDEX: 1.51 CM/M2
ECHO AV AREA PEAK VELOCITY: 2.3 CM2
ECHO AV AREA VTI: 2.4 CM2
ECHO AV AREA/BSA PEAK VELOCITY: 1.2 CM2/M2
ECHO AV AREA/BSA VTI: 1.3 CM2/M2
ECHO AV MEAN GRADIENT: 6 MMHG
ECHO AV MEAN VELOCITY: 1.1 M/S
ECHO AV PEAK GRADIENT: 12 MMHG
ECHO AV PEAK VELOCITY: 1.7 M/S
ECHO AV VELOCITY RATIO: 0.88
ECHO AV VTI: 34.9 CM
ECHO BSA: 1.93 M2
ECHO LA DIAMETER INDEX: 1.78 CM/M2
ECHO LA DIAMETER: 3.3 CM
ECHO LA TO AORTIC ROOT RATIO: 1.18
ECHO LA VOL A-L A2C: 37 ML (ref 22–52)
ECHO LA VOL A-L A4C: 45 ML (ref 22–52)
ECHO LA VOL BP: 39 ML (ref 22–52)
ECHO LA VOL MOD A2C: 35 ML (ref 22–52)
ECHO LA VOL MOD A4C: 41 ML (ref 22–52)
ECHO LA VOL/BSA BIPLANE: 21 ML/M2 (ref 16–34)
ECHO LA VOLUME AREA LENGTH: 41 ML
ECHO LA VOLUME INDEX A-L A2C: 20 ML/M2 (ref 16–34)
ECHO LA VOLUME INDEX A-L A4C: 24 ML/M2 (ref 16–34)
ECHO LA VOLUME INDEX AREA LENGTH: 22 ML/M2 (ref 16–34)
ECHO LA VOLUME INDEX MOD A2C: 19 ML/M2 (ref 16–34)
ECHO LA VOLUME INDEX MOD A4C: 22 ML/M2 (ref 16–34)
ECHO LV E' LATERAL VELOCITY: 9.16 CM/S
ECHO LV EDV A2C: 66 ML
ECHO LV EDV A4C: 69 ML
ECHO LV EDV BP: 69 ML (ref 56–104)
ECHO LV EDV INDEX A4C: 37 ML/M2
ECHO LV EDV INDEX BP: 37 ML/M2
ECHO LV EDV NDEX A2C: 36 ML/M2
ECHO LV EJECTION FRACTION A2C: 66 %
ECHO LV EJECTION FRACTION A4C: 62 %
ECHO LV EJECTION FRACTION BIPLANE: 65 % (ref 55–100)
ECHO LV ESV A2C: 22 ML
ECHO LV ESV A4C: 26 ML
ECHO LV ESV BP: 25 ML (ref 19–49)
ECHO LV ESV INDEX A2C: 12 ML/M2
ECHO LV ESV INDEX A4C: 14 ML/M2
ECHO LV ESV INDEX BP: 14 ML/M2
ECHO LV FRACTIONAL SHORTENING: 34 % (ref 28–44)
ECHO LV INTERNAL DIMENSION DIASTOLE INDEX: 2.38 CM/M2
ECHO LV INTERNAL DIMENSION DIASTOLIC: 4.4 CM (ref 3.9–5.3)
ECHO LV INTERNAL DIMENSION SYSTOLIC INDEX: 1.57 CM/M2
ECHO LV INTERNAL DIMENSION SYSTOLIC: 2.9 CM
ECHO LV IVSD: 1.1 CM (ref 0.6–0.9)
ECHO LV MASS 2D: 158.2 G (ref 67–162)
ECHO LV MASS INDEX 2D: 85.5 G/M2 (ref 43–95)
ECHO LV POSTERIOR WALL DIASTOLIC: 1 CM (ref 0.6–0.9)
ECHO LV RELATIVE WALL THICKNESS RATIO: 0.45
ECHO LVOT AREA: 2.8 CM2
ECHO LVOT AV VTI INDEX: 0.87
ECHO LVOT DIAM: 1.9 CM
ECHO LVOT MEAN GRADIENT: 4 MMHG
ECHO LVOT PEAK GRADIENT: 8 MMHG
ECHO LVOT PEAK VELOCITY: 1.5 M/S
ECHO LVOT STROKE VOLUME INDEX: 46.4 ML/M2
ECHO LVOT SV: 85.9 ML
ECHO LVOT VTI: 30.3 CM
ECHO MV A VELOCITY: 0.91 M/S
ECHO MV AREA PHT: 4.8 CM2
ECHO MV E DECELERATION TIME (DT): 158.5 MS
ECHO MV E VELOCITY: 0.92 M/S
ECHO MV E/A RATIO: 1.01
ECHO MV E/E' LATERAL: 10.04
ECHO MV PRESSURE HALF TIME (PHT): 46 MS
ECHO RV FREE WALL PEAK S': 16.2 CM/S
ECHO RV INTERNAL DIMENSION: 2.8 CM
ECHO RV TAPSE: 2.6 CM (ref 1.7–?)

## 2024-11-27 ENCOUNTER — TELEPHONE (OUTPATIENT)
Age: 74
End: 2024-11-27

## 2024-11-27 NOTE — TELEPHONE ENCOUNTER
Verified patient with two types of identifiers. Ms. Stone noticed shoulder, back pain in the middle of her shoulders last night before bed. It kept her up most of the night. She cannot tell if she had chest pressure or not but does not have any now. She denies chest pain, shortness of breath, palpitations, dizziness or any other symptoms. Advised ER or UC if symptom of back pain do not resolve. Let her know I will update Dr. Evans and that he may want a nuclear. She was appreciative and verbalized understanding.

## 2024-12-02 NOTE — TELEPHONE ENCOUNTER
Called and spoke to patient -ID [id number]   She had some persistent back pain between the shoulder blades sounded musculoskeletal sometimes worse at night laying in the bed.  It went away after 3 days.  She had 1 day with a little bit of mild discomfort when she took a deep breath that lasted a few hours.  That has resolved also.  She is back to feeling normal.  She had an echocardiogram recently which we also reviewed which showed normal findings.  I told her if her pain recurred to see her primary care physician for probably musculoskeletal source.  If the pleuritic pain recurs or if she develops shortness of breath then she is to contact us back.  She was appreciative of the call.  Take care, Dr Evans  Future Appointments   Date Time Provider Department Center   4/3/2025 10:40 AM Soledad Funez APRN - NP [facility]

## 2024-12-06 ENCOUNTER — TELEPHONE (OUTPATIENT)
Facility: CLINIC | Age: 74
End: 2024-12-06

## 2024-12-06 NOTE — TELEPHONE ENCOUNTER
Patient calling to speak with nurse regarding severe foot pain, having issues sleeping because of the pain   Patient took some left over hydrocodone she had and was able to sleep after taking another medication

## 2024-12-06 NOTE — TELEPHONE ENCOUNTER
12/06/2024--This user called and spoke with the patient and she stated that she has a shooting pain in her foot that is making her foot jerk that's only on the top of her foot. Patient stated that she saw the Orthopedic NP about a month ago and she was told it was Arthritis, but patient believes that it is nerve related. The NP had suggested pain medication but patient did not want to take pain medication's at the time, so the NP suggested topical (Blue emu cream/ointment). Patient stated that she has 3 more Hydrocodone left. Patient wanted to know if there was anything else that could be done or that she could take for this, as she is not really a \"pain medication person\", but stated it still is hurting today and is keeping her from sleeping. I let her know that I would check with the doctor and give her a call back. She verbalized understanding and had no further question's or concerns at that time.

## 2024-12-06 NOTE — TELEPHONE ENCOUNTER
12/06/2024--This user called and spoke with the patient and let her know the doctors recommendation's and she verbalized understanding and had no further question's or concerns at that time.

## 2024-12-30 ENCOUNTER — OFFICE VISIT (OUTPATIENT)
Facility: CLINIC | Age: 74
End: 2024-12-30
Payer: MEDICARE

## 2024-12-30 VITALS
RESPIRATION RATE: 16 BRPM | HEART RATE: 61 BPM | DIASTOLIC BLOOD PRESSURE: 51 MMHG | OXYGEN SATURATION: 100 % | WEIGHT: 193 LBS | BODY MASS INDEX: 37.89 KG/M2 | HEIGHT: 60 IN | TEMPERATURE: 98 F | SYSTOLIC BLOOD PRESSURE: 116 MMHG

## 2024-12-30 DIAGNOSIS — J06.9 UPPER RESPIRATORY TRACT INFECTION, UNSPECIFIED TYPE: ICD-10-CM

## 2024-12-30 DIAGNOSIS — J11.1 FLU: ICD-10-CM

## 2024-12-30 DIAGNOSIS — E11.21 TYPE 2 DIABETES MELLITUS WITH NEPHROPATHY (HCC): Primary | ICD-10-CM

## 2024-12-30 DIAGNOSIS — I10 ESSENTIAL (PRIMARY) HYPERTENSION: ICD-10-CM

## 2024-12-30 LAB — HBA1C MFR BLD: 7.2 %

## 2024-12-30 PROCEDURE — G8417 CALC BMI ABV UP PARAM F/U: HCPCS | Performed by: INTERNAL MEDICINE

## 2024-12-30 PROCEDURE — G8428 CUR MEDS NOT DOCUMENT: HCPCS | Performed by: INTERNAL MEDICINE

## 2024-12-30 PROCEDURE — G8399 PT W/DXA RESULTS DOCUMENT: HCPCS | Performed by: INTERNAL MEDICINE

## 2024-12-30 PROCEDURE — 1090F PRES/ABSN URINE INCON ASSESS: CPT | Performed by: INTERNAL MEDICINE

## 2024-12-30 PROCEDURE — 1036F TOBACCO NON-USER: CPT | Performed by: INTERNAL MEDICINE

## 2024-12-30 PROCEDURE — G8484 FLU IMMUNIZE NO ADMIN: HCPCS | Performed by: INTERNAL MEDICINE

## 2024-12-30 PROCEDURE — 3078F DIAST BP <80 MM HG: CPT | Performed by: INTERNAL MEDICINE

## 2024-12-30 PROCEDURE — 3044F HG A1C LEVEL LT 7.0%: CPT | Performed by: INTERNAL MEDICINE

## 2024-12-30 PROCEDURE — 99214 OFFICE O/P EST MOD 30 MIN: CPT | Performed by: INTERNAL MEDICINE

## 2024-12-30 PROCEDURE — 3074F SYST BP LT 130 MM HG: CPT | Performed by: INTERNAL MEDICINE

## 2024-12-30 PROCEDURE — 83036 HEMOGLOBIN GLYCOSYLATED A1C: CPT | Performed by: INTERNAL MEDICINE

## 2024-12-30 PROCEDURE — 1126F AMNT PAIN NOTED NONE PRSNT: CPT | Performed by: INTERNAL MEDICINE

## 2024-12-30 PROCEDURE — PBSHW AMB POC HEMOGLOBIN A1C: Performed by: INTERNAL MEDICINE

## 2024-12-30 PROCEDURE — 1123F ACP DISCUSS/DSCN MKR DOCD: CPT | Performed by: INTERNAL MEDICINE

## 2024-12-30 RX ORDER — LEVOFLOXACIN 750 MG/1
TABLET, FILM COATED ORAL
COMMUNITY
Start: 2024-12-26

## 2024-12-30 ASSESSMENT — ENCOUNTER SYMPTOMS
COUGH: 1
CONSTIPATION: 0
CHEST TIGHTNESS: 0
DIARRHEA: 0
SHORTNESS OF BREATH: 0
ABDOMINAL PAIN: 0
BACK PAIN: 0

## 2024-12-30 NOTE — PROGRESS NOTES
Jasmin Stone is a 74 y.o. female who presents today for Chest Congestion (Pt went to urgent care 12/21; pt got dx with flu; pt was coughing up mucus- pt went back to urgent care 12/26; )  .      She has a history of   Patient Active Problem List   Diagnosis    Peripheral neuropathy    History of arthritis    Intervertebral disk disease    Systolic murmur of aorta    Essential (primary) hypertension    Pre-ulcerative corn or callous    Incontinence    Hyperlipidemia    Stress incontinence    GERD (gastroesophageal reflux disease)    Incomplete emptying of bladder    QUAN (obstructive sleep apnea)    Rosacea    Genital prolapse    Advanced care planning/counseling discussion    Anemia    Vitamin D deficiency    Hepatitis B    Type 2 diabetes mellitus with diabetic neuropathy (HCC)    OAB (overactive bladder)    Type 2 diabetes mellitus with nephropathy (HCC)    Urge incontinence of urine    Severe obesity (BMI 35.0-39.9) with comorbidity    DM (diabetes mellitus) (HCC)    AR (allergic rhinitis)    Uterine prolapse    Foot drop    Incomplete uterovaginal prolapse    Left ventricular outflow tract obstruction    Chronic renal disease, stage III (HCC)    PAF (paroxysmal atrial fibrillation) ([facility])    Pure hypercholesterolemia, unspecified    Shortness of breath    Dysuria    Incontinence of feces   .    Today patient is here for an acute visit.     History of Present Illness  The patient is a 74-year-old female who presents for evaluation of chest congestion and elevated blood glucose levels.    She developed chest congestion several days before Sara. She was ultimately seen at an urgent care and diagnosed with influenza A on 12/21/2024. Despite the administration of Tamiflu, her symptoms persisted, leading to a second visit to urgent care on 12/26/2024. During this visit, no physical examination was conducted. Her family and friends expressed concern about her condition, prompting her to seek further medical  (2) more than 100 beats/min

## 2025-01-03 DIAGNOSIS — E78.00 PURE HYPERCHOLESTEROLEMIA, UNSPECIFIED: ICD-10-CM

## 2025-01-03 RX ORDER — SIMVASTATIN 20 MG
TABLET ORAL
Qty: 90 TABLET | Refills: 3 | Status: SHIPPED | OUTPATIENT
Start: 2025-01-03

## 2025-01-20 ENCOUNTER — OFFICE VISIT (OUTPATIENT)
Facility: CLINIC | Age: 75
End: 2025-01-20
Payer: MEDICARE

## 2025-01-20 VITALS
DIASTOLIC BLOOD PRESSURE: 80 MMHG | SYSTOLIC BLOOD PRESSURE: 128 MMHG | TEMPERATURE: 97.8 F | OXYGEN SATURATION: 100 % | HEIGHT: 60 IN | BODY MASS INDEX: 38.09 KG/M2 | HEART RATE: 57 BPM | RESPIRATION RATE: 16 BRPM | WEIGHT: 194 LBS

## 2025-01-20 DIAGNOSIS — I10 ESSENTIAL (PRIMARY) HYPERTENSION: ICD-10-CM

## 2025-01-20 DIAGNOSIS — E11.21 TYPE 2 DIABETES MELLITUS WITH NEPHROPATHY (HCC): ICD-10-CM

## 2025-01-20 DIAGNOSIS — M25.511 ACUTE PAIN OF RIGHT SHOULDER: Primary | ICD-10-CM

## 2025-01-20 PROCEDURE — 3074F SYST BP LT 130 MM HG: CPT | Performed by: INTERNAL MEDICINE

## 2025-01-20 PROCEDURE — G8428 CUR MEDS NOT DOCUMENT: HCPCS | Performed by: INTERNAL MEDICINE

## 2025-01-20 PROCEDURE — G8399 PT W/DXA RESULTS DOCUMENT: HCPCS | Performed by: INTERNAL MEDICINE

## 2025-01-20 PROCEDURE — 3079F DIAST BP 80-89 MM HG: CPT | Performed by: INTERNAL MEDICINE

## 2025-01-20 PROCEDURE — 1125F AMNT PAIN NOTED PAIN PRSNT: CPT | Performed by: INTERNAL MEDICINE

## 2025-01-20 PROCEDURE — 1036F TOBACCO NON-USER: CPT | Performed by: INTERNAL MEDICINE

## 2025-01-20 PROCEDURE — G8417 CALC BMI ABV UP PARAM F/U: HCPCS | Performed by: INTERNAL MEDICINE

## 2025-01-20 PROCEDURE — 1123F ACP DISCUSS/DSCN MKR DOCD: CPT | Performed by: INTERNAL MEDICINE

## 2025-01-20 PROCEDURE — 99213 OFFICE O/P EST LOW 20 MIN: CPT | Performed by: INTERNAL MEDICINE

## 2025-01-20 PROCEDURE — 1090F PRES/ABSN URINE INCON ASSESS: CPT | Performed by: INTERNAL MEDICINE

## 2025-01-20 SDOH — ECONOMIC STABILITY: FOOD INSECURITY: WITHIN THE PAST 12 MONTHS, YOU WORRIED THAT YOUR FOOD WOULD RUN OUT BEFORE YOU GOT MONEY TO BUY MORE.: NEVER TRUE

## 2025-01-20 SDOH — ECONOMIC STABILITY: FOOD INSECURITY: WITHIN THE PAST 12 MONTHS, THE FOOD YOU BOUGHT JUST DIDN'T LAST AND YOU DIDN'T HAVE MONEY TO GET MORE.: NEVER TRUE

## 2025-01-20 ASSESSMENT — PATIENT HEALTH QUESTIONNAIRE - PHQ9
2. FEELING DOWN, DEPRESSED OR HOPELESS: NOT AT ALL
SUM OF ALL RESPONSES TO PHQ QUESTIONS 1-9: 0
SUM OF ALL RESPONSES TO PHQ9 QUESTIONS 1 & 2: 0
1. LITTLE INTEREST OR PLEASURE IN DOING THINGS: NOT AT ALL
SUM OF ALL RESPONSES TO PHQ QUESTIONS 1-9: 0

## 2025-01-20 ASSESSMENT — ENCOUNTER SYMPTOMS
DIARRHEA: 0
CONSTIPATION: 0
SHORTNESS OF BREATH: 0
BACK PAIN: 1
ABDOMINAL PAIN: 0
CHEST TIGHTNESS: 0

## 2025-01-20 NOTE — PROGRESS NOTES
HYSTERECTOMY Bilateral 10/05/2021    Vaginal hysterectomy, uterosacral ligament suspension, posterior repair, cystoscopy, TVT Exact midurethral sling    UPPER GASTROINTESTINAL ENDOSCOPY      UROLOGICAL SURGERY        Social History     Tobacco Use    Smoking status: Never    Smokeless tobacco: Never   Substance Use Topics    Alcohol use: Not Currently      Family History   Problem Relation Age of Onset    Hypertension Mother     Cancer Sister         Skin cancer    Stroke Mother         TIA    Heart Disease Father          at age 62 after an MI    Thyroid Disease Sister     Hypertension Sister     Eczema Sister     Anesth Problems Neg Hx     Other Sister         MENTAL HEALTH ISSUES    Elevated Lipids Sister     Diabetes Sister         Prediabetes    Asthma Sister     Osteoarthritis Sister     Cancer Paternal Aunt         breast    Breast Cancer Maternal Aunt     Cancer Maternal Aunt         Breast Cancer    Stroke Sister         Mild stroke    Elevated Lipids Sister     Psychiatric Disorder Sister         Borderline Personality Disorder    Asthma Sister     Osteoarthritis Sister     Heart Attack Other     Arthritis Sister     Osteoarthritis Sister     Hypertension Sister     High Cholesterol Sister     Arthritis Sister     Thyroid Disease Mother         Allergies   Allergen Reactions    Sulfa Antibiotics      Other reaction(s): Unknown (comments)  TOPICAL EYE / EYE MUCH WORSE      Assessment & Plan  1. Right shoulder pain.  The pain in the right shoulder has been ongoing for the past few weeks, with recent improvement. It is somewhat positional and primarily muscular in nature. There is no evidence of an internal issue. The pain could be related to a previous rotator cuff tendon tear, which may have been aggravated by certain movements. A referral for physical therapy at [facility] has been provided. She is advised to take acetaminophen 1000 mg up to three times daily. The use of Voltaren is also recommended. If

## 2025-01-28 DIAGNOSIS — I10 ESSENTIAL (PRIMARY) HYPERTENSION: ICD-10-CM

## 2025-01-28 DIAGNOSIS — I48.0 PAF (PAROXYSMAL ATRIAL FIBRILLATION) (HCC): ICD-10-CM

## 2025-01-29 RX ORDER — METOPROLOL SUCCINATE 25 MG/1
25 TABLET, EXTENDED RELEASE ORAL DAILY
Qty: 90 TABLET | Refills: 3 | Status: SHIPPED | OUTPATIENT
Start: 2025-01-29

## 2025-01-29 NOTE — TELEPHONE ENCOUNTER
Per VO by MD.    Future Appointments   Date Time Provider Department Center   4/3/2025 10:40 AM Soledad Funez APRN - NP [facility]   4/22/2025 10:45 AM Janes Tom MD  [facility]

## 2025-02-20 ENCOUNTER — TRANSCRIBE ORDERS (OUTPATIENT)
Facility: HOSPITAL | Age: 75
End: 2025-02-20

## 2025-02-20 DIAGNOSIS — K21.9 GASTROESOPHAGEAL REFLUX DISEASE, UNSPECIFIED WHETHER ESOPHAGITIS PRESENT: ICD-10-CM

## 2025-02-20 DIAGNOSIS — K44.9 HIATAL HERNIA: ICD-10-CM

## 2025-02-20 DIAGNOSIS — R10.13 EPIGASTRIC DISCOMFORT: Primary | ICD-10-CM

## 2025-03-24 RX ORDER — LANCETS 28 GAUGE
EACH MISCELLANEOUS
Qty: 100 EACH | Refills: 5 | Status: SHIPPED | OUTPATIENT
Start: 2025-03-24

## 2025-04-03 ENCOUNTER — OFFICE VISIT (OUTPATIENT)
Age: 75
End: 2025-04-03
Payer: MEDICARE

## 2025-04-03 VITALS
OXYGEN SATURATION: 98 % | BODY MASS INDEX: 36.71 KG/M2 | HEIGHT: 60 IN | HEART RATE: 54 BPM | WEIGHT: 187 LBS | SYSTOLIC BLOOD PRESSURE: 116 MMHG | RESPIRATION RATE: 16 BRPM | DIASTOLIC BLOOD PRESSURE: 64 MMHG

## 2025-04-03 DIAGNOSIS — R06.02 SHORTNESS OF BREATH: ICD-10-CM

## 2025-04-03 DIAGNOSIS — I10 HYPERTENSION, ESSENTIAL: ICD-10-CM

## 2025-04-03 DIAGNOSIS — N18.30 STAGE 3 CHRONIC KIDNEY DISEASE, UNSPECIFIED WHETHER STAGE 3A OR 3B CKD (HCC): ICD-10-CM

## 2025-04-03 DIAGNOSIS — R53.81 PHYSICAL DECONDITIONING: ICD-10-CM

## 2025-04-03 DIAGNOSIS — R00.1 BRADYCARDIA: ICD-10-CM

## 2025-04-03 DIAGNOSIS — I48.0 PAF (PAROXYSMAL ATRIAL FIBRILLATION) (HCC): Primary | ICD-10-CM

## 2025-04-03 DIAGNOSIS — E78.00 HYPERCHOLESTEREMIA: ICD-10-CM

## 2025-04-03 DIAGNOSIS — Q24.8 LEFT VENTRICULAR OUTFLOW TRACT OBSTRUCTION: ICD-10-CM

## 2025-04-03 PROCEDURE — 3078F DIAST BP <80 MM HG: CPT

## 2025-04-03 PROCEDURE — 1126F AMNT PAIN NOTED NONE PRSNT: CPT

## 2025-04-03 PROCEDURE — 93005 ELECTROCARDIOGRAM TRACING: CPT

## 2025-04-03 PROCEDURE — 1160F RVW MEDS BY RX/DR IN RCRD: CPT

## 2025-04-03 PROCEDURE — 3074F SYST BP LT 130 MM HG: CPT

## 2025-04-03 PROCEDURE — G8427 DOCREV CUR MEDS BY ELIG CLIN: HCPCS

## 2025-04-03 PROCEDURE — 1123F ACP DISCUSS/DSCN MKR DOCD: CPT

## 2025-04-03 PROCEDURE — 93010 ELECTROCARDIOGRAM REPORT: CPT

## 2025-04-03 PROCEDURE — 1090F PRES/ABSN URINE INCON ASSESS: CPT

## 2025-04-03 PROCEDURE — 99214 OFFICE O/P EST MOD 30 MIN: CPT

## 2025-04-03 PROCEDURE — G8399 PT W/DXA RESULTS DOCUMENT: HCPCS

## 2025-04-03 PROCEDURE — G8417 CALC BMI ABV UP PARAM F/U: HCPCS

## 2025-04-03 PROCEDURE — 1036F TOBACCO NON-USER: CPT

## 2025-04-03 PROCEDURE — 1159F MED LIST DOCD IN RCRD: CPT

## 2025-04-03 NOTE — PROGRESS NOTES
had concerns including Atrial Fibrillation and Hypertension.    Vitals:    04/03/25 1040   BP: 116/64   BP Site: Left Upper Arm   Patient Position: Sitting   BP Cuff Size: Medium Adult   Pulse: 54   Resp: 16   SpO2: 98%   Weight: 84.8 kg (187 lb)   Height: 1.524 m (5')        Chest pain No    Refills No        1. Have you been to the ER, urgent care clinic since your last visit? No       Hospitalized since your last visit? No       Where?        Date?  
complete cardiac and respiratory are reviewed and negative except as above ; Resp-denies wheezing  or productive cough,. Const- No unusual weight loss or fever; Neuro-no recent seizure or CVA ; GI- No BRBPR, abdom pain, bloating ; - no  hematuria   see supplement sheet, initialed and to be scanned by staff    Past Medical History:   Diagnosis Date    Adverse effect of anesthesia     WOKE UP IN RR ON VENTILATOR    Anemia     Arthritis     Atrial fibrillation (HCC) 09/23/2023    Calculus of kidney     Chronic kidney disease     STAGE 3    Cystocele     Diabetes (HCC)     Diabetic neuropathy, painful (HCC)     GERD (gastroesophageal reflux disease)     Gout 2022    Hepatitis B     Hypercholesterolemia     Hypertension     Microalbuminuria     Mild nonproliferative diabetic retinopathy     PAF (paroxysmal atrial fibrillation) (HCC)     [facility] 3-24-13 ER-[facility] follows    Rectocele     Retinopathy     Rosacea     Ruptured disc, cervical     Sleep apnea     Stenosis, cervical spine     Visit for review of DEXA scan 10/04/2023    T+4.7 spine, T+1.0 radius T-0.8 hip.  DOES NOT NEED REPEAT      Social Hx= reports that she has never smoked. She has never used smokeless tobacco. She reports that she does not currently use alcohol. She reports that she does not use drugs.   Family Hx- family history includes Arthritis in her sister and sister; Asthma in her sister and sister; Breast Cancer in her maternal aunt; Cancer in her maternal aunt, paternal aunt, and sister; Diabetes in her sister; Eczema in her sister; Elevated Lipids in her sister and sister; Heart Attack in an other family member; Heart Disease in her father; High Cholesterol in her sister; Hypertension in her mother, sister, and sister; Osteoarthritis in her sister, sister, and sister; Other in her sister; Psychiatric Disorder in her sister; Stroke in her mother and sister; Thyroid Disease in her mother and sister.   Allergies   Allergen Reactions    Sulfa

## 2025-04-12 DIAGNOSIS — E11.21 TYPE 2 DIABETES MELLITUS WITH NEPHROPATHY (HCC): ICD-10-CM

## 2025-04-14 RX ORDER — BLOOD SUGAR DIAGNOSTIC
STRIP MISCELLANEOUS
Qty: 200 STRIP | Refills: 2 | Status: SHIPPED | OUTPATIENT
Start: 2025-04-14

## 2025-04-18 ENCOUNTER — RESULTS FOLLOW-UP (OUTPATIENT)
Facility: CLINIC | Age: 75
End: 2025-04-18

## 2025-04-18 ENCOUNTER — TELEPHONE (OUTPATIENT)
Facility: CLINIC | Age: 75
End: 2025-04-18

## 2025-04-18 DIAGNOSIS — E11.21 TYPE 2 DIABETES MELLITUS WITH NEPHROPATHY (HCC): ICD-10-CM

## 2025-04-18 LAB
ALBUMIN SERPL-MCNC: 3.3 G/DL (ref 3.5–5)
ALBUMIN/GLOB SERPL: 0.8 (ref 1.1–2.2)
ALP SERPL-CCNC: 69 U/L (ref 45–117)
ALT SERPL-CCNC: 25 U/L (ref 12–78)
ANION GAP SERPL CALC-SCNC: 6 MMOL/L (ref 2–12)
AST SERPL-CCNC: 15 U/L (ref 15–37)
BASOPHILS # BLD: 0.06 K/UL (ref 0–0.1)
BASOPHILS NFR BLD: 0.9 % (ref 0–1)
BILIRUB SERPL-MCNC: 0.3 MG/DL (ref 0.2–1)
BUN SERPL-MCNC: 35 MG/DL (ref 6–20)
BUN/CREAT SERPL: 24 (ref 12–20)
CALCIUM SERPL-MCNC: 10 MG/DL (ref 8.5–10.1)
CHLORIDE SERPL-SCNC: 112 MMOL/L (ref 97–108)
CHOLEST SERPL-MCNC: 171 MG/DL
CO2 SERPL-SCNC: 24 MMOL/L (ref 21–32)
CREAT SERPL-MCNC: 1.47 MG/DL (ref 0.55–1.02)
DIFFERENTIAL METHOD BLD: NORMAL
EOSINOPHIL # BLD: 0.22 K/UL (ref 0–0.4)
EOSINOPHIL NFR BLD: 3.3 % (ref 0–7)
ERYTHROCYTE [DISTWIDTH] IN BLOOD BY AUTOMATED COUNT: 13.4 % (ref 11.5–14.5)
EST. AVERAGE GLUCOSE BLD GHB EST-MCNC: 157 MG/DL
GLOBULIN SER CALC-MCNC: 3.9 G/DL (ref 2–4)
GLUCOSE SERPL-MCNC: 186 MG/DL (ref 65–100)
HBA1C MFR BLD: 7.1 % (ref 4–5.6)
HCT VFR BLD AUTO: 37.1 % (ref 35–47)
HDLC SERPL-MCNC: 51 MG/DL
HDLC SERPL: 3.4 (ref 0–5)
HGB BLD-MCNC: 11.7 G/DL (ref 11.5–16)
IMM GRANULOCYTES # BLD AUTO: 0.03 K/UL (ref 0–0.04)
IMM GRANULOCYTES NFR BLD AUTO: 0.4 % (ref 0–0.5)
LDLC SERPL CALC-MCNC: 83.6 MG/DL (ref 0–100)
LYMPHOCYTES # BLD: 1.44 K/UL (ref 0.8–3.5)
LYMPHOCYTES NFR BLD: 21.4 % (ref 12–49)
MCH RBC QN AUTO: 28.7 PG (ref 26–34)
MCHC RBC AUTO-ENTMCNC: 31.5 G/DL (ref 30–36.5)
MCV RBC AUTO: 90.9 FL (ref 80–99)
MONOCYTES # BLD: 0.54 K/UL (ref 0–1)
MONOCYTES NFR BLD: 8 % (ref 5–13)
NEUTS SEG # BLD: 4.43 K/UL (ref 1.8–8)
NEUTS SEG NFR BLD: 66 % (ref 32–75)
NRBC # BLD: 0 K/UL (ref 0–0.01)
NRBC BLD-RTO: 0 PER 100 WBC
PLATELET # BLD AUTO: 209 K/UL (ref 150–400)
PMV BLD AUTO: 9.7 FL (ref 8.9–12.9)
POTASSIUM SERPL-SCNC: 5 MMOL/L (ref 3.5–5.1)
PROT SERPL-MCNC: 7.2 G/DL (ref 6.4–8.2)
RBC # BLD AUTO: 4.08 M/UL (ref 3.8–5.2)
SODIUM SERPL-SCNC: 142 MMOL/L (ref 136–145)
TRIGL SERPL-MCNC: 182 MG/DL
VLDLC SERPL CALC-MCNC: 36.4 MG/DL
WBC # BLD AUTO: 6.7 K/UL (ref 3.6–11)

## 2025-04-18 SDOH — HEALTH STABILITY: PHYSICAL HEALTH: ON AVERAGE, HOW MANY MINUTES DO YOU ENGAGE IN EXERCISE AT THIS LEVEL?: 0 MIN

## 2025-04-18 SDOH — HEALTH STABILITY: PHYSICAL HEALTH: ON AVERAGE, HOW MANY DAYS PER WEEK DO YOU ENGAGE IN MODERATE TO STRENUOUS EXERCISE (LIKE A BRISK WALK)?: 0 DAYS

## 2025-04-18 ASSESSMENT — PATIENT HEALTH QUESTIONNAIRE - PHQ9
SUM OF ALL RESPONSES TO PHQ QUESTIONS 1-9: 0
2. FEELING DOWN, DEPRESSED OR HOPELESS: NOT AT ALL
SUM OF ALL RESPONSES TO PHQ QUESTIONS 1-9: 0
1. LITTLE INTEREST OR PLEASURE IN DOING THINGS: NOT AT ALL

## 2025-04-18 ASSESSMENT — LIFESTYLE VARIABLES
HOW OFTEN DO YOU HAVE A DRINK CONTAINING ALCOHOL: NEVER
HOW MANY STANDARD DRINKS CONTAINING ALCOHOL DO YOU HAVE ON A TYPICAL DAY: PATIENT DOES NOT DRINK
HOW MANY STANDARD DRINKS CONTAINING ALCOHOL DO YOU HAVE ON A TYPICAL DAY: 0
HOW OFTEN DO YOU HAVE A DRINK CONTAINING ALCOHOL: 1
HOW OFTEN DO YOU HAVE SIX OR MORE DRINKS ON ONE OCCASION: 1

## 2025-04-22 ENCOUNTER — OFFICE VISIT (OUTPATIENT)
Facility: CLINIC | Age: 75
End: 2025-04-22
Payer: MEDICARE

## 2025-04-22 VITALS
HEIGHT: 60 IN | WEIGHT: 197 LBS | BODY MASS INDEX: 38.68 KG/M2 | RESPIRATION RATE: 16 BRPM | OXYGEN SATURATION: 98 % | HEART RATE: 54 BPM | TEMPERATURE: 98 F | SYSTOLIC BLOOD PRESSURE: 124 MMHG | DIASTOLIC BLOOD PRESSURE: 74 MMHG

## 2025-04-22 DIAGNOSIS — Z00.00 MEDICARE ANNUAL WELLNESS VISIT, SUBSEQUENT: Primary | ICD-10-CM

## 2025-04-22 DIAGNOSIS — Z23 NEED FOR DIPHTHERIA-TETANUS-PERTUSSIS (TDAP) VACCINE: ICD-10-CM

## 2025-04-22 DIAGNOSIS — N18.30 STAGE 3 CHRONIC KIDNEY DISEASE, UNSPECIFIED WHETHER STAGE 3A OR 3B CKD (HCC): ICD-10-CM

## 2025-04-22 DIAGNOSIS — G47.33 OSA (OBSTRUCTIVE SLEEP APNEA): ICD-10-CM

## 2025-04-22 DIAGNOSIS — I48.0 PAF (PAROXYSMAL ATRIAL FIBRILLATION) (HCC): ICD-10-CM

## 2025-04-22 DIAGNOSIS — E78.5 HYPERLIPIDEMIA, UNSPECIFIED HYPERLIPIDEMIA TYPE: ICD-10-CM

## 2025-04-22 DIAGNOSIS — N32.81 OAB (OVERACTIVE BLADDER): ICD-10-CM

## 2025-04-22 DIAGNOSIS — I10 ESSENTIAL (PRIMARY) HYPERTENSION: ICD-10-CM

## 2025-04-22 DIAGNOSIS — E66.01 MORBID (SEVERE) OBESITY DUE TO EXCESS CALORIES (HCC): ICD-10-CM

## 2025-04-22 DIAGNOSIS — E11.21 TYPE 2 DIABETES MELLITUS WITH NEPHROPATHY (HCC): ICD-10-CM

## 2025-04-22 PROCEDURE — 90471 IMMUNIZATION ADMIN: CPT | Performed by: INTERNAL MEDICINE

## 2025-04-22 PROCEDURE — 1126F AMNT PAIN NOTED NONE PRSNT: CPT | Performed by: INTERNAL MEDICINE

## 2025-04-22 PROCEDURE — 3074F SYST BP LT 130 MM HG: CPT | Performed by: INTERNAL MEDICINE

## 2025-04-22 PROCEDURE — 3051F HG A1C>EQUAL 7.0%<8.0%: CPT | Performed by: INTERNAL MEDICINE

## 2025-04-22 PROCEDURE — 1123F ACP DISCUSS/DSCN MKR DOCD: CPT | Performed by: INTERNAL MEDICINE

## 2025-04-22 PROCEDURE — PBSHW TDAP, BOOSTRIX, (AGE 10 YRS+), IM: Performed by: INTERNAL MEDICINE

## 2025-04-22 PROCEDURE — G0439 PPPS, SUBSEQ VISIT: HCPCS | Performed by: INTERNAL MEDICINE

## 2025-04-22 PROCEDURE — 3078F DIAST BP <80 MM HG: CPT | Performed by: INTERNAL MEDICINE

## 2025-04-22 PROCEDURE — 90715 TDAP VACCINE 7 YRS/> IM: CPT | Performed by: INTERNAL MEDICINE

## 2025-04-22 ASSESSMENT — ENCOUNTER SYMPTOMS
ABDOMINAL PAIN: 0
BACK PAIN: 0
SHORTNESS OF BREATH: 0
CONSTIPATION: 0
CHEST TIGHTNESS: 0
DIARRHEA: 0

## 2025-04-22 NOTE — PATIENT INSTRUCTIONS
learn more about \"A Healthy Heart: Care Instructions.\"  Current as of: July 31, 2024  Content Version: 14.4  © 3919-1330 [employer].   Care instructions adapted under license by [employer]. If you have questions about a medical condition or this instruction, always ask your healthcare professional. [employer] disclaims any warranty or liability for your use of this information.    Personalized Preventive Plan for Jasmin Stone - 4/22/2025  Medicare offers a range of preventive health benefits. Some of the tests and screenings are paid in full while other may be subject to a deductible, co-insurance, and/or copay.  Some of these benefits include a comprehensive review of your medical history including lifestyle, illnesses that may run in your family, and various assessments and screenings as appropriate.  After reviewing your medical record and screening and assessments performed today your provider may have ordered immunizations, labs, imaging, and/or referrals for you.  A list of these orders (if applicable) as well as your Preventive Care list are included within your After Visit Summary for your review.

## 2025-04-22 NOTE — PROGRESS NOTES
Jasmin Stone is a 75 y.o. female who presents today for Medicare AWV  .      She has a history of   Patient Active Problem List   Diagnosis    Peripheral neuropathy    History of arthritis    Intervertebral disk disease    Systolic murmur of aorta    Essential (primary) hypertension    Pre-ulcerative corn or callous    Incontinence    Hyperlipidemia    Stress incontinence    GERD (gastroesophageal reflux disease)    Incomplete emptying of bladder    QUAN (obstructive sleep apnea)    Rosacea    Genital prolapse    Advanced care planning/counseling discussion    Anemia    Vitamin D deficiency    Hepatitis B    Type 2 diabetes mellitus with diabetic neuropathy (HCC)    OAB (overactive bladder)    Type 2 diabetes mellitus with nephropathy (HCC)    Urge incontinence of urine    Severe obesity (BMI 35.0-39.9) with comorbidity    DM (diabetes mellitus) (HCC)    AR (allergic rhinitis)    Uterine prolapse    Foot drop    Incomplete uterovaginal prolapse    Left ventricular outflow tract obstruction    Chronic renal disease, stage III (HCC)    PAF (paroxysmal atrial fibrillation) ([facility])    Pure hypercholesterolemia, unspecified    Shortness of breath    Dysuria    Incontinence of feces    Body mass index [BMI] 38.0-38.9, adult (Z68.38)   .    Today patient is here for CPE.     History of Present Illness  The patient presents for a complete physical exam.    Given relatively unstable renal function and gastrointestinal symptoms, metformin was discontinued. The option of starting a GLP-1 or an SGLT2 was not pursued due to concerns over mildly elevated pancreatic numbers. Her A1c has remained reasonable, just above 7. She reports that her A1c levels have been consistently within the range of 6.0 to 7.2 over the past 3 months. Despite this, neuropathy and nephropathy are experienced, which she attributes to prolonged diabetes. Her retinologist advised that these conditions are likely due to the duration of her diabetes rather

## 2025-04-27 ENCOUNTER — OFFICE VISIT (OUTPATIENT)
Age: 75
End: 2025-04-27

## 2025-04-27 VITALS
SYSTOLIC BLOOD PRESSURE: 137 MMHG | OXYGEN SATURATION: 97 % | DIASTOLIC BLOOD PRESSURE: 81 MMHG | BODY MASS INDEX: 38.09 KG/M2 | HEART RATE: 50 BPM | TEMPERATURE: 97.4 F | HEIGHT: 60 IN | RESPIRATION RATE: 16 BRPM | WEIGHT: 194 LBS

## 2025-04-27 DIAGNOSIS — M10.171 ACUTE LEAD-INDUCED GOUT INVOLVING TOE OF RIGHT FOOT, INITIAL ENCOUNTER: Primary | ICD-10-CM

## 2025-04-27 DIAGNOSIS — T56.0X1A ACUTE LEAD-INDUCED GOUT INVOLVING TOE OF RIGHT FOOT, INITIAL ENCOUNTER: Primary | ICD-10-CM

## 2025-04-27 RX ORDER — COLCHICINE 0.6 MG/1
0.6 TABLET ORAL DAILY
Qty: 10 TABLET | Refills: 0 | Status: SHIPPED | OUTPATIENT
Start: 2025-04-27

## 2025-04-27 RX ORDER — PREDNISONE 20 MG/1
20 TABLET ORAL 2 TIMES DAILY
Qty: 10 TABLET | Refills: 0 | Status: SHIPPED | OUTPATIENT
Start: 2025-04-27 | End: 2025-05-02

## 2025-05-15 ENCOUNTER — OFFICE VISIT (OUTPATIENT)
Facility: CLINIC | Age: 75
End: 2025-05-15
Payer: MEDICARE

## 2025-05-15 ENCOUNTER — HOSPITAL ENCOUNTER (OUTPATIENT)
Facility: HOSPITAL | Age: 75
Discharge: HOME | End: 2025-05-18
Payer: MEDICARE

## 2025-05-15 VITALS
HEIGHT: 60 IN | WEIGHT: 195 LBS | BODY MASS INDEX: 38.28 KG/M2 | RESPIRATION RATE: 16 BRPM | HEART RATE: 66 BPM | OXYGEN SATURATION: 96 % | DIASTOLIC BLOOD PRESSURE: 77 MMHG | TEMPERATURE: 98.6 F | SYSTOLIC BLOOD PRESSURE: 118 MMHG

## 2025-05-15 DIAGNOSIS — M79.674 PAIN OF TOE OF RIGHT FOOT: Primary | ICD-10-CM

## 2025-05-15 DIAGNOSIS — M10.9 GOUT OF RIGHT FOOT, UNSPECIFIED CAUSE, UNSPECIFIED CHRONICITY: ICD-10-CM

## 2025-05-15 DIAGNOSIS — E11.21 TYPE 2 DIABETES MELLITUS WITH NEPHROPATHY (HCC): ICD-10-CM

## 2025-05-15 DIAGNOSIS — I10 ESSENTIAL (PRIMARY) HYPERTENSION: ICD-10-CM

## 2025-05-15 DIAGNOSIS — M79.674 PAIN OF TOE OF RIGHT FOOT: ICD-10-CM

## 2025-05-15 PROCEDURE — 99214 OFFICE O/P EST MOD 30 MIN: CPT | Performed by: INTERNAL MEDICINE

## 2025-05-15 PROCEDURE — 3074F SYST BP LT 130 MM HG: CPT | Performed by: INTERNAL MEDICINE

## 2025-05-15 PROCEDURE — 1126F AMNT PAIN NOTED NONE PRSNT: CPT | Performed by: INTERNAL MEDICINE

## 2025-05-15 PROCEDURE — G8399 PT W/DXA RESULTS DOCUMENT: HCPCS | Performed by: INTERNAL MEDICINE

## 2025-05-15 PROCEDURE — 1090F PRES/ABSN URINE INCON ASSESS: CPT | Performed by: INTERNAL MEDICINE

## 2025-05-15 PROCEDURE — G8417 CALC BMI ABV UP PARAM F/U: HCPCS | Performed by: INTERNAL MEDICINE

## 2025-05-15 PROCEDURE — G8428 CUR MEDS NOT DOCUMENT: HCPCS | Performed by: INTERNAL MEDICINE

## 2025-05-15 PROCEDURE — 3078F DIAST BP <80 MM HG: CPT | Performed by: INTERNAL MEDICINE

## 2025-05-15 PROCEDURE — 3051F HG A1C>EQUAL 7.0%<8.0%: CPT | Performed by: INTERNAL MEDICINE

## 2025-05-15 PROCEDURE — 1123F ACP DISCUSS/DSCN MKR DOCD: CPT | Performed by: INTERNAL MEDICINE

## 2025-05-15 PROCEDURE — 73660 X-RAY EXAM OF TOE(S): CPT

## 2025-05-15 PROCEDURE — 1036F TOBACCO NON-USER: CPT | Performed by: INTERNAL MEDICINE

## 2025-05-15 RX ORDER — DOXYCYCLINE HYCLATE 100 MG
100 TABLET ORAL 2 TIMES DAILY
Qty: 14 TABLET | Refills: 0 | Status: SHIPPED | OUTPATIENT
Start: 2025-05-15 | End: 2025-05-22

## 2025-05-15 ASSESSMENT — ENCOUNTER SYMPTOMS
BACK PAIN: 0
CONSTIPATION: 0
ABDOMINAL PAIN: 0
SHORTNESS OF BREATH: 0
CHEST TIGHTNESS: 0
DIARRHEA: 0

## 2025-05-15 NOTE — PROGRESS NOTES
Jasmin Stone is a 75 y.o. female who presents today for Toe Pain (4th toe on right foot inflamed and swollen; present for a few days )  .      She has a history of   Patient Active Problem List   Diagnosis    Peripheral neuropathy    History of arthritis    Intervertebral disk disease    Systolic murmur of aorta    Essential (primary) hypertension    Pre-ulcerative corn or callous    Incontinence    Hyperlipidemia    Stress incontinence    GERD (gastroesophageal reflux disease)    Incomplete emptying of bladder    QUAN (obstructive sleep apnea)    Rosacea    Genital prolapse    Advanced care planning/counseling discussion    Anemia    Vitamin D deficiency    Hepatitis B    Type 2 diabetes mellitus with diabetic neuropathy (HCC)    OAB (overactive bladder)    Type 2 diabetes mellitus with nephropathy (HCC)    Urge incontinence of urine    Severe obesity (BMI 35.0-39.9) with comorbidity (HCC)    DM (diabetes mellitus) ([facility])    AR (allergic rhinitis)    Uterine prolapse    Foot drop    Incomplete uterovaginal prolapse    Left ventricular outflow tract obstruction    Chronic renal disease, stage III (HCC)    PAF (paroxysmal atrial fibrillation) ([facility])    Pure hypercholesterolemia, unspecified    Shortness of breath    Dysuria    Incontinence of feces    Body mass index [BMI] 38.0-38.9, adult (Z68.38)   .    Today patient is here for an acute visit.     History of Present Illness  The patient is a 75-year-old female who presents for an acute visit.    She has been experiencing recurrent episodes of gout, with various toes being affected intermittently over the past year. The most recent episode occurred 3 weeks ago, prompting her to seek care at an urgent care facility on 04/27/2025. She reports no trauma to the affected toe but suspects previous unnoticed trauma to other toes. She recalls a similar episode last summer, which was characterized by intermittent electric sensations in her left foot. An orthopedic

## 2025-05-16 ENCOUNTER — TELEPHONE (OUTPATIENT)
Facility: CLINIC | Age: 75
End: 2025-05-16

## 2025-05-16 ENCOUNTER — RESULTS FOLLOW-UP (OUTPATIENT)
Facility: CLINIC | Age: 75
End: 2025-05-16

## 2025-05-16 RX ORDER — COLCHICINE 0.6 MG/1
0.6 TABLET ORAL DAILY
Qty: 30 TABLET | Refills: 3 | Status: SHIPPED | OUTPATIENT
Start: 2025-05-16

## 2025-05-16 NOTE — TELEPHONE ENCOUNTER
05/16/2025--This nurse called and spoke with the patient and let her know the doctor's recommendation's. She verbalized understanding and all question's were answered at that time.

## 2025-05-20 ENCOUNTER — TELEPHONE (OUTPATIENT)
Facility: CLINIC | Age: 75
End: 2025-05-20

## 2025-05-20 NOTE — TELEPHONE ENCOUNTER
Patient was calling to speak with nurse regarding a medication that had been refilled:     colchicine (COLCRYS) 0.6 MG tablet     Patient recently was seen for toe pain and states she had a little bit of this medication and it was refilled recently and wants to see if provider wanted her to continue using this on a daily basis with gout flare ups

## 2025-06-03 ENCOUNTER — OFFICE VISIT (OUTPATIENT)
Facility: CLINIC | Age: 75
End: 2025-06-03
Payer: MEDICARE

## 2025-06-03 VITALS
RESPIRATION RATE: 16 BRPM | WEIGHT: 196 LBS | SYSTOLIC BLOOD PRESSURE: 131 MMHG | HEIGHT: 60 IN | TEMPERATURE: 98.4 F | OXYGEN SATURATION: 97 % | HEART RATE: 76 BPM | DIASTOLIC BLOOD PRESSURE: 58 MMHG | BODY MASS INDEX: 38.48 KG/M2

## 2025-06-03 DIAGNOSIS — I10 ESSENTIAL (PRIMARY) HYPERTENSION: ICD-10-CM

## 2025-06-03 DIAGNOSIS — E11.21 TYPE 2 DIABETES MELLITUS WITH NEPHROPATHY (HCC): ICD-10-CM

## 2025-06-03 DIAGNOSIS — Z87.39 HISTORY OF GOUT: ICD-10-CM

## 2025-06-03 DIAGNOSIS — M79.674 PAIN OF TOE OF RIGHT FOOT: ICD-10-CM

## 2025-06-03 DIAGNOSIS — N18.30 STAGE 3 CHRONIC KIDNEY DISEASE, UNSPECIFIED WHETHER STAGE 3A OR 3B CKD (HCC): ICD-10-CM

## 2025-06-03 DIAGNOSIS — M79.674 PAIN OF TOE OF RIGHT FOOT: Primary | ICD-10-CM

## 2025-06-03 PROCEDURE — G8428 CUR MEDS NOT DOCUMENT: HCPCS | Performed by: INTERNAL MEDICINE

## 2025-06-03 PROCEDURE — 99214 OFFICE O/P EST MOD 30 MIN: CPT | Performed by: INTERNAL MEDICINE

## 2025-06-03 PROCEDURE — 3078F DIAST BP <80 MM HG: CPT | Performed by: INTERNAL MEDICINE

## 2025-06-03 PROCEDURE — 1126F AMNT PAIN NOTED NONE PRSNT: CPT | Performed by: INTERNAL MEDICINE

## 2025-06-03 PROCEDURE — 1090F PRES/ABSN URINE INCON ASSESS: CPT | Performed by: INTERNAL MEDICINE

## 2025-06-03 PROCEDURE — 1123F ACP DISCUSS/DSCN MKR DOCD: CPT | Performed by: INTERNAL MEDICINE

## 2025-06-03 PROCEDURE — G8399 PT W/DXA RESULTS DOCUMENT: HCPCS | Performed by: INTERNAL MEDICINE

## 2025-06-03 PROCEDURE — 1036F TOBACCO NON-USER: CPT | Performed by: INTERNAL MEDICINE

## 2025-06-03 PROCEDURE — 3051F HG A1C>EQUAL 7.0%<8.0%: CPT | Performed by: INTERNAL MEDICINE

## 2025-06-03 PROCEDURE — G8417 CALC BMI ABV UP PARAM F/U: HCPCS | Performed by: INTERNAL MEDICINE

## 2025-06-03 PROCEDURE — 3075F SYST BP GE 130 - 139MM HG: CPT | Performed by: INTERNAL MEDICINE

## 2025-06-03 ASSESSMENT — ENCOUNTER SYMPTOMS
CHEST TIGHTNESS: 0
CONSTIPATION: 0
BACK PAIN: 0
DIARRHEA: 0
SHORTNESS OF BREATH: 0
ABDOMINAL PAIN: 0

## 2025-06-03 NOTE — PROGRESS NOTES
CATARACT REMOVAL Right     CATARACT REMOVAL Left 2021    COLONOSCOPY      COLONOSCOPY N/A 5/15/2024    COLONOSCOPY DIAGNOSTIC performed by Cary Hartman MD at [facility] ENDOSCOPY    COLPORRHAPHY  10/05/2021    Posterior    CYST INCISION AND DRAINAGE Left     BREAST CYST    DILATION AND CURETTAGE OF UTERUS      HERNIA REPAIR      umbilical    KNEE ARTHROPLASTY  2014    Torn miniscus repair    KNEE SURGERY  2014    OTHER SURGICAL HISTORY  2018    Bladder Botox     PELVIC LAPAROSCOPY      OVARIAN CYST REMOVED    TOTAL VAGINAL HYSTERECTOMY Bilateral 10/05/2021    Vaginal hysterectomy, uterosacral ligament suspension, posterior repair, cystoscopy, TVT Exact midurethral sling    UPPER GASTROINTESTINAL ENDOSCOPY      UROLOGICAL SURGERY        Social History     Tobacco Use    Smoking status: Never    Smokeless tobacco: Never   Substance Use Topics    Alcohol use: Not Currently      Family History   Problem Relation Age of Onset    Hypertension Mother     Cancer Sister         Skin cancer    Stroke Mother         TIA    Heart Disease Father          at age 62 after an MI    Thyroid Disease Sister     Hypertension Sister     Eczema Sister     Anesth Problems Neg Hx     Other Sister         MENTAL HEALTH ISSUES    Elevated Lipids Sister     Diabetes Sister         Prediabetes    Asthma Sister     Osteoarthritis Sister     Cancer Paternal Aunt         breast    Breast Cancer Maternal Aunt     Cancer Maternal Aunt         Breast Cancer    Stroke Sister         Mild stroke    Elevated Lipids Sister     Psychiatric Disorder Sister         Borderline Personality Disorder    Asthma Sister     Osteoarthritis Sister     Heart Attack Other     Arthritis Sister     Osteoarthritis Sister     Hypertension Sister     High Cholesterol Sister     Arthritis Sister     Thyroid Disease Mother         Allergies   Allergen Reactions    Ampicillin Other (See Comments)     Reaction Type: Allergy; Reaction(s): ampicillin eye

## 2025-06-04 ENCOUNTER — RESULTS FOLLOW-UP (OUTPATIENT)
Facility: CLINIC | Age: 75
End: 2025-06-04

## 2025-06-04 LAB
BASOPHILS # BLD: 0.06 K/UL (ref 0–0.1)
BASOPHILS NFR BLD: 0.7 % (ref 0–1)
CRP SERPL-MCNC: <0.29 MG/DL (ref 0–0.3)
DIFFERENTIAL METHOD BLD: NORMAL
EOSINOPHIL # BLD: 0.23 K/UL (ref 0–0.4)
EOSINOPHIL NFR BLD: 2.8 % (ref 0–7)
ERYTHROCYTE [DISTWIDTH] IN BLOOD BY AUTOMATED COUNT: 13.3 % (ref 11.5–14.5)
ERYTHROCYTE [SEDIMENTATION RATE] IN BLOOD: 27 MM/HR (ref 0–30)
HCT VFR BLD AUTO: 37.4 % (ref 35–47)
HGB BLD-MCNC: 11.7 G/DL (ref 11.5–16)
IMM GRANULOCYTES # BLD AUTO: 0.03 K/UL (ref 0–0.04)
IMM GRANULOCYTES NFR BLD AUTO: 0.4 % (ref 0–0.5)
LYMPHOCYTES # BLD: 1.86 K/UL (ref 0.8–3.5)
LYMPHOCYTES NFR BLD: 22.6 % (ref 12–49)
MCH RBC QN AUTO: 28.4 PG (ref 26–34)
MCHC RBC AUTO-ENTMCNC: 31.3 G/DL (ref 30–36.5)
MCV RBC AUTO: 90.8 FL (ref 80–99)
MONOCYTES # BLD: 0.69 K/UL (ref 0–1)
MONOCYTES NFR BLD: 8.4 % (ref 5–13)
NEUTS SEG # BLD: 5.37 K/UL (ref 1.8–8)
NEUTS SEG NFR BLD: 65.1 % (ref 32–75)
NRBC # BLD: 0 K/UL (ref 0–0.01)
NRBC BLD-RTO: 0 PER 100 WBC
PLATELET # BLD AUTO: 265 K/UL (ref 150–400)
PMV BLD AUTO: 9.8 FL (ref 8.9–12.9)
RBC # BLD AUTO: 4.12 M/UL (ref 3.8–5.2)
URATE SERPL-MCNC: 8 MG/DL (ref 2.6–6)
WBC # BLD AUTO: 8.2 K/UL (ref 3.6–11)

## 2025-06-13 ENCOUNTER — PATIENT MESSAGE (OUTPATIENT)
Facility: CLINIC | Age: 75
End: 2025-06-13

## 2025-06-24 ENCOUNTER — OFFICE VISIT (OUTPATIENT)
Facility: CLINIC | Age: 75
End: 2025-06-24
Payer: MEDICARE

## 2025-06-24 VITALS
RESPIRATION RATE: 16 BRPM | WEIGHT: 195 LBS | HEART RATE: 62 BPM | DIASTOLIC BLOOD PRESSURE: 72 MMHG | OXYGEN SATURATION: 97 % | HEIGHT: 60 IN | BODY MASS INDEX: 38.28 KG/M2 | SYSTOLIC BLOOD PRESSURE: 122 MMHG | TEMPERATURE: 98.1 F

## 2025-06-24 DIAGNOSIS — I10 ESSENTIAL (PRIMARY) HYPERTENSION: ICD-10-CM

## 2025-06-24 DIAGNOSIS — N18.30 STAGE 3 CHRONIC KIDNEY DISEASE, UNSPECIFIED WHETHER STAGE 3A OR 3B CKD (HCC): ICD-10-CM

## 2025-06-24 DIAGNOSIS — Z87.39 HISTORY OF GOUT: ICD-10-CM

## 2025-06-24 DIAGNOSIS — M79.674 PAIN OF TOE OF RIGHT FOOT: Primary | ICD-10-CM

## 2025-06-24 PROCEDURE — 99213 OFFICE O/P EST LOW 20 MIN: CPT | Performed by: INTERNAL MEDICINE

## 2025-06-24 PROCEDURE — 1126F AMNT PAIN NOTED NONE PRSNT: CPT | Performed by: INTERNAL MEDICINE

## 2025-06-24 PROCEDURE — 3078F DIAST BP <80 MM HG: CPT | Performed by: INTERNAL MEDICINE

## 2025-06-24 PROCEDURE — 1123F ACP DISCUSS/DSCN MKR DOCD: CPT | Performed by: INTERNAL MEDICINE

## 2025-06-24 PROCEDURE — G8428 CUR MEDS NOT DOCUMENT: HCPCS | Performed by: INTERNAL MEDICINE

## 2025-06-24 PROCEDURE — G8417 CALC BMI ABV UP PARAM F/U: HCPCS | Performed by: INTERNAL MEDICINE

## 2025-06-24 PROCEDURE — 1036F TOBACCO NON-USER: CPT | Performed by: INTERNAL MEDICINE

## 2025-06-24 PROCEDURE — 3074F SYST BP LT 130 MM HG: CPT | Performed by: INTERNAL MEDICINE

## 2025-06-24 PROCEDURE — 1090F PRES/ABSN URINE INCON ASSESS: CPT | Performed by: INTERNAL MEDICINE

## 2025-06-24 PROCEDURE — G8399 PT W/DXA RESULTS DOCUMENT: HCPCS | Performed by: INTERNAL MEDICINE

## 2025-06-24 ASSESSMENT — ENCOUNTER SYMPTOMS
CHEST TIGHTNESS: 0
DIARRHEA: 0
ABDOMINAL PAIN: 0
SHORTNESS OF BREATH: 0
CONSTIPATION: 0
BACK PAIN: 0

## 2025-06-24 NOTE — PROGRESS NOTES
arthralgias and back pain.   Skin:  Negative for rash.   Neurological:  Negative for dizziness and headaches.   Psychiatric/Behavioral:  Negative for agitation. The patient is not nervous/anxious.          Vitals:    06/24/25 1515   BP: 122/72   Pulse: 62   Resp: 16   Temp: 98.1 °F (36.7 °C)   SpO2: 97%       Physical Exam  Constitutional:       Appearance: Normal appearance.   HENT:      Head: Normocephalic.      Right Ear: Tympanic membrane normal.      Left Ear: Tympanic membrane normal.   Cardiovascular:      Rate and Rhythm: Normal rate and regular rhythm.      Heart sounds: No murmur heard.  Pulmonary:      Effort: Pulmonary effort is normal.      Breath sounds: Normal breath sounds. No wheezing.   Abdominal:      General: There is no distension.      Palpations: Abdomen is soft.   Musculoskeletal:         General: No swelling.        Feet:    Feet:      Comments: Swollen, but not hot to the touch  Skin:     General: Skin is warm and dry.   Neurological:      General: No focal deficit present.      Mental Status: She is alert.   Psychiatric:         Mood and Affect: Mood normal.         Behavior: Behavior normal.           Current Outpatient Medications   Medication Sig    colchicine (COLCRYS) 0.6 MG tablet Take 1 tablet by mouth daily Unitl gout resolves.    Loratadine (CLARITIN PO) Take by mouth as needed    blood glucose test strips (FREESTYLE TEST STRIPS) strip TEST BLOOD SUGAR TWO TIMES DAILY    FreeStyle Lancets MISC USE THREE TIMES DAILY    metoprolol succinate (TOPROL XL) 25 MG extended release tablet Take 1 tablet by mouth daily    simvastatin (ZOCOR) 20 MG tablet TAKE 1 TABLET AT BEDTIME FOR CHOLESTEROL    olmesartan (BENICAR) 40 MG tablet TAKE 1 TABLET DAILY    amLODIPine (NORVASC) 2.5 MG tablet TAKE 1 TABLET DAILY    apixaban (ELIQUIS) 5 MG TABS tablet Take 1 tablet by mouth 2 times daily    famotidine (PEPCID) 40 MG tablet Take 1 tablet by mouth daily    Omega-3 Fatty Acids (FISH OIL PO) Take by

## 2025-07-14 ENCOUNTER — OFFICE VISIT (OUTPATIENT)
Facility: CLINIC | Age: 75
End: 2025-07-14
Payer: MEDICARE

## 2025-07-14 VITALS
TEMPERATURE: 98 F | HEIGHT: 60 IN | WEIGHT: 193 LBS | HEART RATE: 58 BPM | SYSTOLIC BLOOD PRESSURE: 113 MMHG | BODY MASS INDEX: 37.89 KG/M2 | OXYGEN SATURATION: 96 % | DIASTOLIC BLOOD PRESSURE: 68 MMHG | RESPIRATION RATE: 16 BRPM

## 2025-07-14 DIAGNOSIS — E11.21 TYPE 2 DIABETES MELLITUS WITH NEPHROPATHY (HCC): ICD-10-CM

## 2025-07-14 DIAGNOSIS — G57.01 PIRIFORMIS SYNDROME OF RIGHT SIDE: ICD-10-CM

## 2025-07-14 DIAGNOSIS — I10 ESSENTIAL (PRIMARY) HYPERTENSION: ICD-10-CM

## 2025-07-14 DIAGNOSIS — M25.511 ACUTE PAIN OF RIGHT SHOULDER: Primary | ICD-10-CM

## 2025-07-14 PROCEDURE — 3078F DIAST BP <80 MM HG: CPT | Performed by: INTERNAL MEDICINE

## 2025-07-14 PROCEDURE — 1125F AMNT PAIN NOTED PAIN PRSNT: CPT | Performed by: INTERNAL MEDICINE

## 2025-07-14 PROCEDURE — 1090F PRES/ABSN URINE INCON ASSESS: CPT | Performed by: INTERNAL MEDICINE

## 2025-07-14 PROCEDURE — 99214 OFFICE O/P EST MOD 30 MIN: CPT | Performed by: INTERNAL MEDICINE

## 2025-07-14 PROCEDURE — G8428 CUR MEDS NOT DOCUMENT: HCPCS | Performed by: INTERNAL MEDICINE

## 2025-07-14 PROCEDURE — G8399 PT W/DXA RESULTS DOCUMENT: HCPCS | Performed by: INTERNAL MEDICINE

## 2025-07-14 PROCEDURE — 1123F ACP DISCUSS/DSCN MKR DOCD: CPT | Performed by: INTERNAL MEDICINE

## 2025-07-14 PROCEDURE — 1036F TOBACCO NON-USER: CPT | Performed by: INTERNAL MEDICINE

## 2025-07-14 PROCEDURE — G8417 CALC BMI ABV UP PARAM F/U: HCPCS | Performed by: INTERNAL MEDICINE

## 2025-07-14 PROCEDURE — 3074F SYST BP LT 130 MM HG: CPT | Performed by: INTERNAL MEDICINE

## 2025-07-14 PROCEDURE — 3051F HG A1C>EQUAL 7.0%<8.0%: CPT | Performed by: INTERNAL MEDICINE

## 2025-07-14 RX ORDER — METHYLPREDNISOLONE 4 MG/1
TABLET ORAL
Qty: 1 KIT | Refills: 0 | Status: SHIPPED | OUTPATIENT
Start: 2025-07-14 | End: 2025-07-20

## 2025-07-14 ASSESSMENT — ENCOUNTER SYMPTOMS
BACK PAIN: 1
SHORTNESS OF BREATH: 0
ABDOMINAL PAIN: 0
CHEST TIGHTNESS: 0
CONSTIPATION: 0
DIARRHEA: 0

## 2025-07-14 NOTE — PROGRESS NOTES
miniscus repair    KNEE SURGERY  2014    OTHER SURGICAL HISTORY  2018    Bladder Botox     OVARY REMOVAL      PELVIC LAPAROSCOPY      OVARIAN CYST REMOVED    TOTAL VAGINAL HYSTERECTOMY Bilateral 10/05/2021    Vaginal hysterectomy, uterosacral ligament suspension, posterior repair, cystoscopy, TVT Exact midurethral sling    UPPER GASTROINTESTINAL ENDOSCOPY      UROLOGICAL SURGERY        Social History     Tobacco Use    Smoking status: Never    Smokeless tobacco: Never   Substance Use Topics    Alcohol use: Not Currently      Family History   Problem Relation Age of Onset    Hypertension Mother     Cancer Sister         Skin cancer    Stroke Mother         TIA    Heart Disease Father          at age 62 after an MI    Thyroid Disease Sister     Hypertension Sister     Eczema Sister     Anesth Problems Neg Hx     Other Sister         MENTAL HEALTH ISSUES    Elevated Lipids Sister     Diabetes Sister         Prediabetes    Asthma Sister     Osteoarthritis Sister     Cancer Paternal Aunt         breast    Breast Cancer Maternal Aunt     Cancer Maternal Aunt         Breast Cancer    Stroke Sister         Mild stroke    Elevated Lipids Sister     Psychiatric Disorder Sister         Borderline Personality Disorder    Asthma Sister     Osteoarthritis Sister     Heart Attack Other     Arthritis Sister     Osteoarthritis Sister     Hypertension Sister     High Cholesterol Sister     Arthritis Sister     Thyroid Disease Mother         Allergies   Allergen Reactions    Ampicillin Other (See Comments)     Reaction Type: Allergy; Reaction(s): ampicillin eye drops - severe itching    Sulfa Antibiotics      Other reaction(s): Unknown (comments)  TOPICAL EYE / EYE MUCH WORSE      Assessment & Plan  1. Right upper thigh pain.  - Symptoms suggest a possible impingement syndrome, likely piriformis syndrome, characterized by nerve impingement by the piriformis muscle in the buttocks.  - Physical exam findings indicate

## 2025-08-13 DIAGNOSIS — E11.21 TYPE 2 DIABETES MELLITUS WITH NEPHROPATHY (HCC): ICD-10-CM

## 2025-08-13 DIAGNOSIS — N18.30 STAGE 3 CHRONIC KIDNEY DISEASE, UNSPECIFIED WHETHER STAGE 3A OR 3B CKD (HCC): ICD-10-CM

## 2025-08-13 LAB
ANION GAP SERPL CALC-SCNC: 13 MMOL/L (ref 2–14)
BUN SERPL-MCNC: 43 MG/DL (ref 8–23)
BUN/CREAT SERPL: 32 (ref 12–20)
CALCIUM SERPL-MCNC: 9.9 MG/DL (ref 8.8–10.2)
CHLORIDE SERPL-SCNC: 107 MMOL/L (ref 98–107)
CO2 SERPL-SCNC: 19 MMOL/L (ref 20–29)
CREAT SERPL-MCNC: 1.35 MG/DL (ref 0.6–1)
CREAT UR-MCNC: 50.3 MG/DL (ref 28–217)
EST. AVERAGE GLUCOSE BLD GHB EST-MCNC: 162 MG/DL
GLUCOSE SERPL-MCNC: 169 MG/DL (ref 65–100)
HBA1C MFR BLD: 7.3 % (ref 4–5.6)
MICROALBUMIN UR-MCNC: <1.2 MG/DL
MICROALBUMIN/CREAT UR-RTO: NORMAL MG/G
POTASSIUM SERPL-SCNC: 4.6 MMOL/L (ref 3.5–5.1)
SODIUM SERPL-SCNC: 139 MMOL/L (ref 136–145)

## 2025-08-14 ENCOUNTER — HOSPITAL ENCOUNTER (OUTPATIENT)
Facility: HOSPITAL | Age: 75
Discharge: HOME | End: 2025-08-17
Payer: MEDICARE

## 2025-08-14 VITALS — BODY MASS INDEX: 37.69 KG/M2 | WEIGHT: 193 LBS

## 2025-08-14 DIAGNOSIS — K44.9 HIATAL HERNIA: ICD-10-CM

## 2025-08-14 DIAGNOSIS — R10.13 EPIGASTRIC DISCOMFORT: ICD-10-CM

## 2025-08-14 DIAGNOSIS — K21.9 GASTROESOPHAGEAL REFLUX DISEASE, UNSPECIFIED WHETHER ESOPHAGITIS PRESENT: ICD-10-CM

## 2025-08-14 PROCEDURE — 74183 MRI ABD W/O CNTR FLWD CNTR: CPT

## 2025-08-14 PROCEDURE — A9575 INJ GADOTERATE MEGLUMI 0.1ML: HCPCS | Performed by: RADIOLOGY

## 2025-08-14 PROCEDURE — 6360000004 HC RX CONTRAST MEDICATION: Performed by: RADIOLOGY

## 2025-08-14 RX ORDER — GADOTERATE MEGLUMINE 376.9 MG/ML
17 INJECTION INTRAVENOUS
Status: COMPLETED | OUTPATIENT
Start: 2025-08-14 | End: 2025-08-14

## 2025-08-14 RX ADMIN — GADOTERATE MEGLUMINE 17 ML: 376.9 INJECTION INTRAVENOUS at 12:25

## 2025-08-15 ENCOUNTER — OFFICE VISIT (OUTPATIENT)
Facility: CLINIC | Age: 75
End: 2025-08-15
Payer: MEDICARE

## 2025-08-15 VITALS
SYSTOLIC BLOOD PRESSURE: 136 MMHG | WEIGHT: 196 LBS | OXYGEN SATURATION: 96 % | TEMPERATURE: 98.1 F | DIASTOLIC BLOOD PRESSURE: 80 MMHG | HEIGHT: 60 IN | RESPIRATION RATE: 16 BRPM | HEART RATE: 56 BPM | BODY MASS INDEX: 38.48 KG/M2

## 2025-08-15 DIAGNOSIS — G57.01 PIRIFORMIS SYNDROME OF RIGHT SIDE: ICD-10-CM

## 2025-08-15 DIAGNOSIS — E11.40 TYPE 2 DIABETES MELLITUS WITH DIABETIC NEUROPATHY, WITHOUT LONG-TERM CURRENT USE OF INSULIN (HCC): ICD-10-CM

## 2025-08-15 DIAGNOSIS — I10 ESSENTIAL (PRIMARY) HYPERTENSION: Primary | ICD-10-CM

## 2025-08-15 DIAGNOSIS — N18.30 STAGE 3 CHRONIC KIDNEY DISEASE, UNSPECIFIED WHETHER STAGE 3A OR 3B CKD (HCC): ICD-10-CM

## 2025-08-15 PROCEDURE — 3075F SYST BP GE 130 - 139MM HG: CPT | Performed by: INTERNAL MEDICINE

## 2025-08-15 PROCEDURE — 1036F TOBACCO NON-USER: CPT | Performed by: INTERNAL MEDICINE

## 2025-08-15 PROCEDURE — 99214 OFFICE O/P EST MOD 30 MIN: CPT | Performed by: INTERNAL MEDICINE

## 2025-08-15 PROCEDURE — 3079F DIAST BP 80-89 MM HG: CPT | Performed by: INTERNAL MEDICINE

## 2025-08-15 PROCEDURE — G8399 PT W/DXA RESULTS DOCUMENT: HCPCS | Performed by: INTERNAL MEDICINE

## 2025-08-15 PROCEDURE — 1123F ACP DISCUSS/DSCN MKR DOCD: CPT | Performed by: INTERNAL MEDICINE

## 2025-08-15 PROCEDURE — G8417 CALC BMI ABV UP PARAM F/U: HCPCS | Performed by: INTERNAL MEDICINE

## 2025-08-15 PROCEDURE — G8428 CUR MEDS NOT DOCUMENT: HCPCS | Performed by: INTERNAL MEDICINE

## 2025-08-15 PROCEDURE — 1090F PRES/ABSN URINE INCON ASSESS: CPT | Performed by: INTERNAL MEDICINE

## 2025-08-15 PROCEDURE — 3051F HG A1C>EQUAL 7.0%<8.0%: CPT | Performed by: INTERNAL MEDICINE

## 2025-08-15 RX ORDER — METFORMIN HYDROCHLORIDE 500 MG/1
500 TABLET, EXTENDED RELEASE ORAL
Qty: 90 TABLET | Refills: 1 | Status: SHIPPED | OUTPATIENT
Start: 2025-08-15

## 2025-08-15 ASSESSMENT — ENCOUNTER SYMPTOMS
DIARRHEA: 0
ABDOMINAL PAIN: 0
BACK PAIN: 0
SHORTNESS OF BREATH: 0
CONSTIPATION: 0
CHEST TIGHTNESS: 0

## 2025-08-26 DIAGNOSIS — I48.0 PAF (PAROXYSMAL ATRIAL FIBRILLATION) (HCC): ICD-10-CM

## 2025-08-26 RX ORDER — APIXABAN 5 MG/1
5 TABLET, FILM COATED ORAL 2 TIMES DAILY
Qty: 180 TABLET | Refills: 3 | Status: SHIPPED | OUTPATIENT
Start: 2025-08-26

## (undated) DEVICE — SYR 10ML CTRL LR LCK NSAF LF --

## (undated) DEVICE — BLADE ASSEMB CLP HAIR FINE --

## (undated) DEVICE — COVER,TABLE,60X90,STERILE: Brand: MEDLINE

## (undated) DEVICE — AGENT HEMSTAT W2XL14IN OXIDIZED REGENERATED CELOS ABSRB FOR

## (undated) DEVICE — GLOVE SURG SZ 65 L12IN FNGR THK94MIL STD WHT LTX FREE

## (undated) DEVICE — SPONGE LAPAROTOMY W4XL18IN WHT STRUNG RADPQ PREWASHED ST

## (undated) DEVICE — NEEDLE SUT SZ 6 S STL MAYO CATGUT 1/2 CIR TAPR PNT DISP

## (undated) DEVICE — HOOK RETRCT L5MM E SHRP SELF RET SYS LONE STAR

## (undated) DEVICE — STRAP RESTRAIN W3.5XL19IN TECLIN STRRP POS LEG DURING LITH

## (undated) DEVICE — TRAY PREP DRY W/ PREM GLV 2 APPL 6 SPNG 2 UNDPD 1 OVERWRAP

## (undated) DEVICE — SUTURE MCRYL SZ 3-0 L27IN ABSRB UD L19MM PS-2 3/8 CIR PRIM Y427H

## (undated) DEVICE — GOWN,PREVENTION PLUS,XLN/2XL,ST,22/CS: Brand: MEDLINE

## (undated) DEVICE — FORCEPS BX L240CM JAW DIA2.4MM ORNG L CAP W/ NDL DISP RAD

## (undated) DEVICE — TOWEL SURG W17XL27IN STD BLU COT NONFENESTRATED PREWASHED

## (undated) DEVICE — DERMABOND SKIN ADH 0.7ML -- DERMABOND ADVANCED 12/BX

## (undated) DEVICE — YANKAUER,BULB TIP,W/O VENT,RIGID,STERILE: Brand: MEDLINE

## (undated) DEVICE — GAUZE PK VAG XR DTECT STERIL 2INX9FT

## (undated) DEVICE — TOTAL TRAY, DB, 100% SILI FOLEY, 16FR 10: Brand: MEDLINE

## (undated) DEVICE — PENCIL SMK EVAC 10 FT BLADE ELECTRD ROCKER FOR TELSCP

## (undated) DEVICE — SUTURE PDS II SZ 3-0 L27IN ABSRB VLT L26MM SH 1/2 CIR Z316H

## (undated) DEVICE — SOLUTION IRRIG 1000ML 0.9% SOD CHL USP POUR PLAS BTL

## (undated) DEVICE — DRAPE SURG CYSTO N REINF ST W O FLD PCH STD

## (undated) DEVICE — SUTURE VCRL SZ 0 L18IN ABSRB VLT L26MM CT-2 1/2 CIR J727D

## (undated) DEVICE — SYR 20ML LL STRL LF --

## (undated) DEVICE — BASIN ST MAJOR-NO CAUTERY: Brand: MEDLINE INDUSTRIES, INC.

## (undated) DEVICE — SUPPLEMENT DIGESTIVE H2O SOL GI-EASE

## (undated) DEVICE — SUTURE MCRYL SZ 4-0 L27IN ABSRB UD L19MM PS-2 1/2 CIR PRIM Y426H

## (undated) DEVICE — SUTURE CV-2 36IN TH-26 DA 1DZ 2N02A

## (undated) DEVICE — SUTURE PDS II SZ 2-0 L27IN ABSRB VLT L26MM CT-2 1/2 CIR Z333H

## (undated) DEVICE — GARMENT,MEDLINE,DVT,INT,CALF,MED, GEN2: Brand: MEDLINE

## (undated) DEVICE — GLOVE SURG SZ 65 L12IN FNGR THK79MIL GRN LTX FREE

## (undated) DEVICE — HANDLE LT SNAP ON ULT DURABLE LENS FOR TRUMPF ALC DISPOSABLE

## (undated) DEVICE — SHEET,DRAPE,UNDERBUTTOCK,GRAD POUCH,PORT: Brand: MEDLINE

## (undated) DEVICE — NEEDLE SPNL 22GA L3.5IN BLK HUB S STL REG WALL FIT STYL W/

## (undated) DEVICE — SUTURE VCRL SZ 2-0 L27IN ABSRB UD L26MM SH 1/2 CIR J417H

## (undated) DEVICE — PAD,SANITARY,11 IN,MAXI,N-STRL,IND WRAP: Brand: MEDLINE

## (undated) DEVICE — ROCKER SWITCH PENCIL BLADE ELECTRODE, HOLSTER: Brand: EDGE

## (undated) DEVICE — REM POLYHESIVE ADULT PATIENT RETURN ELECTRODE: Brand: VALLEYLAB

## (undated) DEVICE — SPONGE GZ W4XL4IN COT RADPQ HIGHLY ABSRB

## (undated) DEVICE — SOLUTION IRRIGATION H2O 0797305] ICU MEDICAL INC]

## (undated) DEVICE — CYSTO/BLADDER IRRIGATION SET, REGULATING CLAMP

## (undated) DEVICE — PACK,BASIC,SIRUS,V: Brand: MEDLINE